# Patient Record
Sex: MALE | Race: WHITE | Employment: OTHER | URBAN - METROPOLITAN AREA
[De-identification: names, ages, dates, MRNs, and addresses within clinical notes are randomized per-mention and may not be internally consistent; named-entity substitution may affect disease eponyms.]

---

## 2018-12-11 ENCOUNTER — HOSPITAL ENCOUNTER (OUTPATIENT)
Age: 83
Setting detail: OBSERVATION
Discharge: HOME | End: 2018-12-12
Attending: EMERGENCY MEDICINE | Admitting: INTERNAL MEDICINE
Payer: MEDICARE

## 2018-12-11 ENCOUNTER — APPOINTMENT (OUTPATIENT)
Dept: CT IMAGING | Age: 83
End: 2018-12-11
Payer: MEDICARE

## 2018-12-11 DIAGNOSIS — S30.0XXA TRAUMATIC HEMATOMA OF BUTTOCK, INITIAL ENCOUNTER: Primary | ICD-10-CM

## 2018-12-11 DIAGNOSIS — Z79.01 ON ANTICOAGULANT THERAPY: ICD-10-CM

## 2018-12-11 PROBLEM — E11.9 DMII (DIABETES MELLITUS, TYPE 2) (HCC): Status: ACTIVE | Noted: 2018-12-11

## 2018-12-11 PROBLEM — E78.5 HYPERLIPIDEMIA: Status: ACTIVE | Noted: 2018-12-11

## 2018-12-11 PROBLEM — T14.8XXA HEMATOMA: Status: ACTIVE | Noted: 2018-12-11

## 2018-12-11 LAB
A/G RATIO: 1.5 (ref 1.1–2.2)
ABO/RH: NORMAL
ALBUMIN SERPL-MCNC: 4 G/DL (ref 3.4–5)
ALP BLD-CCNC: 82 U/L (ref 40–129)
ALT SERPL-CCNC: 12 U/L (ref 10–40)
ANION GAP SERPL CALCULATED.3IONS-SCNC: 17 MMOL/L (ref 3–16)
ANTIBODY SCREEN: NORMAL
APTT: 31.8 SEC (ref 26–36)
AST SERPL-CCNC: 13 U/L (ref 15–37)
BASOPHILS ABSOLUTE: 0.1 K/UL (ref 0–0.2)
BASOPHILS RELATIVE PERCENT: 0.5 %
BILIRUB SERPL-MCNC: 1.6 MG/DL (ref 0–1)
BUN BLDV-MCNC: 13 MG/DL (ref 7–20)
CALCIUM SERPL-MCNC: 9 MG/DL (ref 8.3–10.6)
CHLORIDE BLD-SCNC: 102 MMOL/L (ref 99–110)
CO2: 23 MMOL/L (ref 21–32)
CREAT SERPL-MCNC: 0.7 MG/DL (ref 0.8–1.3)
EOSINOPHILS ABSOLUTE: 0.1 K/UL (ref 0–0.6)
EOSINOPHILS RELATIVE PERCENT: 0.5 %
GFR AFRICAN AMERICAN: >60
GFR NON-AFRICAN AMERICAN: >60
GLOBULIN: 2.6 G/DL
GLUCOSE BLD-MCNC: 156 MG/DL (ref 70–99)
GLUCOSE BLD-MCNC: 174 MG/DL (ref 70–99)
HCT VFR BLD CALC: 40.1 % (ref 40.5–52.5)
HCT VFR BLD CALC: 44.6 % (ref 40.5–52.5)
HEMOGLOBIN: 13.3 G/DL (ref 13.5–17.5)
HEMOGLOBIN: 14.9 G/DL (ref 13.5–17.5)
INR BLD: 1.42 (ref 0.86–1.14)
LYMPHOCYTES ABSOLUTE: 1 K/UL (ref 1–5.1)
LYMPHOCYTES RELATIVE PERCENT: 9 %
MCH RBC QN AUTO: 31.3 PG (ref 26–34)
MCHC RBC AUTO-ENTMCNC: 33.4 G/DL (ref 31–36)
MCV RBC AUTO: 93.7 FL (ref 80–100)
MONOCYTES ABSOLUTE: 1 K/UL (ref 0–1.3)
MONOCYTES RELATIVE PERCENT: 9.5 %
NEUTROPHILS ABSOLUTE: 8.9 K/UL (ref 1.7–7.7)
NEUTROPHILS RELATIVE PERCENT: 80.5 %
PDW BLD-RTO: 13.7 % (ref 12.4–15.4)
PERFORMED ON: ABNORMAL
PLATELET # BLD: 255 K/UL (ref 135–450)
PMV BLD AUTO: 9 FL (ref 5–10.5)
POTASSIUM SERPL-SCNC: 4.6 MMOL/L (ref 3.5–5.1)
PROTHROMBIN TIME: 16.2 SEC (ref 9.8–13)
RBC # BLD: 4.76 M/UL (ref 4.2–5.9)
SODIUM BLD-SCNC: 142 MMOL/L (ref 136–145)
TOTAL PROTEIN: 6.6 G/DL (ref 6.4–8.2)
WBC # BLD: 11 K/UL (ref 4–11)

## 2018-12-11 PROCEDURE — 80053 COMPREHEN METABOLIC PANEL: CPT

## 2018-12-11 PROCEDURE — G0378 HOSPITAL OBSERVATION PER HR: HCPCS

## 2018-12-11 PROCEDURE — 85610 PROTHROMBIN TIME: CPT

## 2018-12-11 PROCEDURE — 36415 COLL VENOUS BLD VENIPUNCTURE: CPT

## 2018-12-11 PROCEDURE — 85730 THROMBOPLASTIN TIME PARTIAL: CPT

## 2018-12-11 PROCEDURE — 51798 US URINE CAPACITY MEASURE: CPT

## 2018-12-11 PROCEDURE — 2580000003 HC RX 258: Performed by: INTERNAL MEDICINE

## 2018-12-11 PROCEDURE — 96360 HYDRATION IV INFUSION INIT: CPT

## 2018-12-11 PROCEDURE — 85018 HEMOGLOBIN: CPT

## 2018-12-11 PROCEDURE — 85025 COMPLETE CBC W/AUTO DIFF WBC: CPT

## 2018-12-11 PROCEDURE — 2580000003 HC RX 258: Performed by: PHYSICIAN ASSISTANT

## 2018-12-11 PROCEDURE — 86850 RBC ANTIBODY SCREEN: CPT

## 2018-12-11 PROCEDURE — 86900 BLOOD TYPING SEROLOGIC ABO: CPT

## 2018-12-11 PROCEDURE — 6370000000 HC RX 637 (ALT 250 FOR IP): Performed by: INTERNAL MEDICINE

## 2018-12-11 PROCEDURE — 96361 HYDRATE IV INFUSION ADD-ON: CPT

## 2018-12-11 PROCEDURE — 6360000004 HC RX CONTRAST MEDICATION: Performed by: EMERGENCY MEDICINE

## 2018-12-11 PROCEDURE — 86901 BLOOD TYPING SEROLOGIC RH(D): CPT

## 2018-12-11 PROCEDURE — 99285 EMERGENCY DEPT VISIT HI MDM: CPT

## 2018-12-11 PROCEDURE — 6370000000 HC RX 637 (ALT 250 FOR IP): Performed by: PHYSICIAN ASSISTANT

## 2018-12-11 PROCEDURE — 72193 CT PELVIS W/DYE: CPT

## 2018-12-11 PROCEDURE — 85014 HEMATOCRIT: CPT

## 2018-12-11 RX ORDER — DUTASTERIDE 0.5 MG/1
0.5 CAPSULE, LIQUID FILLED ORAL DAILY
COMMUNITY
End: 2019-07-24

## 2018-12-11 RX ORDER — MECLIZINE HYDROCHLORIDE 25 MG/1
25 TABLET ORAL 3 TIMES DAILY PRN
Status: CANCELLED | OUTPATIENT
Start: 2018-12-11

## 2018-12-11 RX ORDER — TAMSULOSIN HYDROCHLORIDE 0.4 MG/1
0.4 CAPSULE ORAL DAILY
Status: DISCONTINUED | OUTPATIENT
Start: 2018-12-11 | End: 2018-12-12 | Stop reason: HOSPADM

## 2018-12-11 RX ORDER — LISINOPRIL 10 MG/1
10 TABLET ORAL DAILY
Status: DISCONTINUED | OUTPATIENT
Start: 2018-12-12 | End: 2018-12-12 | Stop reason: HOSPADM

## 2018-12-11 RX ORDER — ACETAMINOPHEN 325 MG/1
650 TABLET ORAL EVERY 4 HOURS PRN
Status: DISCONTINUED | OUTPATIENT
Start: 2018-12-11 | End: 2018-12-12 | Stop reason: HOSPADM

## 2018-12-11 RX ORDER — NICOTINE POLACRILEX 4 MG
15 LOZENGE BUCCAL PRN
Status: DISCONTINUED | OUTPATIENT
Start: 2018-12-11 | End: 2018-12-12 | Stop reason: HOSPADM

## 2018-12-11 RX ORDER — DOCUSATE SODIUM 100 MG/1
100 CAPSULE, LIQUID FILLED ORAL 2 TIMES DAILY PRN
Status: DISCONTINUED | OUTPATIENT
Start: 2018-12-11 | End: 2018-12-12 | Stop reason: HOSPADM

## 2018-12-11 RX ORDER — BISACODYL 10 MG
10 SUPPOSITORY, RECTAL RECTAL DAILY PRN
Status: DISCONTINUED | OUTPATIENT
Start: 2018-12-11 | End: 2018-12-12 | Stop reason: HOSPADM

## 2018-12-11 RX ORDER — HYDROCODONE BITARTRATE AND ACETAMINOPHEN 5; 325 MG/1; MG/1
1 TABLET ORAL EVERY 6 HOURS PRN
Status: DISCONTINUED | OUTPATIENT
Start: 2018-12-11 | End: 2018-12-12

## 2018-12-11 RX ORDER — HYDROCODONE BITARTRATE AND ACETAMINOPHEN 5; 325 MG/1; MG/1
1 TABLET ORAL ONCE
Status: COMPLETED | OUTPATIENT
Start: 2018-12-11 | End: 2018-12-11

## 2018-12-11 RX ORDER — SODIUM CHLORIDE 0.9 % (FLUSH) 0.9 %
10 SYRINGE (ML) INJECTION PRN
Status: DISCONTINUED | OUTPATIENT
Start: 2018-12-11 | End: 2018-12-12 | Stop reason: HOSPADM

## 2018-12-11 RX ORDER — ONDANSETRON 2 MG/ML
4 INJECTION INTRAMUSCULAR; INTRAVENOUS EVERY 4 HOURS PRN
Status: DISCONTINUED | OUTPATIENT
Start: 2018-12-11 | End: 2018-12-12 | Stop reason: HOSPADM

## 2018-12-11 RX ORDER — SODIUM CHLORIDE 9 MG/ML
1000 INJECTION, SOLUTION INTRAVENOUS CONTINUOUS
Status: DISCONTINUED | OUTPATIENT
Start: 2018-12-11 | End: 2018-12-12 | Stop reason: HOSPADM

## 2018-12-11 RX ORDER — DEXTROSE MONOHYDRATE 25 G/50ML
12.5 INJECTION, SOLUTION INTRAVENOUS PRN
Status: DISCONTINUED | OUTPATIENT
Start: 2018-12-11 | End: 2018-12-12 | Stop reason: HOSPADM

## 2018-12-11 RX ORDER — SIMVASTATIN 20 MG
20 TABLET ORAL NIGHTLY
Status: DISCONTINUED | OUTPATIENT
Start: 2018-12-11 | End: 2018-12-12 | Stop reason: HOSPADM

## 2018-12-11 RX ORDER — FAMOTIDINE 20 MG/1
20 TABLET, FILM COATED ORAL 2 TIMES DAILY
Status: DISCONTINUED | OUTPATIENT
Start: 2018-12-11 | End: 2018-12-12 | Stop reason: HOSPADM

## 2018-12-11 RX ORDER — DEXTROSE MONOHYDRATE 50 MG/ML
100 INJECTION, SOLUTION INTRAVENOUS PRN
Status: DISCONTINUED | OUTPATIENT
Start: 2018-12-11 | End: 2018-12-12 | Stop reason: HOSPADM

## 2018-12-11 RX ORDER — SODIUM CHLORIDE 0.9 % (FLUSH) 0.9 %
10 SYRINGE (ML) INJECTION EVERY 12 HOURS SCHEDULED
Status: DISCONTINUED | OUTPATIENT
Start: 2018-12-11 | End: 2018-12-12 | Stop reason: HOSPADM

## 2018-12-11 RX ADMIN — HYDROCODONE BITARTRATE AND ACETAMINOPHEN 1 TABLET: 5; 325 TABLET ORAL at 15:37

## 2018-12-11 RX ADMIN — HYDROCODONE BITARTRATE AND ACETAMINOPHEN 1 TABLET: 5; 325 TABLET ORAL at 20:46

## 2018-12-11 RX ADMIN — IOPAMIDOL 75 ML: 755 INJECTION, SOLUTION INTRAVENOUS at 15:44

## 2018-12-11 RX ADMIN — SODIUM CHLORIDE 1000 ML: 9 INJECTION, SOLUTION INTRAVENOUS at 16:52

## 2018-12-11 RX ADMIN — SIMVASTATIN 20 MG: 20 TABLET, FILM COATED ORAL at 20:38

## 2018-12-11 RX ADMIN — FAMOTIDINE 20 MG: 20 TABLET ORAL at 20:38

## 2018-12-11 RX ADMIN — TAMSULOSIN HYDROCHLORIDE 0.4 MG: 0.4 CAPSULE ORAL at 20:38

## 2018-12-11 RX ADMIN — SODIUM CHLORIDE 1000 ML: 9 INJECTION, SOLUTION INTRAVENOUS at 20:38

## 2018-12-11 RX ADMIN — Medication 10 ML: at 20:44

## 2018-12-11 ASSESSMENT — PAIN DESCRIPTION - ORIENTATION: ORIENTATION: LEFT

## 2018-12-11 ASSESSMENT — PAIN DESCRIPTION - LOCATION
LOCATION: BUTTOCKS
LOCATION: BUTTOCKS;LEG
LOCATION: BUTTOCKS

## 2018-12-11 ASSESSMENT — PAIN SCALES - GENERAL
PAINLEVEL_OUTOF10: 8
PAINLEVEL_OUTOF10: 8
PAINLEVEL_OUTOF10: 6
PAINLEVEL_OUTOF10: 6
PAINLEVEL_OUTOF10: 10
PAINLEVEL_OUTOF10: 8

## 2018-12-11 ASSESSMENT — ENCOUNTER SYMPTOMS
NAUSEA: 0
SHORTNESS OF BREATH: 0
ABDOMINAL PAIN: 0
VOMITING: 0

## 2018-12-11 ASSESSMENT — PAIN DESCRIPTION - PAIN TYPE: TYPE: ACUTE PAIN

## 2018-12-11 NOTE — ED TRIAGE NOTES
Pt to ED with c/o of pain from a fall. Pt states that he fell a week and a half ago but fell again last night around 0300. States that he began having bad pain in his left buttocks following the most resent fall. Pt has a hematoma on his left buttocks down his left leg and into his scrotum. Pt is A&O. States pain is 8/10 but has been 10/10 since the fall.

## 2018-12-11 NOTE — ED NOTES
Pt bladder scanned 411 ml in bladder. Pt states that he does not feel like he needs to urinate at the moment. Pt has urinated 200 ml recently. [facility] notified.       Alon Hickman RN  12/11/18 [id number]

## 2018-12-12 VITALS
DIASTOLIC BLOOD PRESSURE: 83 MMHG | RESPIRATION RATE: 20 BRPM | OXYGEN SATURATION: 96 % | TEMPERATURE: 98.4 F | HEART RATE: 81 BPM | WEIGHT: 176.37 LBS | HEIGHT: 72 IN | BODY MASS INDEX: 23.89 KG/M2 | SYSTOLIC BLOOD PRESSURE: 147 MMHG

## 2018-12-12 LAB
ANION GAP SERPL CALCULATED.3IONS-SCNC: 9 MMOL/L (ref 3–16)
BASOPHILS ABSOLUTE: 0.1 K/UL (ref 0–0.2)
BASOPHILS RELATIVE PERCENT: 0.7 %
BUN BLDV-MCNC: 11 MG/DL (ref 7–20)
CALCIUM SERPL-MCNC: 8.5 MG/DL (ref 8.3–10.6)
CHLORIDE BLD-SCNC: 107 MMOL/L (ref 99–110)
CO2: 27 MMOL/L (ref 21–32)
CREAT SERPL-MCNC: 0.6 MG/DL (ref 0.8–1.3)
EOSINOPHILS ABSOLUTE: 0.1 K/UL (ref 0–0.6)
EOSINOPHILS RELATIVE PERCENT: 1.7 %
GFR AFRICAN AMERICAN: >60
GFR NON-AFRICAN AMERICAN: >60
GLUCOSE BLD-MCNC: 144 MG/DL (ref 70–99)
GLUCOSE BLD-MCNC: 162 MG/DL (ref 70–99)
GLUCOSE BLD-MCNC: 180 MG/DL (ref 70–99)
HCT VFR BLD CALC: 42.1 % (ref 40.5–52.5)
HEMOGLOBIN: 14 G/DL (ref 13.5–17.5)
LYMPHOCYTES ABSOLUTE: 1.3 K/UL (ref 1–5.1)
LYMPHOCYTES RELATIVE PERCENT: 18 %
MCH RBC QN AUTO: 31.6 PG (ref 26–34)
MCHC RBC AUTO-ENTMCNC: 33.4 G/DL (ref 31–36)
MCV RBC AUTO: 94.8 FL (ref 80–100)
MONOCYTES ABSOLUTE: 0.9 K/UL (ref 0–1.3)
MONOCYTES RELATIVE PERCENT: 12 %
NEUTROPHILS ABSOLUTE: 5 K/UL (ref 1.7–7.7)
NEUTROPHILS RELATIVE PERCENT: 67.6 %
PDW BLD-RTO: 13.5 % (ref 12.4–15.4)
PERFORMED ON: ABNORMAL
PERFORMED ON: ABNORMAL
PLATELET # BLD: 231 K/UL (ref 135–450)
PMV BLD AUTO: 9 FL (ref 5–10.5)
POTASSIUM REFLEX MAGNESIUM: 4.2 MMOL/L (ref 3.5–5.1)
RBC # BLD: 4.44 M/UL (ref 4.2–5.9)
SODIUM BLD-SCNC: 143 MMOL/L (ref 136–145)
WBC # BLD: 7.4 K/UL (ref 4–11)

## 2018-12-12 PROCEDURE — 97162 PT EVAL MOD COMPLEX 30 MIN: CPT

## 2018-12-12 PROCEDURE — 97530 THERAPEUTIC ACTIVITIES: CPT

## 2018-12-12 PROCEDURE — 97110 THERAPEUTIC EXERCISES: CPT

## 2018-12-12 PROCEDURE — G8987 SELF CARE CURRENT STATUS: HCPCS

## 2018-12-12 PROCEDURE — 99219 PR INITIAL OBSERVATION CARE/DAY 50 MINUTES: CPT | Performed by: SURGERY

## 2018-12-12 PROCEDURE — 97166 OT EVAL MOD COMPLEX 45 MIN: CPT

## 2018-12-12 PROCEDURE — G8979 MOBILITY GOAL STATUS: HCPCS

## 2018-12-12 PROCEDURE — G8978 MOBILITY CURRENT STATUS: HCPCS

## 2018-12-12 PROCEDURE — 36415 COLL VENOUS BLD VENIPUNCTURE: CPT

## 2018-12-12 PROCEDURE — G0378 HOSPITAL OBSERVATION PER HR: HCPCS

## 2018-12-12 PROCEDURE — 6370000000 HC RX 637 (ALT 250 FOR IP): Performed by: NURSE PRACTITIONER

## 2018-12-12 PROCEDURE — 6370000000 HC RX 637 (ALT 250 FOR IP): Performed by: INTERNAL MEDICINE

## 2018-12-12 PROCEDURE — 85025 COMPLETE CBC W/AUTO DIFF WBC: CPT

## 2018-12-12 PROCEDURE — 97535 SELF CARE MNGMENT TRAINING: CPT

## 2018-12-12 PROCEDURE — 2580000003 HC RX 258: Performed by: INTERNAL MEDICINE

## 2018-12-12 PROCEDURE — 2580000003 HC RX 258: Performed by: PHYSICIAN ASSISTANT

## 2018-12-12 PROCEDURE — G8988 SELF CARE GOAL STATUS: HCPCS

## 2018-12-12 PROCEDURE — 97116 GAIT TRAINING THERAPY: CPT

## 2018-12-12 PROCEDURE — 80048 BASIC METABOLIC PNL TOTAL CA: CPT

## 2018-12-12 RX ORDER — HYDROCODONE BITARTRATE AND ACETAMINOPHEN 5; 325 MG/1; MG/1
2 TABLET ORAL EVERY 4 HOURS PRN
Status: DISCONTINUED | OUTPATIENT
Start: 2018-12-12 | End: 2018-12-12 | Stop reason: HOSPADM

## 2018-12-12 RX ORDER — HYDROCODONE BITARTRATE AND ACETAMINOPHEN 5; 325 MG/1; MG/1
2 TABLET ORAL EVERY 6 HOURS PRN
Qty: 24 TABLET | Refills: 0 | Status: SHIPPED | OUTPATIENT
Start: 2018-12-12 | End: 2018-12-15

## 2018-12-12 RX ORDER — HYDROCODONE BITARTRATE AND ACETAMINOPHEN 5; 325 MG/1; MG/1
1 TABLET ORAL EVERY 6 HOURS PRN
Qty: 12 TABLET | Refills: 0 | Status: SHIPPED | OUTPATIENT
Start: 2018-12-12 | End: 2018-12-12

## 2018-12-12 RX ORDER — HYDROCODONE BITARTRATE AND ACETAMINOPHEN 5; 325 MG/1; MG/1
1 TABLET ORAL EVERY 4 HOURS PRN
Status: DISCONTINUED | OUTPATIENT
Start: 2018-12-12 | End: 2018-12-12 | Stop reason: HOSPADM

## 2018-12-12 RX ADMIN — Medication 10 ML: at 08:30

## 2018-12-12 RX ADMIN — HYDROCODONE BITARTRATE AND ACETAMINOPHEN 2 TABLET: 5; 325 TABLET ORAL at 15:16

## 2018-12-12 RX ADMIN — LISINOPRIL 10 MG: 10 TABLET ORAL at 08:29

## 2018-12-12 RX ADMIN — HYDROCODONE BITARTRATE AND ACETAMINOPHEN 2 TABLET: 5; 325 TABLET ORAL at 10:43

## 2018-12-12 RX ADMIN — TAMSULOSIN HYDROCHLORIDE 0.4 MG: 0.4 CAPSULE ORAL at 08:29

## 2018-12-12 RX ADMIN — FAMOTIDINE 20 MG: 20 TABLET ORAL at 08:29

## 2018-12-12 RX ADMIN — HYDROCODONE BITARTRATE AND ACETAMINOPHEN 1 TABLET: 5; 325 TABLET ORAL at 06:12

## 2018-12-12 RX ADMIN — SODIUM CHLORIDE 1000 ML: 9 INJECTION, SOLUTION INTRAVENOUS at 04:30

## 2018-12-12 ASSESSMENT — PAIN SCALES - GENERAL
PAINLEVEL_OUTOF10: 8
PAINLEVEL_OUTOF10: 6
PAINLEVEL_OUTOF10: 8
PAINLEVEL_OUTOF10: 9
PAINLEVEL_OUTOF10: 7

## 2018-12-12 ASSESSMENT — PAIN DESCRIPTION - LOCATION
LOCATION: BUTTOCKS;LEG
LOCATION: BUTTOCKS;LEG

## 2018-12-12 ASSESSMENT — PAIN DESCRIPTION - PAIN TYPE: TYPE: ACUTE PAIN

## 2018-12-12 ASSESSMENT — PAIN DESCRIPTION - ORIENTATION
ORIENTATION: LEFT
ORIENTATION: LEFT

## 2018-12-12 ASSESSMENT — PAIN DESCRIPTION - DESCRIPTORS: DESCRIPTORS: SORE

## 2018-12-12 NOTE — PROGRESS NOTES
has been getting assistance for toileting, dressing. Typically pt is independent with all.)  Homemaking Assistance: Needs assistance (pt does cooking, laundry. Family does cleaning.)  Ambulation Assistance: Independent (with cane at baseline -- fall in July and then 2 recent falls with hematoma on left hip)  Transfer Assistance: Independent  Active : Yes  Additional Comments: Pt's spouse passed away ~3 monhts ago. Pt has a history of vertigo and a-fib as well as plantar fasciitis in feet       Objective   Vision: Within Functional Limits  Hearing: Within functional limits    Orientation  Overall Orientation Status: Within Functional Limits     Standing Balance  Sit to stand: Stand by assistance (pt used cane and pushing on bed for sit to stand, then rollator to walk)  ADL  Feeding: Independent  Grooming: Setup  UE Bathing:  (NT, expect Mod A needed)  UE Dressing: Minimal assistance  LE Dressing: Maximum assistance  Toileting: Maximum assistance        Bed mobility  Supine to Sit: Supervision  Sit to Supine: Supervision  Transfers  Stand Step Transfers: Stand by assistance (SBA to Supervision with rolling walker, for bed to hallway and back)  Sit to stand: Stand by assistance (pt used cane and pushing on bed for sit to stand, then rollator to walk)  Transfer Comments: Declined up to chair. Air cushion placed in recliner for him to try later. Cognition  Overall Cognitive Status: WFL        Sensation  Overall Sensation Status: Impaired (tingling in left posterior leg after heel-slide exercise in supine)        LUE AROM (degrees)  LUE AROM : WFL  RUE AROM (degrees)  RUE AROM : WFL                 Assessment   Performance deficits / Impairments: Decreased functional mobility ; Decreased ADL status; Decreased high-level IADLs  Prognosis: Good  Decision Making: Medium Complexity  History: hx A-fib, vertigo, diabetes, now with fall and large L buttock and leg hematoma  Exam: self care, mobility  Assistance /

## 2018-12-12 NOTE — DISCHARGE SUMMARY
medications which have CHANGED    Details   rivaroxaban (XARELTO) 20 MG TABS tablet Take 1 tablet by mouth daily (with breakfast) Please hold per cardiology  Qty: 30 tablet, Refills: 0           Current Discharge Medication List      CONTINUE these medications which have NOT CHANGED    Details   dutasteride (AVODART) 0.5 MG capsule Take 0.5 mg by mouth daily      metFORMIN (GLUCOPHAGE) 500 MG tablet Take 1,000 mg by mouth daily (with breakfast)      lisinopril (PRINIVIL;ZESTRIL) 10 MG tablet Take 10 mg by mouth daily. tamsulosin (FLOMAX) 0.4 MG capsule Take 0.4 mg by mouth nightly       lovastatin (MEVACOR) 40 MG tablet Take 40 mg by mouth nightly. Current Discharge Medication List      STOP taking these medications       meclizine (ANTIVERT) 25 MG tablet Comments:   Reason for Stopping:         etodolac (LODINE) 400 MG tablet Comments:   Reason for Stopping:         glipiZIDE (GLUCOTROL) 5 MG tablet Comments:   Reason for Stopping: Follow-up appointments:  one week    Provider Follow-up:    pcp    Condition at Discharge:  Stable    The patient was seen and examined on day of discharge and this discharge summary is in conjunction with any daily progress note from day of discharge. Time Spent on discharge is 45 minutes  in the examination, evaluation, counseling and review of medications and discharge plan. Signed:    Khushboo Mckeon DO   12/12/2018      Thank you Luis Herrera MD for the opportunity to be involved in this patient's care. If you have any questions or concerns please feel free to contact me at [phone].

## 2018-12-12 NOTE — PROGRESS NOTES
includes Appendectomy; hernia repair; Lung surgery; and Testicle surgery.        Restrictions  Restrictions/Precautions: Fall Risk (extensive hematoma in left buttock)    Vision/Hearing  Vision: Within Functional Limits  Hearing: Within functional limits    SUBJECTIVE:  Chart Reviewed: Yes  Patient assessed for rehabilitation services?: Yes  Additional Pertinent Hx: Erika Azevedo is an 80 y.o. M who presented to [address] [facility] ED 12/11/18 with hematoma on right buttocks from fall 1 week ago (pt does take Xarelto). Referring Practitioner: Nikolas Pathak MD  Diagnosis: Traumatic hematoma of buttock     Subjective: Pt supine in bed with head elevated; dtr in room visiting. Pt is verbose and somewhat tangential; able to be redirected. Patient Currently in Pain: Yes  Pain Assessment: 0-10  Pain Level: 8 (6 at rest and 8 with mobility)  Pain Type: Acute pain  Pain Location: Buttocks, Leg  Pain Orientation: Left  Pain Descriptors: Sore  Pain Intervention(s): Medication (see eMar)  Response to Pain Intervention: None (pt states norco not helping)     Orientation  Overall Orientation Status: Within Functional Limits  Orientation Level: Oriented X4  Cognition  WFL     Social/Functional History  Social/Functional History  Lives With: Alone  Type of Home: House  Home Layout: One level  Home Access: Ramped entrance  Bathroom Shower/Tub: Walk-in shower  Bathroom Toilet: Standard  Bathroom Equipment: Shower chair, 3-in-1 commode  Bathroom Accessibility: Walker accessible  Home Equipment: 4 wheeled walker, [employer] Help From: Family (dtr stops by every morning before work since pt fell; granddtr checks to make sure pt gets into bed at night)  ADL Assistance: Needs assistance (Since hematoma has been getting assistance for toileting, dressing. Typically pt is independent with all.)  Homemaking Assistance: Needs assistance (pt does cooking, laundry.  Family does cleaning.)  Ambulation Assistance: Independent (with cane at baseline -- fall in July and then 2 recent falls with hematoma on left hip)  Transfer Assistance: Independent  Active : Yes  Additional Comments: Pt's spouse passed away ~3 monhts ago. Pt has a history of vertigo and a-fib as well as plantar fasciitis in feet       OBJECTIVE:  OBSERVATIONS  Observation: Purple ecchymosis throughout entire left gluteal area and proximal posterior thigh (also extending into scrotum). ROM  RLE PROM: WFL     LLE PROM: Exceptions  LLE General PROM: hip flexion to ~50*, knee flexion to ~90*, full knee ext and ankle DF/PF       STRENGTH  Strength RLE: Exception  Comment: hip 3-/5, knee 4-/5, ankle 5/5     Strength LLE: WFL  Comment: 5/5 hip/knee/ankle       Bed mobility  Supine to Sit: Supervision (HOB flat, no rail)  Sit to Supine: Supervision (HOB flat, pt lifting left leg into bed with hands)  Scooting: Supervision    Transfers  Sit to Stand: Stand by assistance (pt keeping wt off left gluteal with left knee extended, increased effort, use of cane in hand)  Stand to sit: Stand by assistance (pt keeping weight off left gluteal)    Ambulation  Ambulation  Ambulation?: Yes  Ambulation 1  Device: Rollator  Assistance: Stand by assistance, Supervision  Quality of Gait: reduced step length on left, good wt shift into melanie LEs, slow gait speed, head / neck slightly flexed, steady  Distance: 150'  Comments: no c/o dizziness or lightheadedness    Stairs/Curb  Stairs/Curb  Stairs?: No     Balance  Balance  Sitting - Static: Good  Standing - Static: Fair, + (with walker)    Exercises  Exercises  Comments: GS x 5 reps in supine, heel slide x 5 reps on L LE. Instructed pt to perform GS every 30 minutes and heel slides BID. Pt reported posterior thigh numbness/tingling after performing heel slide exercise. Other Activities:  Comment: Foot of bed elevated to facilitate venous return in left LE. Pt educated to elevate leg above heart level and to place wash cloth under scrotum.         Treatment included transfer training, gait training, and patient education.     This note also serves as a D/C Summary in the event that this patient is discharged prior to the next therapy session. Please refer to last PT note for goal status, discharge recommendations and functional status.       ASSESSMENT:   Assessment: Konrad Staley is an 80 y.o. M who presented to [facility] ED 12/11/18 with hematoma on right buttocks from fall 1 week ago (pt does take Xarelto). Prior to admit pt was living at home alone and was requiring increased assistance from his family (family stops over morning and evening). Pt had been independent with all function but is now needing assistance for all BADLs and is ambulating short distances with a rollator walker. Today he transferred in/out of bed with supervision/SBA and ambulated 150' with a rollator walker and SBA/Supervision. He did report transient numbness/tingling in sciatic distribution after hip flexion exercise in supine. Pt appears to be functioning below his baseline but has good support at home. Recommend return home with family assist and level 1 home PT. Treatment Diagnosis: Decreased activity tolerance, left gluteal pain, left leg weakness, abnormal gait  Prognosis: Fair  Decision Making: Medium Complexity  History: Diabetes mellitus ([address] Heart aneurysm; and Vertigo. Exam: Decreased activity tolerance, left gluteal pain, left leg weakness, abnormal gait  Clinical Presentation: Evolving activity tolerance  Patient Education: Role of PT, exercises, importance of elevating left leg. He verb understanding. Barriers to Learning: Distractable  REQUIRES PT FOLLOW UP: Yes  Discharge Recommendations: Home with assist PRN, S Level 1, Patient would benefit from continued therapy after discharge    David Maddox scored a 18/24 on the AM-PAC short mobility form.  Initial research suggests that an AM-PAC score of 18 or greater may be associated with a discharge to patient's home setting, however this

## 2018-12-12 NOTE — PLAN OF CARE
Problem: Pain:  Goal: Control of acute pain  Control of acute pain   Outcome: Ongoing  Pt able to express presence/absence of pain and rate pain appropriately using numerical scale. Pain/discomfort being managed with PRN analgesics per MD orders (see MAR). Pain assessed every shift and after interventions. Problem: Falls - Risk of:  Goal: Will remain free from falls  Will remain free from falls   Outcome: Ongoing  Patient free from falls this shift. Fall precautions in place at all times. Bed in lowest position with two side rails up and wheels locked. Call light within reach. Patient able and agreeable to contact for safety appropriately. Problem: Risk for Impaired Skin Integrity  Goal: Tissue integrity - skin and mucous membranes  Structural intactness and normal physiological function of skin and  mucous membranes. Outcome: Ongoing  Skin assessment performed each shift per protocol. Patient turned and repositioned every two hours and prn with pillow support.  Patient checked for incontence every two hours.     '

## 2018-12-12 NOTE — CARE COORDINATION
INITIAL CASE MANAGEMENT ASSESSMENT    Reviewed chart, met with patient and patients daughter Steven Sandoval to assess possible discharge needs. Explained Case Management role/services.      Living Situation: lives alone (wife passed away 3 months ago)  Has 3 daughters and a granddaughter that take turns being with patient throughout the day  Patients daughters are there during morning and evening    ADLs: independent- gets PRN assist from daughters     DME: prior to admission was using a cane  Has been using a walker since his fall     PT/OT Recs: home care  List given- patient would like [facility]     Active Services: has reached out to [facility] in past - does not currently have any services, but knows how to get a hold of them if he decides he wants services     Transportation: prior to admission patient drove- goal is for him to get back to driving once he is feeling better  Family to transport at d/c     Medications: no barriers    PCP: Himanshu Kramer      HD/PD: n/a    PLAN/COMMENTS:   Patient and family deny any additional needs at this time   SW/CM provided contact information for patient or family to call with any questions   SW/CM will follow and assist as needed     Electronically signed by OPAL Hawthorne on 12/12/2018 at 11:34 AM  [phone]

## 2018-12-13 LAB
GLUCOSE BLD-MCNC: 157 MG/DL (ref 70–99)
PERFORMED ON: ABNORMAL

## 2022-07-27 ENCOUNTER — HOSPITAL ENCOUNTER (EMERGENCY)
Age: 87
Discharge: HOME | End: 2022-07-27
Payer: MEDICARE

## 2022-07-27 VITALS
HEIGHT: 72 IN | BODY MASS INDEX: 22.21 KG/M2 | WEIGHT: 164 LBS | TEMPERATURE: 97 F | HEART RATE: 129 BPM | DIASTOLIC BLOOD PRESSURE: 90 MMHG | OXYGEN SATURATION: 97 % | SYSTOLIC BLOOD PRESSURE: 178 MMHG

## 2022-07-27 DIAGNOSIS — M54.31 RIGHT SCIATIC NERVE PAIN: Primary | ICD-10-CM

## 2022-07-27 PROCEDURE — 99283 EMERGENCY DEPT VISIT LOW MDM: CPT

## 2022-07-27 RX ORDER — TRAMADOL HYDROCHLORIDE 50 MG/1
50 TABLET ORAL ONCE
Status: DISCONTINUED | OUTPATIENT
Start: 2022-07-27 | End: 2022-07-27 | Stop reason: HOSPADM

## 2022-07-27 RX ORDER — TRAMADOL HYDROCHLORIDE 50 MG/1
50 TABLET ORAL EVERY 8 HOURS PRN
Qty: 15 TABLET | Refills: 0 | Status: SHIPPED | OUTPATIENT
Start: 2022-07-27 | End: 2022-08-01

## 2022-07-27 RX ORDER — GABAPENTIN 100 MG/1
100 CAPSULE ORAL 3 TIMES DAILY
Qty: 30 CAPSULE | Refills: 0 | Status: ON HOLD | OUTPATIENT
Start: 2022-07-27 | End: 2022-08-12

## 2022-07-27 RX ORDER — GABAPENTIN 100 MG/1
100 CAPSULE ORAL ONCE
Status: DISCONTINUED | OUTPATIENT
Start: 2022-07-27 | End: 2022-07-27 | Stop reason: HOSPADM

## 2022-07-27 ASSESSMENT — PAIN SCALES - GENERAL: PAINLEVEL_OUTOF10: 4

## 2022-07-27 ASSESSMENT — PAIN - FUNCTIONAL ASSESSMENT: PAIN_FUNCTIONAL_ASSESSMENT: 0-10

## 2022-07-27 NOTE — DISCHARGE INSTRUCTIONS
Home in stable condition to use medication as written, use warm or cold compress to the affected area as well for comfort as tolerated, and follow-up outpatient with your back specialist at next available appointment for further care and treatment. Also follow-up with your cardiologist tomorrow as discussed concerning the decreasing pulse in the right lower extremity. Return to the emergency department for any emergency worsening or concern.

## 2022-07-27 NOTE — ED PROVIDER NOTES
**ADVANCED PRACTICE PROVIDER, I HAVE EVALUATED THIS PATIENT**        [address]      Pt Name: Anjana Castellanos  MCF:[id number]  [place] 9/6/1930  Date of evaluation: 7/27/2022  Provider: Young Rock PA-C      Chief Complaint:    Chief Complaint   Patient presents with    Back Pain     Had recent epidural 7 days ago pt still having severe pain. Pt states he can hardly lift his right leg. Lower back pain radiating down right leg          Nursing Notes, Past Medical Hx, Past Surgical Hx, Social Hx, Allergies, and Family Hx were all reviewed and agreed with or any disagreements were addressed in the HPI.    HPI: (Location, Duration, Timing, Severity, Quality, Assoc Sx, Context, Modifying factors)    Chief Complaint of right low back to hip pain     This is a  80 y.o. male who presents along with his daughter and they give the history together. He does have a history of ongoing issues with right sciatic nerve in the posterior right back to the right leg area. He has had multiple previous epidural injections that usually help. He had one last Thursday and it did not help. Therefore he is in quite a bit of pain from this. He cannot get comfortable nor could he be seen today by his back specialist.  Therefore they decided to come in to be seen.     PastMedical/Surgical History:      Diagnosis Date    Diabetes mellitus ([address]     Heart aneurysm     Vertigo          Procedure Laterality Date    APPENDECTOMY      HERNIA REPAIR      LUNG SURGERY      TESTICLE SURGERY         Medications:  Previous Medications    INSULIN REGULAR (HUMULIN R;NOVOLIN R) 100 UNIT/ML INJECTION    Inject into the skin See Admin Instructions    LISINOPRIL (PRINIVIL;ZESTRIL) 10 MG TABLET    Take 5 mg by mouth daily     MELATONIN 3 MG TABS TABLET    Take 3 mg by mouth nightly as needed    OMEPRAZOLE (PRILOSEC) 10 MG DELAYED RELEASE CAPSULE    Take 10 mg by mouth daily RIVAROXABAN (XARELTO) 20 MG TABS TABLET    Take 1 tablet by mouth daily (with breakfast) Please hold per cardiology    SERTRALINE (ZOLOFT) 100 MG TABLET    Take 100 mg by mouth daily    TAMSULOSIN (FLOMAX) 0.4 MG CAPSULE    Take 0.4 mg by mouth nightly          Review of Systems:  (2-9 systems needed)  Review of Systems  Positive history as above with no acute fall contusion or twisting injury or known overuse injury. No worst of life pain but positive for the pain issue as above. No shortness of breath or chest pain or abdominal pain vomiting or diarrhea. No extremity acute weakness or loss of coordination or sensation or movement. No rash. \"Positives and Pertinent negatives as per HPI\"    Physical Exam:  Physical Exam  Vitals and nursing note reviewed. Constitutional:       Appearance: Normal appearance. He is not diaphoretic. HENT:      Head: Normocephalic and atraumatic. Right Ear: External ear normal.      Left Ear: External ear normal.      Nose: Nose normal.   Eyes:      General:         Right eye: No discharge. Left eye: No discharge. Conjunctiva/sclera: Conjunctivae normal.   Cardiovascular:      Rate and Rhythm: Regular rhythm. Tachycardia present. Pulses: Normal pulses. Heart sounds: Normal heart sounds. No murmur heard. No gallop. Comments: Patient's heart rate is near 100 on exam.  Pulmonary:      Effort: Pulmonary effort is normal. No respiratory distress. Breath sounds: Normal breath sounds. No wheezing, rhonchi or rales. Abdominal:      General: Bowel sounds are normal. There is no distension. Palpations: Abdomen is soft. Tenderness: There is no abdominal tenderness. There is no guarding or rebound. Musculoskeletal:      Cervical back: Normal range of motion and neck supple. No rigidity or tenderness. Comments: Two-point discrimination intact throughout to testing.   Patient able to cross right lower extremity over left as well as traMADol (ULTRAM) tablet 50 mg (has no administration in time range)     Presents for evaluation and treatment is begun here. Electronic medical record reviewed and is also helpful of the patient's care including a record of the visit about a week ago to his pain specialist for his low back with the epidural injection. He is having pain in spite of that. This is a pain that usually responds to that type of treatment and not acute in terms of onset or different in nature or concerning otherwise to patient or family. He has good neurologic function. He has appropriate outpatient follow-up. We will write for some medications to help out with this pain. He does additionally have the findings on exam with the decreased but still present pretibial pulse on the right compared to the left. However capillary refill brisk still. Sensation and movement still within normal of that lower extremity. No suspicion for acute ischemic emergency currently. He additionally is seeing his cardiologist tomorrow and family is agreeable to follow-up with them concerning those findings. This is appropriate care. Patient's heart rate near 100 on exam as documented by myself. Conservative home care now agreed upon with family and they will be discharged as follows. Medications are given for comfort. Home in stable condition to use medication as written, use warm or cold compress to the affected area as well for comfort as tolerated, and follow-up outpatient with your back specialist at next available appointment for further care and treatment. Also follow-up with your cardiologist tomorrow as discussed concerning the decreasing pulse in the right lower extremity. Return to the emergency department for any emergency worsening or concern. The patient tolerated their visit well. I evaluated the patient. The physician was available for consultation as needed.   The patient and / or the family were informed of the results of any tests, a time was given to answer questions, a plan was proposed and they agreed with plan. CLINICAL IMPRESSION:  1. Right sciatic nerve pain        DISPOSITION Decision To Discharge 07/27/2022 06:34:35 PM      PATIENT REFERRED TO:  Danyell Caraballo MD  [address]  [phone]    Call   As needed      DISCHARGE MEDICATIONS:  New Prescriptions    TRAMADOL (ULTRAM) 50 MG TABLET    Take 1 tablet by mouth every 8 hours as needed for Pain for up to 5 days. Caution, causes drowsiness. Take appropriate care.        DISCONTINUED MEDICATIONS:  Discontinued Medications    No medications on file              (Please note the MDM and HPI sections of this note were completed with a voice recognition program.  Efforts were made to edit the dictations but occasionally words are mis-transcribed.)    Electronically signed, Sumit Landers PA-C,          Sumit Landers PA-C  07/27/22 8871

## 2022-08-12 ENCOUNTER — HOSPITAL ENCOUNTER (INPATIENT)
Age: 87
LOS: 6 days | Discharge: ANOTHER HOSPITAL | DRG: 300 | End: 2022-08-18
Attending: INTERNAL MEDICINE | Admitting: INTERNAL MEDICINE
Payer: MEDICARE

## 2022-08-12 ENCOUNTER — APPOINTMENT (OUTPATIENT)
Dept: CT IMAGING | Age: 87
DRG: 300 | End: 2022-08-12
Attending: INTERNAL MEDICINE
Payer: MEDICARE

## 2022-08-12 ENCOUNTER — OFFICE VISIT (OUTPATIENT)
Dept: VASCULAR SURGERY | Age: 87
End: 2022-08-12
Payer: MEDICARE

## 2022-08-12 VITALS — SYSTOLIC BLOOD PRESSURE: 130 MMHG | DIASTOLIC BLOOD PRESSURE: 82 MMHG | WEIGHT: 164 LBS | BODY MASS INDEX: 22.24 KG/M2

## 2022-08-12 DIAGNOSIS — E11.69 TYPE 2 DIABETES MELLITUS WITH OTHER SPECIFIED COMPLICATION, UNSPECIFIED WHETHER LONG TERM INSULIN USE (HCC): Primary | ICD-10-CM

## 2022-08-12 DIAGNOSIS — I68.0 CEREBRAL AMYLOID ANGIOPATHY (CODE): ICD-10-CM

## 2022-08-12 DIAGNOSIS — I99.8 PAIN OF RIGHT LOWER EXTREMITY DUE TO ISCHEMIA: ICD-10-CM

## 2022-08-12 DIAGNOSIS — R60.0 EDEMA OF RIGHT LOWER LEG: ICD-10-CM

## 2022-08-12 DIAGNOSIS — I48.11 LONGSTANDING PERSISTENT ATRIAL FIBRILLATION (HCC): ICD-10-CM

## 2022-08-12 DIAGNOSIS — M79.604 PAIN OF RIGHT LOWER EXTREMITY DUE TO ISCHEMIA: ICD-10-CM

## 2022-08-12 PROBLEM — I48.91 ATRIAL FIBRILLATION (HCC): Status: ACTIVE | Noted: 2022-08-12

## 2022-08-12 LAB
A/G RATIO: 1.3 (ref 1.1–2.2)
ALBUMIN SERPL-MCNC: 3.9 G/DL (ref 3.4–5)
ALP BLD-CCNC: 73 U/L (ref 40–129)
ALT SERPL-CCNC: 24 U/L (ref 10–40)
ANION GAP SERPL CALCULATED.3IONS-SCNC: 12 MMOL/L (ref 3–16)
AST SERPL-CCNC: 34 U/L (ref 15–37)
BASOPHILS ABSOLUTE: 0.1 K/UL (ref 0–0.2)
BASOPHILS RELATIVE PERCENT: 0.7 %
BILIRUB SERPL-MCNC: 0.6 MG/DL (ref 0–1)
BUN BLDV-MCNC: 11 MG/DL (ref 7–20)
CALCIUM SERPL-MCNC: 9.6 MG/DL (ref 8.3–10.6)
CHLORIDE BLD-SCNC: 99 MMOL/L (ref 99–110)
CO2: 27 MMOL/L (ref 21–32)
CREAT SERPL-MCNC: 0.7 MG/DL (ref 0.8–1.3)
D DIMER: >20 UG/ML FEU (ref 0–0.6)
EOSINOPHILS ABSOLUTE: 0.1 K/UL (ref 0–0.6)
EOSINOPHILS RELATIVE PERCENT: 1.1 %
GFR AFRICAN AMERICAN: >60
GFR NON-AFRICAN AMERICAN: >60
GLUCOSE BLD-MCNC: 123 MG/DL (ref 70–99)
GLUCOSE BLD-MCNC: 143 MG/DL (ref 70–99)
HCT VFR BLD CALC: 44.7 % (ref 40.5–52.5)
HEMOGLOBIN: 15.3 G/DL (ref 13.5–17.5)
LACTIC ACID: 1.4 MMOL/L (ref 0.4–2)
LYMPHOCYTES ABSOLUTE: 1.9 K/UL (ref 1–5.1)
LYMPHOCYTES RELATIVE PERCENT: 22.1 %
MCH RBC QN AUTO: 31.5 PG (ref 26–34)
MCHC RBC AUTO-ENTMCNC: 34.2 G/DL (ref 31–36)
MCV RBC AUTO: 92.1 FL (ref 80–100)
MONOCYTES ABSOLUTE: 0.8 K/UL (ref 0–1.3)
MONOCYTES RELATIVE PERCENT: 9.3 %
NEUTROPHILS ABSOLUTE: 5.7 K/UL (ref 1.7–7.7)
NEUTROPHILS RELATIVE PERCENT: 66.8 %
PDW BLD-RTO: 14.3 % (ref 12.4–15.4)
PERFORMED ON: ABNORMAL
PLATELET # BLD: 291 K/UL (ref 135–450)
PMV BLD AUTO: 8 FL (ref 5–10.5)
POTASSIUM SERPL-SCNC: 4.6 MMOL/L (ref 3.5–5.1)
RBC # BLD: 4.86 M/UL (ref 4.2–5.9)
SODIUM BLD-SCNC: 138 MMOL/L (ref 136–145)
TOTAL CK: 400 U/L (ref 39–308)
TOTAL PROTEIN: 6.8 G/DL (ref 6.4–8.2)
TROPONIN: <0.01 NG/ML
TROPONIN: <0.01 NG/ML
WBC # BLD: 8.5 K/UL (ref 4–11)

## 2022-08-12 PROCEDURE — 85025 COMPLETE CBC W/AUTO DIFF WBC: CPT

## 2022-08-12 PROCEDURE — 85379 FIBRIN DEGRADATION QUANT: CPT

## 2022-08-12 PROCEDURE — 99204 OFFICE O/P NEW MOD 45 MIN: CPT | Performed by: SURGERY

## 2022-08-12 PROCEDURE — 6360000004 HC RX CONTRAST MEDICATION: Performed by: SURGERY

## 2022-08-12 PROCEDURE — 36415 COLL VENOUS BLD VENIPUNCTURE: CPT

## 2022-08-12 PROCEDURE — 1123F ACP DISCUSS/DSCN MKR DOCD: CPT | Performed by: SURGERY

## 2022-08-12 PROCEDURE — 82550 ASSAY OF CK (CPK): CPT

## 2022-08-12 PROCEDURE — 6360000002 HC RX W HCPCS: Performed by: INTERNAL MEDICINE

## 2022-08-12 PROCEDURE — 4004F PT TOBACCO SCREEN RCVD TLK: CPT | Performed by: SURGERY

## 2022-08-12 PROCEDURE — G8427 DOCREV CUR MEDS BY ELIG CLIN: HCPCS | Performed by: SURGERY

## 2022-08-12 PROCEDURE — 1200000000 HC SEMI PRIVATE

## 2022-08-12 PROCEDURE — 2580000003 HC RX 258: Performed by: SURGERY

## 2022-08-12 PROCEDURE — 75635 CT ANGIO ABDOMINAL ARTERIES: CPT

## 2022-08-12 PROCEDURE — 83605 ASSAY OF LACTIC ACID: CPT

## 2022-08-12 PROCEDURE — G8420 CALC BMI NORM PARAMETERS: HCPCS | Performed by: SURGERY

## 2022-08-12 PROCEDURE — 84484 ASSAY OF TROPONIN QUANT: CPT

## 2022-08-12 PROCEDURE — 80053 COMPREHEN METABOLIC PANEL: CPT

## 2022-08-12 PROCEDURE — 2580000003 HC RX 258: Performed by: INTERNAL MEDICINE

## 2022-08-12 PROCEDURE — 6370000000 HC RX 637 (ALT 250 FOR IP): Performed by: INTERNAL MEDICINE

## 2022-08-12 RX ORDER — ACETAMINOPHEN 650 MG/1
650 SUPPOSITORY RECTAL EVERY 6 HOURS PRN
Status: DISCONTINUED | OUTPATIENT
Start: 2022-08-12 | End: 2022-08-18 | Stop reason: HOSPADM

## 2022-08-12 RX ORDER — ONDANSETRON 2 MG/ML
4 INJECTION INTRAMUSCULAR; INTRAVENOUS EVERY 6 HOURS PRN
Status: DISCONTINUED | OUTPATIENT
Start: 2022-08-12 | End: 2022-08-18 | Stop reason: HOSPADM

## 2022-08-12 RX ORDER — OXYCODONE HYDROCHLORIDE AND ACETAMINOPHEN 5; 325 MG/1; MG/1
1 TABLET ORAL EVERY 8 HOURS PRN
Status: ON HOLD | COMMUNITY
End: 2022-09-05 | Stop reason: HOSPADM

## 2022-08-12 RX ORDER — LISINOPRIL 5 MG/1
5 TABLET ORAL DAILY
Status: DISCONTINUED | OUTPATIENT
Start: 2022-08-13 | End: 2022-08-16

## 2022-08-12 RX ORDER — ATORVASTATIN CALCIUM 20 MG/1
20 TABLET, FILM COATED ORAL DAILY
Status: ON HOLD | COMMUNITY
End: 2022-08-12

## 2022-08-12 RX ORDER — MIRTAZAPINE 15 MG/1
7.5 TABLET, FILM COATED ORAL NIGHTLY
Status: DISCONTINUED | OUTPATIENT
Start: 2022-08-12 | End: 2022-08-12

## 2022-08-12 RX ORDER — ASPIRIN 81 MG/1
81 TABLET, CHEWABLE ORAL DAILY
Status: DISCONTINUED | OUTPATIENT
Start: 2022-08-12 | End: 2022-08-18 | Stop reason: HOSPADM

## 2022-08-12 RX ORDER — MORPHINE SULFATE 4 MG/ML
4 INJECTION, SOLUTION INTRAMUSCULAR; INTRAVENOUS
Status: DISCONTINUED | OUTPATIENT
Start: 2022-08-12 | End: 2022-08-18 | Stop reason: HOSPADM

## 2022-08-12 RX ORDER — LANOLIN ALCOHOL/MO/W.PET/CERES
3 CREAM (GRAM) TOPICAL NIGHTLY PRN
Status: DISCONTINUED | OUTPATIENT
Start: 2022-08-12 | End: 2022-08-12 | Stop reason: CLARIF

## 2022-08-12 RX ORDER — INSULIN GLARGINE 100 [IU]/ML
30 INJECTION, SOLUTION SUBCUTANEOUS NIGHTLY
COMMUNITY
End: 2022-08-18

## 2022-08-12 RX ORDER — ROSUVASTATIN CALCIUM 10 MG/1
10 TABLET, COATED ORAL EVERY MORNING
Status: DISCONTINUED | OUTPATIENT
Start: 2022-08-13 | End: 2022-08-18 | Stop reason: HOSPADM

## 2022-08-12 RX ORDER — INSULIN LISPRO 100 [IU]/ML
0-4 INJECTION, SOLUTION INTRAVENOUS; SUBCUTANEOUS
Status: DISCONTINUED | OUTPATIENT
Start: 2022-08-12 | End: 2022-08-18 | Stop reason: HOSPADM

## 2022-08-12 RX ORDER — INSULIN GLARGINE 100 [IU]/ML
30 INJECTION, SOLUTION SUBCUTANEOUS NIGHTLY
Status: DISCONTINUED | OUTPATIENT
Start: 2022-08-12 | End: 2022-08-15

## 2022-08-12 RX ORDER — ROSUVASTATIN CALCIUM 10 MG/1
10 TABLET, COATED ORAL DAILY
COMMUNITY

## 2022-08-12 RX ORDER — OXYCODONE HYDROCHLORIDE AND ACETAMINOPHEN 5; 325 MG/1; MG/1
1 TABLET ORAL EVERY 6 HOURS PRN
Status: ON HOLD | COMMUNITY
Start: 2022-08-09 | End: 2022-08-12

## 2022-08-12 RX ORDER — SODIUM CHLORIDE 0.9 % (FLUSH) 0.9 %
5-40 SYRINGE (ML) INJECTION PRN
Status: DISCONTINUED | OUTPATIENT
Start: 2022-08-12 | End: 2022-08-18 | Stop reason: HOSPADM

## 2022-08-12 RX ORDER — HYDROCODONE BITARTRATE AND ACETAMINOPHEN 5; 325 MG/1; MG/1
2 TABLET ORAL EVERY 6 HOURS PRN
Status: DISCONTINUED | OUTPATIENT
Start: 2022-08-12 | End: 2022-08-12 | Stop reason: CLARIF

## 2022-08-12 RX ORDER — MIRTAZAPINE 15 MG/1
15 TABLET, FILM COATED ORAL NIGHTLY
COMMUNITY

## 2022-08-12 RX ORDER — SODIUM CHLORIDE 0.9 % (FLUSH) 0.9 %
5-40 SYRINGE (ML) INJECTION EVERY 12 HOURS SCHEDULED
Status: DISCONTINUED | OUTPATIENT
Start: 2022-08-12 | End: 2022-08-18 | Stop reason: HOSPADM

## 2022-08-12 RX ORDER — ACETAMINOPHEN 325 MG/1
650 TABLET ORAL EVERY 6 HOURS PRN
Status: DISCONTINUED | OUTPATIENT
Start: 2022-08-12 | End: 2022-08-18 | Stop reason: HOSPADM

## 2022-08-12 RX ORDER — PANTOPRAZOLE SODIUM 40 MG/1
40 TABLET, DELAYED RELEASE ORAL
Status: DISCONTINUED | OUTPATIENT
Start: 2022-08-13 | End: 2022-08-18 | Stop reason: HOSPADM

## 2022-08-12 RX ORDER — MORPHINE SULFATE 2 MG/ML
2 INJECTION, SOLUTION INTRAMUSCULAR; INTRAVENOUS
Status: DISCONTINUED | OUTPATIENT
Start: 2022-08-12 | End: 2022-08-18 | Stop reason: HOSPADM

## 2022-08-12 RX ORDER — MIRTAZAPINE 15 MG/1
15 TABLET, FILM COATED ORAL NIGHTLY
Status: DISCONTINUED | OUTPATIENT
Start: 2022-08-12 | End: 2022-08-18 | Stop reason: HOSPADM

## 2022-08-12 RX ORDER — DEXTROSE MONOHYDRATE 100 MG/ML
INJECTION, SOLUTION INTRAVENOUS CONTINUOUS PRN
Status: DISCONTINUED | OUTPATIENT
Start: 2022-08-12 | End: 2022-08-18 | Stop reason: HOSPADM

## 2022-08-12 RX ORDER — SERTRALINE HYDROCHLORIDE 100 MG/1
100 TABLET, FILM COATED ORAL DAILY
Status: DISCONTINUED | OUTPATIENT
Start: 2022-08-12 | End: 2022-08-12 | Stop reason: CLARIF

## 2022-08-12 RX ORDER — OXYCODONE HYDROCHLORIDE AND ACETAMINOPHEN 5; 325 MG/1; MG/1
1 TABLET ORAL EVERY 6 HOURS PRN
Status: DISCONTINUED | OUTPATIENT
Start: 2022-08-12 | End: 2022-08-14

## 2022-08-12 RX ORDER — GABAPENTIN 100 MG/1
100 CAPSULE ORAL 3 TIMES DAILY
Status: DISCONTINUED | OUTPATIENT
Start: 2022-08-12 | End: 2022-08-12 | Stop reason: ALTCHOICE

## 2022-08-12 RX ORDER — TAMSULOSIN HYDROCHLORIDE 0.4 MG/1
0.4 CAPSULE ORAL NIGHTLY
Status: DISCONTINUED | OUTPATIENT
Start: 2022-08-12 | End: 2022-08-15

## 2022-08-12 RX ORDER — INSULIN LISPRO 100 [IU]/ML
0-4 INJECTION, SOLUTION INTRAVENOUS; SUBCUTANEOUS NIGHTLY
Status: DISCONTINUED | OUTPATIENT
Start: 2022-08-12 | End: 2022-08-18 | Stop reason: HOSPADM

## 2022-08-12 RX ORDER — SODIUM CHLORIDE 9 MG/ML
INJECTION, SOLUTION INTRAVENOUS CONTINUOUS
Status: DISCONTINUED | OUTPATIENT
Start: 2022-08-12 | End: 2022-08-17

## 2022-08-12 RX ORDER — ENOXAPARIN SODIUM 100 MG/ML
40 INJECTION SUBCUTANEOUS NIGHTLY
Status: DISCONTINUED | OUTPATIENT
Start: 2022-08-12 | End: 2022-08-18 | Stop reason: HOSPADM

## 2022-08-12 RX ORDER — ATORVASTATIN CALCIUM 20 MG/1
20 TABLET, FILM COATED ORAL DAILY
Status: DISCONTINUED | OUTPATIENT
Start: 2022-08-13 | End: 2022-08-12 | Stop reason: ALTCHOICE

## 2022-08-12 RX ORDER — HYDROCODONE BITARTRATE AND ACETAMINOPHEN 5; 325 MG/1; MG/1
TABLET ORAL
Status: ON HOLD | COMMUNITY
Start: 2022-08-04 | End: 2022-08-12

## 2022-08-12 RX ORDER — ONDANSETRON 4 MG/1
4 TABLET, ORALLY DISINTEGRATING ORAL EVERY 8 HOURS PRN
Status: DISCONTINUED | OUTPATIENT
Start: 2022-08-12 | End: 2022-08-18 | Stop reason: HOSPADM

## 2022-08-12 RX ORDER — OMEPRAZOLE 40 MG/1
40 CAPSULE, DELAYED RELEASE ORAL DAILY
COMMUNITY

## 2022-08-12 RX ORDER — POLYETHYLENE GLYCOL 3350 17 G/17G
17 POWDER, FOR SOLUTION ORAL DAILY PRN
Status: DISCONTINUED | OUTPATIENT
Start: 2022-08-12 | End: 2022-08-18 | Stop reason: HOSPADM

## 2022-08-12 RX ORDER — LISINOPRIL 10 MG/1
10 TABLET ORAL DAILY
Status: ON HOLD | COMMUNITY
End: 2022-09-05 | Stop reason: HOSPADM

## 2022-08-12 RX ORDER — SODIUM CHLORIDE 9 MG/ML
INJECTION, SOLUTION INTRAVENOUS PRN
Status: DISCONTINUED | OUTPATIENT
Start: 2022-08-12 | End: 2022-08-18 | Stop reason: HOSPADM

## 2022-08-12 RX ADMIN — MORPHINE SULFATE 2 MG: 2 INJECTION, SOLUTION INTRAMUSCULAR; INTRAVENOUS at 20:02

## 2022-08-12 RX ADMIN — SODIUM CHLORIDE, PRESERVATIVE FREE 10 ML: 5 INJECTION INTRAVENOUS at 16:40

## 2022-08-12 RX ADMIN — OXYCODONE AND ACETAMINOPHEN 1 TABLET: 5; 325 TABLET ORAL at 18:25

## 2022-08-12 RX ADMIN — IOPAMIDOL 75 ML: 755 INJECTION, SOLUTION INTRAVENOUS at 16:52

## 2022-08-12 RX ADMIN — MORPHINE SULFATE 2 MG: 2 INJECTION, SOLUTION INTRAMUSCULAR; INTRAVENOUS at 16:41

## 2022-08-12 RX ADMIN — MIRTAZAPINE 15 MG: 15 TABLET, FILM COATED ORAL at 20:02

## 2022-08-12 RX ADMIN — TAMSULOSIN HYDROCHLORIDE 0.4 MG: 0.4 CAPSULE ORAL at 20:02

## 2022-08-12 RX ADMIN — SODIUM CHLORIDE: 9 INJECTION, SOLUTION INTRAVENOUS at 16:08

## 2022-08-12 RX ADMIN — ASPIRIN 81 MG CHEWABLE TABLET 81 MG: 81 TABLET CHEWABLE at 17:53

## 2022-08-12 RX ADMIN — ENOXAPARIN SODIUM 40 MG: 100 INJECTION SUBCUTANEOUS at 20:02

## 2022-08-12 ASSESSMENT — ENCOUNTER SYMPTOMS
RESPIRATORY NEGATIVE: 1
GASTROINTESTINAL NEGATIVE: 1
ALLERGIC/IMMUNOLOGIC NEGATIVE: 1
EYES NEGATIVE: 1
BACK PAIN: 1

## 2022-08-12 ASSESSMENT — PAIN - FUNCTIONAL ASSESSMENT
PAIN_FUNCTIONAL_ASSESSMENT: ACTIVITIES ARE NOT PREVENTED

## 2022-08-12 ASSESSMENT — PAIN DESCRIPTION - DESCRIPTORS
DESCRIPTORS: ACHING
DESCRIPTORS: ACHING;BURNING
DESCRIPTORS: ACHING;BURNING

## 2022-08-12 ASSESSMENT — PAIN SCALES - GENERAL
PAINLEVEL_OUTOF10: 9
PAINLEVEL_OUTOF10: 8
PAINLEVEL_OUTOF10: 8
PAINLEVEL_OUTOF10: 9

## 2022-08-12 ASSESSMENT — PAIN DESCRIPTION - ONSET: ONSET: ON-GOING

## 2022-08-12 ASSESSMENT — PAIN DESCRIPTION - FREQUENCY: FREQUENCY: CONTINUOUS

## 2022-08-12 ASSESSMENT — PAIN DESCRIPTION - ORIENTATION
ORIENTATION: RIGHT

## 2022-08-12 ASSESSMENT — PAIN DESCRIPTION - LOCATION
LOCATION: LEG

## 2022-08-12 ASSESSMENT — PAIN DESCRIPTION - PAIN TYPE: TYPE: ACUTE PAIN

## 2022-08-12 NOTE — FLOWSHEET NOTE
08/12/22 1543   Encounter Summary   Encounter Overview/Reason  Spiritual/Emotional Needs   Service Provided For: Patient   Referral/Consult From: Patient   Support System Zoroastrianism/alexi community; Family members; Children;Friends/neighbors   Last Encounter  08/12/22  (visit w/pt, pt feeling upbeat & anxious. requested for sos. PN 8/12)   Complexity of Encounter Moderate   Begin Time 1520   End Time  1540   Total Time Calculated 20 min   Encounter    Type Initial Screen/Assessment   Spiritual/Emotional needs   Type Spiritual Support   Assessment/Intervention/Outcome   Assessment Anxious;Calm; Hopeful   Intervention Active listening;Discussed belief system/Tenriism practices/alexi; Explored Coping Skills/Resources; Explored/Affirmed feelings, thoughts, concerns;Nurtured Hope;Prayer (assurance of)/[place]   Outcome Coping;Encouraged;Engaged in conversation; Acceptance;Expressed Gratitude; Connection/Belonging;Comfort   Plan and Referrals   Plan/Referrals Referred to (Comment)  (Referred to Fr Tariq for SOS. )

## 2022-08-12 NOTE — PROGRESS NOTES
Medication Reconciliation    List of medications patient is currently taking is complete. Source of information: 1.  Conversation with patient's family over the phone                                      2. EPIC records      Allergies  Sulfa antibiotics and Pcn [penicillins]     Tamica Valiente [facility], PharmD, BCPS  8/12/2022 4:39 PM

## 2022-08-12 NOTE — PROGRESS NOTES
Daily Progress Note   Ashleigh Vasquez MD      8/12/2022    Chief Complaint   Patient presents with    New Patient     Ref by Dr. Jacob for decreased pulses. Mostly decreased pulses in right leg, pain all the time, leg cold to the touch, more swollen than when he saw his PCP on 8/9. Has been having these issues for awhile, trouble sleeping at night due to pain. HISTORY OF PRESENT ILLNESS:                The patient is a 80 y.o. male who presents with a referral from Dr. Desiree Jacob for decreased pulses. Mr. Wilmer Ortiz is here with his daughters. One of the daughters says she has been staying with him and he has not really slept because of pain. She says his right leg is considerably more swollen than it was even two days ago. The pain in the leg and foot started about two and a half weeks ago. His right leg is swollen and he denies that he has been sleeping with the leg hanging over the bed but family says he does get up into the chair in a recliner trying to get comfortable but can not. Nothing relieves the pain currently    Mr. Wilmer Ortiz was on Xarelto for a-fib but was removed from it because of a brain bleed that happened in May-June of this year. He was diagnosed with CAA. In June he had a torn achilles tendon on the left and saw Dr. Jacob, orthopedics. He had a cast and a boot placed. He then contracted Covid in June. By July he had so much pain in his legs and especially the right foot that he was unable to get comfortable. His leg has not gotten better, and his foot and leg are cool to the touch. Despite not feeling a pulse, he does appear to have a good capillary refill. Past Medical History:   Diagnosis Date    Diabetes mellitus ([address]     Heart aneurysm     Vertigo        Past Surgical History:   Procedure Laterality Date    APPENDECTOMY      HERNIA REPAIR      LUNG SURGERY      TESTICLE SURGERY         Social History     Socioeconomic History    Marital status:       Spouse name: Not on file    Number of children: Not on file    Years of education: Not on file    Highest education level: Not on file   Occupational History    Not on file   Tobacco Use    Smoking status: Some Days     Types: Cigars    Smokeless tobacco: Current   Substance and Sexual Activity    Alcohol use: No    Drug use: No    Sexual activity: Not on file   Other Topics Concern    Not on file   Social History Narrative    Not on file     Social Determinants of Health     Financial Resource Strain: Not on file   Food Insecurity: Not on file   Transportation Needs: Not on file   Physical Activity: Not on file   Stress: Not on file   Social Connections: Not on file   Intimate Partner Violence: Not on file   Housing Stability: Not on file       No family history on file. No current outpatient medications on file.     Sulfa antibiotics and Pcn [penicillins]    Vitals:    08/12/22 1104   BP: 130/82   Weight: 164 lb (74.4 kg)       Admission on 12/11/2018, Discharged on 12/12/2018   Component Date Value Ref Range Status    WBC 12/11/2018 11.0  4.0 - 11.0 K/uL Final    RBC 12/11/2018 4.76  4.20 - 5.90 M/uL Final    Hemoglobin 12/11/2018 14.9  13.5 - 17.5 g/dL Final    Hematocrit 12/11/2018 44.6  40.5 - 52.5 % Final    MCV 12/11/2018 93.7  80.0 - 100.0 fL Final    MCH 12/11/2018 31.3  26.0 - 34.0 pg Final    MCHC 12/11/2018 33.4  31.0 - 36.0 g/dL Final    RDW 12/11/2018 13.7  12.4 - 15.4 % Final    Platelets 15/34/4366 255  135 - 450 K/uL Final    MPV 12/11/2018 9.0  5.0 - 10.5 fL Final    Neutrophils % 12/11/2018 80.5  % Final    Lymphocytes % 12/11/2018 9.0  % Final    Monocytes % 12/11/2018 9.5  % Final    Eosinophils % 12/11/2018 0.5  % Final    Basophils % 12/11/2018 0.5  % Final    Neutrophils Absolute 12/11/2018 8.9 (A) 1.7 - 7.7 K/uL Final    Lymphocytes Absolute 12/11/2018 1.0  1.0 - 5.1 K/uL Final    Monocytes Absolute 12/11/2018 1.0  0.0 - 1.3 K/uL Final    Eosinophils Absolute 12/11/2018 0.1  0.0 - 0.6 K/uL Final Basophils Absolute 12/11/2018 0.1  0.0 - 0.2 K/uL Final    Sodium 12/11/2018 142  136 - 145 mmol/L Final    Potassium 12/11/2018 4.6  3.5 - 5.1 mmol/L Final    Chloride 12/11/2018 102  99 - 110 mmol/L Final    CO2 12/11/2018 23  21 - 32 mmol/L Final    Anion Gap 12/11/2018 17 (A) 3 - 16 Final    Glucose 12/11/2018 174 (A) 70 - 99 mg/dL Final    BUN 12/11/2018 13  7 - 20 mg/dL Final    Creatinine 12/11/2018 0.7 (A) 0.8 - 1.3 mg/dL Final    GFR Non- 12/11/2018 >60  >60 Final    Comment: >60 mL/min/1.73m2 EGFR, calc. for ages 25 and older using the  MDRD formula (not corrected for weight), is valid for stable  renal function. GFR  12/11/2018 >60  >60 Final    Comment: Chronic Kidney Disease: less than 60 ml/min/1.73 sq.m. Kidney Failure: less than 15 ml/min/1.73 sq.m. Results valid for patients 18 years and older. Calcium 12/11/2018 9.0  8.3 - 10.6 mg/dL Final    Total Protein 12/11/2018 6.6  6.4 - 8.2 g/dL Final    Albumin 12/11/2018 4.0  3.4 - 5.0 g/dL Final    Albumin/Globulin Ratio 12/11/2018 1.5  1.1 - 2.2 Final    Total Bilirubin 12/11/2018 1.6 (A) 0.0 - 1.0 mg/dL Final    Alkaline Phosphatase 12/11/2018 82  40 - 129 U/L Final    ALT 12/11/2018 12  10 - 40 U/L Final    AST 12/11/2018 13 (A) 15 - 37 U/L Final    Globulin 12/11/2018 2.6  g/dL Final    ABO/Rh 12/11/2018 O POS   Final    Antibody Screen 12/11/2018 NEG   Final    Protime 12/11/2018 16.2 (A) 9.8 - 13.0 sec Final    Comment: Effective 5-31-18 09:00am EST  Please note reference ranges have  changed for PT and INR Testing. INR 12/11/2018 1.42 (A) 0.86 - 1.14 Final    Comment: Effective 11/28/18 at 02:30pm EST    Normal: 0.94 - 1.06  Therapeutic: 2.0 - 3.0  Pros. Valve: 2.5 - 3.5  AMI: 2.0 - 3.0      aPTT 12/11/2018 31.8  26.0 - 36.0 sec Final    Comment: Therapeutic range: 54.0 - 90.0 sec    Effective 5-31-18 9:00am EST  Please note reference ranges have  changed for PTT Testing.       POC Glucose 12/11/2018 156 (A) 70 - 99 mg/dl Final    Performed on 12/11/2018 ACCU-CHEK   Final    Sodium 12/12/2018 143  136 - 145 mmol/L Final    Potassium reflex Magnesium 12/12/2018 4.2  3.5 - 5.1 mmol/L Final    Chloride 12/12/2018 107  99 - 110 mmol/L Final    CO2 12/12/2018 27  21 - 32 mmol/L Final    Anion Gap 12/12/2018 9  3 - 16 Final    Glucose 12/12/2018 162 (A) 70 - 99 mg/dL Final    BUN 12/12/2018 11  7 - 20 mg/dL Final    Creatinine 12/12/2018 0.6 (A) 0.8 - 1.3 mg/dL Final    GFR Non- 12/12/2018 >60  >60 Final    Comment: >60 mL/min/1.73m2 EGFR, calc. for ages 25 and older using the  MDRD formula (not corrected for weight), is valid for stable  renal function. GFR  12/12/2018 >60  >60 Final    Comment: Chronic Kidney Disease: less than 60 ml/min/1.73 sq.m. Kidney Failure: less than 15 ml/min/1.73 sq.m. Results valid for patients 18 years and older.       Calcium 12/12/2018 8.5  8.3 - 10.6 mg/dL Final    WBC 12/12/2018 7.4  4.0 - 11.0 K/uL Final    RBC 12/12/2018 4.44  4.20 - 5.90 M/uL Final    Hemoglobin 12/12/2018 14.0  13.5 - 17.5 g/dL Final    Hematocrit 12/12/2018 42.1  40.5 - 52.5 % Final    MCV 12/12/2018 94.8  80.0 - 100.0 fL Final    MCH 12/12/2018 31.6  26.0 - 34.0 pg Final    MCHC 12/12/2018 33.4  31.0 - 36.0 g/dL Final    RDW 12/12/2018 13.5  12.4 - 15.4 % Final    Platelets 76/60/5170 231  135 - 450 K/uL Final    MPV 12/12/2018 9.0  5.0 - 10.5 fL Final    Neutrophils % 12/12/2018 67.6  % Final    Lymphocytes % 12/12/2018 18.0  % Final    Monocytes % 12/12/2018 12.0  % Final    Eosinophils % 12/12/2018 1.7  % Final    Basophils % 12/12/2018 0.7  % Final    Neutrophils Absolute 12/12/2018 5.0  1.7 - 7.7 K/uL Final    Lymphocytes Absolute 12/12/2018 1.3  1.0 - 5.1 K/uL Final    Monocytes Absolute 12/12/2018 0.9  0.0 - 1.3 K/uL Final    Eosinophils Absolute 12/12/2018 0.1  0.0 - 0.6 K/uL Final    Basophils Absolute 12/12/2018 0.1  0.0 - 0.2 K/uL Final Hemoglobin 12/11/2018 13.3 (A) 13.5 - 17.5 g/dL Final    Hematocrit 12/11/2018 40.1 (A) 40.5 - 52.5 % Final    POC Glucose 12/12/2018 144 (A) 70 - 99 mg/dl Final    Performed on 12/12/2018 ACCU-CHEK   Final    POC Glucose 12/12/2018 180 (A) 70 - 99 mg/dl Final    Performed on 12/12/2018 ACCU-CHEK   Final    POC Glucose 12/12/2018 157 (A) 70 - 99 mg/dl Final    Performed on 12/12/2018 ACCU-CHEK   Final       Review of Systems   Constitutional: Negative. HENT: Negative. Eyes: Negative. Respiratory: Negative. Cardiovascular:  Positive for leg swelling. Gastrointestinal: Negative. Endocrine: Negative. Genitourinary: Negative. Musculoskeletal:  Positive for back pain. Skin: Negative. Allergic/Immunologic: Negative. Neurological: Negative. Hematological: Negative. Psychiatric/Behavioral: Negative. All other systems reviewed and are negative. Physical Exam  Vitals and nursing note reviewed. Constitutional:       Appearance: Normal appearance. He is well-developed. Eyes:      Conjunctiva/sclera: Conjunctivae normal.      Pupils: Pupils are equal, round, and reactive to light. Cardiovascular:      Rate and Rhythm: Normal rate and regular rhythm. Pulses:           Carotid pulses are 2+ on the right side and 2+ on the left side. Radial pulses are 2+ on the right side and 2+ on the left side. Femoral pulses are 2+ on the right side and 2+ on the left side. Popliteal pulses are 1+ on the right side. Dorsalis pedis pulses are 0 on the right side and 0 on the left side. Posterior tibial pulses are 0 on the right side and 0 on the left side. Heart sounds: Normal heart sounds. Comments:     Doppler 8/12/2022:  Rt DP: none found  Rt PT: none found  Rt AT:     Lt DP: Lt PT: monophasic  Lt AT: monophasic      Pulmonary:      Effort: Pulmonary effort is normal.      Breath sounds: Normal breath sounds.    Abdominal:      General: Bowel sounds are normal.   Musculoskeletal:         General: Normal range of motion. Cervical back: Normal range of motion. Neurological:      Mental Status: He is alert and oriented to person, place, and time. Psychiatric:         Speech: Speech normal.         Behavior: Behavior normal.         Thought Content: Thought content normal.         Judgment: Judgment normal.     He has a-fib. He had a torn left achilles tendon. He had covid. He has had hernia surgery. ASSESSMENT:    Problem List Items Addressed This Visit          Medium    Pain of right lower extremity due to ischemia    Edema of right lower leg    Cerebral amyloid angiopathy (CODE)    Atrial fibrillation (HCC)       Unprioritized    DMII (diabetes mellitus, type 2) ([facility] [address] - Primary   Patient unfortunately has had symptoms for 2 and half weeks no Dopplers in his foot the toes still have capillary refill could just be because the foot was hanging down in wheelchair there is no gross ulcerations just some scratch marks on the calf. He has marked edema of the same area and marked tenderness to the touch suspect he has ischemic muscle we will check a CK. Creatinine was normal recently but we will check it again. Unfortunately I do not think he be a candidate for any kind of lytics since he has had this bleed into his brain recently and has this amyloid problem in his head. I think the scenario of having to take him off his blood thinners with A. fib having COVID which makes hypercoagulable is a good chance he formed a clot in his heart through it to the right leg causing this ischemia. We discussed why and if he wanted to be a no code he is going to think about it. Looks like the note from neurology yesterday was suggesting palliative care or hospice for pain control we did not discuss possibility of him needing an amputation which is real    PLAN:    Mr. Shaun Osler will be admitted to the hospital for further testing and possible procedure.  His daughters are taking him to registration now. Discussed with the hospitalist plan to do a CT angiogram        I Batool Guzman MA am scribing for and in the presence of Danielle Garg MD on this date of 08/12/22    I Mariaelena Reeves MD personally performed the services described in this documentation as scribed by the Medical Assistant Maria Del Carmen Guzman in my presence and it is both accurate and complete.       Electronically signed by Danielle Garg MD on 8/12/2022 at 1:07 PM

## 2022-08-12 NOTE — FLOWSHEET NOTE
Patient 89 y.o/m . Hinduism. Calm . Two daughters at the bed side providing support. Patient received SOS . Family expressed gratitude    [facility] 8/12/2022 08/12/22 1603   Encounter Summary   Encounter Overview/Reason  Initial Encounter;Rituals, Rites and Sacraments   Service Provided For: Patient;Patient and family together   Referral/Consult From: Other    Support System Children   Last Encounter  08/12/22  (SOS bY  CO 8/12)   Complexity of Encounter Moderate   Begin Time 1540   End Time  1605   Total Time Calculated 25 min   Encounter    Type Initial Screen/Assessment   Spiritual/Emotional needs   Type Spiritual Support   Rituals, Rites and Sacraments   Type Sacrament of Sick; Anointing   Assessment/Intervention/Outcome   Assessment Calm;Coping; Hopeful   Intervention Active listening;Discussed relationship with God;Explored Coping Skills/Resources   Outcome Comfort;Coping;Encouraged;Engaged in conversation;Expressed Gratitude

## 2022-08-12 NOTE — PROGRESS NOTES
Labs reviewed white count surprisingly normal lactic acid normal.    Creatinine normal  Trouble getting his IV analgesia ultrasound placed left arm now successful still waiting stat CTA results. Discussed with hospitalist patient does not need an IR angiogram so this order will be canceled  Discussed with the interventional  radiologist  Explained to family that he is at high risk to need an amputation since we cannot use lytics and even if we could if this is been going on for 2-1/2 weeks reperfusion of dead muscle is dangerous    CAT scan done and reviewed with radiologist.  Findings include a 3.8 cm abdominal aortic aneurysm a lot of native arterial disease in both legs. Complete occlusion of the proximal superficial femoral through the popliteal through all 3 tibial vessels 1 small collaterals seen around the knee. Distortion of the muscle and fatty planes in the calf consistent with ischemia. Discussed with 2 daughters and the patient my recommendation is either to do a right above-knee amputation next week unless he opts to do hospice to keep him comfortable.   To picture on the board and family understands the choices of the they will let Dr. Neha Araiza know this weekend so I can try to get it scheduled

## 2022-08-12 NOTE — H&P
Hospital Medicine History & Physical      PCP: Jacob Noekimber    Date of Admission: 8/12/2022    Date of Service: Pt seen/examined on 8/12/22 and Admitted to Inpatient with expected LOS greater than two midnights due to medical therapy. Chief Complaint:    Painful swollen right leg/right leg ischemia. History Of Present Illness:   Mr. Berenice Bravo is a 70-year-old with a history of atrial fibrillation, cerebral amyloid angiopathy, had aneurysm and type 2 diabetes mellitus. He had a cerebral bleed 2022 necessitating the cessation of Xarelto therapy. In the last 2-1/2 weeks he has developed a painful and swollen right leg-the pain has been progressive. He has trouble weightbearing and ambulating. He was seen in Dr. Mary Tony office today, where an impression of his ischemic right leg was arrived at. He is being admitted for further work-up and symptomatic management. He denies trauma to the leg. No fevers or chills. No nausea or vomiting. No chest pain, shortness of breath. Past Medical History:          Diagnosis Date    Afib ([facility] [address]     Cerebral amyloid angiopathy ([facility] [address]     Diabetes mellitus ([facility] [address]     Heart aneurysm     Vertigo        Past Surgical History:          Procedure Laterality Date    APPENDECTOMY      HERNIA REPAIR      LUNG SURGERY      TESTICLE SURGERY         Medications Prior to Admission:      Prior to Admission medications    Medication Sig Start Date End Date Taking? Authorizing Provider   HYDROcodone-acetaminophen ([address]) 5-325 MG per tablet  8/4/22  Yes Historical Provider, MD   insulin glargine (LANTUS) 100 UNIT/ML injection vial Inject 30 Units into the skin nightly   Yes Historical Provider, MD   metFORMIN (GLUCOPHAGE) 500 MG tablet Take 500 mg by mouth in the morning and 500 mg in the evening. Take with meals.    Yes Historical Provider, MD   mirtazapine (REMERON) 15 MG tablet Take 7.5 mg by mouth nightly 2 tablets   Yes Historical Provider, MD   atorvastatin (LIPITOR) 20 MG tablet Take 20 mg by mouth in the morning. Yes Historical Provider, MD   rivaroxaban (XARELTO) 20 MG TABS tablet Take 1 tablet by mouth daily (with breakfast) Please hold per cardiology 12/12/18  Yes Kerry Barlow,    oxyCODONE-acetaminophen (PERCOCET) 5-325 MG per tablet Take 1 tablet by mouth every 6 hours as needed. 8/9/22 8/16/22  Historical Provider, MD   gabapentin (NEURONTIN) 100 MG capsule Take 1 capsule by mouth in the morning and 1 capsule at noon and 1 capsule before bedtime. Do all this for 10 days. 7/27/22 8/6/22  Fantasma Perry PA-C   omeprazole (PRILOSEC) 10 MG delayed release capsule Take 10 mg by mouth daily    Historical Provider, MD   sertraline (ZOLOFT) 100 MG tablet Take 100 mg by mouth daily  Patient not taking: Reported on 8/12/2022    Historical Provider, MD   melatonin 3 MG TABS tablet Take 3 mg by mouth nightly as needed  Patient not taking: Reported on 8/12/2022    Historical Provider, MD   insulin regular (HUMULIN R;NOVOLIN R) 100 UNIT/ML injection Inject into the skin See Admin Instructions    Historical Provider, MD   lisinopril (PRINIVIL;ZESTRIL) 10 MG tablet Take 5 mg by mouth daily     Historical Provider, MD   tamsulosin (FLOMAX) 0.4 MG capsule Take 0.4 mg by mouth nightly     Historical Provider, MD       Allergies:  Sulfa antibiotics and Pcn [penicillins]    Social History:      The patient currently lives at home with wife    TOBACCO:   reports that he has been smoking cigars. He uses smokeless tobacco.  ETOH:   reports no history of alcohol use. E-cigarette/Vaping       Questions Responses    E-cigarette/Vaping Use     Start Date     Passive Exposure     Quit Date     Counseling Given     Comments               Family History:       Reviewed and negative in regards to presenting illness/complaint. No family history on file. REVIEW OF SYSTEMS COMPLETED:   Pertinent positives as noted in the HPI. All other systems reviewed and negative.     PHYSICAL EXAM PERFORMED:    BP (!) 187/81   Pulse 97   Temp 97.9 °F (36.6 °C) (Oral)   Resp 18   SpO2 92%     General appearance:  No apparent distress, appears stated age and cooperative. HEENT:  Normal cephalic, atraumatic without obvious deformity. Pupils equal, round, and reactive to light. Extra ocular muscles intact. Conjunctivae/corneas clear. Neck: Supple, with full range of motion. No jugular venous distention. Trachea midline. Respiratory:  Normal respiratory effort. Clear to auscultation, bilaterally without Rales/Wheezes/Rhonchi. Cardiovascular: Irregularly irregular rhythm ,normal S1/S2 without murmurs, rubs or gallops. Abdomen: Soft, non-tender, non-distended with normal bowel sounds. Musculoskeletal:  No clubbing, cyanosis. Mild bipedal edema. Tender right leg to palpation. .  Not cyanotic. No digital gangrene. Skin: Skin color, texture, turgor normal.  No rashes or lesions. Neurologic:  Neurovascularly intact without any focal sensory/motor deficits. Cranial nerves: II-XII intact, grossly non-focal.  Psychiatric:  Alert and oriented, thought content appropriate, normal insight  Capillary Refill: Brisk,3 seconds, normal  Peripheral Pulses: +2 palpable, equal bilaterally       Labs:     No results for input(s): WBC, HGB, HCT, PLT in the last 72 hours. No results for input(s): NA, K, CL, CO2, BUN, CREATININE, CALCIUM, PHOS in the last 72 hours. Invalid input(s): MAGNES  No results for input(s): AST, ALT, BILIDIR, BILITOT, ALKPHOS in the last 72 hours. No results for input(s): INR in the last 72 hours. No results for input(s): Kenisha Pizarro in the last 72 hours.     Urinalysis:    No results found for: Bossman Greene, BACTERIA, [address], BLOODU, [address], [facility] [address]    Radiology:     CXR: I have reviewed the CXR with the following interpretation: ordered  EKG:  I have reviewed the EKG with the following interpretation: Afib-rate controlled    CTA ABDOMINAL AORTA W BILAT RUNOFF W CONTRAST    (Results Pending)       Consults:    IP CONSULT TO SPIRITUAL SERVICES  IP CONSULT TO SPIRITUAL SERVICES  IP CONSULT TO SPIRITUAL SERVICES    ASSESSMENT:    Active Hospital Problems    Diagnosis Date Noted    Ischemia of right lower extremity [I99.8] 08/12/2022     Priority: Medium     1. Right leg ischemia with pain  2. Atrial fibrillation off anticoagulation  3. Essential hypertension  4. Diabetes mellitus  5. Cerebral amyloid angiopathy s/p cerebral bleed in May 2022    PLAN:  1. Normal saline for fluid resuscitation  2. Continue home diabetic medications  3. Check HbA1c/lipid panel  4. Morphine analgesia 2 to 4 mg IV every 4 as needed  5. CT angiogram right lower extremity  6. Await further recommendations from general surgery. DVT Prophylaxis: Lovenox  Diet: Diet NPO  Code Status: Prior    PT/OT Eval Status: Ordered    Dispo -pending clinical improvement       Mansoor Ng MD    Thank you Anali Patel for the opportunity to be involved in this patient's care. If you have any questions or concerns please feel free to contact me at [phone].

## 2022-08-13 LAB
GLUCOSE BLD-MCNC: 146 MG/DL (ref 70–99)
GLUCOSE BLD-MCNC: 179 MG/DL (ref 70–99)
HCT VFR BLD CALC: 39.9 % (ref 40.5–52.5)
HEMOGLOBIN: 13.4 G/DL (ref 13.5–17.5)
MCH RBC QN AUTO: 31 PG (ref 26–34)
MCHC RBC AUTO-ENTMCNC: 33.7 G/DL (ref 31–36)
MCV RBC AUTO: 92 FL (ref 80–100)
PDW BLD-RTO: 14.5 % (ref 12.4–15.4)
PERFORMED ON: ABNORMAL
PERFORMED ON: ABNORMAL
PLATELET # BLD: 274 K/UL (ref 135–450)
PMV BLD AUTO: 8.2 FL (ref 5–10.5)
RBC # BLD: 4.34 M/UL (ref 4.2–5.9)
WBC # BLD: 6.5 K/UL (ref 4–11)

## 2022-08-13 PROCEDURE — 2580000003 HC RX 258: Performed by: SURGERY

## 2022-08-13 PROCEDURE — 6370000000 HC RX 637 (ALT 250 FOR IP): Performed by: NURSE PRACTITIONER

## 2022-08-13 PROCEDURE — 6370000000 HC RX 637 (ALT 250 FOR IP): Performed by: INTERNAL MEDICINE

## 2022-08-13 PROCEDURE — 36415 COLL VENOUS BLD VENIPUNCTURE: CPT

## 2022-08-13 PROCEDURE — 1200000000 HC SEMI PRIVATE

## 2022-08-13 PROCEDURE — 2580000003 HC RX 258: Performed by: INTERNAL MEDICINE

## 2022-08-13 PROCEDURE — 85027 COMPLETE CBC AUTOMATED: CPT

## 2022-08-13 PROCEDURE — 99233 SBSQ HOSP IP/OBS HIGH 50: CPT | Performed by: SURGERY

## 2022-08-13 PROCEDURE — 6360000002 HC RX W HCPCS: Performed by: INTERNAL MEDICINE

## 2022-08-13 RX ORDER — SENNA PLUS 8.6 MG/1
2 TABLET ORAL ONCE
Status: COMPLETED | OUTPATIENT
Start: 2022-08-13 | End: 2022-08-13

## 2022-08-13 RX ADMIN — LISINOPRIL 5 MG: 5 TABLET ORAL at 10:05

## 2022-08-13 RX ADMIN — MORPHINE SULFATE 2 MG: 2 INJECTION, SOLUTION INTRAMUSCULAR; INTRAVENOUS at 00:46

## 2022-08-13 RX ADMIN — SODIUM CHLORIDE, PRESERVATIVE FREE 10 ML: 5 INJECTION INTRAVENOUS at 10:05

## 2022-08-13 RX ADMIN — MORPHINE SULFATE 2 MG: 2 INJECTION, SOLUTION INTRAMUSCULAR; INTRAVENOUS at 20:25

## 2022-08-13 RX ADMIN — ENOXAPARIN SODIUM 40 MG: 100 INJECTION SUBCUTANEOUS at 20:24

## 2022-08-13 RX ADMIN — PANTOPRAZOLE SODIUM 40 MG: 40 TABLET, DELAYED RELEASE ORAL at 06:05

## 2022-08-13 RX ADMIN — MORPHINE SULFATE 2 MG: 2 INJECTION, SOLUTION INTRAMUSCULAR; INTRAVENOUS at 06:05

## 2022-08-13 RX ADMIN — MIRTAZAPINE 15 MG: 15 TABLET, FILM COATED ORAL at 20:24

## 2022-08-13 RX ADMIN — TAMSULOSIN HYDROCHLORIDE 0.4 MG: 0.4 CAPSULE ORAL at 20:24

## 2022-08-13 RX ADMIN — POLYETHYLENE GLYCOL 3350 17 G: 17 POWDER, FOR SOLUTION ORAL at 10:05

## 2022-08-13 RX ADMIN — ROSUVASTATIN CALCIUM 10 MG: 10 TABLET, FILM COATED ORAL at 10:05

## 2022-08-13 RX ADMIN — ASPIRIN 81 MG CHEWABLE TABLET 81 MG: 81 TABLET CHEWABLE at 10:05

## 2022-08-13 RX ADMIN — SODIUM CHLORIDE: 9 INJECTION, SOLUTION INTRAVENOUS at 12:56

## 2022-08-13 RX ADMIN — MORPHINE SULFATE 4 MG: 4 INJECTION, SOLUTION INTRAMUSCULAR; INTRAVENOUS at 12:54

## 2022-08-13 RX ADMIN — OXYCODONE AND ACETAMINOPHEN 1 TABLET: 5; 325 TABLET ORAL at 17:04

## 2022-08-13 RX ADMIN — OXYCODONE AND ACETAMINOPHEN 1 TABLET: 5; 325 TABLET ORAL at 10:05

## 2022-08-13 RX ADMIN — INSULIN GLARGINE 30 UNITS: 100 INJECTION, SOLUTION SUBCUTANEOUS at 20:33

## 2022-08-13 RX ADMIN — SENNOSIDES 17.2 MG: 8.6 TABLET, FILM COATED ORAL at 20:24

## 2022-08-13 ASSESSMENT — PAIN DESCRIPTION - DESCRIPTORS
DESCRIPTORS: ACHING
DESCRIPTORS: ACHING;BURNING
DESCRIPTORS: ACHING;BURNING
DESCRIPTORS: ACHING
DESCRIPTORS: ACHING;CRAMPING

## 2022-08-13 ASSESSMENT — PAIN SCALES - GENERAL
PAINLEVEL_OUTOF10: 8
PAINLEVEL_OUTOF10: 8
PAINLEVEL_OUTOF10: 0
PAINLEVEL_OUTOF10: 6
PAINLEVEL_OUTOF10: 0
PAINLEVEL_OUTOF10: 0
PAINLEVEL_OUTOF10: 8
PAINLEVEL_OUTOF10: 4
PAINLEVEL_OUTOF10: 0
PAINLEVEL_OUTOF10: 5
PAINLEVEL_OUTOF10: 7

## 2022-08-13 ASSESSMENT — PAIN DESCRIPTION - PAIN TYPE
TYPE: ACUTE PAIN

## 2022-08-13 ASSESSMENT — PAIN DESCRIPTION - FREQUENCY
FREQUENCY: CONTINUOUS

## 2022-08-13 ASSESSMENT — PAIN DESCRIPTION - LOCATION
LOCATION: LEG

## 2022-08-13 ASSESSMENT — PAIN DESCRIPTION - ORIENTATION
ORIENTATION: RIGHT

## 2022-08-13 ASSESSMENT — PAIN - FUNCTIONAL ASSESSMENT
PAIN_FUNCTIONAL_ASSESSMENT: PREVENTS OR INTERFERES SOME ACTIVE ACTIVITIES AND ADLS
PAIN_FUNCTIONAL_ASSESSMENT: PREVENTS OR INTERFERES SOME ACTIVE ACTIVITIES AND ADLS
PAIN_FUNCTIONAL_ASSESSMENT: PREVENTS OR INTERFERES WITH MANY ACTIVE NOT PASSIVE ACTIVITIES
PAIN_FUNCTIONAL_ASSESSMENT: PREVENTS OR INTERFERES SOME ACTIVE ACTIVITIES AND ADLS
PAIN_FUNCTIONAL_ASSESSMENT: PREVENTS OR INTERFERES SOME ACTIVE ACTIVITIES AND ADLS

## 2022-08-13 ASSESSMENT — PAIN DESCRIPTION - ONSET
ONSET: ON-GOING

## 2022-08-13 NOTE — PLAN OF CARE
Problem: Discharge Planning  Goal: Discharge to home or other facility with appropriate resources  Outcome: Progressing  Flowsheets (Taken 8/13/2022 0917)  Discharge to home or other facility with appropriate resources:   Identify barriers to discharge with patient and caregiver   Arrange for needed discharge resources and transportation as appropriate   No discharge plan in place at this time, pt R femoral vein occluded and will either require a bypass or total AKA. Nuha Weeks Pt and family aware. Possible need for SNF at discharge, SW following for discharge needs. Will monitor. Electronically signed by John Velásquez RN on 8/13/2022 at 4:15 PM    Problem: Pain  Goal: Verbalizes/displays adequate comfort level or baseline comfort level  Outcome: Progressing   Pt assessed for pain. Pt in pain and assessed with 0-10 pain rating scale. Pt given prescribed analgesic for pain. (See eMar) Pt satisfied with pain relief thus far. Will reassess and continue to monitor. Electronically signed by John Velásquez RN on 8/13/2022 at 4:15 PM    Problem: Safety - Adult  Goal: Free from fall injury  Outcome: Progressing   Fall risk assessment completed. Fall precautions in place. Call light within reach. Pt educated on calling for assistance before getting up. Walkway free of clutter. Will continue to monitor. Electronically signed by John Velásquez RN on 8/13/2022 at 4:15 PM    Problem: ABCDS Injury Assessment  Goal: Absence of physical injury  Outcome: Progressing  Flowsheets (Taken 8/13/2022 1611)  Absence of Physical Injury: Implement safety measures based on patient assessment   Pt is free of injury. No injury noted. Fall precautions in place. Call light within reach. Will monitor. Electronically signed by John Velásquez RN on 8/13/2022 at 4:15 PM    Problem: Skin/Tissue Integrity  Goal: Absence of new skin breakdown  Description: 1. Monitor for areas of redness and/or skin breakdown  2.   Assess vascular access sites hourly  3. Every 4-6 hours minimum:  Change oxygen saturation probe site  4. Every 4-6 hours:  If on nasal continuous positive airway pressure, respiratory therapy assess nares and determine need for appliance change or resting period. Outcome: Progressing   Skin assessment completed. No skin breakdown noted. Pt reminded to turn and reposition frequently. Will continue to monitor and reassess.   Electronically signed by Sarbjit Quinn RN on 8/13/2022 at 4:16 PM

## 2022-08-13 NOTE — PROGRESS NOTES
Vascular Surgery Progress Note      Pt admitted yesterday for severe right leg rest pain x 2+ weeks. Admitted from Dr. Alan Gary office and CTA performed. Pt noted to have significant occlusive disease throughout the right leg and advised that will likely require AKA. There was concern regarding prior h/o \"brain bleed\" in March. However, in reviewing MRI study from March, sounds as though this was part of differential dx, but he was never definitively found to have cerebral hemorrhage. Daughter is at bedside and all information obtained from both her and pt. He has had limited ambulation for last few months due to recovery from left Achilles tendon tear, but otherwise, lives at home and was doing fairly well prior to that. On exam, Right foot will pallor and coolness up to mid-distal calf. His is able to wiggle toes. Notes sensory about 3/5 below ankle. No mottling or cyanosis. No skin or tissue necrosis. Thigh is warm. R femoral pulse is 2+ palpable. R popliteal non-palp. R DP absent. R PT is faint but present/monphasic. ASSESSMENT/PLAN :  Critical limb threatening ischemia right lower extremity--however, in my opinion, limb appears salvageable and cannot completely rule out options for arterial intervention and preservation of limb. I personally reviewed images of the CTA runoff. He appears to have partially occlusive disease of the R CFA and severe stenosis of PFA origin. SFA is occluded and contrast is difficult to see beyond that level. However, the PT IS visible in the calf to the ankle. I think operative intervention cannot be based on CTA results as contrast timing is probably suboptimal--supported by the fact that contrast is not well seen in the distal left leg either, despite no ischemia to the left leg. Over an hour spent with pt and family, as well as in reviewing studies and discussing options with them.   Certainly, if there is any option of salvaging the leg, they would be most willing to consider. Again, I think CTA is suboptimal in identifying options for management. I would recommend a diagnostic arteriogram early this coming week. I think disease is more of an acute on chronic situation, not a thromboembolic one; thus, I think he will likely have option for revascularization if he and family chooses. Will likely need R femoral endarterectomy/profundaplasty and Fem-tibial bypass. All questions answered, and I will discuss further with them tomorrow.

## 2022-08-13 NOTE — PROGRESS NOTES
Hospitalist Progress Note      PCP: Rafael Mazariegos    Date of Admission: 8/12/2022    Chief Complaint:   Right leg pain/ ischemia      Hospital Course:    Mr. Neri Ornelas is a 55-year-old with a history of atrial fibrillation, cerebral amyloid angiopathy, had aneurysm and type 2 diabetes mellitus. He had a cerebral bleed 2022 necessitating the cessation of Xarelto therapy. In the last 2-1/2 weeks he has developed a painful and swollen right leg-the pain has been progressive. He has trouble weightbearing and ambulating. He was seen in Dr. Haskins Idanha office today, where an impression of his ischemic right leg was arrived at. He is being admitted for further work-up and symptomatic management. He denies trauma to the leg. No fevers or chills. No nausea or vomiting. No chest pain, shortness of breath.       Subjective:   R leg pain       Medications:  Reviewed    Infusion Medications    sodium chloride 150 mL/hr at 08/13/22 1256    sodium chloride      dextrose       Scheduled Medications    lisinopril  5 mg Oral Daily    tamsulosin  0.4 mg Oral Nightly    insulin glargine  30 Units SubCUTAneous Nightly    pantoprazole  40 mg Oral QAM AC    sodium chloride flush  5-40 mL IntraVENous 2 times per day    enoxaparin  40 mg SubCUTAneous Nightly    aspirin  81 mg Oral Daily    insulin lispro  0-4 Units SubCUTAneous TID WC    insulin lispro  0-4 Units SubCUTAneous Nightly    mirtazapine  15 mg Oral Nightly    rosuvastatin  10 mg Oral QAM     PRN Meds: sodium chloride flush, sodium chloride, ondansetron **OR** ondansetron, acetaminophen **OR** acetaminophen, polyethylene glycol, morphine **OR** morphine, glucose, dextrose bolus **OR** dextrose bolus, glucagon (rDNA), dextrose, oxyCODONE-acetaminophen      Intake/Output Summary (Last 24 hours) at 8/13/2022 1559  Last data filed at 8/13/2022 1256  Gross per 24 hour   Intake 480 ml   Output --   Net 480 ml       Physical Exam Performed:    BP (!) 152/85   Pulse 72 Temp 98.1 °F (36.7 °C) (Oral)   Resp 16   SpO2 93%     General appearance: No apparent distress, appears stated age and cooperative. HEENT: Pupils equal, round, and reactive to light. Conjunctivae/corneas clear. Neck: Supple, with full range of motion. No jugular venous distention. Trachea midline. Respiratory:  Normal respiratory effort. Clear to auscultation, bilaterally without Rales/Wheezes/Rhonchi. Cardiovascular: Regular rate and rhythm with normal S1/S2 without murmurs, rubs or gallops. Abdomen: Soft, non-tender, non-distended with normal bowel sounds. Musculoskeletal: No clubbing, cyanosis or edema bilaterally. Full range of motion without deformity. Skin: Skin color, texture, turgor normal.  No rashes or lesions. Neurologic:  Neurovascularly intact without any focal sensory/motor deficits. Cranial nerves: II-XII intact, grossly non-focal.  Psychiatric: Alert and oriented, thought content appropriate, normal insight  Capillary Refill: Brisk,3 seconds, normal   Peripheral Pulses: +2 palpable, equal bilaterally       Labs:   Recent Labs     08/12/22  1434 08/13/22  0637   WBC 8.5 6.5   HGB 15.3 13.4*   HCT 44.7 39.9*    274     Recent Labs     08/12/22  1434      K 4.6   CL 99   CO2 27   BUN 11   CREATININE 0.7*   CALCIUM 9.6     Recent Labs     08/12/22  1434   AST 34   ALT 24   BILITOT 0.6   ALKPHOS 73     No results for input(s): INR in the last 72 hours. Recent Labs     08/12/22  1434 08/12/22  1443 08/12/22  1550   CKTOTAL 400*  --   --    TROPONINI  --  <0.01 <0.01       Urinalysis:    No results found for: Layvonne Lease, BACTERIA, RBCUA, BLOODU, SPECGRAV, GLUCOSEU    Radiology:  CTA ABDOMINAL AORTA W BILAT RUNOFF W CONTRAST   Final Result   Complete occlusion of the right superficial femoral artery just after the   takeoff of the profunda femoris. Occlusion at the origin of the profunda femoris on the right. More distal   aspects of that vessel are enhancing. Multifocal calcified plaque is seen within the left superficial femoral   artery leading to at least moderate if not high-grade central canal stenosis. Unfortunately, contrast bolus is suboptimal limiting fine detail. Note that the bilateral popliteal arteries and the bilateral 3 vessel runoffs   cannot be reliably evaluated secondary to suboptimal contrast timing. Abdominal aortic aneurysm measuring 4.0 cm maximally. See recommendations   below. Circumferential subcutaneous edema within the right calf extending into the   right foot. No soft tissue gas or radiopaque foreign body. No focal fluid   collections are identified to suggest abscess. Moderate amount of stool within the colon. Please correlate with clinical   evidence of constipation. RECOMMENDATIONS:   4 cm infrarenal abdominal aortic aneurysm. Recommend follow-up every 12   months and vascular consultation. Reference: J Am Otto Radiol [id number]. ASSESSMENT:        1. Right leg ischemia with pain-partially occlusive disease of the R CFA and severe stenosis of PFA origin. SFA is occluded  2. Atrial fibrillation off anticoagulation  3. Essential hypertension  4. Diabetes mellitus  5. Cerebral amyloid angiopathy s/p cerebral bleed in May 2022     PLAN:  1. Normal saline for fluid resuscitation  2. Continue home diabetic medications  3. Check HbA1c/lipid panel  4. Morphine analgesia 2 to 4 mg IV every 4 as needed  5. CT angiogram right lower extremity noted  6.   Await further vasc surgery recs          DVT Prophylaxis: Lovenox  Diet: Diet NPO  Code Status: Prior     PT/OT Eval Status: Ordered     Dispo -pending clinical improvement    Camelia Dickey MD

## 2022-08-13 NOTE — PROGRESS NOTES
Pt arrived to room 3126 from Dr Isauro Smiley office. Vital signs taken and were WNL. Admission and assessment completed. Pt oriented to room, bed, phone, and call light. Fall precautions in place. Call light, bedside table and phone within easy reach. Pt instructed to use call light to call for assistance.

## 2022-08-13 NOTE — PROGRESS NOTES
Pt rounded on this morning Q2h, whiteboard updated, pt given ice water and needs assessed. Pt sitting up in bed, ambulating with daughter to bathroom and tolerating ADL's independently. Pt c/o pain in the RLE, given PRN analgesic (see eMAR). Pt has no further needs or concerns at this time. Call light within reach, pt verbalized understanding to call prior to ambulating. Will continue to monitor and reassess.    Electronically signed by Deana Manzano RN on 8/13/2022 at 6:44 PM

## 2022-08-14 LAB
GLUCOSE BLD-MCNC: 108 MG/DL (ref 70–99)
GLUCOSE BLD-MCNC: 134 MG/DL (ref 70–99)
GLUCOSE BLD-MCNC: 68 MG/DL (ref 70–99)
GLUCOSE BLD-MCNC: 75 MG/DL (ref 70–99)
GLUCOSE BLD-MCNC: 84 MG/DL (ref 70–99)
PERFORMED ON: ABNORMAL
PERFORMED ON: NORMAL
PERFORMED ON: NORMAL

## 2022-08-14 PROCEDURE — 6360000002 HC RX W HCPCS: Performed by: INTERNAL MEDICINE

## 2022-08-14 PROCEDURE — 6360000002 HC RX W HCPCS: Performed by: NURSE PRACTITIONER

## 2022-08-14 PROCEDURE — 51798 US URINE CAPACITY MEASURE: CPT

## 2022-08-14 PROCEDURE — 94760 N-INVAS EAR/PLS OXIMETRY 1: CPT

## 2022-08-14 PROCEDURE — 2580000003 HC RX 258: Performed by: INTERNAL MEDICINE

## 2022-08-14 PROCEDURE — 99233 SBSQ HOSP IP/OBS HIGH 50: CPT | Performed by: SURGERY

## 2022-08-14 PROCEDURE — 2580000003 HC RX 258: Performed by: SURGERY

## 2022-08-14 PROCEDURE — 6370000000 HC RX 637 (ALT 250 FOR IP): Performed by: INTERNAL MEDICINE

## 2022-08-14 PROCEDURE — 1200000000 HC SEMI PRIVATE

## 2022-08-14 RX ORDER — LACTULOSE 10 G/15ML
20 SOLUTION ORAL 3 TIMES DAILY
Status: DISCONTINUED | OUTPATIENT
Start: 2022-08-14 | End: 2022-08-18 | Stop reason: HOSPADM

## 2022-08-14 RX ORDER — HYDRALAZINE HYDROCHLORIDE 20 MG/ML
10 INJECTION INTRAMUSCULAR; INTRAVENOUS EVERY 6 HOURS PRN
Status: DISCONTINUED | OUTPATIENT
Start: 2022-08-14 | End: 2022-08-18 | Stop reason: HOSPADM

## 2022-08-14 RX ORDER — OXYCODONE HYDROCHLORIDE AND ACETAMINOPHEN 5; 325 MG/1; MG/1
2 TABLET ORAL EVERY 6 HOURS PRN
Status: DISCONTINUED | OUTPATIENT
Start: 2022-08-14 | End: 2022-08-18 | Stop reason: HOSPADM

## 2022-08-14 RX ADMIN — HYDRALAZINE HYDROCHLORIDE 10 MG: 20 INJECTION INTRAMUSCULAR; INTRAVENOUS at 22:07

## 2022-08-14 RX ADMIN — OXYCODONE AND ACETAMINOPHEN 1 TABLET: 5; 325 TABLET ORAL at 03:03

## 2022-08-14 RX ADMIN — MORPHINE SULFATE 2 MG: 2 INJECTION, SOLUTION INTRAMUSCULAR; INTRAVENOUS at 13:33

## 2022-08-14 RX ADMIN — OXYCODONE AND ACETAMINOPHEN 2 TABLET: 5; 325 TABLET ORAL at 08:47

## 2022-08-14 RX ADMIN — ENOXAPARIN SODIUM 40 MG: 100 INJECTION SUBCUTANEOUS at 22:14

## 2022-08-14 RX ADMIN — LISINOPRIL 5 MG: 5 TABLET ORAL at 08:47

## 2022-08-14 RX ADMIN — INSULIN GLARGINE 30 UNITS: 100 INJECTION, SOLUTION SUBCUTANEOUS at 22:08

## 2022-08-14 RX ADMIN — OXYCODONE AND ACETAMINOPHEN 2 TABLET: 5; 325 TABLET ORAL at 16:22

## 2022-08-14 RX ADMIN — MORPHINE SULFATE 4 MG: 4 INJECTION, SOLUTION INTRAMUSCULAR; INTRAVENOUS at 05:23

## 2022-08-14 RX ADMIN — ROSUVASTATIN CALCIUM 10 MG: 10 TABLET, FILM COATED ORAL at 08:48

## 2022-08-14 RX ADMIN — LACTULOSE 20 G: 20 SOLUTION ORAL at 13:32

## 2022-08-14 RX ADMIN — MIRTAZAPINE 15 MG: 15 TABLET, FILM COATED ORAL at 22:06

## 2022-08-14 RX ADMIN — SODIUM CHLORIDE: 9 INJECTION, SOLUTION INTRAVENOUS at 08:13

## 2022-08-14 RX ADMIN — LACTULOSE 20 G: 20 SOLUTION ORAL at 22:00

## 2022-08-14 RX ADMIN — SODIUM CHLORIDE, PRESERVATIVE FREE 10 ML: 5 INJECTION INTRAVENOUS at 08:48

## 2022-08-14 RX ADMIN — PANTOPRAZOLE SODIUM 40 MG: 40 TABLET, DELAYED RELEASE ORAL at 05:23

## 2022-08-14 RX ADMIN — OXYCODONE AND ACETAMINOPHEN 2 TABLET: 5; 325 TABLET ORAL at 22:56

## 2022-08-14 RX ADMIN — TAMSULOSIN HYDROCHLORIDE 0.4 MG: 0.4 CAPSULE ORAL at 22:00

## 2022-08-14 RX ADMIN — MORPHINE SULFATE 2 MG: 2 INJECTION, SOLUTION INTRAMUSCULAR; INTRAVENOUS at 01:44

## 2022-08-14 RX ADMIN — ASPIRIN 81 MG CHEWABLE TABLET 81 MG: 81 TABLET CHEWABLE at 08:47

## 2022-08-14 ASSESSMENT — PAIN DESCRIPTION - LOCATION
LOCATION: LEG
LOCATION: FOOT;LEG
LOCATION: LEG

## 2022-08-14 ASSESSMENT — PAIN DESCRIPTION - PAIN TYPE
TYPE: ACUTE PAIN

## 2022-08-14 ASSESSMENT — PAIN DESCRIPTION - ONSET
ONSET: ON-GOING

## 2022-08-14 ASSESSMENT — PAIN SCALES - GENERAL
PAINLEVEL_OUTOF10: 5
PAINLEVEL_OUTOF10: 0
PAINLEVEL_OUTOF10: 5
PAINLEVEL_OUTOF10: 9
PAINLEVEL_OUTOF10: 0
PAINLEVEL_OUTOF10: 0
PAINLEVEL_OUTOF10: 6
PAINLEVEL_OUTOF10: 10
PAINLEVEL_OUTOF10: 9
PAINLEVEL_OUTOF10: 8
PAINLEVEL_OUTOF10: 7
PAINLEVEL_OUTOF10: 3
PAINLEVEL_OUTOF10: 0
PAINLEVEL_OUTOF10: 10

## 2022-08-14 ASSESSMENT — PAIN SCALES - WONG BAKER: WONGBAKER_NUMERICALRESPONSE: 0

## 2022-08-14 ASSESSMENT — PAIN DESCRIPTION - ORIENTATION
ORIENTATION: LEFT
ORIENTATION: LEFT
ORIENTATION: RIGHT

## 2022-08-14 ASSESSMENT — PAIN DESCRIPTION - FREQUENCY
FREQUENCY: CONTINUOUS

## 2022-08-14 ASSESSMENT — PAIN DESCRIPTION - DESCRIPTORS
DESCRIPTORS: ACHING

## 2022-08-14 NOTE — PLAN OF CARE
Problem: Discharge Planning  Goal: Discharge to home or other facility with appropriate resources  Outcome: Progressing  Flowsheets (Taken 8/13/2022 0917 by Rosa Elena Steele RN)  Discharge to home or other facility with appropriate resources:   Identify barriers to discharge with patient and caregiver   Arrange for needed discharge resources and transportation as appropriate     Problem: Pain  Goal: Verbalizes/displays adequate comfort level or baseline comfort level  Outcome: Progressing  Flowsheets (Taken 8/14/2022 0702)  Verbalizes/displays adequate comfort level or baseline comfort level:   Encourage patient to monitor pain and request assistance   Assess pain using appropriate pain scale   Administer analgesics based on type and severity of pain and evaluate response   Implement non-pharmacological measures as appropriate and evaluate response     Problem: Safety - Adult  Goal: Free from fall injury  Outcome: Progressing  Flowsheets (Taken 8/14/2022 0702)  Free From Fall Injury:   Instruct family/caregiver on patient safety   Based on caregiver fall risk screen, instruct family/caregiver to ask for assistance with transferring infant if caregiver noted to have fall risk factors     Problem: ABCDS Injury Assessment  Goal: Absence of physical injury  Outcome: Progressing  Flowsheets (Taken 8/14/2022 0702)  Absence of Physical Injury: Implement safety measures based on patient assessment     Problem: Skin/Tissue Integrity  Goal: Absence of new skin breakdown  Description: 1. Monitor for areas of redness and/or skin breakdown  2. Assess vascular access sites hourly  3. Every 4-6 hours minimum:  Change oxygen saturation probe site  4. Every 4-6 hours:  If on nasal continuous positive airway pressure, respiratory therapy assess nares and determine need for appliance change or resting period. Outcome: Progressing  Note: Michele score assessed. Patient able to ambulate and turn self.  Repositioned patient Q2H and assessed skin. Educated patient on importance of repositioning to prevent skin issues.

## 2022-08-14 NOTE — PLAN OF CARE
Problem: Discharge Planning  Goal: Discharge to home or other facility with appropriate resources  Outcome: Progressing  Flowsheets (Taken 8/13/2022 0917)  Discharge to home or other facility with appropriate resources:   Identify barriers to discharge with patient and caregiver   Arrange for needed discharge resources and transportation as appropriate   No discharge plan in place at this time, pt R femoral vein occluded and will either require a bypass or total AKA. Reena Zuñiga Pt and family aware. Possible need for SNF at discharge, SW following for discharge needs. Will monitor. Electronically signed by Javid Velasco RN on 8/14/2022 at 10:18 AM    Problem: Pain  Goal: Verbalizes/displays adequate comfort level or baseline comfort level  Outcome: Progressing   Pt assessed for pain. Pt in pain and assessed with 0-10 pain rating scale. Pt given prescribed analgesic for pain. (See eMar) Pt satisfied with pain relief thus far. Will reassess and continue to monitor. Electronically signed by Javid Velasco RN on 8/14/2022 at 10:18 AM    Problem: Safety - Adult  Goal: Free from fall injury  Outcome: Progressing   Fall risk assessment completed. Fall precautions in place. Call light within reach. Pt educated on calling for assistance before getting up. Walkway free of clutter. Will continue to monitor. Electronically signed by Javid Velasco RN on 8/14/2022 at 10:18 AM    Problem: ABCDS Injury Assessment  Goal: Absence of physical injury  Outcome: Progressing  Flowsheets (Taken 8/13/2022 1611)  Absence of Physical Injury: Implement safety measures based on patient assessment   Pt is free of injury. No injury noted. Fall precautions in place. Call light within reach. Will monitor. Electronically signed by Javid Velasco RN on 8/14/2022 at 10:18 AM    Problem: Skin/Tissue Integrity  Goal: Absence of new skin breakdown  Description: 1. Monitor for areas of redness and/or skin breakdown  2.   Assess vascular access sites hourly  3. Every 4-6 hours minimum:  Change oxygen saturation probe site  4. Every 4-6 hours:  If on nasal continuous positive airway pressure, respiratory therapy assess nares and determine need for appliance change or resting period. Outcome: Progressing   Skin assessment completed. No skin breakdown noted. Pt reminded to turn and reposition frequently. Will continue to monitor and reassess.   Electronically signed by Contreras Pickard RN on 8/14/2022 at 10:18 AM

## 2022-08-14 NOTE — PROGRESS NOTES
PT AAO x4 per this shift. VSS. Pt tolerating PO fluids per this shift. IVF infusing. Pt c/o of constipation and requesting medication to help. Pt daughter also questionable about any anticoagulants per this shift d/t recent brain bleed. Queen Oswald Victor notified and Pt education given and effective. Pt pain increased per this shift. Managed with rest, elevation, and Prn medication per MD orders. No further needs voiced at this time. Family remains at bedside for support. Fall precautions in place. Bed alarm on. Call light within reach. Will continue to round.  Electronically signed by Yecenia Perez RN on 8/14/2022 at 8:28 AM

## 2022-08-14 NOTE — CARE COORDINATION
INITIAL CASE MANAGEMENT ASSESSMENT    Reviewed chart, met with patient to assess possible discharge needs. Explained Case Management role/services. Living Situation: own home -  2 daughters take turns staying 24/7 since his achilles tendon injury and subsequent RLE ischemia     x 4 years. ADLs: daughters set up bed bath and do cooking and cleaning now. Patient independent and driving prior to cast for achilles tendon injury. DME: has ramped entrance / walk in shower  hand held shower - grab bars - wheelchair - shower chair - 3 wheeled walker    PT/OT Recs: not ordered yet - surgery pending      Active Services: None     Transportation: family      Medications: [address]     PCP: Ifeanyi Sharma      HD/PD: NA    PLAN/COMMENTS: Plan is pending the type of vascular surgery he has and therapy recommendations post-op. Has large supportive family. ARU may be good option. SW/CM provided contact information for patient or family to call with any questions. SW/CM will follow and assist as needed.

## 2022-08-14 NOTE — PROGRESS NOTES
Vascular Surgery Progress Note      No new complaints. Still having rest pain, improved with oral meds. Up in room with assist of dgtrs and nurse. Once again, discussed situation with pt and 2 dgtrs. On exam, he has a relatively warm calf, foot is slightly cooler compared to the left. He is able to wiggle toes. Toes are pinker today and he demonstrates only slightly delayed cap refill. Sensory mildly diminished to touch. Again, all questions answered and options reviewed. Both dgtrs are reticent to proceed with amputation. States that prior to L Achilles injury, pt was living independently and ambulating normally. More recently, he continues to live at home with dgtr. I do believe pt has option for R leg salvage. Certainly, leg is not unsalvageable at this time. It appears that there were many questions on how to initially proceed based upon  h/o possible cerebral hemorrhage back in March. I reviewed his notes from his  Neurologist Dr. Jazlyn Melgar who saw him on 8/11, and advised Hospice for pain mgmt. Again, dgtrs state this seems a drastic measure as father was fairly \"sharp\" and independent for age. MRI of brain from [facility] was also source of concern for any anticoagulation. Impression is as follows:  MRI-HEAD W/WO CONTRAST 3/17/22    IMPRESSION:     1. Peripheral distribution of chronic microhemorrhages are suspicious for amyloid angiopathy   2. Moderate generalized brain atrophy without evidence for lobar predominance   3. Bilateral dependent distribution of sulcal FLAIR hyperintensity is nonspecific, with differential considerations including vascular/CSF flow artifact or less likely infectious or inflammatory meningitis. Leptomeningeal metastases considered unlikely in the absence of T1-weighted postcontrast enhancement abnormality in these regions. Subarachnoid hemorrhage is also a consideration, particularly in the setting of suspected amyloid angiopathy.  However, there is no susceptibility to specifically   suggest subarachnoid hemorrhage and the relatively symmetric distribution would also be atypical for acute subarachnoid hemorrhage from a vascular lesion or trauma. Furthermore, there is no evidence for subarachnoid hemorrhage on recent CT examinations dating back to 3/6/2022. 4. Consider neurology consultation. Signed By: Aston Robins MD, Yahir Peter    All other subsequent brain imaging studies appear to be negative for any acute findings, including MRI brain 7/8/22 and CT Head 7/8/22 from [facility].    In summary, I have offered pt option of further evaluation to identify target for revascularization. I think there is a high likelihood that this is feasible. D/w my partner Dr. [place], who will plan to take pt Tues for diagnostic arteriogram.  All questions answered.       Total encounter time:  45 min with > 50% spent with face-to-face counseling and coordination of care, including: Discussion, counseling, coordination of care

## 2022-08-14 NOTE — ACP (ADVANCE CARE PLANNING)
Advance Care Planning     Advance Care Planning Activator (Inpatient)  Conversation Note      Date of ACP Conversation: 8/14/2022     Conversation Conducted with: Patient with Decision Making Capacity   Healthcare Decision Maker: Named in Advance Directive or Healthcare Power of  (name) Daughter Xiomara Smart Activator: Marilou De Guzman:     Current Designated Health Care Decision Maker:     Primary Decision Maker: Xavi Gamboa - Child - [phone]    Secondary Decision Maker: Will Medina - Child - [phone]  Click here to complete Healthcare Decision Makers including section of the Healthcare Decision Maker Relationship (ie \"Primary\")      Care Preferences    Ventilation: \"If you were in your present state of health and suddenly became very ill and were unable to breathe on your own, what would your preference be about the use of a ventilator (breathing machine) if it were available to you? \"      Would the patient desire the use of ventilator (breathing machine)?: yes    \"If your health worsens and it becomes clear that your chance of recovery is unlikely, what would your preference be about the use of a ventilator (breathing machine) if it were available to you? \"     Would the patient desire the use of ventilator (breathing machine)?: No      Resuscitation  \"CPR works best to restart the heart when there is a sudden event, like a heart attack, in someone who is otherwise healthy. Unfortunately, CPR does not typically restart the heart for people who have serious health conditions or who are very sick. \"    \"In the event your heart stopped as a result of an underlying serious health condition, would you want attempts to be made to restart your heart (answer \"yes\" for attempt to resuscitate) or would you prefer a natural death (answer \"no\" for do not attempt to resuscitate)? \" yes       [] Yes   [x] No   Educated Patient / Mery Stone regarding differences between Advance Directives and portable DNR orders.     Length of ACP Conversation in minutes:      Conversation Outcomes:  [] ACP discussion completed  [] Existing advance directive reviewed with patient; no changes to patient's previously recorded wishes  [] New Advance Directive completed  [] Portable Do Not Rescitate prepared for Provider review and signature  [] POLST/POST/MOLST/MOST prepared for Provider review and signature      Follow-up plan:    [] Schedule follow-up conversation to continue planning  [] Referred individual to Provider for additional questions/concerns   [] Advised patient/agent/surrogate to review completed ACP document and update if needed with changes in condition, patient preferences or care setting    [x] This note routed to one or more involved healthcare providers        {

## 2022-08-15 ENCOUNTER — APPOINTMENT (OUTPATIENT)
Dept: GENERAL RADIOLOGY | Age: 87
DRG: 300 | End: 2022-08-15
Attending: INTERNAL MEDICINE
Payer: MEDICARE

## 2022-08-15 PROBLEM — I70.229 ATHEROSCLEROSIS OF ARTERY OF EXTREMITY WITH REST PAIN (HCC): Status: ACTIVE | Noted: 2022-08-15

## 2022-08-15 LAB
ANION GAP SERPL CALCULATED.3IONS-SCNC: 7 MMOL/L (ref 3–16)
BILIRUBIN URINE: NEGATIVE
BLOOD, URINE: NEGATIVE
BUN BLDV-MCNC: 5 MG/DL (ref 7–20)
CALCIUM SERPL-MCNC: 8.6 MG/DL (ref 8.3–10.6)
CHLORIDE BLD-SCNC: 109 MMOL/L (ref 99–110)
CLARITY: CLEAR
CO2: 25 MMOL/L (ref 21–32)
COLOR: YELLOW
CREAT SERPL-MCNC: 0.6 MG/DL (ref 0.8–1.3)
EKG ATRIAL RATE: 70 BPM
EKG DIAGNOSIS: NORMAL
EKG Q-T INTERVAL: 408 MS
EKG QRS DURATION: 94 MS
EKG QTC CALCULATION (BAZETT): 473 MS
EKG R AXIS: -23 DEGREES
EKG T AXIS: 36 DEGREES
EKG VENTRICULAR RATE: 81 BPM
GFR AFRICAN AMERICAN: >60
GFR NON-AFRICAN AMERICAN: >60
GLUCOSE BLD-MCNC: 118 MG/DL (ref 70–99)
GLUCOSE BLD-MCNC: 159 MG/DL (ref 70–99)
GLUCOSE BLD-MCNC: 196 MG/DL (ref 70–99)
GLUCOSE BLD-MCNC: 72 MG/DL (ref 70–99)
GLUCOSE BLD-MCNC: 78 MG/DL (ref 70–99)
GLUCOSE URINE: NEGATIVE MG/DL
HCT VFR BLD CALC: 40.1 % (ref 40.5–52.5)
HEMOGLOBIN: 13.5 G/DL (ref 13.5–17.5)
KETONES, URINE: NEGATIVE MG/DL
LACTIC ACID: 1.2 MMOL/L (ref 0.4–2)
LEUKOCYTE ESTERASE, URINE: NEGATIVE
MCH RBC QN AUTO: 31.5 PG (ref 26–34)
MCHC RBC AUTO-ENTMCNC: 33.8 G/DL (ref 31–36)
MCV RBC AUTO: 93.1 FL (ref 80–100)
MICROSCOPIC EXAMINATION: NORMAL
NITRITE, URINE: NEGATIVE
PDW BLD-RTO: 14.5 % (ref 12.4–15.4)
PERFORMED ON: ABNORMAL
PERFORMED ON: ABNORMAL
PERFORMED ON: NORMAL
PERFORMED ON: NORMAL
PH UA: 5 (ref 5–8)
PLATELET # BLD: 292 K/UL (ref 135–450)
PMV BLD AUTO: 7.9 FL (ref 5–10.5)
POTASSIUM SERPL-SCNC: 4 MMOL/L (ref 3.5–5.1)
PROTEIN UA: NEGATIVE MG/DL
RBC # BLD: 4.3 M/UL (ref 4.2–5.9)
SODIUM BLD-SCNC: 141 MMOL/L (ref 136–145)
SPECIFIC GRAVITY UA: 1.01 (ref 1–1.03)
TOTAL CK: 249 U/L (ref 39–308)
URINE REFLEX TO CULTURE: NORMAL
URINE TYPE: NORMAL
UROBILINOGEN, URINE: 0.2 E.U./DL
WBC # BLD: 8.1 K/UL (ref 4–11)

## 2022-08-15 PROCEDURE — 36415 COLL VENOUS BLD VENIPUNCTURE: CPT

## 2022-08-15 PROCEDURE — 6370000000 HC RX 637 (ALT 250 FOR IP): Performed by: INTERNAL MEDICINE

## 2022-08-15 PROCEDURE — 6370000000 HC RX 637 (ALT 250 FOR IP): Performed by: STUDENT IN AN ORGANIZED HEALTH CARE EDUCATION/TRAINING PROGRAM

## 2022-08-15 PROCEDURE — 81003 URINALYSIS AUTO W/O SCOPE: CPT

## 2022-08-15 PROCEDURE — 71046 X-RAY EXAM CHEST 2 VIEWS: CPT

## 2022-08-15 PROCEDURE — 2580000003 HC RX 258: Performed by: INTERNAL MEDICINE

## 2022-08-15 PROCEDURE — 6360000002 HC RX W HCPCS: Performed by: NURSE PRACTITIONER

## 2022-08-15 PROCEDURE — 80048 BASIC METABOLIC PNL TOTAL CA: CPT

## 2022-08-15 PROCEDURE — 1200000000 HC SEMI PRIVATE

## 2022-08-15 PROCEDURE — 99232 SBSQ HOSP IP/OBS MODERATE 35: CPT | Performed by: SURGERY

## 2022-08-15 PROCEDURE — 2580000003 HC RX 258: Performed by: SURGERY

## 2022-08-15 PROCEDURE — 93971 EXTREMITY STUDY: CPT

## 2022-08-15 PROCEDURE — 6360000002 HC RX W HCPCS: Performed by: INTERNAL MEDICINE

## 2022-08-15 PROCEDURE — 82550 ASSAY OF CK (CPK): CPT

## 2022-08-15 PROCEDURE — 93010 ELECTROCARDIOGRAM REPORT: CPT | Performed by: INTERNAL MEDICINE

## 2022-08-15 PROCEDURE — 83605 ASSAY OF LACTIC ACID: CPT

## 2022-08-15 PROCEDURE — 6370000000 HC RX 637 (ALT 250 FOR IP): Performed by: NURSE PRACTITIONER

## 2022-08-15 PROCEDURE — 94760 N-INVAS EAR/PLS OXIMETRY 1: CPT

## 2022-08-15 PROCEDURE — 85027 COMPLETE CBC AUTOMATED: CPT

## 2022-08-15 PROCEDURE — 93005 ELECTROCARDIOGRAM TRACING: CPT | Performed by: SURGERY

## 2022-08-15 RX ORDER — INSULIN GLARGINE 100 [IU]/ML
20 INJECTION, SOLUTION SUBCUTANEOUS NIGHTLY
Status: DISCONTINUED | OUTPATIENT
Start: 2022-08-15 | End: 2022-08-18 | Stop reason: HOSPADM

## 2022-08-15 RX ORDER — TAMSULOSIN HYDROCHLORIDE 0.4 MG/1
0.8 CAPSULE ORAL NIGHTLY
Status: DISCONTINUED | OUTPATIENT
Start: 2022-08-15 | End: 2022-08-18 | Stop reason: HOSPADM

## 2022-08-15 RX ADMIN — TAMSULOSIN HYDROCHLORIDE 0.8 MG: 0.4 CAPSULE ORAL at 20:29

## 2022-08-15 RX ADMIN — ASPIRIN 81 MG CHEWABLE TABLET 81 MG: 81 TABLET CHEWABLE at 09:12

## 2022-08-15 RX ADMIN — OXYCODONE AND ACETAMINOPHEN 2 TABLET: 5; 325 TABLET ORAL at 16:17

## 2022-08-15 RX ADMIN — LACTULOSE 20 G: 20 SOLUTION ORAL at 16:17

## 2022-08-15 RX ADMIN — OXYCODONE AND ACETAMINOPHEN 2 TABLET: 5; 325 TABLET ORAL at 05:15

## 2022-08-15 RX ADMIN — SODIUM CHLORIDE: 9 INJECTION, SOLUTION INTRAVENOUS at 05:17

## 2022-08-15 RX ADMIN — ENOXAPARIN SODIUM 40 MG: 100 INJECTION SUBCUTANEOUS at 20:30

## 2022-08-15 RX ADMIN — LACTULOSE 20 G: 20 SOLUTION ORAL at 20:30

## 2022-08-15 RX ADMIN — MORPHINE SULFATE 4 MG: 4 INJECTION, SOLUTION INTRAMUSCULAR; INTRAVENOUS at 20:30

## 2022-08-15 RX ADMIN — ROSUVASTATIN CALCIUM 10 MG: 10 TABLET, FILM COATED ORAL at 09:13

## 2022-08-15 RX ADMIN — PANTOPRAZOLE SODIUM 40 MG: 40 TABLET, DELAYED RELEASE ORAL at 05:15

## 2022-08-15 RX ADMIN — LACTULOSE 20 G: 20 SOLUTION ORAL at 09:11

## 2022-08-15 RX ADMIN — HYDRALAZINE HYDROCHLORIDE 10 MG: 20 INJECTION INTRAMUSCULAR; INTRAVENOUS at 20:30

## 2022-08-15 RX ADMIN — SODIUM CHLORIDE, PRESERVATIVE FREE 10 ML: 5 INJECTION INTRAVENOUS at 09:14

## 2022-08-15 RX ADMIN — INSULIN GLARGINE 20 UNITS: 100 INJECTION, SOLUTION SUBCUTANEOUS at 20:46

## 2022-08-15 RX ADMIN — MORPHINE SULFATE 2 MG: 2 INJECTION, SOLUTION INTRAMUSCULAR; INTRAVENOUS at 09:12

## 2022-08-15 RX ADMIN — MORPHINE SULFATE 4 MG: 4 INJECTION, SOLUTION INTRAMUSCULAR; INTRAVENOUS at 11:19

## 2022-08-15 RX ADMIN — MIRTAZAPINE 15 MG: 15 TABLET, FILM COATED ORAL at 20:31

## 2022-08-15 RX ADMIN — LISINOPRIL 5 MG: 5 TABLET ORAL at 09:13

## 2022-08-15 ASSESSMENT — PAIN SCALES - GENERAL
PAINLEVEL_OUTOF10: 8
PAINLEVEL_OUTOF10: 9
PAINLEVEL_OUTOF10: 9
PAINLEVEL_OUTOF10: 0
PAINLEVEL_OUTOF10: 0
PAINLEVEL_OUTOF10: 8
PAINLEVEL_OUTOF10: 0

## 2022-08-15 ASSESSMENT — PAIN DESCRIPTION - ORIENTATION
ORIENTATION: RIGHT

## 2022-08-15 ASSESSMENT — PAIN DESCRIPTION - LOCATION
LOCATION: LEG

## 2022-08-15 ASSESSMENT — PAIN DESCRIPTION - FREQUENCY: FREQUENCY: CONTINUOUS

## 2022-08-15 ASSESSMENT — PAIN DESCRIPTION - DESCRIPTORS
DESCRIPTORS: ACHING

## 2022-08-15 ASSESSMENT — PAIN SCALES - WONG BAKER: WONGBAKER_NUMERICALRESPONSE: 0

## 2022-08-15 ASSESSMENT — PAIN - FUNCTIONAL ASSESSMENT
PAIN_FUNCTIONAL_ASSESSMENT: PREVENTS OR INTERFERES SOME ACTIVE ACTIVITIES AND ADLS

## 2022-08-15 ASSESSMENT — PAIN DESCRIPTION - PAIN TYPE: TYPE: ACUTE PAIN

## 2022-08-15 ASSESSMENT — PAIN DESCRIPTION - ONSET: ONSET: ON-GOING

## 2022-08-15 NOTE — PROGRESS NOTES
control and fall prevention to prevent ICH from CAA. - RTC 5-6 months    Thu Whipple MD    [facility]      Electronically signed by Jean Pierre Swanson MD at 06/23/2022 10:58 AM EDT       Assessment: Barndo Dejesus is a 80y.o. year old male who presents for a follow up of probable CAA now with potential arterial occlusion in right LE. I discussed again with his daughters that the risk of AP and AC is extremely high in terms of development an ICH and that could potentially be devastating and even life threatening.      Plan:  - D/w ortho about ANDRADE results  - [facility] to d/w other consultants re risk of AC/AP  - Recommended hospice especially for pain mangement  - RTC in 3months    Thu Whipple MD    [facility]      Electronically signed by Jean Pierre Swanson MD at 08/11/2022 11:26 AM EDT

## 2022-08-15 NOTE — PROGRESS NOTES
Hospitalist Progress Note      PCP: Graeme Armando    Date of Admission: 8/12/2022    Chief Complaint: Right leg pain     Hospital Course:   80-year-old with a history of atrial fibrillation, cerebral amyloid angiopathy, had aneurysm and type 2 diabetes mellitus. He had a cerebral bleed 2022 necessitating the cessation of Xarelto therapy. In the last 2-1/2 weeks he has developed a painful and swollen right leg-the pain has been progressive. He has trouble weightbearing and ambulating. Patient was admitted with right lower limb ischemia    Subjective:   Patient was likely hypoglycemic yesterday.    Continues to report right lower limb pain      Medications:  Reviewed    Infusion Medications    sodium chloride 50 mL/hr at 08/15/22 2473    sodium chloride      dextrose       Scheduled Medications    tamsulosin  0.8 mg Oral Nightly    insulin glargine  20 Units SubCUTAneous Nightly    lactulose  20 g Oral TID    magnesium citrate  296 mL Oral Once    lisinopril  5 mg Oral Daily    pantoprazole  40 mg Oral QAM AC    sodium chloride flush  5-40 mL IntraVENous 2 times per day    enoxaparin  40 mg SubCUTAneous Nightly    aspirin  81 mg Oral Daily    insulin lispro  0-4 Units SubCUTAneous TID WC    insulin lispro  0-4 Units SubCUTAneous Nightly    mirtazapine  15 mg Oral Nightly    rosuvastatin  10 mg Oral QAM     PRN Meds: oxyCODONE-acetaminophen, hydrALAZINE, sodium chloride flush, sodium chloride, ondansetron **OR** ondansetron, acetaminophen **OR** acetaminophen, polyethylene glycol, morphine **OR** morphine, glucose, dextrose bolus **OR** dextrose bolus, glucagon (rDNA), dextrose      Intake/Output Summary (Last 24 hours) at 8/15/2022 1340  Last data filed at 8/15/2022 0903  Gross per 24 hour   Intake 720 ml   Output 150 ml   Net 570 ml       Physical Exam Performed:    BP (!) 180/112   Pulse 91   Temp 97.5 °F (36.4 °C) (Oral)   Resp 18   SpO2 96%     General appearance: No apparent distress, appears stated age and cooperative. HEENT: Pupils equal, round, and reactive to light. Conjunctivae/corneas clear. Neck: Supple, with full range of motion. No jugular venous distention. Trachea midline. Respiratory:  Normal respiratory effort. Clear to auscultation, bilaterally without Rales/Wheezes/Rhonchi. Cardiovascular: Regular rate and rhythm with normal S1/S2 without murmurs, rubs or gallops. Abdomen: Soft, non-tender, non-distended with normal bowel sounds. Musculoskeletal: Right lower lumbar pain colder than tender on minimal palpation compared to left. Skin: Skin color, texture, turgor normal.  No rashes or lesions. Neurologic:  Neurovascularly intact without any focal sensory/motor deficits. Cranial nerves: II-XII intact, grossly non-focal.  Psychiatric: Alert and oriented, thought content appropriate, normal insight  Capillary Refill: Brisk,3 seconds, normal   Peripheral Pulses: +2 palpable, equal bilaterally       Labs:   Recent Labs     08/12/22  1434 08/13/22  0637 08/15/22  0831   WBC 8.5 6.5 8.1   HGB 15.3 13.4* 13.5   HCT 44.7 39.9* 40.1*    274 292     Recent Labs     08/12/22  1434 08/15/22  0831    141   K 4.6 4.0   CL 99 109   CO2 27 25   BUN 11 5*   CREATININE 0.7* 0.6*   CALCIUM 9.6 8.6     Recent Labs     08/12/22  1434   AST 34   ALT 24   BILITOT 0.6   ALKPHOS 73     No results for input(s): INR in the last 72 hours. Recent Labs     08/12/22  1434 08/12/22  1443 08/12/22  1550 08/15/22  0831   CKTOTAL 400*  --   --  249   TROPONINI  --  <0.01 <0.01  --        Urinalysis:    No results found for: NITRU, WBCUA, BACTERIA, RBCUA, BLOODU, SPECGRAV, GLUCOSEU    Radiology:  XR CHEST (2 VW)   Final Result   Mild bibasilar airspace opacities. This could represent atelectasis or   pneumonia in the appropriate clinical setting.          VL PRE OP VEIN MAPPING   Final Result      CTA ABDOMINAL AORTA W BILAT RUNOFF W CONTRAST   Final Result   Complete occlusion of the right superficial femoral artery just after the   takeoff of the profunda femoris. Occlusion at the origin of the profunda femoris on the right. More distal   aspects of that vessel are enhancing. Multifocal calcified plaque is seen within the left superficial femoral   artery leading to at least moderate if not high-grade central canal stenosis. Unfortunately, contrast bolus is suboptimal limiting fine detail. Note that the bilateral popliteal arteries and the bilateral 3 vessel runoffs   cannot be reliably evaluated secondary to suboptimal contrast timing. Abdominal aortic aneurysm measuring 4.0 cm maximally. See recommendations   below. Circumferential subcutaneous edema within the right calf extending into the   right foot. No soft tissue gas or radiopaque foreign body. No focal fluid   collections are identified to suggest abscess. Moderate amount of stool within the colon. Please correlate with clinical   evidence of constipation. RECOMMENDATIONS:   4 cm infrarenal abdominal aortic aneurysm. Recommend follow-up every 12   months and vascular consultation. Reference: J Am Otto Radiol 6699;02:[id number]. 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 Patient presented to emergency with right lower limb pain at rest, right lower limb was cold and tender on exam.  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 Plan  -Appreciate vascular surgery input, patient underwent mapping today plan for angiogram tomorrow. Atrial fibrillation. Not on anticoagulation, will consider watchman as outpatient    Diabetes mellitus  Patient was noted to be hypoglycemic,  - reduce glargine to 20 units ( from 30).    -Continue on sliding scale    Cerebral amyloid angiopathy  We will continue to monitor    Hyperlipidemia  Resume statin    BPH  Continue  tamsulosin    Hypertension  Continue lisinopril    DVT Prophylaxis: lovenox  Diet: ADULT DIET;  Regular; 3 carb choices (45 gm/meal)  Diet NPO Exceptions are: Sips of Water with Meds  Code Status: Full Code    PT/OT Eval Status: following     Dispo - pending clinical improvement      Shazia Palacio MD

## 2022-08-15 NOTE — PROGRESS NOTES
PT AAO x4 per this shift. VSS with B/P being increased. NP Yina Victor notified. See eMAR with NO. Pt pain more manageable per this shift with PO pain medications per MD orders. Pt having one small BM per this shift. Pt c/o of not being able to void but 150 mg out after multiple tries. Pt bladder scanned for 630 ml. No Val Putoff notified. See eMAR with NO. One time straight cath order placed. Daughter refusing straight cath at this time stating Pt was able to void independently. No further needs voiced a this time. .Fall precautions in place. Bed alarm on. Call light within reach. Will continue to round.  Electronically signed by Angelina Vasquez RN on 8/15/2022 at 8:54 AM

## 2022-08-15 NOTE — PLAN OF CARE
Problem: Discharge Planning  Goal: Discharge to home or other facility with appropriate resources  Outcome: Progressing  Flowsheets (Taken 8/15/2022 0241)  Discharge to home or other facility with appropriate resources:   Identify barriers to discharge with patient and caregiver   Arrange for needed discharge resources and transportation as appropriate   Refer to discharge planning if patient needs post-hospital services based on physician order or complex needs related to functional status, cognitive ability or social support system     Problem: Pain  Goal: Verbalizes/displays adequate comfort level or baseline comfort level  Outcome: Progressing  Flowsheets (Taken 8/14/2022 0702)  Verbalizes/displays adequate comfort level or baseline comfort level:   Encourage patient to monitor pain and request assistance   Assess pain using appropriate pain scale   Administer analgesics based on type and severity of pain and evaluate response   Implement non-pharmacological measures as appropriate and evaluate response     Problem: Safety - Adult  Goal: Free from fall injury  Outcome: Progressing  Flowsheets  Taken 8/15/2022 0241  Free From Fall Injury:   Based on caregiver fall risk screen, instruct family/caregiver to ask for assistance with transferring infant if caregiver noted to have fall risk factors   Instruct family/caregiver on patient safety  Taken 8/15/2022 0237  Free From Fall Injury:   Based on caregiver fall risk screen, instruct family/caregiver to ask for assistance with transferring infant if caregiver noted to have fall risk factors   Instruct family/caregiver on patient safety     Problem: ABCDS Injury Assessment  Goal: Absence of physical injury  Outcome: Progressing  Flowsheets  Taken 8/15/2022 0241  Absence of Physical Injury: Implement safety measures based on patient assessment  Taken 8/15/2022 0237  Absence of Physical Injury: Implement safety measures based on patient assessment     Problem: Skin/Tissue Integrity  Goal: Absence of new skin breakdown  Description: 1. Monitor for areas of redness and/or skin breakdown  2. Assess vascular access sites hourly  3. Every 4-6 hours minimum:  Change oxygen saturation probe site  4. Every 4-6 hours:  If on nasal continuous positive airway pressure, respiratory therapy assess nares and determine need for appliance change or resting period. Outcome: Progressing  Note: Michele score assessed. Patient able to ambulate and turn self. Repositioned patient Q2H and assessed skin. Educated patient on importance of repositioning to prevent skin issues.

## 2022-08-15 NOTE — PROGRESS NOTES
Hospitalist Progress Note      PCP: Shaye Busch    Date of Admission: 8/12/2022    Chief Complaint:   Right leg pain/ ischemia      Hospital Course:    Mr. Christel Swanson is a 80-year-old with a history of atrial fibrillation, cerebral amyloid angiopathy, had aneurysm and type 2 diabetes mellitus. He had a cerebral bleed 2022 necessitating the cessation of Xarelto therapy. In the last 2-1/2 weeks he has developed a painful and swollen right leg-the pain has been progressive. He has trouble weightbearing and ambulating. He was seen in Dr. Amaris Gomez office today, where an impression of his ischemic right leg was arrived at. He is being admitted for further work-up and symptomatic management. He denies trauma to the leg. No fevers or chills. No nausea or vomiting. No chest pain, shortness of breath.       Subjective:   R leg pain       Medications:  Reviewed    Infusion Medications    sodium chloride 150 mL/hr at 08/14/22 0813    sodium chloride      dextrose       Scheduled Medications    lactulose  20 g Oral TID    magnesium citrate  296 mL Oral Once    lisinopril  5 mg Oral Daily    tamsulosin  0.4 mg Oral Nightly    insulin glargine  30 Units SubCUTAneous Nightly    pantoprazole  40 mg Oral QAM AC    sodium chloride flush  5-40 mL IntraVENous 2 times per day    enoxaparin  40 mg SubCUTAneous Nightly    aspirin  81 mg Oral Daily    insulin lispro  0-4 Units SubCUTAneous TID WC    insulin lispro  0-4 Units SubCUTAneous Nightly    mirtazapine  15 mg Oral Nightly    rosuvastatin  10 mg Oral QAM     PRN Meds: oxyCODONE-acetaminophen, sodium chloride flush, sodium chloride, ondansetron **OR** ondansetron, acetaminophen **OR** acetaminophen, polyethylene glycol, morphine **OR** morphine, glucose, dextrose bolus **OR** dextrose bolus, glucagon (rDNA), dextrose      Intake/Output Summary (Last 24 hours) at 8/14/2022 2039  Last data filed at 8/14/2022 1805  Gross per 24 hour   Intake 720 ml   Output --   Net 720 ml         Physical Exam Performed:    BP (!) 184/100   Pulse 75   Temp 97.8 °F (36.6 °C) (Oral)   Resp 22   SpO2 97%     General appearance: No apparent distress, appears stated age and cooperative. HEENT: Pupils equal, round, and reactive to light. Conjunctivae/corneas clear. Neck: Supple, with full range of motion. No jugular venous distention. Trachea midline. Respiratory:  Normal respiratory effort. Clear to auscultation, bilaterally without Rales/Wheezes/Rhonchi. Cardiovascular: Regular rate and rhythm with normal S1/S2 without murmurs, rubs or gallops. Abdomen: Soft, non-tender, non-distended with normal bowel sounds. Musculoskeletal: No clubbing, cyanosis or edema bilaterally. Full range of motion without deformity. Skin: Skin color, texture, turgor normal.  No rashes or lesions. Neurologic:  Neurovascularly intact without any focal sensory/motor deficits. Cranial nerves: II-XII intact, grossly non-focal.  Psychiatric: Alert and oriented, thought content appropriate, normal insight  Capillary Refill: Brisk,3 seconds, normal   Peripheral Pulses: +2 palpable, equal bilaterally       Labs:   Recent Labs     08/12/22  1434 08/13/22  0637   WBC 8.5 6.5   HGB 15.3 13.4*   HCT 44.7 39.9*    274       Recent Labs     08/12/22  1434      K 4.6   CL 99   CO2 27   BUN 11   CREATININE 0.7*   CALCIUM 9.6       Recent Labs     08/12/22  1434   AST 34   ALT 24   BILITOT 0.6   ALKPHOS 73       No results for input(s): INR in the last 72 hours. Recent Labs     08/12/22  1434 08/12/22  1443 08/12/22  1550   CKTOTAL 400*  --   --    TROPONINI  --  <0.01 <0.01         Urinalysis:    No results found for: Derek Pat, BACTERIA, RBCUA, BLOODU, SPECGRAV, GLUCOSEU    Radiology:  CTA ABDOMINAL AORTA W BILAT RUNOFF W CONTRAST   Final Result   Complete occlusion of the right superficial femoral artery just after the   takeoff of the profunda femoris.       Occlusion at the origin of the profunda femoris on the right. More distal   aspects of that vessel are enhancing. Multifocal calcified plaque is seen within the left superficial femoral   artery leading to at least moderate if not high-grade central canal stenosis. Unfortunately, contrast bolus is suboptimal limiting fine detail. Note that the bilateral popliteal arteries and the bilateral 3 vessel runoffs   cannot be reliably evaluated secondary to suboptimal contrast timing. Abdominal aortic aneurysm measuring 4.0 cm maximally. See recommendations   below. Circumferential subcutaneous edema within the right calf extending into the   right foot. No soft tissue gas or radiopaque foreign body. No focal fluid   collections are identified to suggest abscess. Moderate amount of stool within the colon. Please correlate with clinical   evidence of constipation. RECOMMENDATIONS:   4 cm infrarenal abdominal aortic aneurysm. Recommend follow-up every 12   months and vascular consultation. Reference: J Am Otto Radiol 3322;[id number]. ASSESSMENT:        1. Right leg ischemia with pain-partially occlusive disease of the R CFA and severe stenosis of PFA origin. SFA is occluded  2. Atrial fibrillation off anticoagulation  3. Essential hypertension  4. Diabetes mellitus  5. Cerebral amyloid angiopathy s/p cerebral bleed in May 2022     PLAN:  1. Normal saline for fluid resuscitation  2. Continue home diabetic medications  3. Check HbA1c/lipid panel  4. Morphine analgesia 2 to 4 mg IV every 4 as needed  5. Consult neurology in am  6.   Await further vasc surgery recs          DVT Prophylaxis: Lovenox  Diet: Diet NPO  Code Status: Prior     PT/OT Eval Status: Ordered     Dispo -pending clinical improvement    Maye Guerra MD

## 2022-08-15 NOTE — PROGRESS NOTES
[facility]  Progress Note    8/15/2022 12:01 PM    Chief Complaint: Right Leg Pain     Reason for Consult: PAD    Subjective: Pt seen in Vascular Lab, reports his pain is being controlled with pain medications, plans for Angiogram tomorrow with Dr. Jesus Colon Signs:  Vitals:    08/15/22 0724 08/15/22 0743 08/15/22 0911 08/15/22 1119   BP: 135/75      Pulse: 92      Resp: 16 16 14 18   Temp: 98.5 °F (36.9 °C)      TempSrc: Oral      SpO2: 95% 96%         I/O:    Intake/Output Summary (Last 24 hours) at 8/15/2022 1201  Last data filed at 8/15/2022 0903  Gross per 24 hour   Intake 720 ml   Output 150 ml   Net 570 ml       Physical Exam:   General: no apparent distress, alert, afebrile  Chest/Lungs: non labored breathing no tachypnea, retractions or cyanosis  Extremities:   -RLE - calf mildly tender to touch, foot reasonably warm, capillary refill slightly delayed, skin pink  Pulses:  -R Femoral: +2/4 palpable    Labs:   Lab Results   Component Value Date/Time     08/15/2022 08:31 AM    K 4.0 08/15/2022 08:31 AM    K 4.2 12/12/2018 06:10 AM     08/15/2022 08:31 AM    CO2 25 08/15/2022 08:31 AM    BUN 5 08/15/2022 08:31 AM    CREATININE 0.6 08/15/2022 08:31 AM    GFRAA >60 08/15/2022 08:31 AM    LABGLOM >60 08/15/2022 08:31 AM    GLUCOSE 118 08/15/2022 08:31 AM    CALCIUM 8.6 08/15/2022 08:31 AM     Lab Results   Component Value Date/Time    WBC 8.1 08/15/2022 08:31 AM    RBC 4.30 08/15/2022 08:31 AM    HGB 13.5 08/15/2022 08:31 AM    HCT 40.1 08/15/2022 08:31 AM    MCV 93.1 08/15/2022 08:31 AM    RDW 14.5 08/15/2022 08:31 AM     08/15/2022 08:31 AM     Lab Results   Component Value Date    INR 1.42 (H) 12/11/2018    PROTIME 16.2 (H) 12/11/2018        Scheduled Meds:    tamsulosin  0.8 mg Oral Nightly    lactulose  20 g Oral TID    magnesium citrate  296 mL Oral Once    lisinopril  5 mg Oral Daily    insulin glargine  30 Units SubCUTAneous Nightly    pantoprazole  40 mg Oral QAM AC    sodium chloride flush  5-40 mL IntraVENous 2 times per day    enoxaparin  40 mg SubCUTAneous Nightly    aspirin  81 mg Oral Daily    insulin lispro  0-4 Units SubCUTAneous TID WC    insulin lispro  0-4 Units SubCUTAneous Nightly    mirtazapine  15 mg Oral Nightly    rosuvastatin  10 mg Oral QAM     Continuous Infusions:    sodium chloride 50 mL/hr at 08/15/22 7565    sodium chloride      dextrose         Imaging:  CTA Abdominal Aorta with Bilateral Runoff - 8/12/2022  Impression  Complete occlusion of the right superficial femoral artery just after the  takeoff of the profunda femoris. Occlusion at the origin of the profunda femoris on the right. More distal  aspects of that vessel are enhancing. Multifocal calcified plaque is seen within the left superficial femoral  artery leading to at least moderate if not high-grade central canal stenosis. Unfortunately, contrast bolus is suboptimal limiting fine detail. Note that the bilateral popliteal arteries and the bilateral 3 vessel runoffs  cannot be reliably evaluated secondary to suboptimal contrast timing. Abdominal aortic aneurysm measuring 4.0 cm maximally. See recommendations below. Circumferential subcutaneous edema within the right calf extending into the right foot. No soft tissue gas or radiopaque foreign body. No focal fluid collections are identified to suggest abscess. Moderate amount of stool within the colon. Please correlate with clinical  evidence of constipation. Assessment:   -Right Foot Ischemia with Rest pain  -Multilevel arterial disease     Plan:  -Vein Mapping  -Angiogram tomorrow with Dr. Aurelia Haddad  -NPO after midnight  -IVF    All pertinent information and plan of care discussed with Dr. Zack Nolan. All questions and concerns were addressed with the patient. I have discussed the above stated plan with the patient and the nurse.  The patient verbalized understanding and agreed with the plan.    Thank you for allowing to us to participate in the care of Krysten Yanes      Electronically signed by MANDEEP Morris - CNP on 8/15/2022 at 12:01 PM   Pt seen and examined. for DIAGNOSIS OF right leg ischemia agree with MANDEEP Morris's note. Labs reviewed. Vitals   Vitals:    08/15/22 0724 08/15/22 0743 08/15/22 0911 08/15/22 1119   BP: 135/75      Pulse: 92      Resp: 16 16 14 18   Temp: 98.5 °F (36.9 °C)      TempSrc: Oral      SpO2: 95% 96%         Patient seen in vascular lab apparently he does have a greater saphenous vein on the right that would be usable after his posterior tibial was deemed a outflow for a bypass, he would also need an endarterectomy of the common femoral and deep breath patch. Got pain medicine during the exam still very tender calf and concerned about ischemic muscle in the calf. If he gets to the point where he has a bypass he may well need a fasciotomy  . Discussed with daughter agree with plan for angiogram explained the high risk of any procedure on him.   Again went through previous charts at [facility] and his family doctor documenting acute mental status changes at least 2 clear episodes the neurologist opinion that this is CAA and therefore he is at high risk for bleeding

## 2022-08-15 NOTE — PLAN OF CARE
Problem: Discharge Planning  Goal: Discharge to home or other facility with appropriate resources  Outcome: Progressing  Flowsheets (Taken 8/13/2022 0917)  Discharge to home or other facility with appropriate resources:   Identify barriers to discharge with patient and caregiver   Arrange for needed discharge resources and transportation as appropriate   No discharge plan in place at this time, Pt to Vascular lab this morning for pre-op vein mapping. Pt requiring either RLE amputation or bypass. Pt and family aware. Possible need for SNF at discharge, SW following for discharge needs. Will monitor. Electronically signed by Shellie Dueñas RN on 8/15/2022 at 12:41 PM    Problem: Pain  Goal: Verbalizes/displays adequate comfort level or baseline comfort level  Outcome: Progressing   Pt assessed for pain. Pt in pain and assessed with 0-10 pain rating scale. Pt given prescribed analgesic for pain. (See eMar) Pt satisfied with pain relief thus far. Will reassess and continue to monitor. Electronically signed by Shellie Dueñas RN on 8/15/2022 at 12:41 PM    Problem: Safety - Adult  Goal: Free from fall injury  Outcome: Progressing   Fall risk assessment completed. Fall precautions in place. Call light within reach. Pt educated on calling for assistance before getting up. Walkway free of clutter. Will continue to monitor. Electronically signed by Shellie Dueñas RN on 8/15/2022 at 12:41 PM    Problem: ABCDS Injury Assessment  Goal: Absence of physical injury  Outcome: Progressing  Flowsheets (Taken 8/13/2022 1611)  Absence of Physical Injury: Implement safety measures based on patient assessment   Pt is free of injury. No injury noted. Fall precautions in place. Call light within reach. Will monitor. Electronically signed by Shellie Dueñas RN on 8/15/2022 at 12:41 PM    Problem: Skin/Tissue Integrity  Goal: Absence of new skin breakdown  Description: 1.   Monitor for areas of redness and/or skin breakdown  2. Assess vascular access sites hourly  3. Every 4-6 hours minimum:  Change oxygen saturation probe site  4. Every 4-6 hours:  If on nasal continuous positive airway pressure, respiratory therapy assess nares and determine need for appliance change or resting period. Outcome: Progressing   Skin assessment completed. No skin breakdown noted. Pt reminded to turn and reposition frequently. Will continue to monitor and reassess.   Electronically signed by Cristal Alanis RN on 8/15/2022 at 12:41 PM

## 2022-08-15 NOTE — PROGRESS NOTES
[facility]  Hypoglycemia Event and Prevention Plan      NAME: Tereso Blount RECORD NUMBER:  [id number]  AGE: 80 y.o. GENDER: male  : 1930  EPISODE DATE:  8/15/2022     Data     Recent Labs     22  1212 22  0730 22  1124 22  1608 22  2105 08/15/22  0620   POCGLU 68* 75 108* 84 134* 72       Medications  Scheduled Medications:   tamsulosin  0.8 mg Oral Nightly    lactulose  20 g Oral TID    magnesium citrate  296 mL Oral Once    lisinopril  5 mg Oral Daily    insulin glargine  30 Units SubCUTAneous Nightly    pantoprazole  40 mg Oral QAM AC    sodium chloride flush  5-40 mL IntraVENous 2 times per day    enoxaparin  40 mg SubCUTAneous Nightly    aspirin  81 mg Oral Daily    insulin lispro  0-4 Units SubCUTAneous TID WC    insulin lispro  0-4 Units SubCUTAneous Nightly    mirtazapine  15 mg Oral Nightly    rosuvastatin  10 mg Oral QAM       Diet  Current diet/supplement order: ADULT DIET; Regular; 3 carb choices (45 gm/meal)  Diet NPO Exceptions are: Sips of Water with Meds     Recorded PO: PO Meals Eaten (%): 76 - 100% last meal in flowsheets      Action      Physician Notified of event: Yes   Edie Santiago MD    Recommend BG targets 140 -180. Recommend reducing Lantus dose.       Responce     Achieved POCT Blood Glucose greater than 70 mg/dl: Yes      Medication plan updated: Yes      Electronically signed by Bernardino Francis RN on 8/15/2022 at 8:40 AM

## 2022-08-15 NOTE — PROGRESS NOTES
Consent signed and placed in chart. All questions answered at time time.  Electronically signed by Radha Bowser RN on 8/15/2022 at 9:02 AM

## 2022-08-15 NOTE — PROGRESS NOTES
Pt returned from vascular lab for vein mapping, pt resting in bed this afternoon, denies any pain at this time. Pt scheduled for angiogram tomorrow w/ Dr. Adrian Cannon, consent signed and in chart, pt NPO at midnight, daughter at bedside assisting with needs. Call light within reach, will monitor.    Electronically signed by Marilyn Chen RN on 8/15/2022 at 8:00 PM

## 2022-08-15 NOTE — PROGRESS NOTES
VASCULAR    Seen regarding R foot foot rest pain. Continued pain but managed well with medications. No progression since admission Friday. Daughter at bedside as well. Past functional history reviewed - relatively functional at home. VSS Afeb  Good UO  Lungs clear  R foot cool but pink with slowed cap refill - no cyanosis or mottling. 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 Foot is salvageable at this point and pt seems to be a reasonable candidate for what would be a surgical procedure. Would recommend angiogram first - Tuesday afternoon followed in the near future with revascularization as possible. I see no absolute contraindication to proceeding in this manner and the pt with his daughter agree. Will arrange for angio. NPO after MN with IVFs.      Mark Sullivan

## 2022-08-16 LAB
ANION GAP SERPL CALCULATED.3IONS-SCNC: 10 MMOL/L (ref 3–16)
BUN BLDV-MCNC: 4 MG/DL (ref 7–20)
CALCIUM SERPL-MCNC: 8.6 MG/DL (ref 8.3–10.6)
CHLORIDE BLD-SCNC: 108 MMOL/L (ref 99–110)
CO2: 25 MMOL/L (ref 21–32)
CREAT SERPL-MCNC: 0.6 MG/DL (ref 0.8–1.3)
GFR AFRICAN AMERICAN: >60
GFR NON-AFRICAN AMERICAN: >60
GLUCOSE BLD-MCNC: 71 MG/DL (ref 70–99)
GLUCOSE BLD-MCNC: 75 MG/DL (ref 70–99)
GLUCOSE BLD-MCNC: 80 MG/DL (ref 70–99)
GLUCOSE BLD-MCNC: 82 MG/DL (ref 70–99)
GLUCOSE BLD-MCNC: 86 MG/DL (ref 70–99)
PERFORMED ON: NORMAL
POTASSIUM SERPL-SCNC: 3.8 MMOL/L (ref 3.5–5.1)
SODIUM BLD-SCNC: 143 MMOL/L (ref 136–145)

## 2022-08-16 PROCEDURE — 99152 MOD SED SAME PHYS/QHP 5/>YRS: CPT

## 2022-08-16 PROCEDURE — 94760 N-INVAS EAR/PLS OXIMETRY 1: CPT

## 2022-08-16 PROCEDURE — 36200 PLACE CATHETER IN AORTA: CPT

## 2022-08-16 PROCEDURE — 76937 US GUIDE VASCULAR ACCESS: CPT | Performed by: SURGERY

## 2022-08-16 PROCEDURE — C1769 GUIDE WIRE: HCPCS

## 2022-08-16 PROCEDURE — 75625 CONTRAST EXAM ABDOMINL AORTA: CPT | Performed by: SURGERY

## 2022-08-16 PROCEDURE — 99152 MOD SED SAME PHYS/QHP 5/>YRS: CPT | Performed by: SURGERY

## 2022-08-16 PROCEDURE — 04HY32Z INSERTION OF MONITORING DEVICE INTO LOWER ARTERY, PERCUTANEOUS APPROACH: ICD-10-PCS | Performed by: STUDENT IN AN ORGANIZED HEALTH CARE EDUCATION/TRAINING PROGRAM

## 2022-08-16 PROCEDURE — 36415 COLL VENOUS BLD VENIPUNCTURE: CPT

## 2022-08-16 PROCEDURE — 6360000002 HC RX W HCPCS: Performed by: NURSE PRACTITIONER

## 2022-08-16 PROCEDURE — 75625 CONTRAST EXAM ABDOMINL AORTA: CPT

## 2022-08-16 PROCEDURE — 6360000002 HC RX W HCPCS: Performed by: INTERNAL MEDICINE

## 2022-08-16 PROCEDURE — C1894 INTRO/SHEATH, NON-LASER: HCPCS

## 2022-08-16 PROCEDURE — 6370000000 HC RX 637 (ALT 250 FOR IP): Performed by: INTERNAL MEDICINE

## 2022-08-16 PROCEDURE — 75716 ARTERY X-RAYS ARMS/LEGS: CPT

## 2022-08-16 PROCEDURE — 6370000000 HC RX 637 (ALT 250 FOR IP): Performed by: NURSE PRACTITIONER

## 2022-08-16 PROCEDURE — 2580000003 HC RX 258: Performed by: INTERNAL MEDICINE

## 2022-08-16 PROCEDURE — 80048 BASIC METABOLIC PNL TOTAL CA: CPT

## 2022-08-16 PROCEDURE — 6360000002 HC RX W HCPCS

## 2022-08-16 PROCEDURE — 75716 ARTERY X-RAYS ARMS/LEGS: CPT | Performed by: SURGERY

## 2022-08-16 PROCEDURE — 99153 MOD SED SAME PHYS/QHP EA: CPT

## 2022-08-16 PROCEDURE — 6360000004 HC RX CONTRAST MEDICATION: Performed by: STUDENT IN AN ORGANIZED HEALTH CARE EDUCATION/TRAINING PROGRAM

## 2022-08-16 PROCEDURE — B40G1ZZ PLAIN RADIOGRAPHY OF LEFT LOWER EXTREMITY ARTERIES USING LOW OSMOLAR CONTRAST: ICD-10-PCS | Performed by: STUDENT IN AN ORGANIZED HEALTH CARE EDUCATION/TRAINING PROGRAM

## 2022-08-16 PROCEDURE — 2709999900 HC NON-CHARGEABLE SUPPLY

## 2022-08-16 PROCEDURE — 1200000000 HC SEMI PRIVATE

## 2022-08-16 PROCEDURE — 36200 PLACE CATHETER IN AORTA: CPT | Performed by: SURGERY

## 2022-08-16 PROCEDURE — APPNB30 APP NON BILLABLE TIME 0-30 MINS: Performed by: NURSE PRACTITIONER

## 2022-08-16 PROCEDURE — B41J1ZZ FLUOROSCOPY OF OTHER LOWER ARTERIES USING LOW OSMOLAR CONTRAST: ICD-10-PCS | Performed by: STUDENT IN AN ORGANIZED HEALTH CARE EDUCATION/TRAINING PROGRAM

## 2022-08-16 RX ORDER — LISINOPRIL 10 MG/1
10 TABLET ORAL DAILY
Status: DISCONTINUED | OUTPATIENT
Start: 2022-08-16 | End: 2022-08-18 | Stop reason: HOSPADM

## 2022-08-16 RX ADMIN — OXYCODONE AND ACETAMINOPHEN 2 TABLET: 5; 325 TABLET ORAL at 21:20

## 2022-08-16 RX ADMIN — LISINOPRIL 10 MG: 10 TABLET ORAL at 08:10

## 2022-08-16 RX ADMIN — HYDRALAZINE HYDROCHLORIDE 10 MG: 20 INJECTION INTRAMUSCULAR; INTRAVENOUS at 03:30

## 2022-08-16 RX ADMIN — MIRTAZAPINE 15 MG: 15 TABLET, FILM COATED ORAL at 21:20

## 2022-08-16 RX ADMIN — IOPAMIDOL 150 ML: 755 INJECTION, SOLUTION INTRAVENOUS at 17:02

## 2022-08-16 RX ADMIN — OXYCODONE AND ACETAMINOPHEN 2 TABLET: 5; 325 TABLET ORAL at 13:00

## 2022-08-16 RX ADMIN — SODIUM CHLORIDE, PRESERVATIVE FREE 10 ML: 5 INJECTION INTRAVENOUS at 08:10

## 2022-08-16 RX ADMIN — ASPIRIN 81 MG CHEWABLE TABLET 81 MG: 81 TABLET CHEWABLE at 08:10

## 2022-08-16 RX ADMIN — LACTULOSE 20 G: 20 SOLUTION ORAL at 08:10

## 2022-08-16 RX ADMIN — ENOXAPARIN SODIUM 40 MG: 100 INJECTION SUBCUTANEOUS at 21:19

## 2022-08-16 RX ADMIN — ROSUVASTATIN CALCIUM 10 MG: 10 TABLET, FILM COATED ORAL at 08:09

## 2022-08-16 RX ADMIN — SODIUM CHLORIDE, PRESERVATIVE FREE 10 ML: 5 INJECTION INTRAVENOUS at 21:21

## 2022-08-16 RX ADMIN — OXYCODONE AND ACETAMINOPHEN 2 TABLET: 5; 325 TABLET ORAL at 03:34

## 2022-08-16 RX ADMIN — TAMSULOSIN HYDROCHLORIDE 0.8 MG: 0.4 CAPSULE ORAL at 21:20

## 2022-08-16 ASSESSMENT — PAIN DESCRIPTION - PAIN TYPE: TYPE: ACUTE PAIN

## 2022-08-16 ASSESSMENT — PAIN SCALES - GENERAL
PAINLEVEL_OUTOF10: 7
PAINLEVEL_OUTOF10: 9
PAINLEVEL_OUTOF10: 0
PAINLEVEL_OUTOF10: 0
PAINLEVEL_OUTOF10: 5

## 2022-08-16 ASSESSMENT — PAIN DESCRIPTION - ONSET: ONSET: ON-GOING

## 2022-08-16 ASSESSMENT — PAIN DESCRIPTION - DESCRIPTORS: DESCRIPTORS: ACHING

## 2022-08-16 ASSESSMENT — PAIN DESCRIPTION - ORIENTATION: ORIENTATION: RIGHT

## 2022-08-16 ASSESSMENT — PAIN DESCRIPTION - LOCATION: LOCATION: LEG

## 2022-08-16 ASSESSMENT — PAIN SCALES - WONG BAKER: WONGBAKER_NUMERICALRESPONSE: 0

## 2022-08-16 ASSESSMENT — PAIN DESCRIPTION - FREQUENCY: FREQUENCY: CONTINUOUS

## 2022-08-16 ASSESSMENT — PAIN - FUNCTIONAL ASSESSMENT: PAIN_FUNCTIONAL_ASSESSMENT: PREVENTS OR INTERFERES WITH MANY ACTIVE NOT PASSIVE ACTIVITIES

## 2022-08-16 NOTE — PLAN OF CARE
Problem: Discharge Planning  Goal: Discharge to home or other facility with appropriate resources  8/16/2022 1512 by Monik Bean RN  Outcome: Progressing  Flowsheets (Taken 8/16/2022 1512)  Discharge to home or other facility with appropriate resources:   Identify barriers to discharge with patient and caregiver   Identify discharge learning needs (meds, wound care, etc)   Refer to discharge planning if patient needs post-hospital services based on physician order or complex needs related to functional status, cognitive ability or social support system   Arrange for needed discharge resources and transportation as appropriate  8/16/2022 0244 by Jann Patel RN  Outcome: Progressing  Flowsheets  Taken 8/16/2022 0244  Discharge to home or other facility with appropriate resources:   Identify barriers to discharge with patient and caregiver   Identify discharge learning needs (meds, wound care, etc)   Arrange for needed discharge resources and transportation as appropriate  Taken 8/15/2022 2015  Discharge to home or other facility with appropriate resources: Identify barriers to discharge with patient and caregiver     Problem: Pain  Goal: Verbalizes/displays adequate comfort level or baseline comfort level  8/16/2022 1512 by Monik Bean RN  Outcome: Progressing  Flowsheets (Taken 8/16/2022 1512)  Verbalizes/displays adequate comfort level or baseline comfort level:   Encourage patient to monitor pain and request assistance   Administer analgesics based on type and severity of pain and evaluate response   Consider cultural and social influences on pain and pain management   Assess pain using appropriate pain scale   Implement non-pharmacological measures as appropriate and evaluate response  8/16/2022 0244 by Jann Patel RN  Outcome: Progressing  Flowsheets (Taken 8/14/2022 0702)  Verbalizes/displays adequate comfort level or baseline comfort level:   Encourage patient to monitor pain and request assistance   Assess pain using appropriate pain scale   Administer analgesics based on type and severity of pain and evaluate response   Implement non-pharmacological measures as appropriate and evaluate response     Problem: Safety - Adult  Goal: Free from fall injury  8/16/2022 1512 by Kamran Sanches RN  Outcome: Progressing  Flowsheets (Taken 8/16/2022 1512)  Free From Fall Injury: Instruct family/caregiver on patient safety  8/16/2022 0244 by Barbara May RN  Outcome: Progressing  Flowsheets (Taken 8/16/2022 0242)  Free From Fall Injury:   Instruct family/caregiver on patient safety   Based on caregiver fall risk screen, instruct family/caregiver to ask for assistance with transferring infant if caregiver noted to have fall risk factors     Problem: ABCDS Injury Assessment  Goal: Absence of physical injury  8/16/2022 1512 by Kamran Sanches RN  Outcome: Progressing  Flowsheets (Taken 8/16/2022 1512)  Absence of Physical Injury: Implement safety measures based on patient assessment  8/16/2022 0244 by Barbara May RN  Outcome: Progressing  Flowsheets  Taken 8/16/2022 0244  Absence of Physical Injury: Implement safety measures based on patient assessment  Taken 8/16/2022 0242  Absence of Physical Injury: Implement safety measures based on patient assessment     Problem: Skin/Tissue Integrity  Goal: Absence of new skin breakdown  Description: 1. Monitor for areas of redness and/or skin breakdown  2. Assess vascular access sites hourly  3. Every 4-6 hours minimum:  Change oxygen saturation probe site  4. Every 4-6 hours:  If on nasal continuous positive airway pressure, respiratory therapy assess nares and determine need for appliance change or resting period. 8/16/2022 1512 by Kamran Sanches RN  Outcome: Progressing  Note: Patient skin condition and mucus membrane integrity remain unchanged during this shift. Skin breakdown prevention interventions are in place. Will continue to monitor and assess. 8/16/2022 0244 by Howard Lozoya RN  Outcome: Progressing  Note: Michele score assessed. Patient able to ambulate and turn self. Repositioned patient Q2H and assessed skin. Educated patient on importance of repositioning to prevent skin issues.

## 2022-08-16 NOTE — PRE SEDATION
Sedation Pre-Procedure Note    Patient Name: Lamar Bruner   YOB: 1930  Room/Bed: Y7F-8064/3126-01  Medical Record Number: [id number]  Date: 8/16/2022   Time: 4:22 PM       Indication:  R foot rest pain    Consent: I have discussed with the patient and/or the patient representative the indication, alternatives, and the possible risks and/or complications of the planned procedure and the anesthesia methods. The patient and/or patient representative appear to understand and agree to proceed. Vital Signs:   Vitals:    08/16/22 1300   BP: (!) 157/74   Pulse:    Resp:    Temp:    SpO2:        Past Medical History:   has a past medical history of Afib ([facility] [address] Cerebral amyloid angiopathy ([facility] [address] Diabetes mellitus ([facility] [address] Heart aneurysm, and Vertigo. Past Surgical History:   has a past surgical history that includes Appendectomy; hernia repair; Lung surgery; and Testicle surgery.     Medications:   Scheduled Meds:    lisinopril  10 mg Oral Daily    tamsulosin  0.8 mg Oral Nightly    [Held by provider] insulin glargine  20 Units SubCUTAneous Nightly    lactulose  20 g Oral TID    magnesium citrate  296 mL Oral Once    pantoprazole  40 mg Oral QAM AC    sodium chloride flush  5-40 mL IntraVENous 2 times per day    enoxaparin  40 mg SubCUTAneous Nightly    aspirin  81 mg Oral Daily    insulin lispro  0-4 Units SubCUTAneous TID WC    insulin lispro  0-4 Units SubCUTAneous Nightly    mirtazapine  15 mg Oral Nightly    rosuvastatin  10 mg Oral QAM     Continuous Infusions:    sodium chloride 50 mL/hr at 08/15/22 5035    sodium chloride      dextrose       PRN Meds: oxyCODONE-acetaminophen, hydrALAZINE, sodium chloride flush, sodium chloride, ondansetron **OR** ondansetron, acetaminophen **OR** acetaminophen, polyethylene glycol, morphine **OR** morphine, glucose, dextrose bolus **OR** dextrose bolus, glucagon (rDNA), dextrose  Home Meds:   Prior to Admission medications    Medication Sig Start Date End Date Taking? Authorizing Provider   insulin glargine (LANTUS) 100 UNIT/ML injection vial Inject 30 Units into the skin nightly   Yes Historical Provider, MD   metFORMIN (GLUCOPHAGE) 500 MG tablet Take 1,000 mg by mouth in the morning and 1,000 mg in the evening. Take with meals. Yes Historical Provider, MD   mirtazapine (REMERON) 15 MG tablet Take 15 mg by mouth nightly 2 tablets   Yes Historical Provider, MD   rosuvastatin (CRESTOR) 10 MG tablet Take 10 mg by mouth in the morning. Yes Historical Provider, MD   omeprazole (PRILOSEC) 40 MG delayed release capsule Take 40 mg by mouth in the morning. Yes Historical Provider, MD   lisinopril (PRINIVIL;ZESTRIL) 10 MG tablet Take 10 mg by mouth in the morning. Yes Historical Provider, MD   oxyCODONE-acetaminophen (PERCOCET) 5-325 MG per tablet Take 1 tablet by mouth every 8 hours as needed for Pain. Yes Historical Provider, MD   tamsulosin (FLOMAX) 0.4 MG capsule Take 0.4 mg by mouth nightly     Historical Provider, MD     Coumadin Use Last 7 Days:  no  Antiplatelet drug therapy use last 7 days: no  Other anticoagulant use last 7 days: no  Additional Medication Information:  see above      Pre-Sedation Documentation and Exam:   I have personally completed a history, physical exam & review of systems for this patient (see notes).     Mallampati Airway Assessment:  Mallampati Class III - (soft palate & base of uvula are visible)    Prior History of Anesthesia Complications:   none    ASA Classification:  Class 3 - A patient with severe systemic disease that limits activity but is not incapacitating    Sedation/ Anesthesia Plan:   intravenous sedation    Medications Planned:   midazolam (Versed) intravenously and fentanyl intravenously    Patient is an appropriate candidate for plan of sedation: yes    Electronically signed by Arpita Gonzalez MD on 8/16/2022 at 4:22 PM

## 2022-08-16 NOTE — PROGRESS NOTES
Hospitalist Progress Note      PCP: Letty Gomez    Date of Admission: 8/12/2022    Chief Complaint: Right leg pain     Hospital Course:   70-year-old with a history of atrial fibrillation, cerebral amyloid angiopathy, had aneurysm and type 2 diabetes mellitus. He had a cerebral bleed 2022 necessitating the cessation of Xarelto therapy. In the last 2-1/2 weeks he has developed a painful and swollen right leg-the pain has been progressive. He has trouble weightbearing and ambulating. Patient was admitted with right lower limb ischemia    Subjective:   Patient continues to be hypoglycemic. Denies any new complaints, continues to report right lower limb pain.      Medications:  Reviewed    Infusion Medications    sodium chloride 50 mL/hr at 08/15/22 9601    sodium chloride      dextrose       Scheduled Medications    lisinopril  10 mg Oral Daily    tamsulosin  0.8 mg Oral Nightly    [Held by provider] insulin glargine  20 Units SubCUTAneous Nightly    lactulose  20 g Oral TID    magnesium citrate  296 mL Oral Once    pantoprazole  40 mg Oral QAM AC    sodium chloride flush  5-40 mL IntraVENous 2 times per day    enoxaparin  40 mg SubCUTAneous Nightly    aspirin  81 mg Oral Daily    insulin lispro  0-4 Units SubCUTAneous TID WC    insulin lispro  0-4 Units SubCUTAneous Nightly    mirtazapine  15 mg Oral Nightly    rosuvastatin  10 mg Oral QAM     PRN Meds: oxyCODONE-acetaminophen, hydrALAZINE, sodium chloride flush, sodium chloride, ondansetron **OR** ondansetron, acetaminophen **OR** acetaminophen, polyethylene glycol, morphine **OR** morphine, glucose, dextrose bolus **OR** dextrose bolus, glucagon (rDNA), dextrose      Intake/Output Summary (Last 24 hours) at 8/16/2022 1258  Last data filed at 8/16/2022 1229  Gross per 24 hour   Intake 250 ml   Output --   Net 250 ml         Physical Exam Performed:    BP (!) 151/86   Pulse 85   Temp 97.6 °F (36.4 °C) (Oral)   Resp 18   Wt 169 lb 8.5 oz (76.9 kg)   SpO2 94%   BMI 22.99 kg/m²     General appearance: No apparent distress, appears stated age and cooperative. HEENT: Pupils equal, round, and reactive to light. Conjunctivae/corneas clear. Neck: Supple, with full range of motion. No jugular venous distention. Trachea midline. Respiratory:  Normal respiratory effort. Clear to auscultation, bilaterally without Rales/Wheezes/Rhonchi. Cardiovascular: Regular rate and rhythm with normal S1/S2 without murmurs, rubs or gallops. Abdomen: Soft, non-tender, non-distended with normal bowel sounds. Musculoskeletal: Right lower limb  pain , right leg colder and more  tender on minimal palpation compared to left. Skin: Skin color, texture, turgor normal.  No rashes or lesions. Neurologic:  Neurovascularly intact without any focal sensory/motor deficits. Cranial nerves: II-XII intact, grossly non-focal.  Psychiatric: Alert and oriented, thought content appropriate, normal insight  Capillary Refill: Brisk,3 seconds, normal   Peripheral Pulses: +2 palpable, equal bilaterally       Labs:   Recent Labs     08/15/22  0831   WBC 8.1   HGB 13.5   HCT 40.1*          Recent Labs     08/15/22  0831 08/16/22  0807    143   K 4.0 3.8    108   CO2 25 25   BUN 5* 4*   CREATININE 0.6* 0.6*   CALCIUM 8.6 8.6       No results for input(s): AST, ALT, BILIDIR, BILITOT, ALKPHOS in the last 72 hours. No results for input(s): INR in the last 72 hours. Recent Labs     08/15/22  0831   CKTOTAL 249         Urinalysis:      Lab Results   Component Value Date/Time    NITRU Negative 08/15/2022 02:30 PM    BLOODU Negative 08/15/2022 02:30 PM    SPECGRAV 1.010 08/15/2022 02:30 PM    GLUCOSEU Negative 08/15/2022 02:30 PM       Radiology:  XR CHEST (2 VW)   Final Result   Mild bibasilar airspace opacities. This could represent atelectasis or   pneumonia in the appropriate clinical setting.          VL PRE OP VEIN MAPPING   Final Result      CTA ABDOMINAL AORTA W BILAT RUNOFF W CONTRAST   Final Result   Complete occlusion of the right superficial femoral artery just after the   takeoff of the profunda femoris. Occlusion at the origin of the profunda femoris on the right. More distal   aspects of that vessel are enhancing. Multifocal calcified plaque is seen within the left superficial femoral   artery leading to at least moderate if not high-grade central canal stenosis. Unfortunately, contrast bolus is suboptimal limiting fine detail. Note that the bilateral popliteal arteries and the bilateral 3 vessel runoffs   cannot be reliably evaluated secondary to suboptimal contrast timing. Abdominal aortic aneurysm measuring 4.0 cm maximally. See recommendations   below. Circumferential subcutaneous edema within the right calf extending into the   right foot. No soft tissue gas or radiopaque foreign body. No focal fluid   collections are identified to suggest abscess. Moderate amount of stool within the colon. Please correlate with clinical   evidence of constipation. RECOMMENDATIONS:   4 cm infrarenal abdominal aortic aneurysm. Recommend follow-up every 12   months and vascular consultation. Reference: J Am Otto Radiol 0400;28:485-589. 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 Patient presented to emergency with right lower limb pain at rest, right lower limb was cold and tender on exam.  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 Plan  -Appreciate vascular surgery input, patient underwent mapping yesterday.  -Plan for angiogram today    Atrial fibrillation.   Not on anticoagulation, will consider watchman as outpatient    Diabetes mellitus  Patient was noted to be hypoglycemic,  Hold glargine for now  -Continue on sliding scale    Cerebral amyloid angiopathy  We will continue to monitor    Hyperlipidemia  Resume statin    BPH  Continue  tamsulosin    Hypertension  Continue lisinopril    DVT Prophylaxis: lovenox  Diet: Diet NPO Exceptions are: Sips of Water with Meds  Code Status: Full Code    PT/OT Eval Status: following     Dispo - pending clinical improvement      Phillip Hu MD

## 2022-08-16 NOTE — PROGRESS NOTES
Patient in bed, A&O X4. VSS. IV infusing without issue, flushes easily, dressing CDI. Patient denies pain at this time. AM medications given without complaint and small sips of water. Patient remains NPO for procedure this afternoon. Consent is in the chart. Patient verbalizes no additional questions or concerns regarding the angiogram. Daughter is at bedside. Bed in low and locked position and call light within reach. Will monitor.  Electronically signed by Kathryn Goodson RN on 8/16/2022 at 8:32 AM

## 2022-08-16 NOTE — PLAN OF CARE
Problem: Discharge Planning  Goal: Discharge to home or other facility with appropriate resources  Outcome: Progressing  Flowsheets (Taken 8/16/2022 0244)  Discharge to home or other facility with appropriate resources:   Identify barriers to discharge with patient and caregiver   Identify discharge learning needs (meds, wound care, etc)   Arrange for needed discharge resources and transportation as appropriate     Problem: Pain  Goal: Verbalizes/displays adequate comfort level or baseline comfort level  Outcome: Progressing  Flowsheets (Taken 8/14/2022 0702)  Verbalizes/displays adequate comfort level or baseline comfort level:   Encourage patient to monitor pain and request assistance   Assess pain using appropriate pain scale   Administer analgesics based on type and severity of pain and evaluate response   Implement non-pharmacological measures as appropriate and evaluate response     Problem: Safety - Adult  Goal: Free from fall injury  Outcome: Progressing  Flowsheets (Taken 8/16/2022 0242)  Free From Fall Injury:   Instruct family/caregiver on patient safety   Based on caregiver fall risk screen, instruct family/caregiver to ask for assistance with transferring infant if caregiver noted to have fall risk factors     Problem: ABCDS Injury Assessment  Goal: Absence of physical injury  Outcome: Progressing  Flowsheets  Taken 8/16/2022 0244  Absence of Physical Injury: Implement safety measures based on patient assessment  Taken 8/16/2022 0242  Absence of Physical Injury: Implement safety measures based on patient assessment     Problem: Skin/Tissue Integrity  Goal: Absence of new skin breakdown  Description: 1. Monitor for areas of redness and/or skin breakdown  2. Assess vascular access sites hourly  3. Every 4-6 hours minimum:  Change oxygen saturation probe site  4.   Every 4-6 hours:  If on nasal continuous positive airway pressure, respiratory therapy assess nares and determine need for appliance change or resting period. Outcome: Progressing  Note: Michele score assessed. Patient able to ambulate and turn self. Repositioned patient Q2H and assessed skin. Educated patient on importance of repositioning to prevent skin issues.

## 2022-08-17 LAB
ANION GAP SERPL CALCULATED.3IONS-SCNC: 11 MMOL/L (ref 3–16)
BUN BLDV-MCNC: 5 MG/DL (ref 7–20)
CALCIUM SERPL-MCNC: 8.4 MG/DL (ref 8.3–10.6)
CHLORIDE BLD-SCNC: 106 MMOL/L (ref 99–110)
CO2: 22 MMOL/L (ref 21–32)
CREAT SERPL-MCNC: 0.6 MG/DL (ref 0.8–1.3)
GFR AFRICAN AMERICAN: >60
GFR NON-AFRICAN AMERICAN: >60
GLUCOSE BLD-MCNC: 148 MG/DL (ref 70–99)
GLUCOSE BLD-MCNC: 148 MG/DL (ref 70–99)
GLUCOSE BLD-MCNC: 159 MG/DL (ref 70–99)
GLUCOSE BLD-MCNC: 75 MG/DL (ref 70–99)
GLUCOSE BLD-MCNC: 80 MG/DL (ref 70–99)
INR BLD: 1.27 (ref 0.87–1.14)
PERFORMED ON: ABNORMAL
PERFORMED ON: NORMAL
POTASSIUM SERPL-SCNC: 4.1 MMOL/L (ref 3.5–5.1)
PROTHROMBIN TIME: 15.9 SEC (ref 11.7–14.5)
SODIUM BLD-SCNC: 139 MMOL/L (ref 136–145)

## 2022-08-17 PROCEDURE — 99231 SBSQ HOSP IP/OBS SF/LOW 25: CPT | Performed by: SURGERY

## 2022-08-17 PROCEDURE — 85610 PROTHROMBIN TIME: CPT

## 2022-08-17 PROCEDURE — 6360000002 HC RX W HCPCS: Performed by: INTERNAL MEDICINE

## 2022-08-17 PROCEDURE — 6370000000 HC RX 637 (ALT 250 FOR IP): Performed by: INTERNAL MEDICINE

## 2022-08-17 PROCEDURE — 6370000000 HC RX 637 (ALT 250 FOR IP): Performed by: NURSE PRACTITIONER

## 2022-08-17 PROCEDURE — 6360000002 HC RX W HCPCS: Performed by: NURSE PRACTITIONER

## 2022-08-17 PROCEDURE — 36415 COLL VENOUS BLD VENIPUNCTURE: CPT

## 2022-08-17 PROCEDURE — 80048 BASIC METABOLIC PNL TOTAL CA: CPT

## 2022-08-17 PROCEDURE — 1200000000 HC SEMI PRIVATE

## 2022-08-17 PROCEDURE — 2580000003 HC RX 258: Performed by: INTERNAL MEDICINE

## 2022-08-17 PROCEDURE — 94760 N-INVAS EAR/PLS OXIMETRY 1: CPT

## 2022-08-17 RX ADMIN — SODIUM CHLORIDE: 9 INJECTION, SOLUTION INTRAVENOUS at 08:50

## 2022-08-17 RX ADMIN — ENOXAPARIN SODIUM 40 MG: 100 INJECTION SUBCUTANEOUS at 20:31

## 2022-08-17 RX ADMIN — HYDRALAZINE HYDROCHLORIDE 10 MG: 20 INJECTION INTRAMUSCULAR; INTRAVENOUS at 20:31

## 2022-08-17 RX ADMIN — OXYCODONE AND ACETAMINOPHEN 2 TABLET: 5; 325 TABLET ORAL at 15:36

## 2022-08-17 RX ADMIN — MIRTAZAPINE 15 MG: 15 TABLET, FILM COATED ORAL at 20:31

## 2022-08-17 RX ADMIN — PANTOPRAZOLE SODIUM 40 MG: 40 TABLET, DELAYED RELEASE ORAL at 05:06

## 2022-08-17 RX ADMIN — OXYCODONE AND ACETAMINOPHEN 2 TABLET: 5; 325 TABLET ORAL at 05:06

## 2022-08-17 RX ADMIN — LISINOPRIL 10 MG: 10 TABLET ORAL at 08:44

## 2022-08-17 RX ADMIN — ROSUVASTATIN CALCIUM 10 MG: 10 TABLET, FILM COATED ORAL at 08:44

## 2022-08-17 RX ADMIN — SODIUM CHLORIDE, PRESERVATIVE FREE 10 ML: 5 INJECTION INTRAVENOUS at 08:44

## 2022-08-17 RX ADMIN — ASPIRIN 81 MG CHEWABLE TABLET 81 MG: 81 TABLET CHEWABLE at 08:43

## 2022-08-17 RX ADMIN — SODIUM CHLORIDE, PRESERVATIVE FREE 10 ML: 5 INJECTION INTRAVENOUS at 20:30

## 2022-08-17 RX ADMIN — MORPHINE SULFATE 2 MG: 2 INJECTION, SOLUTION INTRAMUSCULAR; INTRAVENOUS at 08:44

## 2022-08-17 RX ADMIN — MORPHINE SULFATE 2 MG: 2 INJECTION, SOLUTION INTRAMUSCULAR; INTRAVENOUS at 18:34

## 2022-08-17 RX ADMIN — TAMSULOSIN HYDROCHLORIDE 0.8 MG: 0.4 CAPSULE ORAL at 20:31

## 2022-08-17 ASSESSMENT — PAIN - FUNCTIONAL ASSESSMENT
PAIN_FUNCTIONAL_ASSESSMENT: PREVENTS OR INTERFERES SOME ACTIVE ACTIVITIES AND ADLS
PAIN_FUNCTIONAL_ASSESSMENT: PREVENTS OR INTERFERES SOME ACTIVE ACTIVITIES AND ADLS

## 2022-08-17 ASSESSMENT — PAIN DESCRIPTION - PAIN TYPE
TYPE: ACUTE PAIN
TYPE: ACUTE PAIN

## 2022-08-17 ASSESSMENT — PAIN SCALES - GENERAL
PAINLEVEL_OUTOF10: 6
PAINLEVEL_OUTOF10: 2
PAINLEVEL_OUTOF10: 9

## 2022-08-17 ASSESSMENT — PAIN DESCRIPTION - ORIENTATION
ORIENTATION: RIGHT
ORIENTATION: RIGHT

## 2022-08-17 ASSESSMENT — PAIN DESCRIPTION - FREQUENCY
FREQUENCY: CONTINUOUS
FREQUENCY: CONTINUOUS

## 2022-08-17 ASSESSMENT — PAIN DESCRIPTION - ONSET
ONSET: ON-GOING
ONSET: ON-GOING

## 2022-08-17 ASSESSMENT — PAIN DESCRIPTION - DESCRIPTORS
DESCRIPTORS: ACHING
DESCRIPTORS: ACHING

## 2022-08-17 ASSESSMENT — PAIN DESCRIPTION - LOCATION
LOCATION: LEG;FOOT
LOCATION: LEG

## 2022-08-17 ASSESSMENT — PAIN SCALES - WONG BAKER: WONGBAKER_NUMERICALRESPONSE: 0

## 2022-08-17 NOTE — PROGRESS NOTES
VASCULAR    No groin pain. R foot without new issues. VSS Afeb  Good UO  L groin soft without hematomas  R foot cool but pink with slow cap refill. No new neuro deficits. Labs pending    A/P: Ischemic rest pain R foot with multilevel arterial occlusive disease. Despite age pt is a candidate for limb salvage. Recommended R femoral replacement graft + R fem-PT bypass. Pt and family agree. Explained in detail. All questions answered and all agree to proceed. Will plan surgery on Friday afternoon. DC IVFs and up in chair/ambulate as possible. Will follow.      General Sparrow

## 2022-08-17 NOTE — PLAN OF CARE
Problem: Discharge Planning  Goal: Discharge to home or other facility with appropriate resources  Outcome: Progressing  Flowsheets (Taken 8/17/2022 1237)  Discharge to home or other facility with appropriate resources:   Identify barriers to discharge with patient and caregiver   Arrange for needed discharge resources and transportation as appropriate   Identify discharge learning needs (meds, wound care, etc)   Refer to discharge planning if patient needs post-hospital services based on physician order or complex needs related to functional status, cognitive ability or social support system     Problem: Pain  Goal: Verbalizes/displays adequate comfort level or baseline comfort level  Outcome: Progressing  Flowsheets (Taken 8/17/2022 1237)  Verbalizes/displays adequate comfort level or baseline comfort level:   Encourage patient to monitor pain and request assistance   Assess pain using appropriate pain scale   Administer analgesics based on type and severity of pain and evaluate response   Implement non-pharmacological measures as appropriate and evaluate response   Consider cultural and social influences on pain and pain management     Problem: Safety - Adult  Goal: Free from fall injury  Outcome: Progressing  Flowsheets (Taken 8/17/2022 1237)  Free From Fall Injury: Instruct family/caregiver on patient safety     Problem: ABCDS Injury Assessment  Goal: Absence of physical injury  Outcome: Progressing  Flowsheets (Taken 8/17/2022 1237)  Absence of Physical Injury: Implement safety measures based on patient assessment     Problem: Skin/Tissue Integrity  Goal: Absence of new skin breakdown  Description: 1. Monitor for areas of redness and/or skin breakdown  2. Assess vascular access sites hourly  3. Every 4-6 hours minimum:  Change oxygen saturation probe site  4.   Every 4-6 hours:  If on nasal continuous positive airway pressure, respiratory therapy assess nares and determine need for appliance change or resting period. Outcome: Progressing  Note: Patient skin condition and mucus membrane integrity remain unchanged during this shift. Skin breakdown prevention interventions are in place. Will continue to monitor and assess.

## 2022-08-17 NOTE — PROGRESS NOTES
Patient in bed, A&O X4. VSS. IV infusing without issue, flushes easily, dressing CDI. Patient reports a pain of 9/10 in his right leg. Pain medications administered as ordered. See MAR. AM medications given without complaint. Tolerating PO intake. Patient refuses lactulose this morning as he had a BM early today and claims his constipation is resolved. Dressing to left groin remains CDI from angiogram 8/16/22. Pulses remain audible with Doppler only on the right foot. Weak pulses palpated on left foot. Daughter is at bedside. Patient verbalizes no other needs at this time. Bed in low and locked position and call light within reach. Will monitor.  Electronically signed by Loretta Stringer RN on 8/17/2022 at 0850 AM

## 2022-08-17 NOTE — PROGRESS NOTES
Hospitalist Progress Note      PCP: Charm Gitelman    Date of Admission: 8/12/2022    Chief Complaint: Right leg pain     Hospital Course:   51-year-old with a history of atrial fibrillation, cerebral amyloid angiopathy, had aneurysm and type 2 diabetes mellitus. He had a cerebral bleed 2022 necessitating the cessation of Xarelto therapy. In the last 2-1/2 weeks he has developed a painful and swollen right leg-the pain has been progressive. He has trouble weightbearing and ambulating. Patient was admitted with right lower limb ischemia    Subjective:   No new complaints, continues to report right foot pain.      Medications:  Reviewed    Infusion Medications    sodium chloride 25 mL/hr at 08/17/22 0850    dextrose       Scheduled Medications    lisinopril  10 mg Oral Daily    tamsulosin  0.8 mg Oral Nightly    [Held by provider] insulin glargine  20 Units SubCUTAneous Nightly    lactulose  20 g Oral TID    magnesium citrate  296 mL Oral Once    pantoprazole  40 mg Oral QAM AC    sodium chloride flush  5-40 mL IntraVENous 2 times per day    enoxaparin  40 mg SubCUTAneous Nightly    aspirin  81 mg Oral Daily    insulin lispro  0-4 Units SubCUTAneous TID WC    insulin lispro  0-4 Units SubCUTAneous Nightly    mirtazapine  15 mg Oral Nightly    rosuvastatin  10 mg Oral QAM     PRN Meds: oxyCODONE-acetaminophen, hydrALAZINE, sodium chloride flush, sodium chloride, ondansetron **OR** ondansetron, acetaminophen **OR** acetaminophen, polyethylene glycol, morphine **OR** morphine, glucose, dextrose bolus **OR** dextrose bolus, glucagon (rDNA), dextrose      Intake/Output Summary (Last 24 hours) at 8/17/2022 1302  Last data filed at 8/17/2022 0850  Gross per 24 hour   Intake 130 ml   Output --   Net 130 ml         Physical Exam Performed:    /67   Pulse 90   Temp 98.2 °F (36.8 °C) (Oral)   Resp 18   Wt 169 lb 8.5 oz (76.9 kg)   SpO2 96%   BMI 22.99 kg/m²     General appearance: No apparent distress, appears stated age and cooperative. HEENT: Pupils equal, round, and reactive to light. Conjunctivae/corneas clear. Neck: Supple, with full range of motion. No jugular venous distention. Trachea midline. Respiratory:  Normal respiratory effort. Clear to auscultation, bilaterally without Rales/Wheezes/Rhonchi. Cardiovascular: Regular rate and rhythm with normal S1/S2 without murmurs, rubs or gallops. Abdomen: Soft, non-tender, non-distended with normal bowel sounds. Musculoskeletal: Right lower limb  pain , right leg colder and more  tender on minimal palpation compared to left. Skin: Skin color, texture, turgor normal.  No rashes or lesions. Neurologic:  Neurovascularly intact without any focal sensory/motor deficits. Cranial nerves: II-XII intact, grossly non-focal.  Psychiatric: Alert and oriented, thought content appropriate, normal insight  Capillary Refill: Brisk,3 seconds, normal   Peripheral Pulses: +2 palpable, equal bilaterally       Labs:   Recent Labs     08/15/22  0831   WBC 8.1   HGB 13.5   HCT 40.1*          Recent Labs     08/15/22  0831 08/16/22  0807 08/17/22  0633    143 139   K 4.0 3.8 4.1    108 106   CO2 25 25 22   BUN 5* 4* 5*   CREATININE 0.6* 0.6* 0.6*   CALCIUM 8.6 8.6 8.4       No results for input(s): AST, ALT, BILIDIR, BILITOT, ALKPHOS in the last 72 hours. Recent Labs     08/17/22  0633   INR 1.27*     Recent Labs     08/15/22  0831   CKTOTAL 249         Urinalysis:      Lab Results   Component Value Date/Time    NITRU Negative 08/15/2022 02:30 PM    BLOODU Negative 08/15/2022 02:30 PM    SPECGRAV 1.010 08/15/2022 02:30 PM    GLUCOSEU Negative 08/15/2022 02:30 PM       Radiology:  XR CHEST (2 VW)   Final Result   Mild bibasilar airspace opacities. This could represent atelectasis or   pneumonia in the appropriate clinical setting.          VL PRE OP VEIN MAPPING   Final Result      CTA ABDOMINAL AORTA W BILAT RUNOFF W CONTRAST   Final Result   Complete occlusion of the right superficial femoral artery just after the   takeoff of the profunda femoris. Occlusion at the origin of the profunda femoris on the right. More distal   aspects of that vessel are enhancing. Multifocal calcified plaque is seen within the left superficial femoral   artery leading to at least moderate if not high-grade central canal stenosis. Unfortunately, contrast bolus is suboptimal limiting fine detail. Note that the bilateral popliteal arteries and the bilateral 3 vessel runoffs   cannot be reliably evaluated secondary to suboptimal contrast timing. Abdominal aortic aneurysm measuring 4.0 cm maximally. See recommendations   below. Circumferential subcutaneous edema within the right calf extending into the   right foot. No soft tissue gas or radiopaque foreign body. No focal fluid   collections are identified to suggest abscess. Moderate amount of stool within the colon. Please correlate with clinical   evidence of constipation. RECOMMENDATIONS:   4 cm infrarenal abdominal aortic aneurysm. Recommend follow-up every 12   months and vascular consultation. Reference: J Am Otto Radiol 9185;70:362-606. 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 Patient presented to emergency with right lower limb pain at rest, right lower limb was cold and tender on exam.  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 Underwent coronary angiogram that showed multilevel arterial occlusion 8/16. Patient is planned for right femoral bypass Friday  Plan  -Appreciate vascular surgery input, patient underwent mapping yesterday.  -Patient planned for femoral bypass Friday    Atrial fibrillation.   Not on anticoagulation, will consider watchman as outpatient    Diabetes mellitus  Patient was noted to be hypoglycemic,  Hold glargine for now  -Continue on sliding scale    Cerebral amyloid angiopathy  We will continue to monitor    Hyperlipidemia  Resume statin    BPH  Continue  tamsulosin    Hypertension  Continue lisinopril    DVT Prophylaxis: lovenox  Diet: ADULT DIET;  Regular; 5 carb choices (75 gm/meal)  Code Status: Full Code    PT/OT Eval Status: following     Dispo - pending clinical improvement      Viridiana Andrade MD

## 2022-08-17 NOTE — OP NOTE
[address] [facility]                                OPERATIVE REPORT    PATIENT NAME: Yajaira Bueno                     :        1930  MED REC NO:   [id number]                          ROOM:       3126  ACCOUNT NO:   [de-identified]                           ADMIT DATE: 2022  PROVIDER:     Fabrice Seth MD      ANGIOGRAM    DATE OF PROCEDURE:  2022    PREOPERATIVE DIAGNOSIS:  Ischemic rest pain, right foot. POSTOPERATIVE DIAGNOSIS:  Ischemic rest pain, right foot. OPERATION PERFORMED:  1. Ultrasound-guided left femoral catheterization. 2.  Aortogram via suprarenal catheter placement. 3.  Iliac angiogram via catheter positioned above the aortic  bifurcation. 4.  Bilateral infrarenal runoff series via catheter positioned above the  aortic bifurcation. SURGEON:  Fabrice Seth MD    ANESTHESIA:  Lidocaine 1% with conscious sedation (Versed 2 mg and  fentanyl 50 mcg) lasting 30 minutes with continuous oxygen saturation,  EKG and blood pressure monitoring and independent nurse administration  and monitoring. ESTIMATED BLOOD LOSS:  Less than 50 mL. INDICATIONS:  The patient is a very functional 75-year-old gentleman who  presented to the hospital complaining of several-week history of right  foot rest pain. He was evaluated first with a CT angiogram, which  demonstrated occlusion of his right superficial femoral, popliteal and  tibial vessels, however, there appeared to be some filling in the  posterior tibial artery and his foot remained viable. It was  recommended that he consider angiography to see if there any options for  limb salvage. He and his family agreed understanding the risks,  benefits and other options. OPERATIVE PROCEDURE:  The patient was brought to the Angio Department  and placed on the table in supine position.   He was attached to  continuous oxygen saturation, EKG, and blood pressure monitoring. Conscious sedation was begun under the supervision of this physician  with continuous nursing observation. The bilateral groins were then  prepped and draped in a sterile fashion. Ultrasound was sterilely  passed onto the field and the left common femoral artery and its  bifurcation were identified and the artery was confirmed to be patent by  duplex scanning. Lidocaine was infiltrated over the femoral artery and  using ultrasound guidance, the femoral artery was punctured above its  bifurcation without difficulty. Images were obtained for the permanent  record. The wire was passed proximally in the aorta followed by a 5-Spanish  sheath. Over the wire was placed a pigtail catheter, which was  positioned in the suprarenal location. A flush aortogram was performed. Catheter was then withdrawn to adjust above the aortic bifurcation where  iliac angiogram was performed. Leaving the catheter in that position, a  bilateral infrainguinal run off series was performed. The images were  reviewed and found to be of adequate quality and the catheter was  straightened and removed over a wire. Sheath was removed and pressure  applied manually. The patient tolerated the procedure well. FINDINGS:  1. Right renal artery stenosis measuring greater than 60% with no  significant left renal artery stenosis. 2.  Infrarenal abdominal aortic aneurysm, which appears to measure  approximately 4 cm in size and is associated with widely patent  bilateral iliac systems. 3.  Diseased right common femoral artery with a short segment occlusion  of the right deep femoral artery at its origin. The right superficial  femoral artery and popliteal arteries are occluded throughout the course  with reconstitution of the right posterior tibial artery at its origin. The anterior tibial artery is occluded and the peroneal artery severely  diseased.   Throughout the course of the right posterior tibial artery,  there were multiple severe stenoses with an undiseased posterior tibial  artery reconstituting just above the ankle with good runoff into the  foot. 4.  Patent left superficial femoral artery and popliteal artery without  stenoses associated with a left posterior tibial and peroneal runoff  into the foot.         Belem Watts MD    D: 08/16/2022 18:09:23       T: 08/16/2022 22:36:41     [id number]  Job#: [id number]     Doc#: [id number]    CC:

## 2022-08-17 NOTE — PROGRESS NOTES
PM Assessment and Mediations completed. BP (!) 160/86   Pulse 94   Temp 98.2 °F (36.8 °C) (Oral)   Resp 18   Wt 169 lb 8.5 oz (76.9 kg)   SpO2 96%   BMI 22.99 kg/m²     Alert and oriented. Right leg cool, discolored, -dopple pulse  Left leg warm +1pedal pulse  Angiogram site cdi no hematoma  Needs to lay flat until 9pm      Calls appropriately. Plan of care and safety measures reviewed with the patient. Needed items including call light in reach and exit alarm in place.        Electronically signed by Tatyana Maddox RN on 8/16/2022 at 8:11 PM

## 2022-08-17 NOTE — PROGRESS NOTES
Patient in bed, status unchanged from previous assessment. Patient reports a pain of 8/10 in his right foot/leg. Will administer pain medications as ordered. See MAR. Appetite and PO intake remain adequate. No other needs verbalized at this time. Standard safety precautions in place. Will monitor.  Electronically signed by Sameer Adams RN on 8/17/2022 at 6:31 PM

## 2022-08-18 ENCOUNTER — DIRECT ADMIT ORDERS (OUTPATIENT)
Dept: VASCULAR SURGERY | Age: 87
End: 2022-08-18

## 2022-08-18 ENCOUNTER — HOSPITAL ENCOUNTER (INPATIENT)
Age: 87
LOS: 4 days | Discharge: NURSING, REHAB OR LONG-TERM CARE | DRG: 253 | End: 2022-08-22
Attending: INTERNAL MEDICINE | Admitting: INTERNAL MEDICINE
Payer: MEDICARE

## 2022-08-18 VITALS
HEART RATE: 94 BPM | TEMPERATURE: 98 F | SYSTOLIC BLOOD PRESSURE: 116 MMHG | DIASTOLIC BLOOD PRESSURE: 56 MMHG | OXYGEN SATURATION: 93 % | BODY MASS INDEX: 22.63 KG/M2 | WEIGHT: 166.89 LBS | RESPIRATION RATE: 16 BRPM

## 2022-08-18 DIAGNOSIS — I73.9 CLAUDICATION OF LOWER EXTREMITY (HCC): ICD-10-CM

## 2022-08-18 PROBLEM — I99.8 LIMB ISCHEMIA: Status: ACTIVE | Noted: 2022-08-18

## 2022-08-18 PROBLEM — M79.606 ISCHEMIC LEG PAIN: Status: ACTIVE | Noted: 2022-08-18

## 2022-08-18 PROBLEM — I99.8 ISCHEMIC LEG PAIN: Status: ACTIVE | Noted: 2022-08-18

## 2022-08-18 LAB
ABO/RH: NORMAL
ABO/RH: NORMAL
ANION GAP SERPL CALCULATED.3IONS-SCNC: 11 MMOL/L (ref 3–16)
ANTIBODY SCREEN: NORMAL
ANTIBODY SCREEN: NORMAL
BUN BLDV-MCNC: 7 MG/DL (ref 7–20)
CALCIUM SERPL-MCNC: 8.8 MG/DL (ref 8.3–10.6)
CHLORIDE BLD-SCNC: 105 MMOL/L (ref 99–110)
CO2: 23 MMOL/L (ref 21–32)
CREAT SERPL-MCNC: 0.7 MG/DL (ref 0.8–1.3)
GFR AFRICAN AMERICAN: >60
GFR NON-AFRICAN AMERICAN: >60
GLUCOSE BLD-MCNC: 116 MG/DL (ref 70–99)
GLUCOSE BLD-MCNC: 124 MG/DL (ref 70–99)
GLUCOSE BLD-MCNC: 154 MG/DL (ref 70–99)
GLUCOSE BLD-MCNC: 162 MG/DL (ref 70–99)
GLUCOSE BLD-MCNC: 165 MG/DL (ref 70–99)
HCT VFR BLD CALC: 42.1 % (ref 40.5–52.5)
HEMOGLOBIN: 14 G/DL (ref 13.5–17.5)
INR BLD: 1.02 (ref 0.87–1.14)
LV EF: 58 %
LVEF MODALITY: NORMAL
MCH RBC QN AUTO: 30.4 PG (ref 26–34)
MCHC RBC AUTO-ENTMCNC: 33.2 G/DL (ref 31–36)
MCV RBC AUTO: 91.7 FL (ref 80–100)
PDW BLD-RTO: 14.9 % (ref 12.4–15.4)
PERFORMED ON: ABNORMAL
PLATELET # BLD: 324 K/UL (ref 135–450)
PMV BLD AUTO: 8.3 FL (ref 5–10.5)
POTASSIUM SERPL-SCNC: 3.7 MMOL/L (ref 3.5–5.1)
PROTHROMBIN TIME: 13.3 SEC (ref 11.7–14.5)
RBC # BLD: 4.59 M/UL (ref 4.2–5.9)
SODIUM BLD-SCNC: 139 MMOL/L (ref 136–145)
WBC # BLD: 8.3 K/UL (ref 4–11)

## 2022-08-18 PROCEDURE — 86900 BLOOD TYPING SEROLOGIC ABO: CPT

## 2022-08-18 PROCEDURE — 86901 BLOOD TYPING SEROLOGIC RH(D): CPT

## 2022-08-18 PROCEDURE — 6370000000 HC RX 637 (ALT 250 FOR IP): Performed by: INTERNAL MEDICINE

## 2022-08-18 PROCEDURE — 94760 N-INVAS EAR/PLS OXIMETRY 1: CPT

## 2022-08-18 PROCEDURE — 2580000003 HC RX 258: Performed by: INTERNAL MEDICINE

## 2022-08-18 PROCEDURE — 6360000002 HC RX W HCPCS: Performed by: INTERNAL MEDICINE

## 2022-08-18 PROCEDURE — 86850 RBC ANTIBODY SCREEN: CPT

## 2022-08-18 PROCEDURE — 2580000003 HC RX 258: Performed by: NURSE PRACTITIONER

## 2022-08-18 PROCEDURE — 93306 TTE W/DOPPLER COMPLETE: CPT

## 2022-08-18 PROCEDURE — 36415 COLL VENOUS BLD VENIPUNCTURE: CPT

## 2022-08-18 PROCEDURE — 80048 BASIC METABOLIC PNL TOTAL CA: CPT

## 2022-08-18 PROCEDURE — 85610 PROTHROMBIN TIME: CPT

## 2022-08-18 PROCEDURE — 6360000002 HC RX W HCPCS: Performed by: NURSE PRACTITIONER

## 2022-08-18 PROCEDURE — 85027 COMPLETE CBC AUTOMATED: CPT

## 2022-08-18 PROCEDURE — 2100000000 HC CCU R&B

## 2022-08-18 PROCEDURE — 6370000000 HC RX 637 (ALT 250 FOR IP): Performed by: NURSE PRACTITIONER

## 2022-08-18 RX ORDER — DEXTROSE MONOHYDRATE 100 MG/ML
INJECTION, SOLUTION INTRAVENOUS CONTINUOUS PRN
Status: DISCONTINUED | OUTPATIENT
Start: 2022-08-18 | End: 2022-08-22 | Stop reason: HOSPADM

## 2022-08-18 RX ORDER — ROSUVASTATIN CALCIUM 10 MG/1
10 TABLET, COATED ORAL DAILY
Status: DISCONTINUED | OUTPATIENT
Start: 2022-08-19 | End: 2022-08-22 | Stop reason: HOSPADM

## 2022-08-18 RX ORDER — SODIUM CHLORIDE 9 MG/ML
INJECTION, SOLUTION INTRAVENOUS CONTINUOUS
Status: DISCONTINUED | OUTPATIENT
Start: 2022-08-19 | End: 2022-08-18 | Stop reason: HOSPADM

## 2022-08-18 RX ORDER — LACTULOSE 10 G/15ML
20 SOLUTION ORAL 3 TIMES DAILY
Status: DISCONTINUED | OUTPATIENT
Start: 2022-08-18 | End: 2022-08-18

## 2022-08-18 RX ORDER — ENOXAPARIN SODIUM 100 MG/ML
40 INJECTION SUBCUTANEOUS NIGHTLY
Status: COMPLETED | OUTPATIENT
Start: 2022-08-18 | End: 2022-08-18

## 2022-08-18 RX ORDER — SODIUM CHLORIDE 0.9 % (FLUSH) 0.9 %
5-40 SYRINGE (ML) INJECTION PRN
Status: DISCONTINUED | OUTPATIENT
Start: 2022-08-18 | End: 2022-08-22 | Stop reason: HOSPADM

## 2022-08-18 RX ORDER — ACETAMINOPHEN 325 MG/1
650 TABLET ORAL EVERY 6 HOURS PRN
Status: DISCONTINUED | OUTPATIENT
Start: 2022-08-18 | End: 2022-08-22 | Stop reason: HOSPADM

## 2022-08-18 RX ORDER — ACETAMINOPHEN 325 MG/1
650 TABLET ORAL EVERY 4 HOURS PRN
Status: DISCONTINUED | OUTPATIENT
Start: 2022-08-18 | End: 2022-08-22 | Stop reason: HOSPADM

## 2022-08-18 RX ORDER — INSULIN LISPRO 100 [IU]/ML
0-4 INJECTION, SOLUTION INTRAVENOUS; SUBCUTANEOUS NIGHTLY
Status: DISCONTINUED | OUTPATIENT
Start: 2022-08-18 | End: 2022-08-22 | Stop reason: HOSPADM

## 2022-08-18 RX ORDER — INSULIN LISPRO 100 [IU]/ML
0-8 INJECTION, SOLUTION INTRAVENOUS; SUBCUTANEOUS
Status: DISCONTINUED | OUTPATIENT
Start: 2022-08-19 | End: 2022-08-22 | Stop reason: HOSPADM

## 2022-08-18 RX ORDER — MIRTAZAPINE 15 MG/1
30 TABLET, FILM COATED ORAL NIGHTLY
Status: DISCONTINUED | OUTPATIENT
Start: 2022-08-18 | End: 2022-08-22 | Stop reason: HOSPADM

## 2022-08-18 RX ORDER — SODIUM CHLORIDE 9 MG/ML
INJECTION, SOLUTION INTRAVENOUS PRN
Status: DISCONTINUED | OUTPATIENT
Start: 2022-08-18 | End: 2022-08-22 | Stop reason: HOSPADM

## 2022-08-18 RX ORDER — SODIUM CHLORIDE 9 MG/ML
INJECTION, SOLUTION INTRAVENOUS CONTINUOUS
Status: CANCELLED | OUTPATIENT
Start: 2022-08-19

## 2022-08-18 RX ORDER — INSULIN GLARGINE 100 [IU]/ML
20 INJECTION, SOLUTION SUBCUTANEOUS NIGHTLY
Status: DISCONTINUED | OUTPATIENT
Start: 2022-08-18 | End: 2022-08-22 | Stop reason: HOSPADM

## 2022-08-18 RX ORDER — LACTULOSE 10 G/15ML
20 SOLUTION ORAL 3 TIMES DAILY
Status: DISCONTINUED | OUTPATIENT
Start: 2022-08-19 | End: 2022-08-19

## 2022-08-18 RX ORDER — OXYCODONE HYDROCHLORIDE AND ACETAMINOPHEN 5; 325 MG/1; MG/1
1 TABLET ORAL EVERY 8 HOURS PRN
Status: DISCONTINUED | OUTPATIENT
Start: 2022-08-18 | End: 2022-08-19

## 2022-08-18 RX ORDER — ENOXAPARIN SODIUM 100 MG/ML
40 INJECTION SUBCUTANEOUS DAILY
Status: DISCONTINUED | OUTPATIENT
Start: 2022-08-19 | End: 2022-08-18

## 2022-08-18 RX ORDER — ASPIRIN 81 MG/1
81 TABLET, CHEWABLE ORAL DAILY
Status: DISCONTINUED | OUTPATIENT
Start: 2022-08-19 | End: 2022-08-22

## 2022-08-18 RX ORDER — ONDANSETRON 2 MG/ML
4 INJECTION INTRAMUSCULAR; INTRAVENOUS EVERY 6 HOURS PRN
Status: DISCONTINUED | OUTPATIENT
Start: 2022-08-18 | End: 2022-08-22 | Stop reason: HOSPADM

## 2022-08-18 RX ORDER — SODIUM CHLORIDE 0.9 % (FLUSH) 0.9 %
5-40 SYRINGE (ML) INJECTION EVERY 12 HOURS SCHEDULED
Status: DISCONTINUED | OUTPATIENT
Start: 2022-08-18 | End: 2022-08-22 | Stop reason: HOSPADM

## 2022-08-18 RX ORDER — SODIUM CHLORIDE 9 MG/ML
INJECTION, SOLUTION INTRAVENOUS CONTINUOUS
Status: DISCONTINUED | OUTPATIENT
Start: 2022-08-19 | End: 2022-08-19

## 2022-08-18 RX ORDER — ACETAMINOPHEN 650 MG/1
650 SUPPOSITORY RECTAL EVERY 6 HOURS PRN
Status: DISCONTINUED | OUTPATIENT
Start: 2022-08-18 | End: 2022-08-22 | Stop reason: HOSPADM

## 2022-08-18 RX ORDER — POLYETHYLENE GLYCOL 3350 17 G/17G
17 POWDER, FOR SOLUTION ORAL DAILY PRN
Status: DISCONTINUED | OUTPATIENT
Start: 2022-08-18 | End: 2022-08-19

## 2022-08-18 RX ORDER — LISINOPRIL 10 MG/1
10 TABLET ORAL DAILY
Status: DISCONTINUED | OUTPATIENT
Start: 2022-08-18 | End: 2022-08-21

## 2022-08-18 RX ORDER — TAMSULOSIN HYDROCHLORIDE 0.4 MG/1
0.4 CAPSULE ORAL NIGHTLY
Status: DISCONTINUED | OUTPATIENT
Start: 2022-08-18 | End: 2022-08-22 | Stop reason: HOSPADM

## 2022-08-18 RX ORDER — SODIUM CHLORIDE 9 MG/ML
INJECTION, SOLUTION INTRAVENOUS CONTINUOUS
Status: DISCONTINUED | OUTPATIENT
Start: 2022-08-19 | End: 2022-08-18

## 2022-08-18 RX ORDER — PANTOPRAZOLE SODIUM 40 MG/1
40 TABLET, DELAYED RELEASE ORAL
Status: DISCONTINUED | OUTPATIENT
Start: 2022-08-19 | End: 2022-08-22 | Stop reason: HOSPADM

## 2022-08-18 RX ORDER — ONDANSETRON 4 MG/1
4 TABLET, ORALLY DISINTEGRATING ORAL EVERY 8 HOURS PRN
Status: DISCONTINUED | OUTPATIENT
Start: 2022-08-18 | End: 2022-08-22 | Stop reason: HOSPADM

## 2022-08-18 RX ADMIN — OXYCODONE AND ACETAMINOPHEN 2 TABLET: 5; 325 TABLET ORAL at 14:02

## 2022-08-18 RX ADMIN — TAMSULOSIN HYDROCHLORIDE 0.4 MG: 0.4 CAPSULE ORAL at 23:22

## 2022-08-18 RX ADMIN — Medication 10 ML: at 23:23

## 2022-08-18 RX ADMIN — ASPIRIN 81 MG CHEWABLE TABLET 81 MG: 81 TABLET CHEWABLE at 08:53

## 2022-08-18 RX ADMIN — SODIUM CHLORIDE: 9 INJECTION, SOLUTION INTRAVENOUS at 23:26

## 2022-08-18 RX ADMIN — MIRTAZAPINE 30 MG: 15 TABLET, FILM COATED ORAL at 23:22

## 2022-08-18 RX ADMIN — HYDRALAZINE HYDROCHLORIDE 10 MG: 20 INJECTION INTRAMUSCULAR; INTRAVENOUS at 12:15

## 2022-08-18 RX ADMIN — ENOXAPARIN SODIUM 40 MG: 100 INJECTION SUBCUTANEOUS at 23:21

## 2022-08-18 RX ADMIN — LISINOPRIL 10 MG: 10 TABLET ORAL at 23:22

## 2022-08-18 RX ADMIN — ROSUVASTATIN CALCIUM 10 MG: 10 TABLET, FILM COATED ORAL at 08:53

## 2022-08-18 RX ADMIN — ACETAMINOPHEN 650 MG: 325 TABLET ORAL at 22:07

## 2022-08-18 RX ADMIN — OXYCODONE AND ACETAMINOPHEN 2 TABLET: 5; 325 TABLET ORAL at 06:39

## 2022-08-18 RX ADMIN — MORPHINE SULFATE 2 MG: 2 INJECTION, SOLUTION INTRAMUSCULAR; INTRAVENOUS at 19:55

## 2022-08-18 RX ADMIN — SODIUM CHLORIDE, PRESERVATIVE FREE 10 ML: 5 INJECTION INTRAVENOUS at 08:53

## 2022-08-18 RX ADMIN — LISINOPRIL 10 MG: 10 TABLET ORAL at 08:53

## 2022-08-18 RX ADMIN — MORPHINE SULFATE 4 MG: 4 INJECTION, SOLUTION INTRAMUSCULAR; INTRAVENOUS at 08:53

## 2022-08-18 RX ADMIN — PANTOPRAZOLE SODIUM 40 MG: 40 TABLET, DELAYED RELEASE ORAL at 06:41

## 2022-08-18 ASSESSMENT — PAIN DESCRIPTION - ORIENTATION
ORIENTATION: RIGHT

## 2022-08-18 ASSESSMENT — PAIN SCALES - GENERAL
PAINLEVEL_OUTOF10: 5
PAINLEVEL_OUTOF10: 6
PAINLEVEL_OUTOF10: 7
PAINLEVEL_OUTOF10: 4
PAINLEVEL_OUTOF10: 8
PAINLEVEL_OUTOF10: 5
PAINLEVEL_OUTOF10: 0
PAINLEVEL_OUTOF10: 8
PAINLEVEL_OUTOF10: 7

## 2022-08-18 ASSESSMENT — PAIN DESCRIPTION - FREQUENCY
FREQUENCY: CONTINUOUS

## 2022-08-18 ASSESSMENT — PAIN DESCRIPTION - DESCRIPTORS
DESCRIPTORS: ACHING
DESCRIPTORS: ACHING;DISCOMFORT;THROBBING
DESCRIPTORS: ACHING

## 2022-08-18 ASSESSMENT — PAIN DESCRIPTION - LOCATION
LOCATION: LEG
LOCATION: FOOT;LEG
LOCATION: FOOT;LEG
LOCATION: LEG
LOCATION: FOOT;LEG
LOCATION: LEG
LOCATION: FOOT;LEG
LOCATION: LEG

## 2022-08-18 ASSESSMENT — PAIN - FUNCTIONAL ASSESSMENT
PAIN_FUNCTIONAL_ASSESSMENT: PREVENTS OR INTERFERES SOME ACTIVE ACTIVITIES AND ADLS

## 2022-08-18 ASSESSMENT — PAIN DESCRIPTION - ONSET
ONSET: ON-GOING

## 2022-08-18 ASSESSMENT — PAIN DESCRIPTION - PAIN TYPE
TYPE: ACUTE PAIN

## 2022-08-18 NOTE — PROGRESS NOTES
Consent for procedure review and signed by pt. Consent placed in chart. Pt aware of NPO status at MN.

## 2022-08-18 NOTE — PROGRESS NOTES
VASCULAR     No groin pain. R foot without new issues. C/O previous straight cath. VSS     Afeb                 Good UO  L groin soft without hematomas  R foot cool but pink with slow cap refill. No new neuro deficits. Labs noted     A/P:     Ischemic rest pain R foot with multilevel arterial occlusive disease. Despite age pt is a candidate for limb salvage. Recommended R femoral replacement graft + R fem-PT bypass. Pt and family agree. Again explained in detail. All questions answered and all agree to proceed. Surgery tomorrow afternoon. IVF and NPO after MN. Orders written.      Dusty Arroyo

## 2022-08-18 NOTE — CARE COORDINATION
Patient's daughter Mateo Quigley present in room. Discussed possible discharge plans. They prefer 1. acute rehab at [facility]   2. [facility] if snf . Explained that patient will have PT/OT eval after surgery which will help assist with discharge plan. Provided daughter with IMM letter so she would have it since she is present today in room. Referral made to [facility].      Electronically signed by OPAL Man, ADELAIDA, Case Management on 8/18/2022 at 2:05 PM  [employer] [phone]

## 2022-08-18 NOTE — PROGRESS NOTES
Pt resting in bed a beginning of shift, daughter at bedside. He c/o ongoing pain and tingling in RLE, pain rated 7/10. Extremity edematous and cool to touch. He has weak flexion in R foot. Pt CHARLOTTE Centeno on telemetry. Will continue to monitor and assess.

## 2022-08-18 NOTE — PROGRESS NOTES
Patient is resting in bed. Alert and oriented X 4. Daughter at bedside. Complaining of pain, PRN pain medication given per order (see MAR). IV capped and flushed. Assessment complete. All patient needs are met at this time. Fall precautions are in place. Call light is in reach.

## 2022-08-18 NOTE — PLAN OF CARE
Problem: Discharge Planning  Goal: Discharge to home or other facility with appropriate resources  Outcome: Progressing  Flowsheets (Taken 8/17/2022 1237 by Anupam Rowe, RN)  Discharge to home or other facility with appropriate resources:   Identify barriers to discharge with patient and caregiver   Arrange for needed discharge resources and transportation as appropriate   Identify discharge learning needs (meds, wound care, etc)   Refer to discharge planning if patient needs post-hospital services based on physician order or complex needs related to functional status, cognitive ability or social support system     Problem: Pain  Goal: Verbalizes/displays adequate comfort level or baseline comfort level  Outcome: Progressing  Flowsheets (Taken 8/17/2022 1237 by Anupam Rowe, RN)  Verbalizes/displays adequate comfort level or baseline comfort level:   Encourage patient to monitor pain and request assistance   Assess pain using appropriate pain scale   Administer analgesics based on type and severity of pain and evaluate response   Implement non-pharmacological measures as appropriate and evaluate response   Consider cultural and social influences on pain and pain management     Problem: Safety - Adult  Goal: Free from fall injury  Outcome: Progressing  Flowsheets (Taken 8/18/2022 0204 by Kina Hammond)  Free From Fall Injury: Instruct family/caregiver on patient safety     Problem: ABCDS Injury Assessment  Goal: Absence of physical injury  Outcome: Progressing  Flowsheets (Taken 8/18/2022 0204 by Kina Hammond)  Absence of Physical Injury: Implement safety measures based on patient assessment     Problem: Skin/Tissue Integrity  Goal: Absence of new skin breakdown  Description: 1. Monitor for areas of redness and/or skin breakdown  2. Assess vascular access sites hourly  3. Every 4-6 hours minimum:  Change oxygen saturation probe site  4.   Every 4-6 hours:  If on nasal continuous positive airway pressure, respiratory therapy assess nares and determine need for appliance change or resting period. Outcome: Progressing  Note: No new areas of skin breakdown noted. Will monitor for changes.

## 2022-08-18 NOTE — PROGRESS NOTES
Pt will be transported to [facility] [place] this evening by [employer]. Report called to Talon Yarbrough RN at [facility]. Pt will be transferred to room 2901. Notified pt of plan.

## 2022-08-18 NOTE — PROGRESS NOTES
Hospitalist Progress Note      PCP: Ileana Jose    Date of Admission: 8/12/2022    Chief Complaint: Right leg pain     Hospital Course:   80-year-old with a history of atrial fibrillation, cerebral amyloid angiopathy, had aneurysm and type 2 diabetes mellitus. He had a cerebral bleed 2022 necessitating the cessation of Xarelto therapy. In the last 2-1/2 weeks he has developed a painful and swollen right leg-the pain has been progressive. He has trouble weightbearing and ambulating. Patient was admitted with right lower limb ischemia    Subjective:   No new complaints.        Medications:  Reviewed    Infusion Medications    [START ON 8/19/2022] sodium chloride      sodium chloride 25 mL/hr at 08/17/22 0850    dextrose       Scheduled Medications    [START ON 8/19/2022] ceFAZolin  2,000 mg IntraVENous On Call to OR    lisinopril  10 mg Oral Daily    tamsulosin  0.8 mg Oral Nightly    [Held by provider] insulin glargine  20 Units SubCUTAneous Nightly    lactulose  20 g Oral TID    magnesium citrate  296 mL Oral Once    pantoprazole  40 mg Oral QAM AC    sodium chloride flush  5-40 mL IntraVENous 2 times per day    enoxaparin  40 mg SubCUTAneous Nightly    aspirin  81 mg Oral Daily    insulin lispro  0-4 Units SubCUTAneous TID WC    insulin lispro  0-4 Units SubCUTAneous Nightly    mirtazapine  15 mg Oral Nightly    rosuvastatin  10 mg Oral QAM     PRN Meds: oxyCODONE-acetaminophen, hydrALAZINE, sodium chloride flush, sodium chloride, ondansetron **OR** ondansetron, acetaminophen **OR** acetaminophen, polyethylene glycol, morphine **OR** morphine, glucose, dextrose bolus **OR** dextrose bolus, glucagon (rDNA), dextrose      Intake/Output Summary (Last 24 hours) at 8/18/2022 1254  Last data filed at 8/18/2022 0802  Gross per 24 hour   Intake 840 ml   Output 1 ml   Net 839 ml         Physical Exam Performed:    BP (!) 140/64   Pulse (!) 109   Temp 97.8 °F (36.6 °C) (Oral)   Resp 16   Wt 166 lb 14.2 oz (75.7 kg)   SpO2 95%   BMI 22.63 kg/m²     General appearance: No apparent distress, appears stated age and cooperative. HEENT: Pupils equal, round, and reactive to light. Conjunctivae/corneas clear. Neck: Supple, with full range of motion. No jugular venous distention. Trachea midline. Respiratory:  Normal respiratory effort. Clear to auscultation, bilaterally without Rales/Wheezes/Rhonchi. Cardiovascular: Regular rate and rhythm with normal S1/S2 without murmurs, rubs or gallops. Abdomen: Soft, non-tender, non-distended with normal bowel sounds. Musculoskeletal: Right lower limb  pain , right leg colder and more  tender on minimal palpation compared to left. Skin: Skin color, texture, turgor normal.  No rashes or lesions. Neurologic:  Neurovascularly intact without any focal sensory/motor deficits. Cranial nerves: II-XII intact, grossly non-focal.  Psychiatric: Alert and oriented, thought content appropriate, normal insight  Capillary Refill: Brisk,3 seconds, normal   Peripheral Pulses: +2 palpable, equal bilaterally       Labs:   No results for input(s): WBC, HGB, HCT, PLT in the last 72 hours. Recent Labs     08/16/22  0807 08/17/22  0633    139   K 3.8 4.1    106   CO2 25 22   BUN 4* 5*   CREATININE 0.6* 0.6*   CALCIUM 8.6 8.4       No results for input(s): AST, ALT, BILIDIR, BILITOT, ALKPHOS in the last 72 hours. Recent Labs     08/17/22  0633   INR 1.27*       No results for input(s): Assunta Carbajal in the last 72 hours. Urinalysis:      Lab Results   Component Value Date/Time    NITRU Negative 08/15/2022 02:30 PM    BLOODU Negative 08/15/2022 02:30 PM    SPECGRAV 1.010 08/15/2022 02:30 PM    GLUCOSEU Negative 08/15/2022 02:30 PM       Radiology:  XR CHEST (2 VW)   Final Result   Mild bibasilar airspace opacities. This could represent atelectasis or   pneumonia in the appropriate clinical setting.          VL PRE OP VEIN MAPPING   Final Result      CTA ABDOMINAL AORTA W BILAT RUNOFF W CONTRAST   Final Result   Complete occlusion of the right superficial femoral artery just after the   takeoff of the profunda femoris. Occlusion at the origin of the profunda femoris on the right. More distal   aspects of that vessel are enhancing. Multifocal calcified plaque is seen within the left superficial femoral   artery leading to at least moderate if not high-grade central canal stenosis. Unfortunately, contrast bolus is suboptimal limiting fine detail. Note that the bilateral popliteal arteries and the bilateral 3 vessel runoffs   cannot be reliably evaluated secondary to suboptimal contrast timing. Abdominal aortic aneurysm measuring 4.0 cm maximally. See recommendations   below. Circumferential subcutaneous edema within the right calf extending into the   right foot. No soft tissue gas or radiopaque foreign body. No focal fluid   collections are identified to suggest abscess. Moderate amount of stool within the colon. Please correlate with clinical   evidence of constipation. RECOMMENDATIONS:   4 cm infrarenal abdominal aortic aneurysm. Recommend follow-up every 12   months and vascular consultation. Reference: J Am Otto Radiol 2940;11:708-317. 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 Patient presented to emergency with right lower limb pain at rest, right lower limb was cold and tender on exam.  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 Underwent coronary angiogram that showed multilevel arterial occlusion 8/16. Patient is planned for right femoral bypass Friday  Plan  -Appreciate vascular surgery input, patient underwent mapping yesterday.   - echo pending   -Patient planned for femoral bypass Friday    Atrial fibrillation. Not on anticoagulation, will consider watchman as outpatient    Diabetes mellitus  Patient was noted to be hypoglycemic,  Hold glargine for now  -Continue on sliding scale    Cerebral amyloid angiopathy  We will continue to monitor    Hyperlipidemia  Resume statin    BPH  Continue  tamsulosin    Hypertension  Continue lisinopril    DVT Prophylaxis: lovenox  Diet: ADULT DIET;  Regular; 5 carb choices (75 gm/meal)  Diet NPO Exceptions are: Sips of Water with Meds  Code Status: Full Code    PT/OT Eval Status: following     Dispo - pending clinical improvement      Bridgett Mendoza MD

## 2022-08-18 NOTE — PROGRESS NOTES
I was contacted by vascular surgeon as OR is down due to mechanical issues. Patient has a slot for surgery at 200 tomorrow at [facility], family agreed to transfer. Transfer initiated, hospitalist at [facility] accepted patient.

## 2022-08-19 ENCOUNTER — ANESTHESIA EVENT (OUTPATIENT)
Dept: OPERATING ROOM | Age: 87
DRG: 253 | End: 2022-08-19
Payer: MEDICARE

## 2022-08-19 ENCOUNTER — ANESTHESIA (OUTPATIENT)
Dept: OPERATING ROOM | Age: 87
DRG: 253 | End: 2022-08-19
Payer: MEDICARE

## 2022-08-19 ENCOUNTER — APPOINTMENT (OUTPATIENT)
Dept: GENERAL RADIOLOGY | Age: 87
DRG: 253 | End: 2022-08-19
Attending: INTERNAL MEDICINE
Payer: MEDICARE

## 2022-08-19 PROBLEM — I72.4 FEMORAL ARTERY ANEURYSM, RIGHT (HCC): Status: ACTIVE | Noted: 2022-08-19

## 2022-08-19 LAB
ANION GAP SERPL CALCULATED.3IONS-SCNC: 11 MMOL/L (ref 3–16)
ANION GAP SERPL CALCULATED.3IONS-SCNC: 13 MMOL/L (ref 3–16)
BUN BLDV-MCNC: 5 MG/DL (ref 7–20)
BUN BLDV-MCNC: 5 MG/DL (ref 7–20)
CALCIUM SERPL-MCNC: 8.2 MG/DL (ref 8.3–10.6)
CALCIUM SERPL-MCNC: 9.1 MG/DL (ref 8.3–10.6)
CHLORIDE BLD-SCNC: 107 MMOL/L (ref 99–110)
CHLORIDE BLD-SCNC: 110 MMOL/L (ref 99–110)
CO2: 20 MMOL/L (ref 21–32)
CO2: 25 MMOL/L (ref 21–32)
CREAT SERPL-MCNC: 0.6 MG/DL (ref 0.8–1.3)
CREAT SERPL-MCNC: 0.6 MG/DL (ref 0.8–1.3)
GFR AFRICAN AMERICAN: >60
GFR AFRICAN AMERICAN: >60
GFR NON-AFRICAN AMERICAN: >60
GFR NON-AFRICAN AMERICAN: >60
GLUCOSE BLD-MCNC: 102 MG/DL (ref 70–99)
GLUCOSE BLD-MCNC: 136 MG/DL (ref 70–99)
GLUCOSE BLD-MCNC: 185 MG/DL (ref 70–99)
GLUCOSE BLD-MCNC: 199 MG/DL (ref 70–99)
GLUCOSE BLD-MCNC: 210 MG/DL (ref 70–99)
HCT VFR BLD CALC: 39.1 % (ref 40.5–52.5)
HCT VFR BLD CALC: 43.8 % (ref 40.5–52.5)
HEMOGLOBIN: 12.6 G/DL (ref 13.5–17.5)
HEMOGLOBIN: 14.5 G/DL (ref 13.5–17.5)
MCH RBC QN AUTO: 30.5 PG (ref 26–34)
MCH RBC QN AUTO: 30.6 PG (ref 26–34)
MCHC RBC AUTO-ENTMCNC: 32.3 G/DL (ref 31–36)
MCHC RBC AUTO-ENTMCNC: 33 G/DL (ref 31–36)
MCV RBC AUTO: 92.6 FL (ref 80–100)
MCV RBC AUTO: 94.7 FL (ref 80–100)
PDW BLD-RTO: 14.6 % (ref 12.4–15.4)
PDW BLD-RTO: 15.4 % (ref 12.4–15.4)
PERFORMED ON: ABNORMAL
PLATELET # BLD: 240 K/UL (ref 135–450)
PLATELET # BLD: 291 K/UL (ref 135–450)
PMV BLD AUTO: 8 FL (ref 5–10.5)
PMV BLD AUTO: 8.5 FL (ref 5–10.5)
POTASSIUM SERPL-SCNC: 3.7 MMOL/L (ref 3.5–5.1)
POTASSIUM SERPL-SCNC: 4.3 MMOL/L (ref 3.5–5.1)
RBC # BLD: 4.13 M/UL (ref 4.2–5.9)
RBC # BLD: 4.74 M/UL (ref 4.2–5.9)
SODIUM BLD-SCNC: 143 MMOL/L (ref 136–145)
SODIUM BLD-SCNC: 143 MMOL/L (ref 136–145)
WBC # BLD: 11.9 K/UL (ref 4–11)
WBC # BLD: 7.3 K/UL (ref 4–11)

## 2022-08-19 PROCEDURE — 6360000002 HC RX W HCPCS: Performed by: ANESTHESIOLOGY

## 2022-08-19 PROCEDURE — 6360000002 HC RX W HCPCS: Performed by: REGISTERED NURSE

## 2022-08-19 PROCEDURE — 3600000014 HC SURGERY LEVEL 4 ADDTL 15MIN: Performed by: SURGERY

## 2022-08-19 PROCEDURE — 35141 REPAIR DEFECT OF ARTERY: CPT | Performed by: SURGERY

## 2022-08-19 PROCEDURE — 7100000000 HC PACU RECOVERY - FIRST 15 MIN: Performed by: SURGERY

## 2022-08-19 PROCEDURE — 6360000002 HC RX W HCPCS: Performed by: SURGERY

## 2022-08-19 PROCEDURE — 36415 COLL VENOUS BLD VENIPUNCTURE: CPT

## 2022-08-19 PROCEDURE — C1768 GRAFT, VASCULAR: HCPCS | Performed by: SURGERY

## 2022-08-19 PROCEDURE — 6370000000 HC RX 637 (ALT 250 FOR IP): Performed by: SURGERY

## 2022-08-19 PROCEDURE — 6360000002 HC RX W HCPCS: Performed by: NURSE ANESTHETIST, CERTIFIED REGISTERED

## 2022-08-19 PROCEDURE — 80048 BASIC METABOLIC PNL TOTAL CA: CPT

## 2022-08-19 PROCEDURE — 3700000000 HC ANESTHESIA ATTENDED CARE: Performed by: SURGERY

## 2022-08-19 PROCEDURE — 35585 VEIN BYP FEM-TIBIAL PERONEAL: CPT | Performed by: SURGERY

## 2022-08-19 PROCEDURE — 3700000001 HC ADD 15 MINUTES (ANESTHESIA): Performed by: SURGERY

## 2022-08-19 PROCEDURE — 6360000004 HC RX CONTRAST MEDICATION: Performed by: SURGERY

## 2022-08-19 PROCEDURE — 04QK0ZZ REPAIR RIGHT FEMORAL ARTERY, OPEN APPROACH: ICD-10-PCS | Performed by: SURGERY

## 2022-08-19 PROCEDURE — 2580000003 HC RX 258: Performed by: NURSE PRACTITIONER

## 2022-08-19 PROCEDURE — 6370000000 HC RX 637 (ALT 250 FOR IP): Performed by: INTERNAL MEDICINE

## 2022-08-19 PROCEDURE — 041K0JN BYPASS RIGHT FEMORAL ARTERY TO POSTERIOR TIBIAL ARTERY WITH SYNTHETIC SUBSTITUTE, OPEN APPROACH: ICD-10-PCS | Performed by: SURGERY

## 2022-08-19 PROCEDURE — 3600000004 HC SURGERY LEVEL 4 BASE: Performed by: SURGERY

## 2022-08-19 PROCEDURE — 2500000003 HC RX 250 WO HCPCS: Performed by: NURSE ANESTHETIST, CERTIFIED REGISTERED

## 2022-08-19 PROCEDURE — 88304 TISSUE EXAM BY PATHOLOGIST: CPT

## 2022-08-19 PROCEDURE — 6360000002 HC RX W HCPCS: Performed by: FAMILY MEDICINE

## 2022-08-19 PROCEDURE — 7100000001 HC PACU RECOVERY - ADDTL 15 MIN: Performed by: SURGERY

## 2022-08-19 PROCEDURE — 2580000003 HC RX 258: Performed by: NURSE ANESTHETIST, CERTIFIED REGISTERED

## 2022-08-19 PROCEDURE — 6360000002 HC RX W HCPCS

## 2022-08-19 PROCEDURE — 85027 COMPLETE CBC AUTOMATED: CPT

## 2022-08-19 PROCEDURE — 2000000000 HC ICU R&B

## 2022-08-19 PROCEDURE — 2580000003 HC RX 258: Performed by: SURGERY

## 2022-08-19 PROCEDURE — A4217 STERILE WATER/SALINE, 500 ML: HCPCS | Performed by: SURGERY

## 2022-08-19 PROCEDURE — B51B1ZA FLUOROSCOPY OF RIGHT LOWER EXTREMITY VEINS USING LOW OSMOLAR CONTRAST, GUIDANCE: ICD-10-PCS | Performed by: SURGERY

## 2022-08-19 PROCEDURE — 2709999900 HC NON-CHARGEABLE SUPPLY: Performed by: SURGERY

## 2022-08-19 PROCEDURE — 6360000002 HC RX W HCPCS: Performed by: NURSE PRACTITIONER

## 2022-08-19 PROCEDURE — 73552 X-RAY EXAM OF FEMUR 2/>: CPT

## 2022-08-19 PROCEDURE — P9045 ALBUMIN (HUMAN), 5%, 250 ML: HCPCS | Performed by: REGISTERED NURSE

## 2022-08-19 DEVICE — GELSOFT GELATIN IMPREGNATED KNITTED VASCULAR PROSTHESIS STRAIGHT
Type: IMPLANTABLE DEVICE | Site: ARTERIAL | Status: FUNCTIONAL
Brand: GELSOFT™

## 2022-08-19 RX ORDER — SODIUM CHLORIDE 9 MG/ML
INJECTION, SOLUTION INTRAVENOUS CONTINUOUS PRN
Status: DISCONTINUED | OUTPATIENT
Start: 2022-08-19 | End: 2022-08-19 | Stop reason: SDUPTHER

## 2022-08-19 RX ORDER — DEXAMETHASONE SODIUM PHOSPHATE 4 MG/ML
INJECTION, SOLUTION INTRA-ARTICULAR; INTRALESIONAL; INTRAMUSCULAR; INTRAVENOUS; SOFT TISSUE PRN
Status: DISCONTINUED | OUTPATIENT
Start: 2022-08-19 | End: 2022-08-19 | Stop reason: SDUPTHER

## 2022-08-19 RX ORDER — HALOPERIDOL 5 MG/ML
10 INJECTION INTRAMUSCULAR ONCE
Status: COMPLETED | OUTPATIENT
Start: 2022-08-19 | End: 2022-08-19

## 2022-08-19 RX ORDER — HALOPERIDOL 5 MG/ML
INJECTION INTRAMUSCULAR
Status: COMPLETED
Start: 2022-08-19 | End: 2022-08-19

## 2022-08-19 RX ORDER — ONDANSETRON 2 MG/ML
INJECTION INTRAMUSCULAR; INTRAVENOUS PRN
Status: DISCONTINUED | OUTPATIENT
Start: 2022-08-19 | End: 2022-08-19 | Stop reason: SDUPTHER

## 2022-08-19 RX ORDER — SODIUM CHLORIDE 9 MG/ML
INJECTION, SOLUTION INTRAVENOUS PRN
Status: DISCONTINUED | OUTPATIENT
Start: 2022-08-19 | End: 2022-08-22 | Stop reason: HOSPADM

## 2022-08-19 RX ORDER — ONDANSETRON 2 MG/ML
4 INJECTION INTRAMUSCULAR; INTRAVENOUS EVERY 6 HOURS PRN
Status: DISCONTINUED | OUTPATIENT
Start: 2022-08-19 | End: 2022-08-22 | Stop reason: HOSPADM

## 2022-08-19 RX ORDER — VECURONIUM BROMIDE 1 MG/ML
INJECTION, POWDER, LYOPHILIZED, FOR SOLUTION INTRAVENOUS PRN
Status: DISCONTINUED | OUTPATIENT
Start: 2022-08-19 | End: 2022-08-19 | Stop reason: SDUPTHER

## 2022-08-19 RX ORDER — SUCCINYLCHOLINE CHLORIDE 20 MG/ML
INJECTION INTRAMUSCULAR; INTRAVENOUS PRN
Status: DISCONTINUED | OUTPATIENT
Start: 2022-08-19 | End: 2022-08-19 | Stop reason: SDUPTHER

## 2022-08-19 RX ORDER — OXYCODONE HYDROCHLORIDE 5 MG/1
10 TABLET ORAL PRN
Status: DISCONTINUED | OUTPATIENT
Start: 2022-08-19 | End: 2022-08-19 | Stop reason: HOSPADM

## 2022-08-19 RX ORDER — FAMOTIDINE 10 MG/ML
INJECTION, SOLUTION INTRAVENOUS PRN
Status: DISCONTINUED | OUTPATIENT
Start: 2022-08-19 | End: 2022-08-19 | Stop reason: SDUPTHER

## 2022-08-19 RX ORDER — HYDROMORPHONE HCL 110MG/55ML
0.5 PATIENT CONTROLLED ANALGESIA SYRINGE INTRAVENOUS EVERY 5 MIN PRN
Status: DISCONTINUED | OUTPATIENT
Start: 2022-08-19 | End: 2022-08-19 | Stop reason: HOSPADM

## 2022-08-19 RX ORDER — HEPARIN SODIUM 1000 [USP'U]/ML
INJECTION, SOLUTION INTRAVENOUS; SUBCUTANEOUS PRN
Status: DISCONTINUED | OUTPATIENT
Start: 2022-08-19 | End: 2022-08-19 | Stop reason: SDUPTHER

## 2022-08-19 RX ORDER — SODIUM CHLORIDE 0.9 % (FLUSH) 0.9 %
5-40 SYRINGE (ML) INJECTION PRN
Status: DISCONTINUED | OUTPATIENT
Start: 2022-08-19 | End: 2022-08-22 | Stop reason: HOSPADM

## 2022-08-19 RX ORDER — NEOSTIGMINE METHYLSULFATE 1 MG/ML
INJECTION, SOLUTION INTRAVENOUS PRN
Status: DISCONTINUED | OUTPATIENT
Start: 2022-08-19 | End: 2022-08-19 | Stop reason: SDUPTHER

## 2022-08-19 RX ORDER — FENTANYL CITRATE 50 UG/ML
25 INJECTION, SOLUTION INTRAMUSCULAR; INTRAVENOUS EVERY 5 MIN PRN
Status: DISCONTINUED | OUTPATIENT
Start: 2022-08-19 | End: 2022-08-19 | Stop reason: HOSPADM

## 2022-08-19 RX ORDER — SODIUM CHLORIDE 0.9 % (FLUSH) 0.9 %
5-40 SYRINGE (ML) INJECTION EVERY 12 HOURS SCHEDULED
Status: DISCONTINUED | OUTPATIENT
Start: 2022-08-19 | End: 2022-08-22 | Stop reason: HOSPADM

## 2022-08-19 RX ORDER — CLONIDINE HYDROCHLORIDE 0.1 MG/1
0.1 TABLET ORAL EVERY 4 HOURS PRN
Status: DISCONTINUED | OUTPATIENT
Start: 2022-08-19 | End: 2022-08-22 | Stop reason: HOSPADM

## 2022-08-19 RX ORDER — HALOPERIDOL 5 MG/ML
10 INJECTION INTRAMUSCULAR ONCE
Status: DISCONTINUED | OUTPATIENT
Start: 2022-08-19 | End: 2022-08-19

## 2022-08-19 RX ORDER — PROCHLORPERAZINE EDISYLATE 5 MG/ML
5 INJECTION INTRAMUSCULAR; INTRAVENOUS
Status: DISCONTINUED | OUTPATIENT
Start: 2022-08-19 | End: 2022-08-19 | Stop reason: HOSPADM

## 2022-08-19 RX ORDER — DIPHENHYDRAMINE HYDROCHLORIDE 50 MG/ML
12.5 INJECTION INTRAMUSCULAR; INTRAVENOUS
Status: DISCONTINUED | OUTPATIENT
Start: 2022-08-19 | End: 2022-08-19 | Stop reason: HOSPADM

## 2022-08-19 RX ORDER — SODIUM CHLORIDE 9 MG/ML
INJECTION, SOLUTION INTRAVENOUS CONTINUOUS
Status: DISCONTINUED | OUTPATIENT
Start: 2022-08-19 | End: 2022-08-19

## 2022-08-19 RX ORDER — KETAMINE HYDROCHLORIDE 10 MG/ML
INJECTION, SOLUTION INTRAMUSCULAR; INTRAVENOUS PRN
Status: DISCONTINUED | OUTPATIENT
Start: 2022-08-19 | End: 2022-08-19 | Stop reason: SDUPTHER

## 2022-08-19 RX ORDER — ONDANSETRON 2 MG/ML
4 INJECTION INTRAMUSCULAR; INTRAVENOUS
Status: DISCONTINUED | OUTPATIENT
Start: 2022-08-19 | End: 2022-08-19 | Stop reason: HOSPADM

## 2022-08-19 RX ORDER — SODIUM CHLORIDE 9 MG/ML
25 INJECTION, SOLUTION INTRAVENOUS PRN
Status: DISCONTINUED | OUTPATIENT
Start: 2022-08-19 | End: 2022-08-19 | Stop reason: HOSPADM

## 2022-08-19 RX ORDER — ALBUMIN, HUMAN INJ 5% 5 %
SOLUTION INTRAVENOUS PRN
Status: DISCONTINUED | OUTPATIENT
Start: 2022-08-19 | End: 2022-08-19 | Stop reason: SDUPTHER

## 2022-08-19 RX ORDER — OXYCODONE HYDROCHLORIDE AND ACETAMINOPHEN 5; 325 MG/1; MG/1
2 TABLET ORAL EVERY 4 HOURS PRN
Status: DISCONTINUED | OUTPATIENT
Start: 2022-08-19 | End: 2022-08-22 | Stop reason: HOSPADM

## 2022-08-19 RX ORDER — LABETALOL HYDROCHLORIDE 5 MG/ML
10 INJECTION, SOLUTION INTRAVENOUS
Status: DISCONTINUED | OUTPATIENT
Start: 2022-08-19 | End: 2022-08-19 | Stop reason: HOSPADM

## 2022-08-19 RX ORDER — PROPOFOL 10 MG/ML
INJECTION, EMULSION INTRAVENOUS PRN
Status: DISCONTINUED | OUTPATIENT
Start: 2022-08-19 | End: 2022-08-19 | Stop reason: SDUPTHER

## 2022-08-19 RX ORDER — ASPIRIN 81 MG/1
81 TABLET ORAL DAILY
Status: DISCONTINUED | OUTPATIENT
Start: 2022-08-20 | End: 2022-08-22 | Stop reason: HOSPADM

## 2022-08-19 RX ORDER — FENTANYL CITRATE 50 UG/ML
INJECTION, SOLUTION INTRAMUSCULAR; INTRAVENOUS PRN
Status: DISCONTINUED | OUTPATIENT
Start: 2022-08-19 | End: 2022-08-19 | Stop reason: SDUPTHER

## 2022-08-19 RX ORDER — OXYCODONE HYDROCHLORIDE AND ACETAMINOPHEN 5; 325 MG/1; MG/1
1 TABLET ORAL EVERY 4 HOURS PRN
Status: DISCONTINUED | OUTPATIENT
Start: 2022-08-19 | End: 2022-08-22 | Stop reason: HOSPADM

## 2022-08-19 RX ORDER — MORPHINE SULFATE 2 MG/ML
2 INJECTION, SOLUTION INTRAMUSCULAR; INTRAVENOUS
Status: DISCONTINUED | OUTPATIENT
Start: 2022-08-19 | End: 2022-08-22 | Stop reason: HOSPADM

## 2022-08-19 RX ORDER — CEFAZOLIN SODIUM 1 G/3ML
INJECTION, POWDER, FOR SOLUTION INTRAMUSCULAR; INTRAVENOUS PRN
Status: DISCONTINUED | OUTPATIENT
Start: 2022-08-19 | End: 2022-08-19 | Stop reason: SDUPTHER

## 2022-08-19 RX ORDER — SODIUM CHLORIDE 0.9 % (FLUSH) 0.9 %
5-40 SYRINGE (ML) INJECTION EVERY 12 HOURS SCHEDULED
Status: DISCONTINUED | OUTPATIENT
Start: 2022-08-19 | End: 2022-08-19 | Stop reason: HOSPADM

## 2022-08-19 RX ORDER — MEPERIDINE HYDROCHLORIDE 25 MG/ML
12.5 INJECTION INTRAMUSCULAR; INTRAVENOUS; SUBCUTANEOUS EVERY 5 MIN PRN
Status: DISCONTINUED | OUTPATIENT
Start: 2022-08-19 | End: 2022-08-19 | Stop reason: HOSPADM

## 2022-08-19 RX ORDER — ONDANSETRON 4 MG/1
4 TABLET, ORALLY DISINTEGRATING ORAL EVERY 8 HOURS PRN
Status: DISCONTINUED | OUTPATIENT
Start: 2022-08-19 | End: 2022-08-22 | Stop reason: HOSPADM

## 2022-08-19 RX ORDER — SODIUM CHLORIDE 9 MG/ML
INJECTION, SOLUTION INTRAVENOUS CONTINUOUS
Status: DISCONTINUED | OUTPATIENT
Start: 2022-08-19 | End: 2022-08-21

## 2022-08-19 RX ORDER — GLYCOPYRROLATE 0.2 MG/ML
INJECTION INTRAMUSCULAR; INTRAVENOUS PRN
Status: DISCONTINUED | OUTPATIENT
Start: 2022-08-19 | End: 2022-08-19 | Stop reason: SDUPTHER

## 2022-08-19 RX ORDER — SODIUM CHLORIDE 0.9 % (FLUSH) 0.9 %
5-40 SYRINGE (ML) INJECTION PRN
Status: DISCONTINUED | OUTPATIENT
Start: 2022-08-19 | End: 2022-08-19 | Stop reason: HOSPADM

## 2022-08-19 RX ORDER — MORPHINE SULFATE 2 MG/ML
4 INJECTION, SOLUTION INTRAMUSCULAR; INTRAVENOUS
Status: DISCONTINUED | OUTPATIENT
Start: 2022-08-19 | End: 2022-08-22 | Stop reason: HOSPADM

## 2022-08-19 RX ORDER — OXYCODONE HYDROCHLORIDE 5 MG/1
5 TABLET ORAL PRN
Status: DISCONTINUED | OUTPATIENT
Start: 2022-08-19 | End: 2022-08-19 | Stop reason: HOSPADM

## 2022-08-19 RX ORDER — MAGNESIUM SULFATE HEPTAHYDRATE 500 MG/ML
INJECTION, SOLUTION INTRAMUSCULAR; INTRAVENOUS PRN
Status: DISCONTINUED | OUTPATIENT
Start: 2022-08-19 | End: 2022-08-19 | Stop reason: SDUPTHER

## 2022-08-19 RX ORDER — LORAZEPAM 2 MG/ML
0.5 INJECTION INTRAMUSCULAR
Status: DISCONTINUED | OUTPATIENT
Start: 2022-08-19 | End: 2022-08-19 | Stop reason: HOSPADM

## 2022-08-19 RX ORDER — LIDOCAINE HYDROCHLORIDE 20 MG/ML
INJECTION, SOLUTION INFILTRATION; PERINEURAL PRN
Status: DISCONTINUED | OUTPATIENT
Start: 2022-08-19 | End: 2022-08-19 | Stop reason: SDUPTHER

## 2022-08-19 RX ADMIN — PROPOFOL 40 MG: 10 INJECTION, EMULSION INTRAVENOUS at 14:37

## 2022-08-19 RX ADMIN — Medication 2 MG: at 21:16

## 2022-08-19 RX ADMIN — PHENYLEPHRINE HYDROCHLORIDE 50 MCG: 10 INJECTION INTRAVENOUS at 14:50

## 2022-08-19 RX ADMIN — HEPARIN SODIUM 1000 UNITS: 1000 INJECTION INTRAVENOUS; SUBCUTANEOUS at 19:18

## 2022-08-19 RX ADMIN — HALOPERIDOL 10 MG: 5 INJECTION INTRAMUSCULAR at 22:28

## 2022-08-19 RX ADMIN — KETAMINE HYDROCHLORIDE 10 MG: 10 INJECTION, SOLUTION INTRAMUSCULAR; INTRAVENOUS at 16:50

## 2022-08-19 RX ADMIN — VECURONIUM BROMIDE 9 MG: 1 INJECTION, POWDER, LYOPHILIZED, FOR SOLUTION INTRAVENOUS at 14:48

## 2022-08-19 RX ADMIN — DEXAMETHASONE SODIUM PHOSPHATE 4 MG: 4 INJECTION, SOLUTION INTRAMUSCULAR; INTRAVENOUS at 14:43

## 2022-08-19 RX ADMIN — CEFAZOLIN 2000 MG: 2 INJECTION, POWDER, FOR SOLUTION INTRAMUSCULAR; INTRAVENOUS at 14:28

## 2022-08-19 RX ADMIN — KETAMINE HYDROCHLORIDE 10 MG: 10 INJECTION, SOLUTION INTRAMUSCULAR; INTRAVENOUS at 15:09

## 2022-08-19 RX ADMIN — GLYCOPYRROLATE 0.4 MG: 0.2 INJECTION, SOLUTION INTRAMUSCULAR; INTRAVENOUS at 21:14

## 2022-08-19 RX ADMIN — VECURONIUM BROMIDE 2 MG: 1 INJECTION, POWDER, LYOPHILIZED, FOR SOLUTION INTRAVENOUS at 17:53

## 2022-08-19 RX ADMIN — SODIUM CHLORIDE, PRESERVATIVE FREE 10 ML: 5 INJECTION INTRAVENOUS at 23:19

## 2022-08-19 RX ADMIN — ALBUMIN (HUMAN) 12.5 G: 12.5 INJECTION, SOLUTION INTRAVENOUS at 15:30

## 2022-08-19 RX ADMIN — SODIUM CHLORIDE: 9 INJECTION, SOLUTION INTRAVENOUS at 23:18

## 2022-08-19 RX ADMIN — PHENYLEPHRINE HYDROCHLORIDE 50 MCG: 10 INJECTION INTRAVENOUS at 15:37

## 2022-08-19 RX ADMIN — FENTANYL CITRATE 50 MCG: 50 INJECTION, SOLUTION INTRAMUSCULAR; INTRAVENOUS at 14:45

## 2022-08-19 RX ADMIN — PANTOPRAZOLE SODIUM 40 MG: 40 TABLET, DELAYED RELEASE ORAL at 08:37

## 2022-08-19 RX ADMIN — OXYCODONE HYDROCHLORIDE AND ACETAMINOPHEN 1 TABLET: 5; 325 TABLET ORAL at 03:56

## 2022-08-19 RX ADMIN — VECURONIUM BROMIDE 1 MG: 1 INJECTION, POWDER, LYOPHILIZED, FOR SOLUTION INTRAVENOUS at 14:36

## 2022-08-19 RX ADMIN — HYDROMORPHONE HYDROCHLORIDE 0.5 MG: 2 INJECTION INTRAMUSCULAR; INTRAVENOUS; SUBCUTANEOUS at 22:10

## 2022-08-19 RX ADMIN — ONDANSETRON 4 MG: 2 INJECTION INTRAMUSCULAR; INTRAVENOUS at 14:46

## 2022-08-19 RX ADMIN — FAMOTIDINE 20 MG: 10 INJECTION INTRAVENOUS at 14:00

## 2022-08-19 RX ADMIN — HEPARIN SODIUM 1000 UNITS: 1000 INJECTION INTRAVENOUS; SUBCUTANEOUS at 19:01

## 2022-08-19 RX ADMIN — VECURONIUM BROMIDE 2 MG: 1 INJECTION, POWDER, LYOPHILIZED, FOR SOLUTION INTRAVENOUS at 17:08

## 2022-08-19 RX ADMIN — ASPIRIN 81 MG: 81 TABLET, CHEWABLE ORAL at 08:37

## 2022-08-19 RX ADMIN — WATER 10 ML: 1 INJECTION INTRAMUSCULAR; INTRAVENOUS; SUBCUTANEOUS at 14:37

## 2022-08-19 RX ADMIN — LIDOCAINE HYDROCHLORIDE 60 MG: 20 INJECTION, SOLUTION INFILTRATION; PERINEURAL at 14:37

## 2022-08-19 RX ADMIN — MAGNESIUM SULFATE HEPTAHYDRATE 1 G: 500 INJECTION, SOLUTION INTRAMUSCULAR; INTRAVENOUS at 14:42

## 2022-08-19 RX ADMIN — HEPARIN SODIUM 3000 UNITS: 1000 INJECTION INTRAVENOUS; SUBCUTANEOUS at 15:48

## 2022-08-19 RX ADMIN — PHENYLEPHRINE HYDROCHLORIDE 50 MCG: 10 INJECTION INTRAVENOUS at 14:34

## 2022-08-19 RX ADMIN — CEFAZOLIN 2000 MG: 1 INJECTION, POWDER, FOR SOLUTION INTRAMUSCULAR; INTRAVENOUS at 18:32

## 2022-08-19 RX ADMIN — KETAMINE HYDROCHLORIDE 10 MG: 10 INJECTION, SOLUTION INTRAMUSCULAR; INTRAVENOUS at 15:48

## 2022-08-19 RX ADMIN — PHENYLEPHRINE HYDROCHLORIDE 100 MCG: 10 INJECTION INTRAVENOUS at 15:15

## 2022-08-19 RX ADMIN — LISINOPRIL 10 MG: 10 TABLET ORAL at 08:37

## 2022-08-19 RX ADMIN — KETAMINE HYDROCHLORIDE 10 MG: 10 INJECTION, SOLUTION INTRAMUSCULAR; INTRAVENOUS at 14:55

## 2022-08-19 RX ADMIN — PHENYLEPHRINE HYDROCHLORIDE 50 MCG/MIN: 10 INJECTION INTRAVENOUS at 14:50

## 2022-08-19 RX ADMIN — HALOPERIDOL LACTATE 10 MG: 5 INJECTION, SOLUTION INTRAMUSCULAR at 22:28

## 2022-08-19 RX ADMIN — ROSUVASTATIN 10 MG: 10 TABLET, FILM COATED ORAL at 08:37

## 2022-08-19 RX ADMIN — HALOPERIDOL 10 MG: 5 INJECTION INTRAMUSCULAR at 21:55

## 2022-08-19 RX ADMIN — VECURONIUM BROMIDE 2 MG: 1 INJECTION, POWDER, LYOPHILIZED, FOR SOLUTION INTRAVENOUS at 18:53

## 2022-08-19 RX ADMIN — VECURONIUM BROMIDE 4 MG: 1 INJECTION, POWDER, LYOPHILIZED, FOR SOLUTION INTRAVENOUS at 15:44

## 2022-08-19 RX ADMIN — HALOPERIDOL LACTATE 10 MG: 5 INJECTION, SOLUTION INTRAMUSCULAR at 21:55

## 2022-08-19 RX ADMIN — HEPARIN SODIUM 2000 UNITS: 1000 INJECTION INTRAVENOUS; SUBCUTANEOUS at 18:01

## 2022-08-19 RX ADMIN — SODIUM CHLORIDE: 9 INJECTION, SOLUTION INTRAVENOUS at 12:00

## 2022-08-19 RX ADMIN — PHENYLEPHRINE HYDROCHLORIDE 100 MCG: 10 INJECTION INTRAVENOUS at 15:52

## 2022-08-19 RX ADMIN — KETAMINE HYDROCHLORIDE 10 MG: 10 INJECTION, SOLUTION INTRAMUSCULAR; INTRAVENOUS at 17:46

## 2022-08-19 RX ADMIN — FENTANYL CITRATE 50 MCG: 50 INJECTION, SOLUTION INTRAMUSCULAR; INTRAVENOUS at 14:36

## 2022-08-19 RX ADMIN — SUCCINYLCHOLINE CHLORIDE 100 MG: 20 INJECTION, SOLUTION INTRAMUSCULAR; INTRAVENOUS at 14:38

## 2022-08-19 RX ADMIN — CLONIDINE HYDROCHLORIDE 0.1 MG: 0.1 TABLET ORAL at 03:26

## 2022-08-19 RX ADMIN — GLYCOPYRROLATE 0.1 MG: 0.2 INJECTION, SOLUTION INTRAMUSCULAR; INTRAVENOUS at 14:33

## 2022-08-19 RX ADMIN — PHENYLEPHRINE HYDROCHLORIDE 50 MCG: 10 INJECTION INTRAVENOUS at 15:41

## 2022-08-19 RX ADMIN — CEFAZOLIN 2000 MG: 2 INJECTION, POWDER, FOR SOLUTION INTRAMUSCULAR; INTRAVENOUS at 23:38

## 2022-08-19 ASSESSMENT — PAIN SCALES - GENERAL
PAINLEVEL_OUTOF10: 7
PAINLEVEL_OUTOF10: 5
PAINLEVEL_OUTOF10: 0
PAINLEVEL_OUTOF10: 4

## 2022-08-19 ASSESSMENT — PAIN DESCRIPTION - ORIENTATION
ORIENTATION: RIGHT

## 2022-08-19 ASSESSMENT — PAIN DESCRIPTION - LOCATION
LOCATION: LEG

## 2022-08-19 ASSESSMENT — PAIN DESCRIPTION - DESCRIPTORS: DESCRIPTORS: ACHING;DISCOMFORT

## 2022-08-19 ASSESSMENT — PAIN - FUNCTIONAL ASSESSMENT: PAIN_FUNCTIONAL_ASSESSMENT: 0-10

## 2022-08-19 ASSESSMENT — ENCOUNTER SYMPTOMS: SHORTNESS OF BREATH: 1

## 2022-08-19 NOTE — PROGRESS NOTES
VASCULAR    Transferred from [facility] due to OR closure. No new complaints. VSS AFBEB  Excellent UO (arellano placed with 700 cc residual)  Lungs clear   Heart IRR  R foot cool but pink with slow cap refil. Neuro unchanged    Labs noted  ECHO - nl EF    A/P: Ischemic rest pain R foot   Planned R fem-PT bypass this afternoon. All questions answered.      Claritza Etienne

## 2022-08-19 NOTE — H&P
(PRILOSEC) 40 MG delayed release capsule Take 40 mg by mouth in the morning. Historical Provider, MD   lisinopril (PRINIVIL;ZESTRIL) 10 MG tablet Take 10 mg by mouth in the morning. Historical Provider, MD   oxyCODONE-acetaminophen (PERCOCET) 5-325 MG per tablet Take 1 tablet by mouth every 8 hours as needed for Pain. Historical Provider, MD   insulin glargine (LANTUS) 100 UNIT/ML injection vial Inject 30 Units into the skin nightly  8/18/22  Historical Provider, MD   tamsulosin (FLOMAX) 0.4 MG capsule Take 0.4 mg by mouth nightly     Historical Provider, MD       Allergies:  Sulfa antibiotics and Pcn [penicillins]    Social History:      The patient currently lives at home    TOBACCO:   reports that he has been smoking cigars. He uses smokeless tobacco.  ETOH:   reports no history of alcohol use. E-cigarette/Vaping       Questions Responses    E-cigarette/Vaping Use Never User    Start Date     Passive Exposure     Quit Date     Counseling Given     Comments               Family History:      Reviewed and negative in regards to presenting illness/complaint. REVIEW OF SYSTEMS COMPLETED:   Pertinent positives as noted in the HPI. All other systems reviewed and negative. PHYSICAL EXAM PERFORMED:    BP (!) 181/80   Pulse (!) 103   SpO2 98%     General appearance:  No apparent distress, appears stated age and cooperative. HEENT:  Normal cephalic, atraumatic without obvious deformity. Pupils equal, round, and reactive to light. Extra ocular muscles intact. Conjunctivae/corneas clear. Neck: Supple, with full range of motion. No jugular venous distention. Trachea midline. Respiratory:  Normal respiratory effort. Clear to auscultation, bilaterally without Rales/Wheezes/Rhonchi. Cardiovascular:  Regular rate and rhythm with normal S1/S2 without murmurs, rubs or gallops. Abdomen: Soft, non-tender, non-distended with normal bowel sounds. Musculoskeletal:  No clubbing, cyanosis or edema bilaterally. Full range of motion without deformity. Skin: Skin color, texture, turgor normal.  No rashes or lesions. Neurologic:  Neurovascularly intact without any focal sensory/motor deficits. Cranial nerves: II-XII intact, grossly non-focal.  Psychiatric:  Alert and oriented, thought content appropriate, normal insight  Capillary Refill: Brisk,3 seconds, normal  Peripheral Pulses: +2 palpable, equal bilaterally       Labs:     Recent Labs     08/18/22 2110   WBC 8.3   HGB 14.0   HCT 42.1        Recent Labs     08/16/22  0807 08/17/22  0633 08/18/22 2110    139 139   K 3.8 4.1 3.7    106 105   CO2 25 22 23   BUN 4* 5* 7   CREATININE 0.6* 0.6* 0.7*   CALCIUM 8.6 8.4 8.8     No results for input(s): AST, ALT, BILIDIR, BILITOT, ALKPHOS in the last 72 hours. Recent Labs     08/17/22  0633 08/18/22 2110   INR 1.27* 1.02     No results for input(s): Marci Pringle in the last 72 hours. Urinalysis:      Lab Results   Component Value Date/Time    NITRU Negative 08/15/2022 02:30 PM    BLOODU Negative 08/15/2022 02:30 PM    SPECGRAV 1.010 08/15/2022 02:30 PM    GLUCOSEU Negative 08/15/2022 02:30 PM             Consults:    None    ASSESSMENT and PLAN:    Critical right lower limb ischemia  Patient presented to emergency with right lower limb pain at rest, right lower limb was cold and tender on exam.  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 Underwent coronary angiogram that showed multilevel arterial occlusion 8/16.    Patient is planned for right femoral replacement graft + R fem-PT bypass Friday afternoon   Patient underwent mapping yesterday per vascular surgery     Atrial fibrillation, rate controlled  Not on anticoagulation, will consider watchman as outpatient    Diabetes mellitus  Patient was noted to be hypoglycemic,  Hold glargine for now  Continue on sliding scale    Cerebral amyloid angiopathy  We will continue to monitor    Hyperlipidemia  Resume statin BPH  Continue  tamsulosin    Hypertension  Continue lisinopril      DVT Prophylaxis: Lovenox  Diet: Diet NPO Exceptions are: Sips of Water with Meds, Sips of Clear Liquids  Code Status: Full Code    PT/OT Eval Status: PT/OT consult is not ordered     Dispo - Admit as inpatient        Mary Ellen Barth MD    Thank you Marcial Bryan for the opportunity to be involved in this patient's care. If you have any questions or concerns please feel free to contact me at [phone].

## 2022-08-19 NOTE — ANESTHESIA PRE PROCEDURE
Department of Anesthesiology  Preprocedure Note       Name:  Gely Watt   Age:  80 y.o.  :  1930                                          MRN:  [id number]         Date:  2022      Surgeon: Akhil Moscoso):  Jeromy Aguila MD    Procedure: Procedure(s):  RIGHT FEMORAL TO POSTERIOR TIBIAL BYPASS GRAFT WITH VEIN GRAFT    Medications prior to admission:   Prior to Admission medications    Medication Sig Start Date End Date Taking? Authorizing Provider   metFORMIN (GLUCOPHAGE) 500 MG tablet Take 1,000 mg by mouth in the morning and 1,000 mg in the evening. Take with meals. Historical Provider, MD   mirtazapine (REMERON) 15 MG tablet Take 15 mg by mouth nightly 2 tablets    Historical Provider, MD   rosuvastatin (CRESTOR) 10 MG tablet Take 10 mg by mouth in the morning. Historical Provider, MD   omeprazole (PRILOSEC) 40 MG delayed release capsule Take 40 mg by mouth in the morning. Historical Provider, MD   lisinopril (PRINIVIL;ZESTRIL) 10 MG tablet Take 10 mg by mouth in the morning. Historical Provider, MD   oxyCODONE-acetaminophen (PERCOCET) 5-325 MG per tablet Take 1 tablet by mouth every 8 hours as needed for Pain.     Historical Provider, MD   insulin glargine (LANTUS) 100 UNIT/ML injection vial Inject 30 Units into the skin nightly  22  Historical Provider, MD   tamsulosin (FLOMAX) 0.4 MG capsule Take 0.4 mg by mouth nightly     Historical Provider, MD       Current medications:    Current Facility-Administered Medications   Medication Dose Route Frequency Provider Last Rate Last Admin    cloNIDine (CATAPRES) tablet 0.1 mg  0.1 mg Oral Q4H PRN Anabel Naik MD   0.1 mg at 22 0326    0.9 % sodium chloride infusion   IntraVENous Continuous Jeromy Aguila MD 50 mL/hr at 22 0832 Rate Change at 22 0832    ceFAZolin (ANCEF) 2,000 mg in dextrose 5 % 50 mL IVPB (mini-bag)  2,000 mg IntraVENous On Call to [address] [place] [address], APRN - [facility]        acetaminophen (TYLENOL) tablet 650 mg  650 mg Oral Q4H PRN Lori Huynh MD   650 mg at 08/18/22 2207    mirtazapine (REMERON) tablet 30 mg  30 mg Oral Nightly Lori Huynh MD   30 mg at 08/18/22 2322    oxyCODONE-acetaminophen (PERCOCET) 5-325 MG per tablet 1 tablet  1 tablet Oral Q8H PRN Lori Huynh MD   1 tablet at 08/19/22 0356    rosuvastatin (CRESTOR) tablet 10 mg  10 mg Oral Daily Lori Huynh MD   10 mg at 08/19/22 0837    lisinopril (PRINIVIL;ZESTRIL) tablet 10 mg  10 mg Oral Daily Lori Huynh MD   10 mg at 08/19/22 0837    tamsulosin (FLOMAX) capsule 0.4 mg  0.4 mg Oral Nightly Lori Huynh MD   0.4 mg at 08/18/22 2322    pantoprazole (PROTONIX) tablet 40 mg  40 mg Oral QAM AC Lori Huynh MD   40 mg at 08/19/22 0837    [Held by provider] insulin glargine (LANTUS) injection vial 20 Units  20 Units SubCUTAneous Nightly Lori Huynh MD        aspirin chewable tablet 81 mg  81 mg Oral Daily Lori Huynh MD   81 mg at 08/19/22 0837    dextrose bolus 10% 125 mL  125 mL IntraVENous PRN Lori Huynh MD        Or    dextrose bolus 10% 250 mL  250 mL IntraVENous PRN Lori Huynh MD        glucagon (rDNA) injection 1 mg  1 mg SubCUTAneous PRN Lori Huynh MD        dextrose 10 % infusion   IntraVENous Continuous PRN Lori Huynh MD        sodium chloride flush 0.9 % injection 5-40 mL  5-40 mL IntraVENous 2 times per day Lori Huynh MD   10 mL at 08/18/22 2323    sodium chloride flush 0.9 % injection 5-40 mL  5-40 mL IntraVENous PRN Lori Huynh MD        0.9 % sodium chloride infusion   IntraVENous PRN Lori Huynh MD        ondansetron (ZOFRAN-ODT) disintegrating tablet 4 mg  4 mg Oral Q8H PRN Lori Huynh MD        Or    ondansetron (ZOFRAN) injection 4 mg  4 mg IntraVENous Q6H PRN Lori Huynh MD        polyethylene glycol (GLYCOLAX) packet 17 g  17 g Oral Daily PRN Lori Huynh MD        acetaminophen (TYLENOL) tablet 650 mg  650 mg Oral Q6H PRN Lori Huynh MD        Or   Radha Moreland acetaminophen (TYLENOL) suppository 650 mg  650 mg Rectal Q6H PRN Semaj Saravia MD        insulin lispro (HUMALOG) injection vial 0-8 Units  0-8 Units SubCUTAneous TID WC Semaj Saravia MD        insulin lispro (HUMALOG) injection vial 0-4 Units  0-4 Units SubCUTAneous Nightly Semaj Saravia MD        lactulose (CHRONULAC) 10 GM/15ML solution 20 g  20 g Oral TID Semaj Saravia MD           Allergies: Allergies   Allergen Reactions    Sulfa Antibiotics Hives    Pcn [Penicillins] Rash     \"Rash and itchy\"       Problem List:    Patient Active Problem List   Diagnosis Code    Pleural plaque J92.9    Shortness of breath R06.02    Benign essential HTN I10    Hematoma T14. 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    Limb ischemia I99.8    Ischemic leg pain M79.606, I99.8       Past Medical History:        Diagnosis Date    [facility]ib [facility] [address]     Cerebral amyloid angiopathy (HCC)     Diabetes mellitus ([facility] [address]     Heart aneurysm     Vertigo        Past Surgical History:        Procedure Laterality Date    APPENDECTOMY      HERNIA REPAIR      LUNG SURGERY      TESTICLE SURGERY         Social History:    Social History     Tobacco Use    Smoking status: Some Days     Types: Cigars    Smokeless tobacco: Current   Substance Use Topics    Alcohol use:  No                                Ready to quit: Not Answered  Counseling given: Not Answered      Vital Signs (Current):   Vitals:    08/19/22 0600 08/19/22 0623 08/19/22 0800 08/19/22 1204   BP: (!) 159/135 (!) 155/98 (!) 154/97 (!) 160/102   Pulse: 92 84 71 94   Resp:   15 20   Temp:   97.4 °F (36.3 °C) 97.6 °F (36.4 °C)   TempSrc: Temporal   SpO2:   98% 97%   Weight:       Height:                                                  BP Readings from Last 3 Encounters:   08/19/22 (!) 160/102   08/18/22 (!) 116/56   08/12/22 130/82       NPO Status: Time of last liquid consumption: 2359                        Time of last solid consumption: 2359                        Date of last liquid consumption: 08/18/22                        Date of last solid food consumption: 08/18/22    BMI:   Wt Readings from Last 3 Encounters:   08/19/22 165 lb 5.5 oz (75 kg)   08/18/22 166 lb 14.2 oz (75.7 kg)   08/12/22 164 lb (74.4 kg)     Body mass index is 22.42 kg/m².     CBC:   Lab Results   Component Value Date/Time    WBC 7.3 08/19/2022 03:50 AM    RBC 4.74 08/19/2022 03:50 AM    HGB 14.5 08/19/2022 03:50 AM    HCT 43.8 08/19/2022 03:50 AM    MCV 92.6 08/19/2022 03:50 AM    RDW 14.6 08/19/2022 03:50 AM     08/19/2022 03:50 AM       CMP:   Lab Results   Component Value Date/Time     08/19/2022 03:50 AM    K 3.7 08/19/2022 03:50 AM    K 4.2 12/12/2018 06:10 AM     08/19/2022 03:50 AM    CO2 25 08/19/2022 03:50 AM    BUN 5 08/19/2022 03:50 AM    CREATININE 0.6 08/19/2022 03:50 AM    GFRAA >60 08/19/2022 03:50 AM    AGRATIO 1.3 08/12/2022 02:34 PM    LABGLOM >60 08/19/2022 03:50 AM    GLUCOSE 136 08/19/2022 03:50 AM    PROT 6.8 08/12/2022 02:34 PM    CALCIUM 9.1 08/19/2022 03:50 AM    BILITOT 0.6 08/12/2022 02:34 PM    ALKPHOS 73 08/12/2022 02:34 PM    AST 34 08/12/2022 02:34 PM    ALT 24 08/12/2022 02:34 PM       POC Tests:   Recent Labs     08/19/22  1128   POCGLU 102*       Coags:   Lab Results   Component Value Date/Time    PROTIME 13.3 08/18/2022 09:10 PM    INR 1.02 08/18/2022 09:10 PM    APTT 31.8 12/11/2018 02:55 PM       HCG (If Applicable): No results found for: PREGTESTUR, PREGSERUM, HCG, HCGQUANT     ABGs: No results found for: PHART, PO2ART, JDY4XQT, KHT6TBG, BEART, Y4KCACOJ     Type & Screen (If Applicable):  No results found for: LABABO, LABRH    Drug/Infectious Status (If Applicable):  No results found for: HIV, HEPCAB    COVID-19 Screening (If Applicable): No results found for: COVID19        Anesthesia Evaluation    Airway: Mallampati: II  TM distance: >3 FB   Neck ROM: full  Mouth opening: > = 3 FB   Dental:          Pulmonary:   (+) shortness of breath:                             Cardiovascular:    (+) hypertension:, dysrhythmias: atrial fibrillation,         Rhythm: regular  Rate: normal  Echocardiogram reviewed                  Neuro/Psych:   (+) CVA:,             GI/Hepatic/Renal:             Endo/Other:    (+) Diabetes, . Abdominal:             Vascular: Other Findings:           Anesthesia Plan      general     ASA 4       Induction: intravenous. Anesthetic plan and risks discussed with patient. Plan discussed with CRNA.                     Reji Payton MD   8/19/2022

## 2022-08-19 NOTE — CARE COORDINATION
CM returned call to [facility] in admissions at [facility] [phone] and discussed that pt is currently in surgery and per NP will not be ready this weekend. Also pt will need to have therapy evaluations when stable to determine if placement would be needed. She will continue to follow along in epic.     Willie Gunn RN, BSN  [phone]

## 2022-08-19 NOTE — CARE COORDINATION
Cm reviewed chart for d/c planning. Pt was transferred from [facility] for surgery. Pt to have a R fem/popliteal bypass later this afternoon. Pt will need therapy evaluations after surgery. Per  note yesterday at [facility] pt's daughter's first choice is ARU at [facility] and second choice is [facility] if snf. Per  note referral was made to [facility]. CM will follow for therapy recommendations and d/c plan.         Wes Perdomo RN, BSN  [phone]

## 2022-08-19 NOTE — PROGRESS NOTES
Pt transported to [facility] via [facility]. Daughter took patient belongings home. Pt stable, no distress. Morphine administered right before departure. IV left in place. Wrapped with Coban. No further questions at this time.

## 2022-08-19 NOTE — ANESTHESIA PRE PROCEDURE
Department of Anesthesiology  Preprocedure Note       Name:  Desiree Ferrari   Age:  80 y.o.  :  1930                                          MRN:  [id number]         Date:  2022      Surgeon: Maday Fermin):  Alivia Torrez MD    Procedure: Procedure(s):  RIGHT FEMORAL TO POSTERIOR TIBIAL BYPASS GRAFT WITH VEIN GRAFT    Medications prior to admission:   Prior to Admission medications    Medication Sig Start Date End Date Taking? Authorizing Provider   metFORMIN (GLUCOPHAGE) 500 MG tablet Take 1,000 mg by mouth in the morning and 1,000 mg in the evening. Take with meals. Historical Provider, MD   mirtazapine (REMERON) 15 MG tablet Take 15 mg by mouth nightly 2 tablets    Historical Provider, MD   rosuvastatin (CRESTOR) 10 MG tablet Take 10 mg by mouth in the morning. Historical Provider, MD   omeprazole (PRILOSEC) 40 MG delayed release capsule Take 40 mg by mouth in the morning. Historical Provider, MD   lisinopril (PRINIVIL;ZESTRIL) 10 MG tablet Take 10 mg by mouth in the morning. Historical Provider, MD   oxyCODONE-acetaminophen (PERCOCET) 5-325 MG per tablet Take 1 tablet by mouth every 8 hours as needed for Pain.     Historical Provider, MD   insulin glargine (LANTUS) 100 UNIT/ML injection vial Inject 30 Units into the skin nightly  22  Historical Provider, MD   tamsulosin (FLOMAX) 0.4 MG capsule Take 0.4 mg by mouth nightly     Historical Provider, MD       Current medications:    Current Facility-Administered Medications   Medication Dose Route Frequency Provider Last Rate Last Admin    cloNIDine (CATAPRES) tablet 0.1 mg  0.1 mg Oral Q4H PRN Semaj Saravia MD   0.1 mg at 22 0326    0.9 % sodium chloride infusion   IntraVENous Continuous Alivia Torrez MD 50 mL/hr at 22 0832 Rate Change at 22 0832    ceFAZolin (ANCEF) 2,000 mg in dextrose 5 % 50 mL IVPB (mini-bag)  2,000 mg IntraVENous On Call to [address] [place] [address], APRN - [facility]        acetaminophen (TYLENOL) tablet 650 mg  650 mg Oral Q4H PRN Mason Hall MD   650 mg at 08/18/22 2207    mirtazapine (REMERON) tablet 30 mg  30 mg Oral Nightly Mason Hall MD   30 mg at 08/18/22 2322    oxyCODONE-acetaminophen (PERCOCET) 5-325 MG per tablet 1 tablet  1 tablet Oral Q8H PRN Mason Hall MD   1 tablet at 08/19/22 0356    rosuvastatin (CRESTOR) tablet 10 mg  10 mg Oral Daily Mason Hall MD   10 mg at 08/19/22 0837    lisinopril (PRINIVIL;ZESTRIL) tablet 10 mg  10 mg Oral Daily Mason Hall MD   10 mg at 08/19/22 0837    tamsulosin (FLOMAX) capsule 0.4 mg  0.4 mg Oral Nightly Mason Hall MD   0.4 mg at 08/18/22 2322    pantoprazole (PROTONIX) tablet 40 mg  40 mg Oral QAM AC Mason Hall MD   40 mg at 08/19/22 0837    [Held by provider] insulin glargine (LANTUS) injection vial 20 Units  20 Units SubCUTAneous Nightly Mason Hall MD        aspirin chewable tablet 81 mg  81 mg Oral Daily Mason Hall MD   81 mg at 08/19/22 0837    dextrose bolus 10% 125 mL  125 mL IntraVENous PRN Mason Hall MD        Or    dextrose bolus 10% 250 mL  250 mL IntraVENous PRN Mason Hall MD        glucagon (rDNA) injection 1 mg  1 mg SubCUTAneous PRN Mason Hall MD        dextrose 10 % infusion   IntraVENous Continuous PRN Mason Hall MD        sodium chloride flush 0.9 % injection 5-40 mL  5-40 mL IntraVENous 2 times per day Mason Hall MD   10 mL at 08/18/22 2323    sodium chloride flush 0.9 % injection 5-40 mL  5-40 mL IntraVENous PRN Mason Hall MD        0.9 % sodium chloride infusion   IntraVENous PRN Mason Hall MD        ondansetron (ZOFRAN-ODT) disintegrating tablet 4 mg  4 mg Oral Q8H PRN Mason Hall MD        Or    ondansetron (ZOFRAN) injection 4 mg  4 mg IntraVENous Q6H PRN Mason Hall MD        polyethylene glycol (GLYCOLAX) packet 17 g  17 g Oral Daily PRN Mason Hall MD        acetaminophen (TYLENOL) tablet 650 mg  650 mg Oral Q6H PRN Mason Hall MD        Or   Kayleigh Norman acetaminophen (TYLENOL) suppository 650 mg  650 mg Rectal Q6H PRN Burleigh Bernheim, MD        insulin lispro (HUMALOG) injection vial 0-8 Units  0-8 Units SubCUTAneous TID WC Burleigh Bernheim, MD        insulin lispro (HUMALOG) injection vial 0-4 Units  0-4 Units SubCUTAneous Nightly Burleigh Bernheim, MD        lactulose (CHRONULAC) 10 GM/15ML solution 20 g  20 g Oral TID Burleigh Bernheim, MD           Allergies: Allergies   Allergen Reactions    Sulfa Antibiotics Hives    Pcn [Penicillins] Rash     \"Rash and itchy\"       Problem List:    Patient Active Problem List   Diagnosis Code    Pleural plaque J92.9    Shortness of breath R06.02    Benign essential HTN I10    Hematoma T14. 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 ([facility]) I70.229    Limb ischemia I99.8    Ischemic leg pain M79.606, I99.8       Past Medical History:        Diagnosis Date    Afib [facility] [address]     Cerebral amyloid angiopathy (HCC)     Diabetes mellitus ([facility] [address]     Heart aneurysm     Vertigo        Past Surgical History:        Procedure Laterality Date    APPENDECTOMY      HERNIA REPAIR      LUNG SURGERY      TESTICLE SURGERY         Social History:    Social History     Tobacco Use    Smoking status: Some Days     Types: Cigars    Smokeless tobacco: Current   Substance Use Topics    Alcohol use:  No                                Ready to quit: Not Answered  Counseling given: Not Answered      Vital Signs (Current):   Vitals:    08/19/22 0600 08/19/22 0623 08/19/22 0800 08/19/22 1204   BP: (!) 159/135 (!) 155/98 (!) 154/97 (!) 160/102   Pulse: 92 84 71 94   Resp:   15 20   Temp:   97.4 °F (36.3 °C) 97.6 °F (36.4 °C)   TempSrc: Temporal   SpO2:   98% 97%   Weight:       Height:                                                  BP Readings from Last 3 Encounters:   08/19/22 (!) 160/102   08/18/22 (!) 116/56   08/12/22 130/82       NPO Status: Time of last liquid consumption: 2359                        Time of last solid consumption: 2359                        Date of last liquid consumption: 08/18/22                        Date of last solid food consumption: 08/18/22    BMI:   Wt Readings from Last 3 Encounters:   08/19/22 165 lb 5.5 oz (75 kg)   08/18/22 166 lb 14.2 oz (75.7 kg)   08/12/22 164 lb (74.4 kg)     Body mass index is 22.42 kg/m².     CBC:   Lab Results   Component Value Date/Time    WBC 7.3 08/19/2022 03:50 AM    RBC 4.74 08/19/2022 03:50 AM    HGB 14.5 08/19/2022 03:50 AM    HCT 43.8 08/19/2022 03:50 AM    MCV 92.6 08/19/2022 03:50 AM    RDW 14.6 08/19/2022 03:50 AM     08/19/2022 03:50 AM       CMP:   Lab Results   Component Value Date/Time     08/19/2022 03:50 AM    K 3.7 08/19/2022 03:50 AM    K 4.2 12/12/2018 06:10 AM     08/19/2022 03:50 AM    CO2 25 08/19/2022 03:50 AM    BUN 5 08/19/2022 03:50 AM    CREATININE 0.6 08/19/2022 03:50 AM    GFRAA >60 08/19/2022 03:50 AM    AGRATIO 1.3 08/12/2022 02:34 PM    LABGLOM >60 08/19/2022 03:50 AM    GLUCOSE 136 08/19/2022 03:50 AM    PROT 6.8 08/12/2022 02:34 PM    CALCIUM 9.1 08/19/2022 03:50 AM    BILITOT 0.6 08/12/2022 02:34 PM    ALKPHOS 73 08/12/2022 02:34 PM    AST 34 08/12/2022 02:34 PM    ALT 24 08/12/2022 02:34 PM       POC Tests:   Recent Labs     08/19/22  1128   POCGLU 102*       Coags:   Lab Results   Component Value Date/Time    PROTIME 13.3 08/18/2022 09:10 PM    INR 1.02 08/18/2022 09:10 PM    APTT 31.8 12/11/2018 02:55 PM       HCG (If Applicable): No results found for: PREGTESTUR, PREGSERUM, HCG, HCGQUANT     ABGs: No results found for: PHART, PO2ART, DPT7MPP, UAL5MET, BEART, F2ASCVUN     Type & Screen (If Applicable):  No results found for: LABABO, LABRH    Drug/Infectious Status (If Applicable):  No results found for: HIV, HEPCAB    COVID-19 Screening (If Applicable): No results found for: COVID19        Anesthesia Evaluation    Airway: Mallampati: II  TM distance: >3 FB   Neck ROM: full  Mouth opening: > = 3 FB   Dental:          Pulmonary:   (+) shortness of breath:                             Cardiovascular:    (+) hypertension:,         Rhythm: regular  Rate: normal                    Neuro/Psych:               GI/Hepatic/Renal:             Endo/Other:    (+) Diabetes, . Abdominal:             Vascular: Other Findings:           Anesthesia Plan      general     ASA 4       Induction: intravenous. Anesthetic plan and risks discussed with patient. Plan discussed with CRNA.                     Reji Payton MD   8/19/2022

## 2022-08-19 NOTE — DISCHARGE SUMMARY
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 Hospital Course:     80-year-old with a history of atrial fibrillation, cerebral amyloid angiopathy, had aneurysm and type 2 diabetes mellitus. He had a cerebral bleed 2022 necessitating the cessation of Xarelto therapy. In the last 2-1/2 weeks he has developed a painful and swollen right leg-the pain has been progressive. He has trouble weightbearing and ambulating. Patient was admitted with right lower limb ischemia    Critical right lower limb ischemia. Patient presented to emergency with right lower limb pain at rest, right lower limb was cold and tender on exam.  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 Underwent coronary angiogram that showed multilevel arterial occlusion 8/16. Patient is planned for right femoral bypass Friday  Plan  -Appreciate vascular surgery input, patient underwent mapping yesterday. - echo pending   -Patient planned for femoral bypass Friday     Atrial fibrillation.   Not on anticoagulation, will consider watchman as outpatient     Diabetes mellitus  Patient was noted to be hypoglycemic,  Hold glargine for now  -Continue on sliding scale     Cerebral amyloid angiopathy  We will continue to monitor     Hyperlipidemia  Resume statin     BPH  Continue  tamsulosin     Hypertension  Continue lisinopril      Physical Exam Performed:     BP (!) 116/56   Pulse 94   Temp 98 °F (36.7 °C) (Oral)   Resp 16   Wt 166 lb 14.2 oz (75.7 kg)   SpO2 93%   BMI 22.63 kg/m²       General appearance:  No apparent distress, appears stated age and cooperative. HEENT:  Normal cephalic, atraumatic without obvious deformity. Pupils equal, round, and reactive to light. Extra ocular muscles intact. Conjunctivae/corneas clear. Neck: Supple, with full range of motion. No jugular venous distention. Trachea midline. Respiratory:  Normal respiratory effort. Clear to auscultation, bilaterally without Rales/Wheezes/Rhonchi. Cardiovascular:  Regular rate and rhythm with normal S1/S2 without murmurs, rubs or gallops. Abdomen: Soft, non-tender, non-distended with normal bowel sounds. Musculoskeletal:  No clubbing, cyanosis or edema bilaterally. Full range of motion without deformity. Skin: Skin color, texture, turgor normal.  No rashes or lesions. Neurologic:  Neurovascularly intact without any focal sensory/motor deficits. Cranial nerves: II-XII intact, grossly non-focal.  Psychiatric:  Alert and oriented, thought content appropriate, normal insight  Capillary Refill: Brisk,< 3 seconds   Peripheral Pulses: +2 palpable, equal bilaterally       Labs:  For convenience and continuity at follow-up the following most recent labs are provided:      CBC:    Lab Results   Component Value Date/Time    WBC 7.3 08/19/2022 03:50 AM    HGB 14.5 08/19/2022 03:50 AM    HCT 43.8 08/19/2022 03:50 AM     08/19/2022 03:50 AM       Renal:    Lab Results   Component Value Date/Time     08/19/2022 03:50 AM    K 3.7 08/19/2022 03:50 AM    K 4.2 12/12/2018 06:10 AM     08/19/2022 03:50 AM    CO2 25 08/19/2022 03:50 AM    BUN 5 08/19/2022 03:50 AM    CREATININE 0.6 08/19/2022 03:50 AM    CALCIUM 9.1 08/19/2022 03:50 AM         Significant Diagnostic Studies    Radiology:   XR CHEST (2 VW)   Final Result   Mild bibasilar airspace opacities. This could represent atelectasis or   pneumonia in the appropriate clinical setting. VL PRE OP VEIN MAPPING   Final Result      CTA ABDOMINAL AORTA W BILAT RUNOFF W CONTRAST   Final Result   Complete occlusion of the right superficial femoral artery just after the   takeoff of the profunda femoris. Occlusion at the origin of the profunda femoris on the right. More distal   aspects of that vessel are enhancing. Multifocal calcified plaque is seen within the left superficial femoral   artery leading to at least moderate if not high-grade central canal stenosis. Unfortunately, contrast bolus is suboptimal limiting fine detail. Note that the bilateral popliteal arteries and the bilateral 3 vessel runoffs   cannot be reliably evaluated secondary to suboptimal contrast timing. Abdominal aortic aneurysm measuring 4.0 cm maximally. See recommendations   below. Circumferential subcutaneous edema within the right calf extending into the   right foot. No soft tissue gas or radiopaque foreign body. No focal fluid   collections are identified to suggest abscess. Moderate amount of stool within the colon. Please correlate with clinical   evidence of constipation. RECOMMENDATIONS:   4 cm infrarenal abdominal aortic aneurysm. Recommend follow-up every 12   months and vascular consultation. Reference: J Am Otto Radiol 3135;[id number].                 Consults:     IP CONSULT TO SPIRITUAL SERVICES  IP CONSULT TO SPIRITUAL SERVICES  IP CONSULT TO SPIRITUAL SERVICES    Disposition:  [facility]      Condition at Discharge: Stable    Discharge Instructions/Follow-up:    Patient was transferred to [facility] for femoral bypass as OR at [facility] had been down due to technical issues    Code Status:  Full Code     Activity: activity as tolerated    Diet: regular diet      Discharge Medications:     Discharge Medication List as of 8/18/2022  8:20 PM             Details metFORMIN (GLUCOPHAGE) 500 MG tablet Take 1,000 mg by mouth in the morning and 1,000 mg in the evening. Take with meals. Historical Med      mirtazapine (REMERON) 15 MG tablet Take 15 mg by mouth nightly 2 tabletsHistorical Med      rosuvastatin (CRESTOR) 10 MG tablet Take 10 mg by mouth in the morning. Historical Med      omeprazole (PRILOSEC) 40 MG delayed release capsule Take 40 mg by mouth in the morning. Historical Med      lisinopril (PRINIVIL;ZESTRIL) 10 MG tablet Take 10 mg by mouth in the morning. Historical Med      oxyCODONE-acetaminophen (PERCOCET) 5-325 MG per tablet Take 1 tablet by mouth every 8 hours as needed for Pain. Historical Med      tamsulosin (FLOMAX) 0.4 MG capsule Take 0.4 mg by mouth nightly Historical Med             Time Spent on discharge is more than 30 minutes in the examination, evaluation, counseling and review of medications and discharge plan. Signed:    Maryanne Andersen MD   8/19/2022      Thank you [facility] for the opportunity to be involved in this patient's care. If you have any questions or concerns, please feel free to contact me at [phone].

## 2022-08-20 LAB
ANION GAP SERPL CALCULATED.3IONS-SCNC: 14 MMOL/L (ref 3–16)
BUN BLDV-MCNC: 7 MG/DL (ref 7–20)
CALCIUM SERPL-MCNC: 8.2 MG/DL (ref 8.3–10.6)
CHLORIDE BLD-SCNC: 110 MMOL/L (ref 99–110)
CO2: 16 MMOL/L (ref 21–32)
CREAT SERPL-MCNC: 0.6 MG/DL (ref 0.8–1.3)
GFR AFRICAN AMERICAN: >60
GFR NON-AFRICAN AMERICAN: >60
GLUCOSE BLD-MCNC: 144 MG/DL (ref 70–99)
GLUCOSE BLD-MCNC: 146 MG/DL (ref 70–99)
GLUCOSE BLD-MCNC: 175 MG/DL (ref 70–99)
GLUCOSE BLD-MCNC: 203 MG/DL (ref 70–99)
HCT VFR BLD CALC: 36.3 % (ref 40.5–52.5)
HEMOGLOBIN: 11.8 G/DL (ref 13.5–17.5)
MCH RBC QN AUTO: 30.4 PG (ref 26–34)
MCHC RBC AUTO-ENTMCNC: 32.4 G/DL (ref 31–36)
MCV RBC AUTO: 94.1 FL (ref 80–100)
PDW BLD-RTO: 15.1 % (ref 12.4–15.4)
PERFORMED ON: ABNORMAL
PLATELET # BLD: 263 K/UL (ref 135–450)
PMV BLD AUTO: 8.3 FL (ref 5–10.5)
POTASSIUM SERPL-SCNC: 3.8 MMOL/L (ref 3.5–5.1)
RBC # BLD: 3.86 M/UL (ref 4.2–5.9)
SODIUM BLD-SCNC: 140 MMOL/L (ref 136–145)
WBC # BLD: 12.5 K/UL (ref 4–11)

## 2022-08-20 PROCEDURE — 80048 BASIC METABOLIC PNL TOTAL CA: CPT

## 2022-08-20 PROCEDURE — 2580000003 HC RX 258: Performed by: SURGERY

## 2022-08-20 PROCEDURE — 6370000000 HC RX 637 (ALT 250 FOR IP): Performed by: SURGERY

## 2022-08-20 PROCEDURE — 6360000002 HC RX W HCPCS: Performed by: SURGERY

## 2022-08-20 PROCEDURE — 36415 COLL VENOUS BLD VENIPUNCTURE: CPT

## 2022-08-20 PROCEDURE — 6360000002 HC RX W HCPCS: Performed by: INTERNAL MEDICINE

## 2022-08-20 PROCEDURE — 99024 POSTOP FOLLOW-UP VISIT: CPT | Performed by: SURGERY

## 2022-08-20 PROCEDURE — 2000000000 HC ICU R&B

## 2022-08-20 PROCEDURE — 85027 COMPLETE CBC AUTOMATED: CPT

## 2022-08-20 RX ORDER — HALOPERIDOL 5 MG/ML
5 INJECTION INTRAMUSCULAR ONCE
Status: COMPLETED | OUTPATIENT
Start: 2022-08-20 | End: 2022-08-20

## 2022-08-20 RX ADMIN — ROSUVASTATIN 10 MG: 10 TABLET, FILM COATED ORAL at 09:00

## 2022-08-20 RX ADMIN — CEFAZOLIN 2000 MG: 2 INJECTION, POWDER, FOR SOLUTION INTRAMUSCULAR; INTRAVENOUS at 15:16

## 2022-08-20 RX ADMIN — CEFAZOLIN 2000 MG: 2 INJECTION, POWDER, FOR SOLUTION INTRAMUSCULAR; INTRAVENOUS at 07:33

## 2022-08-20 RX ADMIN — MIRTAZAPINE 30 MG: 15 TABLET, FILM COATED ORAL at 20:04

## 2022-08-20 RX ADMIN — HALOPERIDOL LACTATE 5 MG: 5 INJECTION, SOLUTION INTRAMUSCULAR at 04:21

## 2022-08-20 RX ADMIN — PANTOPRAZOLE SODIUM 40 MG: 40 TABLET, DELAYED RELEASE ORAL at 07:00

## 2022-08-20 RX ADMIN — ASPIRIN 81 MG: 81 TABLET, CHEWABLE ORAL at 09:00

## 2022-08-20 RX ADMIN — Medication 10 ML: at 21:00

## 2022-08-20 RX ADMIN — LISINOPRIL 10 MG: 10 TABLET ORAL at 09:00

## 2022-08-20 RX ADMIN — SODIUM CHLORIDE, PRESERVATIVE FREE 10 ML: 5 INJECTION INTRAVENOUS at 20:05

## 2022-08-20 RX ADMIN — TAMSULOSIN HYDROCHLORIDE 0.4 MG: 0.4 CAPSULE ORAL at 20:05

## 2022-08-20 RX ADMIN — SODIUM CHLORIDE, PRESERVATIVE FREE 10 ML: 5 INJECTION INTRAVENOUS at 09:00

## 2022-08-20 ASSESSMENT — PAIN SCALES - GENERAL
PAINLEVEL_OUTOF10: 0

## 2022-08-20 NOTE — PROGRESS NOTES
Hospitalist Progress Note      PCP: Namon Pallas    Date of Admission: 8/18/2022    Chief Complaint: foot pain        Hospital Course:    Marianela Salgado is 80 y.o. male who presented with complaint of right leg pain. Symptom onset was acute for a time period of 2.5 weeks. The severity is described as severe. The course of his symptoms over time is worsening. The symptoms improved with rest and worsened with walking. The patient's symptom is associated with none. Marianela Salgado is 80 y.o. male with history of atrial fibrillation, cerebral amyloid angiopathy, had aneurysm and type 2 diabetes mellitus. He had a cerebral bleed 2022 necessitating the cessation of Xarelto therapy. In the last 2-1/2 weeks he has developed a painful and swollen right leg-the pain has been progressive. He has trouble weightbearing and ambulating. Patient admitted with right lower limb ischemia  On interview, pain is constant and not changed since yesterday     Subjective:   Patient feels well  No chest pain no shortness of breath  No lower extremity pain        Medications:  Reviewed        Intake/Output Summary (Last 24 hours) at 8/20/2022 1547  Last data filed at 8/20/2022 0704  Gross per 24 hour   Intake 4064.79 ml   Output 1775 ml   Net 2289.79 ml         Physical Exam Performed: by night hospital doctor. I did not d    BP (!) 138/49   Pulse 94   Temp 97.7 °F (36.5 °C) (Temporal)   Resp 16   Ht 6' (1.829 m)   Wt 152 lb 5.4 oz (69.1 kg)   SpO2 99%   BMI 20.66 kg/m²   General appearance:  No apparent distress, appears stated age and cooperative. HEENT:  Normal cephalic, atraumatic without obvious deformity. Pupils equal, round, and reactive to light. Extra ocular muscles intact. Conjunctivae/corneas clear. Neck: Supple, with full range of motion. No jugular venous distention. Trachea midline. Respiratory:  Normal respiratory effort.  Clear to auscultation, bilaterally without Rales/Wheezes/Rhonchi. Cardiovascular:  Regular rate and rhythm with normal S1/S2 without murmurs, rubs or gallops. Abdomen: Soft, non-tender, non-distended with normal bowel sounds. Musculoskeletal: Right lower extremity has been dressed  Skin: Skin color, texture, turgor normal.  No rashes or lesions. Neurologic:  Neurovascularly intact without any focal sensory/motor deficits. Cranial nerves: II-XII intact, grossly non-focal.            Labs:   Recent Labs     08/19/22  0350 08/19/22  2256 08/20/22  0755   WBC 7.3 11.9* 12.5*   HGB 14.5 12.6* 11.8*   HCT 43.8 39.1* 36.3*    240 263       Recent Labs     08/19/22  0350 08/19/22  2256 08/20/22  0755    143 140   K 3.7 4.3 3.8    110 110   CO2 25 20* 16*   BUN 5* 5* 7   CREATININE 0.6* 0.6* 0.6*   CALCIUM 9.1 8.2* 8.2*       No results for input(s): AST, ALT, BILIDIR, BILITOT, ALKPHOS in the last 72 hours. Recent Labs     08/18/22  2110   INR 1.02       No results for input(s): Claudia Horn in the last 72 hours. 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 Underwent coronary angiogram that showed multilevel arterial occlusion 8/16.   S/p right femoral replacement with right Marks distal PT bypass  Doing well  On IV antibiotics  Continue bedrest for today       Atrial fibrillation, rate controlled  Not on anticoagulation, will consider watchman as outpatient     Diabetes mellitus  Patient was noted to be hypoglycemic,  Hold glargine for now  Continue on sliding scale     Cerebral amyloid angiopathy  We will continue to monitor     Hyperlipidemia  Resume statin     BPH  Continue  tamsulosin     Hypertension  Continue lisinopril    DVT Prophylaxis: SCD  Diet: ADULT DIET; Regular; 5 carb choices (75 gm/meal)  Code Status: Full Code    PT/OT Eval Status:  Padmini Olson MD

## 2022-08-20 NOTE — PLAN OF CARE
Problem: Safety - Adult  Goal: Free from fall injury  8/20/2022 1822 by Jose Dangelo RN  Outcome: Progressing  8/20/2022 0502 by Kathy Malik RN  Outcome: Progressing     Problem: ABCDS Injury Assessment  Goal: Absence of physical injury  8/20/2022 1822 by Jose Dangelo RN  Outcome: Progressing  8/20/2022 0502 by Kathy Malik RN  Outcome: Progressing     Problem: Skin/Tissue Integrity  Goal: Absence of new skin breakdown  Description: 1. Monitor for areas of redness and/or skin breakdown  2. Assess vascular access sites hourly  3. Every 4-6 hours minimum:  Change oxygen saturation probe site  4. Every 4-6 hours:  If on nasal continuous positive airway pressure, respiratory therapy assess nares and determine need for appliance change or resting period.   8/20/2022 1822 by Jose Dangelo RN  Outcome: Progressing  8/20/2022 0502 by Kathy Malik RN  Outcome: Progressing     Problem: Pain  Goal: Verbalizes/displays adequate comfort level or baseline comfort level  8/20/2022 1822 by Jose Dangelo RN  Outcome: Progressing  8/20/2022 0502 by Kathy Malik RN  Outcome: Progressing     Problem: Chronic Conditions and Co-morbidities  Goal: Patient's chronic conditions and co-morbidity symptoms are monitored and maintained or improved  8/20/2022 1822 by Jose Dangelo RN  Outcome: Progressing  Flowsheets (Taken 8/20/2022 0800)  Care Plan - Patient's Chronic Conditions and Co-Morbidity Symptoms are Monitored and Maintained or Improved: Monitor and assess patient's chronic conditions and comorbid symptoms for stability, deterioration, or improvement  8/20/2022 0502 by Kathy Malik RN  Outcome: Progressing     Problem: Discharge Planning  Goal: Discharge to home or other facility with appropriate resources  8/20/2022 1822 by Jose Dangelo RN  Outcome: Progressing  Flowsheets (Taken 8/20/2022 0800)  Discharge to home or other facility with appropriate resources: Identify

## 2022-08-20 NOTE — PLAN OF CARE
Problem: Safety - Adult  Goal: Free from fall injury  Outcome: Progressing     Problem: ABCDS Injury Assessment  Goal: Absence of physical injury  Outcome: Progressing     Problem: Skin/Tissue Integrity  Goal: Absence of new skin breakdown  Description: 1. Monitor for areas of redness and/or skin breakdown  2. Assess vascular access sites hourly  3. Every 4-6 hours minimum:  Change oxygen saturation probe site  4. Every 4-6 hours:  If on nasal continuous positive airway pressure, respiratory therapy assess nares and determine need for appliance change or resting period.   Outcome: Progressing     Problem: Pain  Goal: Verbalizes/displays adequate comfort level or baseline comfort level  Outcome: Progressing     Problem: Chronic Conditions and Co-morbidities  Goal: Patient's chronic conditions and co-morbidity symptoms are monitored and maintained or improved  Outcome: Progressing     Problem: Discharge Planning  Goal: Discharge to home or other facility with appropriate resources  Outcome: Progressing

## 2022-08-20 NOTE — BRIEF OP NOTE
Brief Postoperative Note      Patient: Jaspal Azevedo  YOB: 1930  MRN: [id number]    Date of Procedure: 8/19/2022    Pre-Op Diagnosis: Ischemic rest pain foot    Post-Op Diagnosis: Same       Procedure(s):  RIGHT FEMORAL TO POSTERIOR TIBIAL BYPASS GRAFT WITH VEIN GRAFT AND ARTERIOGRAM    Surgeon(s):  Areli Herndon MD    Assistant:  Surgical Assistant: Sudhir Ramirez RN  First Assistant: Kofi Art RN    Anesthesia: General    Estimated Blood Loss (mL): 165     Complications: None    Specimens:   ID Type Source Tests Collected by Time Destination   A : A-RIGHT femoral anuerysm Tissue Tissue SURGICAL PATHOLOGY Areli Herndon MD 8/19/2022 1558        Implants:  Implant Name Type Inv. Item Serial No.  Lot No. LRB No. Used Action   GRAFT VASC L30CM OD8MM POLY STR NONRINGED KNIT STD WALL - [id number] Vascular grafts GRAFT VASC L30CM OD8MM POLY STR NONRINGED KNIT STD WALL [id number] [facility] CARDIOVASC[facility]- [id number] Right 1 Implanted         Drains:   Urinary Catheter 08/19/22 Parker (Active)   $ Urethral catheter insertion Inserted for procedure 08/19/22 0400   Catheter Indications Perioperative use for selected surgical procedures 08/19/22 0832   Site Assessment Pink 08/19/22 0832   Urine Color Yellow 08/19/22 0832   Urine Appearance Clear 08/19/22 0832   Urine Odor Malodorous 08/19/22 0400   Collection Container Standard 08/19/22 0832   Securement Method Securing device (Describe) 08/19/22 0832   Catheter Care Completed Yes 08/19/22 0400   Catheter Best Practices  Drainage tube clipped to bed; Bag below bladder;Lack of dependent loop in tubing;Drainage bag less than half full; Tamper seal intact; Catheter secured to thigh;Bag not on floor 08/19/22 0832   Status Draining 08/19/22 0832   Output (mL) 350 mL 08/19/22 1355       Findings: as above    Electronically signed by Areli Herndon MD on 8/19/2022 at 9:10 PM

## 2022-08-20 NOTE — PROGRESS NOTES
Roberto: Alert to person     Cardiac: A-fib    Resp: SpO2 98% on room air    GI: Active    : Parker in place, yellow clear urine    Skin: Intact, RT leg surgical incision     Lines: PIV    Gtts: none       See flow sheets for details, VS, MAR for all medication and titration of gtts.   Sandi Mak RN

## 2022-08-20 NOTE — PROGRESS NOTES
Pt again agitated began yelling out and trying to leave his bed. Family at the bedside talking with patient. Tried to redirect pt with music, agitation continued. Pt voiced that he wanted to leave, and daughter began talking with him. Pt began yelling out, more assistance was called. Pt tried to hit charge nurse. Messaged Dr. Angelina Brink and new order received for Haldol 5mg IM given by charge nurse. Patient tolerated IM injection, and haldol was effective. Pt refused vital signs, bath, and any care at this time. Pt would not allow this nurse to fix his heart lead.

## 2022-08-20 NOTE — PROGRESS NOTES
4/15/2021        RE: Maribel Sevilla  [facility]  [address] [facility] [address]        [facility]  Chief Complaint   Patient presents with     shelter Regulatory     [facility] Medical Record Number:  [id number]  Place of Service where encounter took place:  [facility] () [[id number]]    HPI:    Maribel Sevilla  is 89 year old (12/20/1931), who is being seen today for a federally mandated E/M visit.  HPI information obtained from: facility chart records, facility staff and patient report.     Today's concerns are:  CKD (chronic kidney disease) stage 4, GFR 15-29 ml/min (H)  No recent labs  Creatinine   Date Value Ref Range Status   07/24/2019 2.69 (A) 0.55 - 1.02 mg/dL Final     Lewy body dementia without behavioral disturbance (H)  Bipolar affective disorder, remission status unspecified (H)  Severe episode of recurrent major depressive disorder, without psychotic features (H)  Per chart review, Remeron was decreased 3/17/21. This was likely a GDR done by psychiatry, but the note is not available yet. Patient chronically waxes and wanes with her behavior. She will stay in bed for several days and then spontaneously decide to get up. Sometimes she will keep her eyes closed and not respond in any way. Today she says she feels very sick, but does not need anything and appreciates provider stopping by    Gastroesophageal reflux disease without esophagitis  No reports of dyspepsia per staff      ALLERGIES:No known allergies  PAST MEDICAL HISTORY:   has a past medical history of Anemia, Anxiety, Bipolar affective (H), Dementia (H), Depressive disorder, Gastro-oesophageal reflux disease, OA (osteoarthritis) of knee, and Renal insufficiencies, chronic.  PAST SURGICAL HISTORY:   has a past surgical history that includes Salpingo-oophorectomy bilateral; Hysterectomy; and Eye surgery.  FAMILY HISTORY: family history includes C.A.D. in her brother, father, mother,  Patient became combative, but was able to be redirected with the help of the family member. Patient tried to take out IV and was yelling. Another nurse and family were able to help stop patient from squeezing my hand. "and sister; Psychotic Disorder in her son.  SOCIAL HISTORY:  reports that she has never smoked. She has never used smokeless tobacco. She reports that she does not drink alcohol or use drugs.    MEDICATIONS:  Current Outpatient Medications   Medication Sig Dispense Refill     acetaminophen (TYLENOL) 500 MG tablet Take 500 mg by mouth 2 times daily and 1 tab every 4 hours as needed       donepezil (ARICEPT) 5 MG tablet Take 5 mg by mouth 2 times daily  30 tablet 0     ferrous sulfate (IRON) 325 (65 Fe) MG tablet Take 325 mg by mouth daily every other day       lamoTRIgine (LAMICTAL) 200 MG tablet Take 1 tablet (200 mg) by mouth every morning       mineral oil OIL Place 2 drops into both ears daily       mirtazapine (REMERON) 15 MG tablet Take 1.5 tablets (22.5 mg) by mouth At Bedtime       Multiple Vitamins-Minerals (PRESERVISION AREDS 2 PO) Take 1 tablet by mouth daily       ondansetron (ZOFRAN) 4 MG tablet Take 4 mg by mouth 4 times daily        pantoprazole (PROTONIX) 40 MG EC tablet Take 40 mg by mouth daily        QUEtiapine (SEROQUEL) 50 MG tablet Take 150 mg by mouth At Bedtime        senna-docusate (SENNA S) 8.6-50 MG per tablet Take 2 tablets by mouth At Bedtime       sertraline (ZOLOFT) 50 MG tablet Take 200 mg by mouth daily          Case Management:  I have reviewed the care plan and MDS and do agree with the plan. Patient's desire to return to the community is not assessible due to cognitive impairment. Information reviewed:  Medications, vital signs, orders, and nursing notes.    ROS:  Unobtainable secondary to cognitive impairment/lack of participation    Vitals:  /68   Pulse 68   Temp 98  F (36.7  C)   Resp 16   Ht 1.575 m (5' 2\")   Wt 62.6 kg (138 lb)   SpO2 93%   BMI 25.24 kg/m    Body mass index is 25.24 kg/m .  Exam:  GENERAL APPEARANCE:  Alert, in no distress  ENT:  Mouth and posterior oropharynx normal, moist mucous membranes  EYES:  EOM normal, conjunctiva and lids normal  RESP:  " "no respiratory distress  PSYCH:  insight and judgement impaired, memory impaired     Lab/Diagnostic data:   None recent    ASSESSMENT/PLAN  (N18.4) CKD (chronic kidney disease) stage 4, GFR 15-29 ml/min (H)  (primary encounter diagnosis)  Comment: No recent labs due to refusal. CKD4 based on previous labs.   Plan: Avoid nephrotoxic medications and adjust medications per renal function. Monitor renal function prn.     (G31.83,  F02.80) Lewy body dementia without behavioral disturbance (H)  (F31.9) Bipolar affective disorder, remission status unspecified (H)  (F33.2) Severe episode of recurrent major depressive disorder, without psychotic features (H)  Comment: Chronic behavioral issues of staying in bed, feeling \"sick\", and not responding. No acute concerns. Unless she exhibits any significant change, will let psychiatry adjust medications  Plan:  Continue current POC with no changes at this time and adjustments as needed.    (K21.9) Gastroesophageal reflux disease without esophagitis  Comment: No obvious symptoms  Plan: Continue current POC with no changes at this time and adjustments as needed.      Electronically signed by:  ASCENCION Pope [facility]  Phone: [phone]                    "

## 2022-08-20 NOTE — PROGRESS NOTES
Patient transferred from OR to PACU, opens eyes to physical stimuli, VSS, restless, RLE vascular checks WDL, palpable pedal pulse, dressing CDI, will monitor.

## 2022-08-20 NOTE — PROGRESS NOTES
VASCULAR  POD#1    Confused overnight but improved with single dose of Haldol per hospitalist.  Denies ant foot pain. VSS Afeb  Good UO  Lungs clear  R leg wrapped - no hematoma at any surgical sites  R foot and hyperemic  Palpable graft pulse with excellent doppler PT signal distal to ankle incision    Labs pending    A/P: S/P R femoral replacement + R fem-distal PT bypass. Patent. Decrease IVFs. Continue bedrest for today. Continue IV antibx. Haldol prn - agitation and confusion soul clear in near future.      General [facility]

## 2022-08-20 NOTE — PROGRESS NOTES
Patient asleep, snoring, no longer restless, appears comfortable, opens eyes to voice, VSS, right leg dressing CDI, RLE vascular checks WDL, will transfer back to CVU.

## 2022-08-20 NOTE — OP NOTE
[address]                     [facility], [address]                                OPERATIVE REPORT    PATIENT NAME: Reji Roberson                     :        1930  MED REC NO:   [id number]                          ROOM:       2916  ACCOUNT NO:   [de-identified]                           ADMIT DATE: 2022  PROVIDER:     Cristóbal Moses MD    DATE OF PROCEDURE:  2022    PREOPERATIVE DIAGNOSES:  1. Ischemic rest pain right foot with multilevel arterial occlusive  disease. 2.  Partially thrombosed right femoral artery aneurysm. POSTOPERATIVE DIAGNOSES:  1. Ischemic rest pain right foot with multilevel arterial occlusive  disease. 2.  Partially thrombosed right femoral artery aneurysm. OPERATION PERFORMED:  1. Repair of right femoral artery aneurysm with right femoral artery  replacement (8 mm Dacron graft). 2.  Right femoral to distal posterior tibial in situ bypass graft. 3.  Intraoperative angiogram.    SURGEON:  Cristóbal Moses MD    ANESTHESIA:  General endotracheal.    ESTIMATED BLOOD LOSS:  300 mL. HISTORY:  The patient is a very functional 25-year-old gentleman who  presented recently with sudden onset of right foot rest pain which  continued for approximately a week to two weeks before he sought  attention. Upon being evaluated in the hospital, he has significant  rest pain with some numbness and loss of function in his foot. His foot  however remained viable and imaging with CT angiogram followed by  contrast angiography demonstrated occlusion of the entire right  superficial femoral and popliteal arteries and reconstitution of the  diseased posterior tibial artery at its origin. The posterior tibial  artery then normalized in appearance just above the ankle with a  complete arch in the foot.   CT suggested a nearly thrombosed right  femoral artery aneurysm with occlusion of the first centimeter of the  right deep femoral artery as well as the superficial femoral artery. It  was recommended to his family and the patient that he undergo an  attempted revascularization with repair of inflow dealing with the  femoral artery aneurysm and a femoral to distal posterior tibial bypass. He agreed as did his family understanding the risks, benefits and other  options. TECHNIQUE:  The patient was brought to the operating room and placed in  the operating table in the supine position. After adequate induction of  anesthesia, his right groin and leg were prepped and draped in a sterile  fashion. An oblique incision was made in the right groin crease  overlying the diminished femoral pulse. Subcutaneous tissue was divided  down to the common femoral artery which was identified to be aneurysmal.  Dissection was then continued up to the inguinal ligament where the  common femoral artery was found to normalize just below the origin of  both the inferior epigastric and lateral circumflex arteries. It was  circumferentially dissected and controlled at this level. Dissection  was then continued distally under the superficial femoral artery which  was controlled and deep onto the deep femoral artery. The crossing  femoral circumflex vein was divided between clamps and oversewn with 2-0  silk suture ligatures. This facilitated control of the un-diseased main  trunk of the more distal deep femoral artery. At this point, the  patient was given 3000 units of  heparin. After adequate circulation  time, clamps were applied to the distal external iliac artery as well as  the deep femoral artery and proximal branches. The superficial femoral  artery which was occluded approximately 2 cm below its origin was  divided over a 2-0 silk ligature. The common femoral aneurysm was then  opened longitudinally and a large amount of relatively fresh thrombus  was removed. The origin of the deep femoral artery was confirmed to be  occluded.   The femoral artery was then resected sharply just distal to  the origin of the inferior epigastric and lateral circumflex arteries  which had been ligated prior to this. The arteriotomy was continued  into the profundus artery where it reconstituted and appeared normal  approximately a centimeter distal to its origin. Some atherosclerotic  debris was taken away from the proximal deep femoral artery which had been  transected as well. The major arterial collateral branches into the  origin of the deep femoral artery were preserved. The 8 mm Dacron graft was  brought onto the field. It was spatulated distally and a spatulated  end-to-end anastomosis was created using two separate running 5-0  Prolene on the deep femoral artery. After completion of this and after  backbleeding and flushing with heparinized saline, the graft was  stretched to an appropriate length and spatulated slightly as was the  most proximal common femoral artery. An end-to-end running anastomosis  in a spatulated fashion was then created using two separate 5-0  Prolenes. Prior to completion of this, the arteries were forward and  back bled and flushed with heparinized saline. Anastomosis was  completed and flow was restored. There was noted to be good hemostasis. Attention was then directed to the right greater saphenous vein. It was  identified in the groin and dissected up the saphenofemoral junction  where it was skeletonized by dividing all branches with 2-0 silks and  clips. The wound was packed with antibiotic soaked gauze and attention  was then directed target vessel which was the right posterior tibial  artery just above the ankle. A longitudinal incision was made posterior  to the medial malleolus. Subcutaneous tissue was divided using  electrocautery and the flexor retinaculum was incised. The posterior  tibial vascular bundle was identified and a small but un-diseased  posterior tibial artery was dissected for a distance of 1.5 cm. It was  confirmed to be soft. It was left in its bed with the intention to use  tourniquet control. At this point, attention was directed to preparing  the greater saphenous vein for in situ bypass. The greater saphenous  vein was then mobilized distally from the groin underneath the skin flap  dividing several branches between 2-0 silks and clips. A  counterincision was made in the more distal thigh near the knee and  similarly branches were either clipped or divided. Two to three more  incisions were made in the calf to identify and prepare the greater  saphenous vein which was found to be good quality throughout. Finally,  a last incision was made at anterior medial malleolus and the vein was  mobilized in this area. Attention was then directed back to the groin. The saphenofemoral junction was divided over clamps and oversewn with a  2-0 silk suture ligature. The patient was given additional 3000 units  of heparin and clamps were applied to the proximal Dacron graft as well  as the deep femoral arteries. An elliptical incision was made in the  distal Dacron graft and the greater saphenous vein was spatulated. Standard end-to-side anastomosis was then created using a single running  5-0 Prolene. After completion of this, flow was restored and there was  good hemostasis. The distal greater saphenous vein was then divided and  clipped distally. It was mobilized through the ankle incision to  facilitate tracking of the graft in the subcutaneous plane into the  location of the posterior tibial artery. The vein at this point was  distended with heparin papaverine solution throughout its length. A 3  mm Uresil valvulotome was then inserted from the distal transection of  the vein and advanced up to the anastomosis. It was then withdrawn  multiple times under pulsatile flow with lysis of valves and  establishment of a good pulsatile flow. The graft was heparinized and  using Doppler it was assessed. Several fistulas were identified and  these were exposed and clipped. A final pass of the valvulotome was  performed which resulted in lysis of an additional valve and restoration  of excellent pulsatile bleeding. The graft was heparinized and the  patient was given an additional 2000 units of heparin. A tourniquet was  applied to the mid right calf and the leg was exsanguinated using  elevation. Tourniquet was inflated to 300 mmHg and the posterior tibial  artery ankle was exposed. A longitudinal arteriotomy measuring less  than a centimeter in length was made. The artery was probed with a 1.5  mm Rossana Calico dilator which had accepted well distally. The vein graft was  then marked with indelible ink and had been tunneled into the posterior  tibial wound prior to placement of the tourniquet. The graft was then  cut to an appropriate length and a standard end-to-side anastomosis was  created using two separate 7-0 Prolene. Prior to completion of this,  the tourniquet was temporarily deflated to flush the graft and then  re-inflated and then the anastomosis was completed. Flow was then  restored and there was noted to be an excellent pulse in the graft. Doppler evaluation demonstrated excellent flow including diastolic flow. The proximal graft was then punctured with a 21-gauge butterfly and an  on-table angiogram was performed to evaluate for fistulas. There was  noted to be a single fistula in the skin bridge at the knee. This was  then identified and clipped with control of the fistula. Otherwise, the  angiogram appeared to show excellent technical result. The wounds were  then irrigated and made hemostatic. The ankle wound was closed using a  running 3-0 nylon. Other incisions were closed in layers using 3-0  Vicryls and 4-0 Monocryl for the skin. Clean sterile dry dressing was  applied to the entire leg and the patient was extubated and transferred  to recovery room.         Tyshawn Damon MD    D: 08/19/2022 22:07:41       T: 08/20/2022 2:54:50     [id number]  Job#: [id number]     Doc#: [id number]    CC:

## 2022-08-20 NOTE — ANESTHESIA POSTPROCEDURE EVALUATION
Department of Anesthesiology  Postprocedure Note    Patient: Livan Morataya  MRN: [id number]  YOB: 1930  Date of evaluation: 8/19/2022      Procedure Summary     Date: 08/19/22 Room / Location: [address]    Anesthesia Start: 1429 Anesthesia Stop: 2141    Procedure: RIGHT FEMORAL TO POSTERIOR TIBIAL BYPASS GRAFT WITH VEIN GRAFT AND ARTERIOGRAM (Right) Diagnosis:       Claudication of lower extremity ([address]      (Right Lower Extrimity Claudication)    Surgeons: Essence Doty MD Responsible Provider: Jasper Lew MD    Anesthesia Type: general ASA Status: 4          Anesthesia Type: No value filed.     Michael Phase I: Michael Score: 10    Michael Phase II:        Anesthesia Post Evaluation    Patient location during evaluation: PACU  Patient participation: complete - patient participated  Level of consciousness: awake and alert  Pain score: 2  Airway patency: patent  Nausea & Vomiting: no vomiting  Complications: no  Cardiovascular status: blood pressure returned to baseline  Respiratory status: acceptable  Hydration status: euvolemic  Multimodal analgesia pain management approach

## 2022-08-20 NOTE — PROGRESS NOTES
Hospitalist Progress Note      PCP: Jessica Maldonado    Date of Admission: 8/18/2022    Chief Complaint: foot pain        Hospital Course:    Markell Holder is 80 y.o. male who presented with complaint of right leg pain. Symptom onset was acute for a time period of 2.5 weeks. The severity is described as severe. The course of his symptoms over time is worsening. The symptoms improved with rest and worsened with walking. The patient's symptom is associated with none. Markell Holder is 80 y.o. male with history of atrial fibrillation, cerebral amyloid angiopathy, had aneurysm and type 2 diabetes mellitus. He had a cerebral bleed 2022 necessitating the cessation of Xarelto therapy. In the last 2-1/2 weeks he has developed a painful and swollen right leg-the pain has been progressive. He has trouble weightbearing and ambulating. Patient admitted with right lower limb ischemia  On interview, pain is constant and not changed since yesterday     Subjective:   Patient was gone all day and I never was able to see  them.        Medications:  Reviewed    Infusion Medications    sodium chloride      sodium chloride      sodium chloride      sodium chloride      dextrose      sodium chloride       Scheduled Medications    sodium chloride flush  5-40 mL IntraVENous 2 times per day    ceFAZolin  2,000 mg IntraVENous Q8H    sodium chloride flush  5-40 mL IntraVENous 2 times per day    [START ON 8/20/2022] aspirin  81 mg Oral Daily    mirtazapine  30 mg Oral Nightly    rosuvastatin  10 mg Oral Daily    lisinopril  10 mg Oral Daily    tamsulosin  0.4 mg Oral Nightly    pantoprazole  40 mg Oral QAM AC    [Held by provider] insulin glargine  20 Units SubCUTAneous Nightly    aspirin  81 mg Oral Daily    sodium chloride flush  5-40 mL IntraVENous 2 times per day    insulin lispro  0-8 Units SubCUTAneous TID WC    insulin lispro  0-4 Units SubCUTAneous Nightly    lactulose  20 g Oral TID     PRN Meds: cloNIDine, sodium chloride flush, sodium chloride, meperidine, fentanNYL, HYDROmorphone, oxyCODONE **OR** oxyCODONE, ondansetron, prochlorperazine, LORazepam, diphenhydrAMINE, labetalol, sodium chloride flush, sodium chloride, ondansetron **OR** ondansetron, oxyCODONE-acetaminophen **OR** oxyCODONE-acetaminophen, morphine **OR** morphine, acetaminophen, oxyCODONE-acetaminophen, dextrose bolus **OR** dextrose bolus, glucagon (rDNA), dextrose, sodium chloride flush, sodium chloride, ondansetron **OR** ondansetron, polyethylene glycol, acetaminophen **OR** acetaminophen      Intake/Output Summary (Last 24 hours) at 8/19/2022 2134  Last data filed at 8/19/2022 2035  Gross per 24 hour   Intake 3128.88 ml   Output 4200 ml   Net -1071.12 ml       Physical Exam Performed: by night hospital doctor. I did not physically examine this patient has he was in the OR  every time I went to his room. BP (!) 160/102   Pulse 94   Temp 97.6 °F (36.4 °C) (Temporal)   Resp 20   Ht 6' (1.829 m)   Wt 165 lb 5.5 oz (75 kg)   SpO2 97%   BMI 22.42 kg/m²   General appearance:  No apparent distress, appears stated age and cooperative. HEENT:  Normal cephalic, atraumatic without obvious deformity. Pupils equal, round, and reactive to light. Extra ocular muscles intact. Conjunctivae/corneas clear. Neck: Supple, with full range of motion. No jugular venous distention. Trachea midline. Respiratory:  Normal respiratory effort. Clear to auscultation, bilaterally without Rales/Wheezes/Rhonchi. Cardiovascular:  Regular rate and rhythm with normal S1/S2 without murmurs, rubs or gallops. Abdomen: Soft, non-tender, non-distended with normal bowel sounds. Musculoskeletal:  No clubbing, cyanosis or edema bilaterally. Full range of motion without deformity. Skin: Skin color, texture, turgor normal.  No rashes or lesions. Neurologic:  Neurovascularly intact without any focal sensory/motor deficits.  Cranial nerves: II-XII intact, grossly non-focal.  Psychiatric:  Alert and oriented, thought content appropriate, normal insight  Capillary Refill: slow in effected limb. Peripheral Pulses: +2 palpable, equal bilaterally          Labs:   Recent Labs     08/18/22 2110 08/19/22  0350   WBC 8.3 7.3   HGB 14.0 14.5   HCT 42.1 43.8    291     Recent Labs     08/17/22  0633 08/18/22 2110 08/19/22  0350    139 143   K 4.1 3.7 3.7    105 107   CO2 22 23 25   BUN 5* 7 5*   CREATININE 0.6* 0.7* 0.6*   CALCIUM 8.4 8.8 9.1     No results for input(s): AST, ALT, BILIDIR, BILITOT, ALKPHOS in the last 72 hours. Recent Labs     08/17/22 0633 08/18/22 2110   INR 1.27* 1.02     No results for input(s): Sherry Highman in the last 72 hours. 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 Underwent coronary angiogram that showed multilevel arterial occlusion 8/16. Patient is planned for right femoral replacement graft + R fem-PT bypass Friday afternoon  Patient underwent mapping yesterday per vascular surgery    Pt was in the [facility] .      Atrial fibrillation, rate controlled  Not on anticoagulation, will consider watchman as outpatient     Diabetes mellitus  Patient was noted to be hypoglycemic,  Hold glargine for now  Continue on sliding scale     Cerebral amyloid angiopathy  We will continue to monitor     Hyperlipidemia  Resume statin     BPH  Continue  tamsulosin     Hypertension  Continue lisinopril    DVT Prophylaxis: SCD  Diet: Diet NPO Exceptions are: Sips of Water with Meds, Sips of Clear Liquids  Code Status: Full Code    PT/OT Eval Status:  Asia Leo MD

## 2022-08-21 LAB
ANION GAP SERPL CALCULATED.3IONS-SCNC: 8 MMOL/L (ref 3–16)
BASOPHILS ABSOLUTE: 0.1 K/UL (ref 0–0.2)
BASOPHILS RELATIVE PERCENT: 0.9 %
BUN BLDV-MCNC: 7 MG/DL (ref 7–20)
CALCIUM SERPL-MCNC: 8 MG/DL (ref 8.3–10.6)
CHLORIDE BLD-SCNC: 110 MMOL/L (ref 99–110)
CO2: 22 MMOL/L (ref 21–32)
CREAT SERPL-MCNC: <0.5 MG/DL (ref 0.8–1.3)
EOSINOPHILS ABSOLUTE: 0 K/UL (ref 0–0.6)
EOSINOPHILS RELATIVE PERCENT: 0.4 %
GFR AFRICAN AMERICAN: >60
GFR NON-AFRICAN AMERICAN: >60
GLUCOSE BLD-MCNC: 148 MG/DL (ref 70–99)
GLUCOSE BLD-MCNC: 156 MG/DL (ref 70–99)
GLUCOSE BLD-MCNC: 210 MG/DL (ref 70–99)
HCT VFR BLD CALC: 35.9 % (ref 40.5–52.5)
HEMOGLOBIN: 11.5 G/DL (ref 13.5–17.5)
INR BLD: 1.27 (ref 0.87–1.14)
LYMPHOCYTES ABSOLUTE: 1.2 K/UL (ref 1–5.1)
LYMPHOCYTES RELATIVE PERCENT: 10.2 %
MAGNESIUM: 1.8 MG/DL (ref 1.8–2.4)
MCH RBC QN AUTO: 29.9 PG (ref 26–34)
MCHC RBC AUTO-ENTMCNC: 32 G/DL (ref 31–36)
MCV RBC AUTO: 93.5 FL (ref 80–100)
MONOCYTES ABSOLUTE: 1.1 K/UL (ref 0–1.3)
MONOCYTES RELATIVE PERCENT: 9.4 %
NEUTROPHILS ABSOLUTE: 9.4 K/UL (ref 1.7–7.7)
NEUTROPHILS RELATIVE PERCENT: 79.1 %
PDW BLD-RTO: 15 % (ref 12.4–15.4)
PERFORMED ON: ABNORMAL
PERFORMED ON: ABNORMAL
PLATELET # BLD: 233 K/UL (ref 135–450)
PMV BLD AUTO: 8.4 FL (ref 5–10.5)
POTASSIUM SERPL-SCNC: 3.4 MMOL/L (ref 3.5–5.1)
PROTHROMBIN TIME: 15.8 SEC (ref 11.7–14.5)
RBC # BLD: 3.84 M/UL (ref 4.2–5.9)
SODIUM BLD-SCNC: 140 MMOL/L (ref 136–145)
WBC # BLD: 11.9 K/UL (ref 4–11)

## 2022-08-21 PROCEDURE — 83735 ASSAY OF MAGNESIUM: CPT

## 2022-08-21 PROCEDURE — 2000000000 HC ICU R&B

## 2022-08-21 PROCEDURE — 85610 PROTHROMBIN TIME: CPT

## 2022-08-21 PROCEDURE — 2580000003 HC RX 258: Performed by: SURGERY

## 2022-08-21 PROCEDURE — 99024 POSTOP FOLLOW-UP VISIT: CPT | Performed by: SURGERY

## 2022-08-21 PROCEDURE — 6360000002 HC RX W HCPCS: Performed by: INTERNAL MEDICINE

## 2022-08-21 PROCEDURE — 6370000000 HC RX 637 (ALT 250 FOR IP): Performed by: SURGERY

## 2022-08-21 PROCEDURE — 80048 BASIC METABOLIC PNL TOTAL CA: CPT

## 2022-08-21 PROCEDURE — 6370000000 HC RX 637 (ALT 250 FOR IP): Performed by: INTERNAL MEDICINE

## 2022-08-21 PROCEDURE — 85025 COMPLETE CBC W/AUTO DIFF WBC: CPT

## 2022-08-21 RX ORDER — LISINOPRIL 10 MG/1
10 TABLET ORAL ONCE
Status: COMPLETED | OUTPATIENT
Start: 2022-08-21 | End: 2022-08-21

## 2022-08-21 RX ORDER — MAGNESIUM SULFATE IN WATER 40 MG/ML
2000 INJECTION, SOLUTION INTRAVENOUS ONCE
Status: COMPLETED | OUTPATIENT
Start: 2022-08-21 | End: 2022-08-21

## 2022-08-21 RX ORDER — POLYETHYLENE GLYCOL 3350 17 G/17G
17 POWDER, FOR SOLUTION ORAL 2 TIMES DAILY
Status: DISCONTINUED | OUTPATIENT
Start: 2022-08-21 | End: 2022-08-22 | Stop reason: HOSPADM

## 2022-08-21 RX ORDER — POTASSIUM CHLORIDE 20 MEQ/1
20 TABLET, EXTENDED RELEASE ORAL 2 TIMES DAILY WITH MEALS
Status: DISCONTINUED | OUTPATIENT
Start: 2022-08-21 | End: 2022-08-22 | Stop reason: HOSPADM

## 2022-08-21 RX ORDER — LISINOPRIL 20 MG/1
20 TABLET ORAL DAILY
Status: DISCONTINUED | OUTPATIENT
Start: 2022-08-22 | End: 2022-08-22

## 2022-08-21 RX ADMIN — INSULIN LISPRO 2 UNITS: 100 INJECTION, SOLUTION INTRAVENOUS; SUBCUTANEOUS at 16:30

## 2022-08-21 RX ADMIN — MAGNESIUM SULFATE HEPTAHYDRATE 2000 MG: 40 INJECTION, SOLUTION INTRAVENOUS at 14:30

## 2022-08-21 RX ADMIN — ACETAMINOPHEN 650 MG: 325 TABLET ORAL at 03:15

## 2022-08-21 RX ADMIN — TAMSULOSIN HYDROCHLORIDE 0.4 MG: 0.4 CAPSULE ORAL at 21:15

## 2022-08-21 RX ADMIN — ASPIRIN 81 MG: 81 TABLET, CHEWABLE ORAL at 08:51

## 2022-08-21 RX ADMIN — POLYETHYLENE GLYCOL 3350 17 G: 17 POWDER, FOR SOLUTION ORAL at 15:54

## 2022-08-21 RX ADMIN — MIRTAZAPINE 30 MG: 15 TABLET, FILM COATED ORAL at 21:15

## 2022-08-21 RX ADMIN — PANTOPRAZOLE SODIUM 40 MG: 40 TABLET, DELAYED RELEASE ORAL at 08:51

## 2022-08-21 RX ADMIN — POLYETHYLENE GLYCOL 3350 17 G: 17 POWDER, FOR SOLUTION ORAL at 21:16

## 2022-08-21 RX ADMIN — Medication 10 ML: at 08:52

## 2022-08-21 RX ADMIN — Medication 10 ML: at 21:20

## 2022-08-21 RX ADMIN — SODIUM CHLORIDE, PRESERVATIVE FREE 10 ML: 5 INJECTION INTRAVENOUS at 21:15

## 2022-08-21 RX ADMIN — POTASSIUM CHLORIDE 20 MEQ: 1500 TABLET, EXTENDED RELEASE ORAL at 08:51

## 2022-08-21 RX ADMIN — LISINOPRIL 10 MG: 10 TABLET ORAL at 16:10

## 2022-08-21 RX ADMIN — LISINOPRIL 10 MG: 10 TABLET ORAL at 08:51

## 2022-08-21 RX ADMIN — ROSUVASTATIN 10 MG: 10 TABLET, FILM COATED ORAL at 08:51

## 2022-08-21 RX ADMIN — POTASSIUM CHLORIDE 20 MEQ: 1500 TABLET, EXTENDED RELEASE ORAL at 15:54

## 2022-08-21 RX ADMIN — ACETAMINOPHEN 650 MG: 325 TABLET ORAL at 16:01

## 2022-08-21 RX ADMIN — SODIUM CHLORIDE: 9 INJECTION, SOLUTION INTRAVENOUS at 05:34

## 2022-08-21 ASSESSMENT — PAIN DESCRIPTION - DESCRIPTORS
DESCRIPTORS: DISCOMFORT
DESCRIPTORS: ACHING
DESCRIPTORS: ACHING

## 2022-08-21 ASSESSMENT — PAIN DESCRIPTION - LOCATION
LOCATION: BACK

## 2022-08-21 ASSESSMENT — PAIN - FUNCTIONAL ASSESSMENT
PAIN_FUNCTIONAL_ASSESSMENT: PREVENTS OR INTERFERES SOME ACTIVE ACTIVITIES AND ADLS
PAIN_FUNCTIONAL_ASSESSMENT: ACTIVITIES ARE NOT PREVENTED

## 2022-08-21 ASSESSMENT — PAIN SCALES - GENERAL
PAINLEVEL_OUTOF10: 0
PAINLEVEL_OUTOF10: 0
PAINLEVEL_OUTOF10: 7
PAINLEVEL_OUTOF10: 3
PAINLEVEL_OUTOF10: 7

## 2022-08-21 ASSESSMENT — PAIN DESCRIPTION - ORIENTATION
ORIENTATION: LOWER
ORIENTATION: MID
ORIENTATION: LOWER

## 2022-08-21 ASSESSMENT — PAIN DESCRIPTION - PAIN TYPE: TYPE: CHRONIC PAIN

## 2022-08-21 NOTE — PLAN OF CARE
Problem: Safety - Adult  Goal: Free from fall injury  8/20/2022 2009 by Breonna Dalton RN  Outcome: Progressing  8/20/2022 1822 by Cinthia Almeida RN  Outcome: Progressing     Problem: ABCDS Injury Assessment  Goal: Absence of physical injury  8/20/2022 2009 by Breonna Dalton RN  Outcome: Progressing  8/20/2022 1822 by Cinthia Almeida RN  Outcome: Progressing     Problem: Skin/Tissue Integrity  Goal: Absence of new skin breakdown  Description: 1. Monitor for areas of redness and/or skin breakdown  2. Assess vascular access sites hourly  3. Every 4-6 hours minimum:  Change oxygen saturation probe site  4. Every 4-6 hours:  If on nasal continuous positive airway pressure, respiratory therapy assess nares and determine need for appliance change or resting period.   8/20/2022 2009 by Breonna Dalton RN  Outcome: Progressing  8/20/2022 1822 by Cinthia Almeida RN  Outcome: Progressing     Problem: Pain  Goal: Verbalizes/displays adequate comfort level or baseline comfort level  8/20/2022 2009 by Breonna Dalton RN  Outcome: Progressing  Problem: Chronic Conditions and Co-morbidities  Goal: Patient's chronic conditions and co-morbidity symptoms are monitored and maintained or improved  8/20/2022 2009 by Breonna Dalton RN  Outcome: Progressing  8Problem: Discharge Planning  Goal: Discharge to home or other facility with appropriate resources  8/20/2022 2009 by Breonna Dalton RN  Outcome: Progressing

## 2022-08-21 NOTE — PROGRESS NOTES
VASCULAR                 POD#2     Confusion has cleared. Denies any foot pain. Has not taken any narcotics for pain. VSS     Afeb                 Good UO I/O +600  Lungs clear  R leg unwrapped/rewrapped - no hematoma at any surgical sites; slight ankle wound drainage (not active)  R foot and hyperemic  Palpable graft pulse & distal to ankle incision; excellent doppler PT signal distal to ankle incision & in graft     Labs noted. K 3.4     A/P:     S/P R femoral replacement + R fem-distal PT bypass. Patent with excellent reperfusion. DC IVFs. Supplemental po K. Up to chair today - NWB R foot              José Antonio arellano for now - plan DC Monday. Possible  evaluation. Continue Flomax. PT/OT starting tomorrow. ARU evaluation eventually.                  Rafita Marsh

## 2022-08-21 NOTE — PROGRESS NOTES
Hospitalist Progress Note      PCP: Claritza Lange    Date of Admission: 8/18/2022    Chief Complaint: foot pain        Hospital Course:    Malika Villanueva is 80 y.o. male who presented with complaint of right leg pain. Symptom onset was acute for a time period of 2.5 weeks. The severity is described as severe. The course of his symptoms over time is worsening. The symptoms improved with rest and worsened with walking. The patient's symptom is associated with none. Malika Villanueva is 80 y.o. male with history of atrial fibrillation, cerebral amyloid angiopathy, had aneurysm and type 2 diabetes mellitus. He had a cerebral bleed 2022 necessitating the cessation of Xarelto therapy. In the last 2-1/2 weeks he has developed a painful and swollen right leg-the pain has been progressive. He has trouble weightbearing and ambulating. Patient admitted with right lower limb ischemia  On interview, pain is constant and not changed since yesterday     Subjective:   Feels well  No chest pain no shortness of breath  Has not had a bowel movement  No headache        Medications:  Reviewed        Intake/Output Summary (Last 24 hours) at 8/21/2022 1337  Last data filed at 8/21/2022 0540  Gross per 24 hour   Intake 948.64 ml   Output 2075 ml   Net -1126.36 ml         Physical Exam Performed: by night hospital doctor. I did not d    BP (!) 173/79   Pulse 96   Temp 97.3 °F (36.3 °C) (Temporal)   Resp 16   Ht 6' (1.829 m)   Wt 175 lb 0.7 oz (79.4 kg)   SpO2 96%   BMI 23.74 kg/m²   General appearance:  No apparent distress, appears stated age and cooperative. HEENT:  Normal cephalic, atraumatic without obvious deformity. Pupils equal, round, and reactive to light. Extra ocular muscles intact. Conjunctivae/corneas clear. Neck: Supple, with full range of motion. No jugular venous distention. Trachea midline. Respiratory:  Normal respiratory effort.  Clear to auscultation, bilaterally without Rales/Wheezes/Rhonchi. Cardiovascular:  Regular rate and rhythm with normal S1/S2 without murmurs, rubs or gallops. Abdomen: Soft, non-tender, non-distended with normal bowel sounds. Musculoskeletal: Right lower extremity has been dressed  Skin: Skin color, texture, turgor normal.  No rashes or lesions. Neurologic:  Neurovascularly intact without any focal sensory/motor deficits. Cranial nerves: II-XII intact, grossly non-focal.  Physical exam remains unchanged from 8/20          Labs:   Recent Labs     08/19/22 2256 08/20/22 0755 08/21/22  0426   WBC 11.9* 12.5* 11.9*   HGB 12.6* 11.8* 11.5*   HCT 39.1* 36.3* 35.9*    263 233       Recent Labs     08/19/22 2256 08/20/22  0755 08/21/22  0425    140 140   K 4.3 3.8 3.4*    110 110   CO2 20* 16* 22   BUN 5* 7 7   CREATININE 0.6* 0.6* <0.5*   CALCIUM 8.2* 8.2* 8.0*       No results for input(s): AST, ALT, BILIDIR, BILITOT, ALKPHOS in the last 72 hours. Recent Labs     08/18/22 2110 08/21/22  0426   INR 1.02 1.27*       No results for input(s): Ardelle Chalk in the last 72 hours. 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 Underwent coronary angiogram that showed multilevel arterial occlusion 8/16.   S/p right femoral replacement with right Marks distal PT bypass  Doing well  Per vascular surgery no nonweightbearing on right lower extremity cannot sit out in the chair  Will get PT OT tomorrow         Atrial fibrillation, rate controlled  Not on anticoagulation, will consider watchman as outpatient     Diabetes mellitus  Patient was noted to be hypoglycemic,  Hold glargine for now  Continue on sliding scale     Cerebral amyloid angiopathy  We will continue to monitor     Hyperlipidemia  Resume statin     BPH  Continue  tamsulosin     Hypertension  Continue lisinopril    Urology consult and possible trial without catheter we will discuss with vascular surgery  Possible discharge tomorrow  DVT Prophylaxis: SCD  Diet: ADULT DIET; Regular; 5 carb choices (75 gm/meal)  Code Status: Full Code    PT/OT Eval Status:  Mt Doyle MD

## 2022-08-22 ENCOUNTER — HOSPITAL ENCOUNTER (INPATIENT)
Age: 87
LOS: 15 days | Discharge: HOME HEALTH | DRG: 949 | End: 2022-09-06
Attending: PHYSICAL MEDICINE & REHABILITATION | Admitting: PHYSICAL MEDICINE & REHABILITATION
Payer: MEDICARE

## 2022-08-22 VITALS
RESPIRATION RATE: 16 BRPM | BODY MASS INDEX: 23.5 KG/M2 | OXYGEN SATURATION: 95 % | WEIGHT: 173.5 LBS | HEIGHT: 72 IN | TEMPERATURE: 98 F | DIASTOLIC BLOOD PRESSURE: 74 MMHG | HEART RATE: 100 BPM | SYSTOLIC BLOOD PRESSURE: 138 MMHG

## 2022-08-22 DIAGNOSIS — I73.9 PERIPHERAL VASCULAR DISEASE (HCC): Primary | ICD-10-CM

## 2022-08-22 LAB
ANION GAP SERPL CALCULATED.3IONS-SCNC: 9 MMOL/L (ref 3–16)
BASOPHILS ABSOLUTE: 0.1 K/UL (ref 0–0.2)
BASOPHILS RELATIVE PERCENT: 0.8 %
BUN BLDV-MCNC: 5 MG/DL (ref 7–20)
CALCIUM SERPL-MCNC: 8.1 MG/DL (ref 8.3–10.6)
CHLORIDE BLD-SCNC: 106 MMOL/L (ref 99–110)
CO2: 23 MMOL/L (ref 21–32)
CREAT SERPL-MCNC: <0.5 MG/DL (ref 0.8–1.3)
EOSINOPHILS ABSOLUTE: 0.1 K/UL (ref 0–0.6)
EOSINOPHILS RELATIVE PERCENT: 0.5 %
GFR AFRICAN AMERICAN: >60
GFR NON-AFRICAN AMERICAN: >60
GLUCOSE BLD-MCNC: 173 MG/DL (ref 70–99)
GLUCOSE BLD-MCNC: 173 MG/DL (ref 70–99)
GLUCOSE BLD-MCNC: 182 MG/DL (ref 70–99)
GLUCOSE BLD-MCNC: 188 MG/DL (ref 70–99)
GLUCOSE BLD-MCNC: 189 MG/DL (ref 70–99)
HCT VFR BLD CALC: 35.8 % (ref 40.5–52.5)
HEMOGLOBIN: 11.9 G/DL (ref 13.5–17.5)
LYMPHOCYTES ABSOLUTE: 1.8 K/UL (ref 1–5.1)
LYMPHOCYTES RELATIVE PERCENT: 13.4 %
MAGNESIUM: 1.7 MG/DL (ref 1.8–2.4)
MCH RBC QN AUTO: 30.5 PG (ref 26–34)
MCHC RBC AUTO-ENTMCNC: 33.2 G/DL (ref 31–36)
MCV RBC AUTO: 91.8 FL (ref 80–100)
MONOCYTES ABSOLUTE: 1.1 K/UL (ref 0–1.3)
MONOCYTES RELATIVE PERCENT: 8.5 %
NEUTROPHILS ABSOLUTE: 10.1 K/UL (ref 1.7–7.7)
NEUTROPHILS RELATIVE PERCENT: 76.8 %
PDW BLD-RTO: 15 % (ref 12.4–15.4)
PERFORMED ON: ABNORMAL
PLATELET # BLD: 226 K/UL (ref 135–450)
PMV BLD AUTO: 8.5 FL (ref 5–10.5)
POTASSIUM SERPL-SCNC: 3.1 MMOL/L (ref 3.5–5.1)
RBC # BLD: 3.9 M/UL (ref 4.2–5.9)
SODIUM BLD-SCNC: 138 MMOL/L (ref 136–145)
WBC # BLD: 13.1 K/UL (ref 4–11)

## 2022-08-22 PROCEDURE — 2580000003 HC RX 258: Performed by: PHYSICAL MEDICINE & REHABILITATION

## 2022-08-22 PROCEDURE — 97530 THERAPEUTIC ACTIVITIES: CPT

## 2022-08-22 PROCEDURE — 51702 INSERT TEMP BLADDER CATH: CPT

## 2022-08-22 PROCEDURE — 6370000000 HC RX 637 (ALT 250 FOR IP): Performed by: PHYSICAL MEDICINE & REHABILITATION

## 2022-08-22 PROCEDURE — 80048 BASIC METABOLIC PNL TOTAL CA: CPT

## 2022-08-22 PROCEDURE — 97162 PT EVAL MOD COMPLEX 30 MIN: CPT

## 2022-08-22 PROCEDURE — 85025 COMPLETE CBC W/AUTO DIFF WBC: CPT

## 2022-08-22 PROCEDURE — 6370000000 HC RX 637 (ALT 250 FOR IP): Performed by: SURGERY

## 2022-08-22 PROCEDURE — 83735 ASSAY OF MAGNESIUM: CPT

## 2022-08-22 PROCEDURE — 6370000000 HC RX 637 (ALT 250 FOR IP): Performed by: INTERNAL MEDICINE

## 2022-08-22 PROCEDURE — 97167 OT EVAL HIGH COMPLEX 60 MIN: CPT

## 2022-08-22 PROCEDURE — 6370000000 HC RX 637 (ALT 250 FOR IP): Performed by: NURSE PRACTITIONER

## 2022-08-22 PROCEDURE — 51798 US URINE CAPACITY MEASURE: CPT

## 2022-08-22 PROCEDURE — 1280000000 HC REHAB R&B

## 2022-08-22 PROCEDURE — 6360000002 HC RX W HCPCS: Performed by: INTERNAL MEDICINE

## 2022-08-22 PROCEDURE — 2580000003 HC RX 258: Performed by: SURGERY

## 2022-08-22 PROCEDURE — 97535 SELF CARE MNGMENT TRAINING: CPT

## 2022-08-22 PROCEDURE — 99024 POSTOP FOLLOW-UP VISIT: CPT | Performed by: SURGERY

## 2022-08-22 RX ORDER — HYDRALAZINE HYDROCHLORIDE 25 MG/1
50 TABLET, FILM COATED ORAL EVERY 8 HOURS PRN
Status: DISCONTINUED | OUTPATIENT
Start: 2022-08-22 | End: 2022-09-06 | Stop reason: HOSPADM

## 2022-08-22 RX ORDER — CLONIDINE HYDROCHLORIDE 0.1 MG/1
0.1 TABLET ORAL EVERY 4 HOURS PRN
Status: CANCELLED | OUTPATIENT
Start: 2022-08-22

## 2022-08-22 RX ORDER — TAMSULOSIN HYDROCHLORIDE 0.4 MG/1
0.4 CAPSULE ORAL NIGHTLY
Status: CANCELLED | OUTPATIENT
Start: 2022-08-22

## 2022-08-22 RX ORDER — POTASSIUM CHLORIDE 20 MEQ/1
20 TABLET, EXTENDED RELEASE ORAL 2 TIMES DAILY WITH MEALS
Status: CANCELLED | OUTPATIENT
Start: 2022-08-23 | End: 2022-08-25

## 2022-08-22 RX ORDER — OXYCODONE HYDROCHLORIDE AND ACETAMINOPHEN 5; 325 MG/1; MG/1
2 TABLET ORAL EVERY 4 HOURS PRN
Status: CANCELLED | OUTPATIENT
Start: 2022-08-22

## 2022-08-22 RX ORDER — ROSUVASTATIN CALCIUM 10 MG/1
10 TABLET, COATED ORAL DAILY
Status: DISCONTINUED | OUTPATIENT
Start: 2022-08-23 | End: 2022-09-06 | Stop reason: HOSPADM

## 2022-08-22 RX ORDER — LISINOPRIL 20 MG/1
40 TABLET ORAL DAILY
Status: CANCELLED | OUTPATIENT
Start: 2022-08-23

## 2022-08-22 RX ORDER — OXYCODONE HYDROCHLORIDE AND ACETAMINOPHEN 5; 325 MG/1; MG/1
1 TABLET ORAL EVERY 4 HOURS PRN
Status: CANCELLED | OUTPATIENT
Start: 2022-08-22

## 2022-08-22 RX ORDER — OXYCODONE HYDROCHLORIDE AND ACETAMINOPHEN 5; 325 MG/1; MG/1
2 TABLET ORAL EVERY 4 HOURS PRN
Status: DISCONTINUED | OUTPATIENT
Start: 2022-08-22 | End: 2022-09-06 | Stop reason: HOSPADM

## 2022-08-22 RX ORDER — LISINOPRIL 20 MG/1
40 TABLET ORAL DAILY
Status: DISCONTINUED | OUTPATIENT
Start: 2022-08-23 | End: 2022-08-22 | Stop reason: HOSPADM

## 2022-08-22 RX ORDER — PANTOPRAZOLE SODIUM 40 MG/1
40 TABLET, DELAYED RELEASE ORAL
Status: CANCELLED | OUTPATIENT
Start: 2022-08-23

## 2022-08-22 RX ORDER — FINASTERIDE 5 MG/1
5 TABLET, FILM COATED ORAL DAILY
Status: DISCONTINUED | OUTPATIENT
Start: 2022-08-22 | End: 2022-08-22 | Stop reason: HOSPADM

## 2022-08-22 RX ORDER — ONDANSETRON 4 MG/1
4 TABLET, ORALLY DISINTEGRATING ORAL EVERY 8 HOURS PRN
Status: CANCELLED | OUTPATIENT
Start: 2022-08-22

## 2022-08-22 RX ORDER — ACETAMINOPHEN 325 MG/1
650 TABLET ORAL EVERY 4 HOURS PRN
Status: DISCONTINUED | OUTPATIENT
Start: 2022-08-22 | End: 2022-09-06 | Stop reason: HOSPADM

## 2022-08-22 RX ORDER — ROSUVASTATIN CALCIUM 10 MG/1
10 TABLET, COATED ORAL DAILY
Status: CANCELLED | OUTPATIENT
Start: 2022-08-23

## 2022-08-22 RX ORDER — FINASTERIDE 5 MG/1
5 TABLET, FILM COATED ORAL DAILY
Status: CANCELLED | OUTPATIENT
Start: 2022-08-23

## 2022-08-22 RX ORDER — DEXTROSE MONOHYDRATE 100 MG/ML
INJECTION, SOLUTION INTRAVENOUS CONTINUOUS PRN
Status: DISCONTINUED | OUTPATIENT
Start: 2022-08-22 | End: 2022-09-06 | Stop reason: HOSPADM

## 2022-08-22 RX ORDER — ASPIRIN 81 MG/1
81 TABLET ORAL DAILY
Status: DISCONTINUED | OUTPATIENT
Start: 2022-08-23 | End: 2022-09-06 | Stop reason: HOSPADM

## 2022-08-22 RX ORDER — ACETAMINOPHEN 325 MG/1
650 TABLET ORAL EVERY 4 HOURS PRN
Status: CANCELLED | OUTPATIENT
Start: 2022-08-22

## 2022-08-22 RX ORDER — INSULIN LISPRO 100 [IU]/ML
0-4 INJECTION, SOLUTION INTRAVENOUS; SUBCUTANEOUS NIGHTLY
Status: DISCONTINUED | OUTPATIENT
Start: 2022-08-22 | End: 2022-08-29

## 2022-08-22 RX ORDER — TAMSULOSIN HYDROCHLORIDE 0.4 MG/1
0.4 CAPSULE ORAL NIGHTLY
Status: DISCONTINUED | OUTPATIENT
Start: 2022-08-22 | End: 2022-09-06 | Stop reason: HOSPADM

## 2022-08-22 RX ORDER — SODIUM CHLORIDE 0.9 % (FLUSH) 0.9 %
5-40 SYRINGE (ML) INJECTION EVERY 12 HOURS SCHEDULED
Status: CANCELLED | OUTPATIENT
Start: 2022-08-22

## 2022-08-22 RX ORDER — POTASSIUM CHLORIDE 7.45 MG/ML
10 INJECTION INTRAVENOUS
Status: COMPLETED | OUTPATIENT
Start: 2022-08-22 | End: 2022-08-22

## 2022-08-22 RX ORDER — DIPHENHYDRAMINE HCL 25 MG
25 TABLET ORAL EVERY 6 HOURS PRN
Status: CANCELLED | OUTPATIENT
Start: 2022-08-22

## 2022-08-22 RX ORDER — OXYCODONE HYDROCHLORIDE AND ACETAMINOPHEN 5; 325 MG/1; MG/1
1 TABLET ORAL EVERY 4 HOURS PRN
Status: DISCONTINUED | OUTPATIENT
Start: 2022-08-22 | End: 2022-09-06 | Stop reason: HOSPADM

## 2022-08-22 RX ORDER — INSULIN LISPRO 100 [IU]/ML
0-8 INJECTION, SOLUTION INTRAVENOUS; SUBCUTANEOUS
Status: DISCONTINUED | OUTPATIENT
Start: 2022-08-23 | End: 2022-08-29

## 2022-08-22 RX ORDER — LISINOPRIL 20 MG/1
40 TABLET ORAL DAILY
Status: DISCONTINUED | OUTPATIENT
Start: 2022-08-23 | End: 2022-09-06 | Stop reason: HOSPADM

## 2022-08-22 RX ORDER — ASPIRIN 81 MG/1
81 TABLET ORAL DAILY
Status: CANCELLED | OUTPATIENT
Start: 2022-08-23

## 2022-08-22 RX ORDER — HYDRALAZINE HYDROCHLORIDE 25 MG/1
50 TABLET, FILM COATED ORAL EVERY 8 HOURS PRN
Status: CANCELLED | OUTPATIENT
Start: 2022-08-22

## 2022-08-22 RX ORDER — ONDANSETRON 4 MG/1
4 TABLET, ORALLY DISINTEGRATING ORAL EVERY 8 HOURS PRN
Status: DISCONTINUED | OUTPATIENT
Start: 2022-08-22 | End: 2022-09-06 | Stop reason: HOSPADM

## 2022-08-22 RX ORDER — LABETALOL HYDROCHLORIDE 5 MG/ML
10 INJECTION, SOLUTION INTRAVENOUS EVERY 4 HOURS PRN
Status: DISCONTINUED | OUTPATIENT
Start: 2022-08-22 | End: 2022-08-22 | Stop reason: HOSPADM

## 2022-08-22 RX ORDER — DIPHENHYDRAMINE HCL 25 MG
25 TABLET ORAL EVERY 6 HOURS PRN
Status: DISCONTINUED | OUTPATIENT
Start: 2022-08-22 | End: 2022-09-06 | Stop reason: HOSPADM

## 2022-08-22 RX ORDER — CLONIDINE HYDROCHLORIDE 0.1 MG/1
0.1 TABLET ORAL EVERY 4 HOURS PRN
Status: DISCONTINUED | OUTPATIENT
Start: 2022-08-22 | End: 2022-08-23

## 2022-08-22 RX ORDER — SODIUM CHLORIDE 0.9 % (FLUSH) 0.9 %
5-40 SYRINGE (ML) INJECTION EVERY 12 HOURS SCHEDULED
Status: DISCONTINUED | OUTPATIENT
Start: 2022-08-22 | End: 2022-08-25

## 2022-08-22 RX ORDER — POLYETHYLENE GLYCOL 3350 17 G/17G
17 POWDER, FOR SOLUTION ORAL 2 TIMES DAILY
Status: CANCELLED | OUTPATIENT
Start: 2022-08-22

## 2022-08-22 RX ORDER — INSULIN LISPRO 100 [IU]/ML
0-4 INJECTION, SOLUTION INTRAVENOUS; SUBCUTANEOUS NIGHTLY
Status: CANCELLED | OUTPATIENT
Start: 2022-08-22

## 2022-08-22 RX ORDER — DEXTROSE MONOHYDRATE 100 MG/ML
INJECTION, SOLUTION INTRAVENOUS CONTINUOUS PRN
Status: CANCELLED | OUTPATIENT
Start: 2022-08-22

## 2022-08-22 RX ORDER — MIRTAZAPINE 15 MG/1
30 TABLET, FILM COATED ORAL NIGHTLY
Status: CANCELLED | OUTPATIENT
Start: 2022-08-22

## 2022-08-22 RX ORDER — FUROSEMIDE 10 MG/ML
20 INJECTION INTRAMUSCULAR; INTRAVENOUS ONCE
Status: DISCONTINUED | OUTPATIENT
Start: 2022-08-22 | End: 2022-08-22

## 2022-08-22 RX ORDER — INSULIN LISPRO 100 [IU]/ML
0-8 INJECTION, SOLUTION INTRAVENOUS; SUBCUTANEOUS
Status: CANCELLED | OUTPATIENT
Start: 2022-08-23

## 2022-08-22 RX ORDER — FINASTERIDE 5 MG/1
5 TABLET, FILM COATED ORAL DAILY
Status: DISCONTINUED | OUTPATIENT
Start: 2022-08-23 | End: 2022-09-06 | Stop reason: HOSPADM

## 2022-08-22 RX ORDER — POTASSIUM CHLORIDE 20 MEQ/1
20 TABLET, EXTENDED RELEASE ORAL 2 TIMES DAILY WITH MEALS
Status: DISCONTINUED | OUTPATIENT
Start: 2022-08-23 | End: 2022-08-22 | Stop reason: ALTCHOICE

## 2022-08-22 RX ORDER — MIRTAZAPINE 15 MG/1
30 TABLET, FILM COATED ORAL NIGHTLY
Status: DISCONTINUED | OUTPATIENT
Start: 2022-08-22 | End: 2022-09-06 | Stop reason: HOSPADM

## 2022-08-22 RX ORDER — MAGNESIUM SULFATE IN WATER 40 MG/ML
2000 INJECTION, SOLUTION INTRAVENOUS ONCE
Status: COMPLETED | OUTPATIENT
Start: 2022-08-22 | End: 2022-08-22

## 2022-08-22 RX ORDER — PANTOPRAZOLE SODIUM 40 MG/1
40 TABLET, DELAYED RELEASE ORAL
Status: DISCONTINUED | OUTPATIENT
Start: 2022-08-23 | End: 2022-09-06 | Stop reason: HOSPADM

## 2022-08-22 RX ORDER — POLYETHYLENE GLYCOL 3350 17 G/17G
17 POWDER, FOR SOLUTION ORAL 2 TIMES DAILY
Status: DISCONTINUED | OUTPATIENT
Start: 2022-08-22 | End: 2022-08-23

## 2022-08-22 RX ADMIN — ACETAMINOPHEN 650 MG: 325 TABLET ORAL at 20:56

## 2022-08-22 RX ADMIN — TAMSULOSIN HYDROCHLORIDE 0.4 MG: 0.4 CAPSULE ORAL at 20:59

## 2022-08-22 RX ADMIN — ROSUVASTATIN 10 MG: 10 TABLET, FILM COATED ORAL at 08:36

## 2022-08-22 RX ADMIN — SODIUM CHLORIDE, PRESERVATIVE FREE 10 ML: 5 INJECTION INTRAVENOUS at 20:59

## 2022-08-22 RX ADMIN — FINASTERIDE 5 MG: 5 TABLET, FILM COATED ORAL at 16:37

## 2022-08-22 RX ADMIN — Medication 10 MEQ: at 10:51

## 2022-08-22 RX ADMIN — Medication 10 ML: at 08:38

## 2022-08-22 RX ADMIN — POTASSIUM CHLORIDE 20 MEQ: 1500 TABLET, EXTENDED RELEASE ORAL at 16:37

## 2022-08-22 RX ADMIN — POLYETHYLENE GLYCOL 3350 17 G: 17 POWDER, FOR SOLUTION ORAL at 08:37

## 2022-08-22 RX ADMIN — ASPIRIN 81 MG: 81 TABLET, COATED ORAL at 08:36

## 2022-08-22 RX ADMIN — LISINOPRIL 30 MG: 20 TABLET ORAL at 08:36

## 2022-08-22 RX ADMIN — Medication 10 MEQ: at 11:50

## 2022-08-22 RX ADMIN — MAGNESIUM SULFATE HEPTAHYDRATE 2000 MG: 40 INJECTION, SOLUTION INTRAVENOUS at 08:48

## 2022-08-22 RX ADMIN — CLONIDINE HYDROCHLORIDE 0.1 MG: 0.1 TABLET ORAL at 16:41

## 2022-08-22 RX ADMIN — ACETAMINOPHEN 650 MG: 325 TABLET ORAL at 02:36

## 2022-08-22 RX ADMIN — POTASSIUM CHLORIDE 20 MEQ: 1500 TABLET, EXTENDED RELEASE ORAL at 08:36

## 2022-08-22 RX ADMIN — PANTOPRAZOLE SODIUM 40 MG: 40 TABLET, DELAYED RELEASE ORAL at 08:36

## 2022-08-22 RX ADMIN — MIRTAZAPINE 30 MG: 15 TABLET, FILM COATED ORAL at 20:57

## 2022-08-22 RX ADMIN — OXYCODONE HYDROCHLORIDE AND ACETAMINOPHEN 1 TABLET: 5; 325 TABLET ORAL at 14:25

## 2022-08-22 ASSESSMENT — PAIN DESCRIPTION - LOCATION
LOCATION: BACK
LOCATION: LEG
LOCATION: BACK

## 2022-08-22 ASSESSMENT — PAIN SCALES - GENERAL
PAINLEVEL_OUTOF10: 3
PAINLEVEL_OUTOF10: 3
PAINLEVEL_OUTOF10: 0
PAINLEVEL_OUTOF10: 6

## 2022-08-22 ASSESSMENT — PAIN - FUNCTIONAL ASSESSMENT: PAIN_FUNCTIONAL_ASSESSMENT: ACTIVITIES ARE NOT PREVENTED

## 2022-08-22 ASSESSMENT — PAIN DESCRIPTION - ORIENTATION
ORIENTATION: RIGHT
ORIENTATION: MID

## 2022-08-22 ASSESSMENT — PAIN DESCRIPTION - DESCRIPTORS
DESCRIPTORS: DISCOMFORT;ACHING
DESCRIPTORS: ACHING

## 2022-08-22 NOTE — PROGRESS NOTES
daughters who rotate in and out of house. Will need to be cotx for PT and OT initially due to 2-3 people needed for safe transfers due to patient putting full weight into right foot and needing max cues for safety and sequencing of tasks. If patient discharges prior to next session this note will serve as a discharge summary. Please see below for the latest assessment towards goals. Past Medical History:  has a past medical history of Afib ([facility] [address] Cerebral amyloid angiopathy ([facility] [address] Diabetes mellitus ([facility] [address] Heart aneurysm, and Vertigo. Past Surgical History:  has a past surgical history that includes Appendectomy; hernia repair; Lung surgery; Testicle surgery; and Femoral-tibial Bypass Graft (Right, 8/19/2022). Discharge Recommendations: The patient is a 80 y.o. male who presents with a referral from Dr. Emely Jacob for decreased pulses. Mr. Winston Zheng is here with his daughters. One of the daughters says she has been staying with him and he has not really slept because of pain. She says his right leg is considerably more swollen than it was even two days ago. The pain in the leg and foot started about two and a half weeks ago. His right leg is swollen and he denies that he has been sleeping with the leg hanging over the bed but family says he does get up into the chair in a recliner trying to get comfortable but can not. Nothing relieves the pain currently     Mr. Winston Zheng was on Xarelto for a-fib but was removed from it because of a brain bleed that happened in May-June of this year. He was diagnosed with CAA. In June he had a torn achilles tendon on the left and saw Dr. Jacob, orthopedics. He had a cast and a boot placed. He then contracted Covid in June. By July he had so much pain in his legs and especially the right foot that he was unable to get comfortable. His leg has not gotten better, and his foot and leg are cool to the touch.  Despite not feeling a pulse, he does appear to have a good capillary refill. DME Required For Discharge: DME to be determined at next level of care, DME to be determined pending patient progress    Precautions/Restrictions: high fall risk, weight bearing, up as tolerated  Weight Bearing Restrictions: non weight bearing  [] Right Upper Extremity  [] Left Upper Extremity [x] Right Lower Extremity  [] Left Lower Extremity     Required Braces/Orthotics: no braces required   [] Right  [] Left  Positional Restrictions:no positional restrictions    Pre-Admission Information   Lives With: alone, . Comment: daughters take turns weekly 24/7 to stay with him                          Type of Home: house  Home Layout: one level  Home Access: level entry  [facility]: walker accessible, walk in shower  Bathroom Equipment: grab bars in shower, tub transfer bench, hand held shower head, toilet raiser  Toilet Height: standard height  Home Equipment: rolling walker, manual wheelchair, . Comment: typically uses 3 wheel walker   Transfer Assistance: Independent without use of device  Ambulation Assistance:requires assistance  ADL Assistance: requires assistance with bathing, requires assistance with dressing, requires assistance with grooming, requires assistance with toileting  IADL Assistance: requires assistance with all homemaking tasks  Active :        [] Yes                 [] No  Hand Dominance: [] Left                 [] Right  Current Employment: retired.   Occupation:  at [employer]: [address]: 1 fall leading to ruptured achilles     Examination   Vision:   Vision Corrective Device: wears glasses for reading  Hearing:   hard of hearing  Perception:   WFL  Observation:   General Observation:  right leg ace wrapped due to recent surgery  Posture:   Kyphotic, poor sitting posture EOB  Sensation:   denies numbness and tingling  Proprioception:    WFL  Tone:   Normotonic  Coordination Testing:   WFL    ROM:   Limited shoulder AROM due to kyphotic back with flexed shoulders, elbow, wrist and fingers WFL   Strength:   (B) UE strength grossly WFL    Decision Making: high complexity  Clinical Presentation: stable      Subjective  General: Patient supine in bed, pleasant and cooperative. However patient very focused on walking to the bathroom and standing to urinate. Reinforced R LE NWB many times during session. Patient also with recent left achilles tendon rupture but daughters report cast was removed a few week ago and is healed. Pain: 4/10. Location: right leg and 8/10. Location: 4/10 right leg and beginning of session, 8/10 back pain at end of session when sitting in chair  Pain Interventions: RN notified, RN notified of patient request for pain medication, and repositioned         Activities of Daily Living  Basic Activities of Daily Living  Feeding: setup assistance  Grooming: setup assistance  Lower Extremity Dressing: dependent  Toileting: maximum assistance. Toileting Equipment: none  General Comments: patient declined bathing, gown changed and left sock donned. Nurse stated catheter pulled this AM and is retaining urine. Requested to try to get patient to urinate. Patient wanting to stand to urinate however was very unsafe and putting full weight into right foot. Attempted to urinate sitting   Instrumental Activities of Daily Living  No IADL completed on this date. Functional Mobility  Bed Mobility  Supine to Sit: moderate assistance  Scooting: moderate assistance  Comments:  Transfers  Sit to stand transfer:2 person assistance with mod/max assist of 3 people. 1 person at foot keeping right LE up off floor, 2 therapists at trunk/side due to not following commands and not safe   Stand to sit transfer: 2 person assistance with 2 people mod/max   Stand pivot transfer: 2 person assistance with 2 people mod/max    Bed / Chair transfer: 2 person assistance with 2-3 people, 4 [employer] .     Bed / Chair comments: stood x 4 times to attempt to use urinal. Patient unable to urinate into urinal at sitting or standing. Patient putting full weight into right foot. Comments:  Functional Mobility:  Standing Balance: moderate assistance, maximum assistance. Standing Balance Comment: patient putting weight into right LE even when max cued to keep in NWB  Functional Mobility Comment: cotx needed for safety of patient     Other Therapeutic Interventions    Functional Outcomes  AM-PAC Inpatient Daily Activity Raw Score: 12    Cognition  Arousal/Alterness: appropriate responses to stimuli  Following Commands: follows one step commands with repetition  Attention Span: difficulty dividing attention  Memory: decreased recall of recent events  Safety Judgement: decreased awareness of need for assistance, decreased awareness of need for safety  Problem Solving: decreased awareness of errors, assistance required to identify errors made, assistance required to correct errors made  Insights: decreased awareness of deficits  Initiation: requires cues for some  Sequencing: requires cues for all  Comments:   Orientation:    alert and oriented x 4  Command Following:   impaired-- patient very focused on how things should be done-- patient adamantly wanting to transfer into his w/c and go into the bathroom. Attempted to explain multiple times that patient is R LE NWB and that would be unsafe. Patient very talkative and focused on doing things his way.       Education  Barriers To Learning: cognition and hearing  Patient Education: patient educated on goals, OT role and benefits, plan of care, weight-bearing education, family education, discharge recommendations  Learning Assessment:  patient will require reinforcement due to cognitive deficits    Assessment  Activity Tolerance: Patient tolerated therapy but needed cotx of PT and OT, called a 3rd therapist in for standing and transfer due to patient putting weight onto right LE and demo poor standing balance. Impairments Requiring Therapeutic Intervention: decreased functional mobility, decreased ADL status, decreased safety awareness, decreased cognition, decreased endurance, increased pain, decreased posture  Prognosis: good and fair-- unsure how long will be R LE NWB after surgery      Clinical Assessment: Patient is not at baseline level following right LE vascular surgery. Patient also limited due to recent achilles injury on left and COVID in June. Patient's daughters have been caring for patient at home. Patient would benefit from skilled OT treatment. Limited by NWB of right LE and difficulty maintaining NWB.        Safety Interventions: patient left in chair, chair alarm in place, call light within reach, nurse notified, and family/caregiver present    Plan  Frequency: 3-5 x/per week  Current Treatment Recommendations: strengthening, ROM, balance training, functional mobility training, transfer training, and gait training    Goals  Patient Goals: eventually return home with 24/7 care provided by children   Short Term Goals:  Time Frame: d/c  Patient will complete upper body ADL at stand by assistance   Patient will complete lower body ADL at moderate assistance   Patient will complete toileting at moderate assistance   Patient will complete functional transfers at moderate assistance   Patient will increase functional sitting balance to good for improved ADL completion  Patient will increase functional standing balance to stand x 3 minutes with min assist while keeping right LE in NWB for improved ADL completion  Patient will increase [facility] ADL score = to or > than 16/24    Therapy Session Time     Individual Group Co-treatment   Time In    1325   Time Out    1406   Minutes    41        Timed Code Treatment Minutes:   26   Total Treatment Minutes:  41       Electronically Signed By: JORGE Mahajan/NESTOR Calzada

## 2022-08-22 NOTE — DISCHARGE SUMMARY
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 Hospital Course:     Patient is a 80-year-old-year-old male with past medical history of atrial fibrillation, cerebral amyloid angiopathy, aneurysm, type 2 diabetes, cerebral bleed 2022 causing him to be off Xarelto. Presented to the hospital for right leg pain. During hospitalization patient noted to have acute limbic ischemia underwent right femoral to posterior tibial bypass graft. Patient continued to be monitored. During hospitalization patient was also noted to have urinary retention, urology was consulted Parker was removed with empiric use of Flomax. PT OT saw the patient recommended ARU. Patient was discharged in stable condition to ARU, vascular and urology on board        Physical Exam Performed:     BP (!) 168/82   Pulse 94   Temp 98 °F (36.7 °C) (Temporal)   Resp 15   Ht 6' (1.829 m)   Wt 173 lb 8 oz (78.7 kg)   SpO2 95%   BMI 23.53 kg/m²       General appearance:  No apparent distress, appears stated age and cooperative. HEENT:  Normal cephalic, atraumatic without obvious deformity. Pupils equal, round, and reactive to light. Extra ocular muscles intact. Conjunctivae/corneas clear. Neck: Supple, with full range of motion. No jugular venous distention. Trachea midline. Respiratory:  Normal respiratory effort.  Clear to auscultation, bilaterally without Rales/Wheezes/Rhonchi. Cardiovascular:  Regular rate and rhythm with normal S1/S2 without murmurs, rubs or gallops. Abdomen: Soft, non-tender, non-distended with normal bowel sounds. Musculoskeletal:  No clubbing, cyanosis or edema bilaterally. Limited range of motion to the right leg   skin: Skin color, texture, turgor normal.  No rashes or lesions. Neurologic:  Neurovascularly intact without any focal sensory/motor deficits. Cranial nerves: II-XII intact, grossly non-focal.  Psychiatric:  Alert and oriented, thought content appropriate, normal insight  Capillary Refill: Brisk,< 3 seconds   Peripheral Pulses: +2 palpable, equal bilaterally   Right leg wrapped, without cyanosis, pink, warm pulses intact    Labs: For convenience and continuity at follow-up the following most recent labs are provided:      CBC:    Lab Results   Component Value Date/Time    WBC 13.1 08/22/2022 04:15 AM    HGB 11.9 08/22/2022 04:15 AM    HCT 35.8 08/22/2022 04:15 AM     08/22/2022 04:15 AM       Renal:    Lab Results   Component Value Date/Time     08/22/2022 04:15 AM    K 3.1 08/22/2022 04:15 AM    K 4.2 12/12/2018 06:10 AM     08/22/2022 04:15 AM    CO2 23 08/22/2022 04:15 AM    BUN 5 08/22/2022 04:15 AM    CREATININE <0.5 08/22/2022 04:15 AM    CALCIUM 8.1 08/22/2022 04:15 AM         Significant Diagnostic Studies    Radiology:   XR FEMUR RIGHT (MIN 2 VIEWS)   Final Result             Consults:     IP CONSULT TO PHYSICAL MEDICINE REHAB  IP CONSULT TO UROLOGY    Disposition: ARU    Condition at Discharge: Stable    Discharge Instructions/Follow-up: Follow-up with PCP, vascular and urology outpatient    Code Status:  Full Code     Activity: activity as tolerated    Diet: cardiac diet      Discharge Medications:     Current Discharge Medication List             Details   metFORMIN (GLUCOPHAGE) 500 MG tablet Take 1,000 mg by mouth in the morning and 1,000 mg in the evening. Take with meals. mirtazapine (REMERON) 15 MG tablet Take 15 mg by mouth nightly 2 tablets      rosuvastatin (CRESTOR) 10 MG tablet Take 10 mg by mouth in the morning. omeprazole (PRILOSEC) 40 MG delayed release capsule Take 40 mg by mouth in the morning. lisinopril (PRINIVIL;ZESTRIL) 10 MG tablet Take 10 mg by mouth in the morning. oxyCODONE-acetaminophen (PERCOCET) 5-325 MG per tablet Take 1 tablet by mouth every 8 hours as needed for Pain.      tamsulosin (FLOMAX) 0.4 MG capsule Take 0.4 mg by mouth nightly              Time Spent on discharge is more than 1 hour in the examination, evaluation, counseling and review of medications and discharge plan. Signed: Es Sahu DO   8/22/2022      Thank you [facility] for the opportunity to be involved in this patient's care. If you have any questions or concerns please feel free to contact me at [phone].

## 2022-08-22 NOTE — CARE COORDINATION
Therapy notes still pending. Pt and family member states that therapy work with him and \"recommended going upstairs to rehab. \" CM explained waiting for scores from therapy and process. Both verbalized understanding. ARU MD is following pt. Pt medically stable for d/c.     CM returned call to [facility] in admissions at [facility] [phone] and left vm informing pt may be candidate for ARU, waiting on therapy completed notes.       Wes Perdomo RN, BSN  [phone]

## 2022-08-22 NOTE — CONSULTS
Urology Consult Note  [facility]     Patient: Lolly Jaramillo MRN: [id number]  Room/Bed: [facility]61/6871-99   YOB: 1930  Age/Sex: 80 y.o.male  Admission Date: 8/18/2022     Date of Service:  8/22/2022    Consulting Provider: MANDEEP Latham CNP  Admitting/Requesting Physician: Caterina Jones MD  Primary Care Physician: Rafael Mazariegos    Reason for Consult: BPH    ASSESSMENT/PLAN     81 yo male with hx of BPH and very remote history of TUR of prostate, on Flomax daily. He is POD 2 right fem repair and right fem bybass. He had a arellano placed preoperatively. It was removed this am and urology has been consulted for these issues. Recommendations:  Continue Flomax  OOB as tolerated  Obtain PVR after first void  Monitor for succesful voiding. May need to replace arellano if PVR >450cc's- permit residuals up to 400cc's  Would benefit from rehabilitation  Urology will continue to follow, call with any questions    All patient questions were answered. He understands the plan as listed above. HISTORY     Chief Complaint: No chief complaint on file. History of Present Illness: Lolly Jaramillo is a 80 y.o. male with . Onset of symptoms was days ago with improving course since that time. Symptoms are aggravated by ischemia of foot. Symptoms improved with surgery and fem bipas. Associated symptoms include leg cold. Patient also reports painful lower extremity, but currently resting in bed eyes closed. He has tried the following treatments: arellano prior to surgery and Flomax. Past Medical History:  He has a past medical history of Afib ([address] Cerebral amyloid angiopathy ([facility] [address] Diabetes mellitus ([facility] [address] Heart aneurysm, and Vertigo. Hospital Problem List:  Principal Problem:    Limb ischemia  Active Problems:    Ischemic leg pain    Femoral artery aneurysm, right (HCC)  Resolved Problems:    * No resolved hospital problems.  *      Past Surgical History:  He has a past surgical history that includes Appendectomy; hernia repair; Lung surgery; Testicle surgery; and Femoral-tibial Bypass Graft (Right, 8/19/2022). Social History:  He reports that he has been smoking cigars. He uses smokeless tobacco. He reports that he does not drink alcohol and does not use drugs. Family History:  family history is not on file. Allergies: Allergies   Allergen Reactions    Sulfa Antibiotics Hives    Pcn [Penicillins] Rash     \"Rash and itchy\"       Medications:  Scheduled Meds:   [START ON 8/23/2022] lisinopril  40 mg Oral Daily    potassium chloride  20 mEq Oral BID WC    polyethylene glycol  17 g Oral BID    sodium chloride flush  5-40 mL IntraVENous 2 times per day    aspirin  81 mg Oral Daily    mirtazapine  30 mg Oral Nightly    rosuvastatin  10 mg Oral Daily    tamsulosin  0.4 mg Oral Nightly    pantoprazole  40 mg Oral QAM AC    [Held by provider] insulin glargine  20 Units SubCUTAneous Nightly    sodium chloride flush  5-40 mL IntraVENous 2 times per day    insulin lispro  0-8 Units SubCUTAneous TID WC    insulin lispro  0-4 Units SubCUTAneous Nightly     Continuous Infusions:   sodium chloride      dextrose      sodium chloride       PRN Meds:labetalol, cloNIDine, sodium chloride flush, sodium chloride, ondansetron **OR** ondansetron, oxyCODONE-acetaminophen **OR** oxyCODONE-acetaminophen, morphine **OR** morphine, acetaminophen, dextrose bolus **OR** dextrose bolus, glucagon (rDNA), dextrose, sodium chloride flush, sodium chloride, ondansetron **OR** ondansetron, acetaminophen **OR** acetaminophen    Review of Systems:  Pertinent positives/negatives reviewed in HPI. All other systems reviewed and negative, unless noted below.     Constitutional: Negative  Genitourinary: see HPI  HEENT: Negative   Cardiovascular: Negative   Respiratory: Negative   Gastrointestinal: Negative   Musculoskeletal: Negative   Neurological: Negative   Psychiatric: Negative   Integumentary: Negative     PHYSICAL EXAM Vitals:    08/22/22 0820   BP: (!) 168/82   Pulse: (!) 105   Resp: 15   Temp: 98 °F (36.7 °C)   SpO2: 95%     CONSTITUTIONAL: The patient is well nourished/developed, with no distress noted. NEUROLOGICAL/PSYCHIATRIC: resting in bed with eyes closed  CARDIOVASCULAR: Regular rate and rhythm, no evidence of swelling noted. RESPIRATORY: Normal respiratory effort with no wheezing noted. ABDOMEN: Abdomen soft, non-tender, non-distended. No enlarged liver or spleen. No hernias noted. Stool occult blood not indicated. SKIN: Skin appears normal.  LYMPHATICS: No adenopathy noted. CVA: No CVA tenderness bilaterally. GENITOURINARY: The penis is without rash or lesions and meatus with expected size and location. The scrotum appears normal. Bilateral testicles appears to be of normal size and location. No masses or tenderness noted of testicles or epididymis.        Ins/Outs:    Intake/Output Summary (Last 24 hours) at 8/22/2022 1104  Last data filed at 8/22/2022 0829  Gross per 24 hour   Intake 953.16 ml   Output 2575 ml   Net -1621.84 ml       LABS     CBC   Lab Results   Component Value Date/Time    WBC 13.1 08/22/2022 04:15 AM    RBC 3.90 08/22/2022 04:15 AM    HGB 11.9 08/22/2022 04:15 AM    HCT 35.8 08/22/2022 04:15 AM    MCV 91.8 08/22/2022 04:15 AM    MCH 30.5 08/22/2022 04:15 AM    MCHC 33.2 08/22/2022 04:15 AM    RDW 15.0 08/22/2022 04:15 AM     08/22/2022 04:15 AM    MPV 8.5 08/22/2022 04:15 AM     BMP   Lab Results   Component Value Date/Time     08/22/2022 04:15 AM    K 3.1 08/22/2022 04:15 AM    K 4.2 12/12/2018 06:10 AM     08/22/2022 04:15 AM    CO2 23 08/22/2022 04:15 AM    BUN 5 08/22/2022 04:15 AM    CREATININE <0.5 08/22/2022 04:15 AM    GLUCOSE 173 08/22/2022 04:15 AM    CALCIUM 8.1 08/22/2022 04:15 AM     Urinalysis:   Lab Results   Component Value Date/Time    COLORU Yellow 08/15/2022 02:30 PM    GLUCOSEU Negative 08/15/2022 02:30 PM    BLOODU Negative 08/15/2022 02:30 PM NITRU Negative 08/15/2022 02:30 PM    LEUKOCYTESUR Negative 08/15/2022 02:30 PM     Urine culture: No results for input(s): LABURIN in the last 72 hours. PSA: No results found for: PSA      IMAGING     ECHO Complete 2D W Doppler W Color    Result Date: 8/18/2022  Transthoracic Echocardiography Report (TTE)  Demographics   Patient Name       January Watts   Date of Study      08/18/2022         Gender              Male   Patient Number     [id number]         Date of Birth       09/06/1930   Visit Number       [id number]          Age                 80 year(s)   Accession Number   [id number]         Room Number         7430   Corporate ID       [id number]            Sonographer         Jad Hardwick, RT, RDMS, RDCS   Ordering Physician Areli Herndon,  Interpreting        Justice Todd.                     MD                 Physician           Perri Matt MD  Procedure Type of Study   TTE procedure:ECHOCARDIOGRAM COMPLETE 2D W DOPPLER W COLOR. Procedure Date Date: 08/18/2022 Start: 10:17 AM Study Location: [address] [place] [address]. Patient Status: Routine Height: 72 inches Weight: 166 pounds BSA: 1.97 m2 BMI: 22.51 kg/m2 BP: 184/85 mmHg  Conclusions   Summary  Left ventricular cavity size is normal. There is mildly increased left  ventricular wall thickness. Ejection fraction is visually estimated to be 55-60%. No regional wall  motion abnormalities are noted. Indeterminate diastolic function. Normal right ventricular size and function. Tricuspid valve is structurally normal. Moderate tricuspid regurgitation. No  evidence of tricuspid stenosis.    Signature   ------------------------------------------------------------------  Electronically signed by Perri Matt MD (Interpreting  physician) on 08/18/2022 at 02:27 PM  ------------------------------------------------------------------   Findings   Left Ventricle  Left ventricular cavity size is normal. There is mildly increased left  ventricular wall thickness. Ejection fraction is visually estimated to be  55-60%. No regional wall motion abnormalities are noted. Indeterminate  diastolic function. Mitral Valve  Mitral annular calcification is present. Mild thickening of leaflets of  mitral valve. Mild mitral regurgitation. No evidence of mitral stenosis. Left Atrium  The left atrium is mildly dilated. Aortic Valve  The aortic valve is structurally normal. No evidence of aortic stenosis. No  evidence of aortic valve regurgitation. Aorta  The aortic root is normal in size. Right Ventricle  Normal right ventricular size and function. TAPSE 1.95 cm. RV S velocity 12.1 cm/s. Tricuspid Valve  Tricuspid valve is structurally normal. Moderate tricuspid regurgitation. No  evidence of tricuspid stenosis. Right Atrium  The right atrium is dilated. Pulmonic Valve  The pulmonic valve is not well visualized. No evidence of pulmonic valve  regurgitation. No evidence of pulmonic valve stenosis. Pericardial Effusion  No evidence of pericardial or pleural effusion. Miscellaneous  IVC size is dilated (>2.1 cm) and collapses < 50% with respiration  consistent with elevated RA pressure (15 mmHg). Estimated pulmonary artery systolic pressure is elevated at 63 mmHg assuming  a right atrial pressure of 15 mmHg.   M-Mode/2D Measurements (cm)   LV Diastolic Dimension: 1.45 cm LV Systolic Dimension: 6.12 cm  LV Septum Diastolic: 3.83 cm  LV PW Diastolic: 1.46 cm        AO Root Dimension: 3.7 cm                                  AV Cusp Separation: 1.9 cm                                  LA Area: 21.4 cm2                                  LA volume/Index: 54 ml /27 ml/m2  Doppler Measurements   AV Peak Velocity: 136 cm/s  AV Peak Gradient: 7.4 mmHg  AV Mean Gradient: 5 mmHg  LVOT Peak Velocity: 95 cm/s   TR Velocity:350 cm/s  TR Gradient:49 mmHg   PV Peak Velocity: 92.3 cm/s  PV Peak Gradient: 3.41 mmHg   Aortic Valve   Peak Velocity: 136 cm/s Mean Velocity: 104 cm/s  Peak Gradient: 7.4 mmHg Mean Gradient: 5 mmHg  AV VTI: 25.3 cm   Cusp Separation: 1.9 cm  Aorta   Aortic Root: 3.7 cm      XR CHEST (2 VW)    Result Date: 8/15/2022  EXAMINATION: TWO XRAY VIEWS OF THE CHEST 8/15/2022 10:57 am COMPARISON: Chest x-ray dated 20 July 2015 HISTORY: ORDERING SYSTEM PROVIDED HISTORY: preop TECHNOLOGIST PROVIDED HISTORY: Reason for exam:->preop Reason for Exam: preop FINDINGS: Mild bibasilar airspace opacities. No pneumothorax or pleural effusion. Calcified aortic arch. No cardiomegaly. Mild bibasilar airspace opacities. This could represent atelectasis or pneumonia in the appropriate clinical setting. XR FEMUR RIGHT (MIN 2 VIEWS)    Result Date: 8/19/2022  Radiology exam is complete. No Radiologist dictation. Please follow up with ordering provider. CTA ABDOMINAL AORTA W BILAT RUNOFF W CONTRAST    Result Date: 8/12/2022  EXAMINATION: CTA OF THE AORTA WITH LOWER EXTREMITY RUNOFF 8/12/2022 1:30 pm TECHNIQUE: CTA of the pelvis and bilateral lower extremities was performed after the administration of intravenous contrast.   Multiplanar reformatted images are provided for review. MIP images are provided for review. Automated exposure control, iterative reconstruction, and/or weight based adjustment of the mA/kV was utilized to reduce the radiation dose to as low as reasonably achievable. COMPARISON: None. HISTORY: ORDERING SYSTEM PROVIDED HISTORY: Ischemic right leg TECHNOLOGIST PROVIDED HISTORY: Reason for exam:->Ischemic right leg Reason for Exam: Ischemic right leg FINDINGS: Nonvascular Lower Chest: Visualized lungs are clear. Base of the heart unremarkable. Organs: No hypervascular abnormalities are detected within the liver. Gallbladder unremarkable. Normal arterial phase imaging of the spleen. Adrenals unremarkable. Pancreas unremarkable.   No acute or suspicious renal abnormalities. GI/Bowel: Moderate amount of stool is seen within the large bowel. Large bowel is otherwise unremarkable. The appendix is not well visualized. No asymmetric pericecal inflammation is seen to suggest acute appendicitis. Stomach unremarkable. Small bowel unremarkable. Pelvis: Moderate prostatic hypertrophy. Urinary bladder unremarkable. No free pelvic fluid. Peritoneum/Retroperitoneum: No lymphadenopathy. Bones/Soft Tissues: No acute bony abnormality detected within the abdomen and pelvis. The extra-abdominal and extra pelvic soft tissues are unremarkable. Circumferential subcutaneous edema is noted about the right calf. Edema extends into the right foot, especially dorsally. No soft tissue gas is found. No fluid collections are seen within the right lower extremity to suggest abscess formation. No acute or suspicious bony abnormalities are detected within the right lower extremity. Soft tissue and bony structures of the left lower extremity are unremarkable for age. VASCULAR Abdominal aorta: Infrarenal abdominal aortic aneurysm present measuring maximally 4.0 cm. Moderate thrombus is seen, without significant stenosis. Celiac artery: Mild stenosis at the origin of the celiac artery. Branches appear to be patent. Superior mesenteric artery: Mild stenosis at the origin. More distal aspects of the SMA and its branches appear to be widely patent. Renal arteries: Mild stenosis of the origin of the right renal artery: At least moderate stenosis at the origin of the left renal artery. Right iliac system: Moderate calcified plaque within the common iliac artery, but without significant stenosis or aneurysm. Left iliac system: Moderate calcified plaque within the left common iliac artery leading to mild stenosis of 10-20% or less. External iliac artery on the left is patent.  Right femoral system: Heavy fibro atheromatous and calcified plaque is seen within the right common femoral artery leading to stenosis of approximately 60%. High-grade stenosis at the origin of the profunda femoris is noted. More distal aspects of the profunda femoris are patent. There is complete occlusion of the right superficial femoral artery just after the takeoff of the profunda femoris. Left femoral system: Mild atherosclerotic plaque within the left common femoral artery without significant stenosis. Profunda femoris is widely patent. Multifocal calcified plaque in the superficial femoral artery is present leading to at least moderate if not high-grade stenosis. Unfortunately, the contrast bolus is suboptimal, limiting fine detail. Right popliteal artery: No opacification is identified. However, the contrast bolus is very suboptimal. Left popliteal artery: Again very little contrast is seen, which is likely secondary to contrast timing. Right 3 vessel runoff: The right 3 vessel runoff cannot be reliably evaluated secondary to contrast timing. Left 3 vessel runoff: Likewise, the left three-vessel runoff cannot be reliably evaluated due to contrast timing. Complete occlusion of the right superficial femoral artery just after the takeoff of the profunda femoris. Occlusion at the origin of the profunda femoris on the right. More distal aspects of that vessel are enhancing. Multifocal calcified plaque is seen within the left superficial femoral artery leading to at least moderate if not high-grade central canal stenosis. Unfortunately, contrast bolus is suboptimal limiting fine detail. Note that the bilateral popliteal arteries and the bilateral 3 vessel runoffs cannot be reliably evaluated secondary to suboptimal contrast timing. Abdominal aortic aneurysm measuring 4.0 cm maximally. See recommendations below. Circumferential subcutaneous edema within the right calf extending into the right foot. No soft tissue gas or radiopaque foreign body. No focal fluid collections are identified to suggest abscess.  Moderate amount of stool within the colon. Please correlate with clinical evidence of constipation. RECOMMENDATIONS: 4 cm infrarenal abdominal aortic aneurysm. Recommend follow-up every 12 months and vascular consultation. Reference: J Am Otto Radiol 5663;90:478-766. VL PRE OP VEIN MAPPING    Result Date: 8/15/2022  Lower Extremities Vein Mapping  Demographics   Patient Name       Gloriann Chamber   Date of Study      08/15/2022         Gender              Male   Patient Number     [id number]         Date of Birth       09/06/1930   Visit Number       [id number]          Age                 80 year(s)   Accession Number   [id number]         Room Number         0406   Corporate ID       [id number]            [facility]                                                            [facility]   Ordering Physician Marco Neves MD                 Physician           Surg                                                            Christina Sheets MD  Procedure Type of Study:   Veins:Lower Extremity Vein Mapping, VASC [facility]. Vascular Sonographer Report  Indications for Study:Pre-op for vein harvesting. Impressions Right Impression The right greater saphenous vein was visualized between zone 1 and 8 and demonstrates total compressibility. The right greater saphenous vein was visualized between zone 1 and 8 it is sizeable and suitable for bypass conduit. Please see diameter measurements. Right GSV: GSV high thigh 5.1 mm GSV mid thigh 3.9 mm GSV low thigh 3.5 mm GSV knee 3.2 mm GSV high calf 3.6 mm GSV mid calf 3.4 mm GSV low calf 3.1 mm GSV ankle calf 2.5 mm. Left Impression The left greater saphenous vein was visualized between zone 1 and 8 and demonstrates total compressibility. The left greater saphenous vein is sizeable and suitable for bypass conduit between zone 1 and 8.  Please see diameter measurements. Left GSV: GSV high thigh 4.3 mm GSV mid thigh 4.6 mm GSV low thigh 4.2 mm GSV knee 3.9 mm GSV high calf 2.4 mm GSV mid calf 1.9 mm GSV low calf 1.6 mm GSV ankle calf 1.3 mm. Conclusions   Summary   The right greater saphenous vein was visualized between zone 1 and 8 and  demonstrates total compressibility. The right greater saphenous vein was visualized between zone 1 and 8 it is  sizeable and suitable for bypass conduit. Please see diameter measurements. Right GSV:  GSV high thigh 5.1 mm  GSV mid thigh 3.9 mm  GSV low thigh 3.5 mm  GSV knee 3.2 mm  GSV high calf 3.6 mm  GSV mid calf 3.4 mm  GSV low calf 3.1 mm  GSV ankle calf 2.5 mm. The left greater saphenous vein was visualized between zone 1 and 8 and  demonstrates total compressibility. The left greater saphenous vein is sizeable and suitable for bypass conduit  between zone 1 and 8. Please see diameter measurements. Left GSV:  GSV high thigh 4.3 mm  GSV mid thigh 4.6 mm  GSV low thigh 4.2 mm  GSV knee 3.9 mm  GSV high calf 2.4 mm  GSV mid calf 1.9 mm  GSV low calf 1.6 mm  GSV ankle calf 1.3 mm. Signature   ------------------------------------------------------------------  Electronically signed by Marleni Le MD (Interpreting  physician) on 08/15/2022 at 12:37 PM  ------------------------------------------------------------------  Patient Status:Routine. Study [address] V[facility]. Technical Quality:Adequate visualization. Velocities are measured in cm/s ; Diameters are measured in mm LE Vein Mapping +----------------------------------++--------+-----+----+--------+-----+ ! Superficial - Great Saphenous Vein! !Right   ! ! Left!        !     ! +----------------------------------++--------+-----+----+--------+-----+ ! Location                          ! !Diameter! Depth! !Diameter! Depth! +----------------------------------++--------+-----+----+--------+-----+ ! GSV High Thigh                    !!5.1 !     !    !4.3     !     ! +----------------------------------++--------+-----+----+--------+-----+ ! GSV Mid Thigh                     ! !3.9     !     !    !4.6     !     ! +----------------------------------++--------+-----+----+--------+-----+ ! GSV Low Thigh                     ! !3.5     !     !    !4.2     !     ! +----------------------------------++--------+-----+----+--------+-----+ ! GSV Knee                          !!3.2     !     !    !3.9     !     ! +----------------------------------++--------+-----+----+--------+-----+ ! GSV High Calf                     !!3.6     !     !    !2.4     !     ! +----------------------------------++--------+-----+----+--------+-----+ ! GSV Mid Calf                      !!3.4     !     !    !1.9     !     ! +----------------------------------++--------+-----+----+--------+-----+ ! GSV Low Calf                      !!3.1     !     !    !1.6     !     ! +----------------------------------++--------+-----+----+--------+-----+ ! GSV Ankle                         ! !2.5     !     !    !1.3     !     ! +----------------------------------++--------+-----+----+--------+-----+           Electronically signed by: MANDEEP Gruber CNP  8/22/2022   [facility]  Office Contact: [phone]

## 2022-08-22 NOTE — PLAN OF CARE
Problem: Safety - Adult  Goal: Free from fall injury  Outcome: Progressing  Flowsheets (Taken 8/21/2022 8068 by Dasia Corea, RN)  Free From Fall Injury: Instruct family/caregiver on patient safety     Problem: ABCDS Injury Assessment  Goal: Absence of physical injury  Outcome: Progressing  Flowsheets (Taken 8/21/2022 0838 by Dasia Corea, RN)  Absence of Physical Injury: Implement safety measures based on patient assessment     Problem: Skin/Tissue Integrity  Goal: Absence of new skin breakdown  Description: 1. Monitor for areas of redness and/or skin breakdown  2. Assess vascular access sites hourly  3. Every 4-6 hours minimum:  Change oxygen saturation probe site  4. Every 4-6 hours:  If on nasal continuous positive airway pressure, respiratory therapy assess nares and determine need for appliance change or resting period.   Outcome: Progressing     Problem: Pain  Goal: Verbalizes/displays adequate comfort level or baseline comfort level  Outcome: Progressing  Problem: Chronic Conditions and Co-morbidities  Goal: Patient's chronic conditions and co-morbidity symptoms are monitored and maintained or improved  Outcome: Progressing

## 2022-08-22 NOTE — CONSULTS
Patient: Linda Suarez  [id number]  Date: 8/22/2022      Chief Complaint: Right leg pain    History of Present Illness/Hospital Course:  80-year-old male with a history of A. fib previously on Xarelto but stopped secondary to a head bleed, cerebral amyloid angiopathy, diabetes who was admitted on 8/12 with right leg pain. He was transferred from [facility] to this facility on 8/18 for surgery with Dr. Leo Matthew. He underwent a right femoral to posterior tibial bypass on 8/19. His postoperative course has been overall unremarkable. Therapy evaluations are currently pending. He reports his pain as controlled. He states he will not go to a skilled nursing facility. Per family report, patient took care of his wife for nearly 27 years following a significant stroke as he refused to allow her to go to a nursing facility. Prior Level of Function:  Independent    Current Level of Function:  Pending therapy evaluations today     has a past medical history of Afib ([facility] [address] Cerebral amyloid angiopathy ([facility] [address] Diabetes mellitus ([facility] [address] Heart aneurysm, and Vertigo. has a past surgical history that includes Appendectomy; hernia repair; Lung surgery; Testicle surgery; and Femoral-tibial Bypass Graft (Right, 8/19/2022). reports that he has been smoking cigars. He uses smokeless tobacco. He reports that he does not drink alcohol and does not use drugs. family history is not contributory      REVIEW OF SYSTEMS:   CONSTITUTIONAL: negative for fevers, chills, diaphoresis, appetite change, night sweats and unexpected weight change. HEENT: negative for hearing loss, tinnitus, ear drainage, sinus pressure, nasal congestion, epistaxis and snoring. RESPIRATORY: Negative for hemoptysis, cough, sputum production. CARDIOVASCULAR: negative for chest pain, palpitations, exertional chest pressure/discomfort, edema, syncope.    GASTROINTESTINAL: negative for nausea, vomiting, diarrhea, constipation, blood in stool and abdominal pain.  GENITOURINARY: negative for frequency, dysuria, urinary incontinence, decreased urine volume, and hematuria. HEMATOLOGIC/LYMPHATIC: negative for easy bruising, bleeding and lymphadenopathy. ALLERGIC/IMMUNOLOGIC: negative for recurrent infections, angioedema, anaphylaxis and drug reactions. ENDOCRINE: negative for weight changes and diabetic symptoms including polyuria, polydipsia and polyphagia. MUSCULOSKELETAL: negative for joint swelling, decreased range of motion and muscle weakness. NEUROLOGICAL: negative for headaches, slurred speech, unilateral weakness. PSYCHIATRIC/BEHAVIORAL: negative for hallucinations, behavioral problems, confusion and agitation. All pertinent positives are noted in the HPI. Physical Examination:  Vitals: Patient Vitals for the past 24 hrs:   BP Temp Temp src Pulse Resp SpO2 Weight   08/22/22 0820 (!) 168/82 98 °F (36.7 °C) Temporal (!) 105 15 95 % --   08/22/22 0539 -- -- -- -- -- -- 173 lb 8 oz (78.7 kg)   08/22/22 0534 -- -- -- 93 -- -- --   08/22/22 0533 -- -- -- 97 -- -- --   08/22/22 0532 (!) 171/91 98.2 °F (36.8 °C) Temporal 90 -- 93 % --   08/22/22 0051 -- -- -- 99 -- -- --   08/22/22 0050 -- -- -- 99 -- -- --   08/22/22 0049 (!) 171/80 98.2 °F (36.8 °C) Temporal (!) 105 -- 93 % --   08/21/22 2115 -- -- -- 87 -- -- --   08/21/22 2114 -- -- -- 91 -- -- --   08/21/22 2113 (!) 150/76 97.3 °F (36.3 °C) Temporal 88 (!) 8 93 % --   08/21/22 2112 -- -- -- 96 -- -- --   08/21/22 2111 -- -- -- 87 -- -- --   08/21/22 1720 123/70 -- -- 90 -- -- --   08/21/22 1700 (!) 147/74 -- -- 82 -- -- --   08/21/22 1600 (!) 178/99 97.7 °F (36.5 °C) Temporal 95 18 -- --   08/21/22 1555 -- -- -- 97 -- -- --   08/21/22 1554 -- -- -- 96 -- -- --   08/21/22 1553 -- -- -- 97 -- -- --     Psych: Stable mood, normal judgement, normal affect. Const: No distress  Eyes: Conjunctiva noninjected, no icterus noted; pupils equal, round.    HENT: Atraumatic, normocephalic; Oral mucosa moist  Neck: Trachea midline, neck supple. No thyromegaly noted. CV: No audible murmurs  Resp: No increased WOB, no audible wheezing   GI: Nondistended   Neuro: Alert, oriented, appropriate. Skin: No visible abnormalities  MSK: No joint abnormalities noted. Ext: No significant edema appreciated. No varicosities. Lab Results   Component Value Date    WBC 13.1 (H) 08/22/2022    HGB 11.9 (L) 08/22/2022    HCT 35.8 (L) 08/22/2022    MCV 91.8 08/22/2022     08/22/2022     Lab Results   Component Value Date    INR 1.27 (H) 08/21/2022    INR 1.02 08/18/2022    INR 1.27 (H) 08/17/2022    PROTIME 15.8 (H) 08/21/2022    PROTIME 13.3 08/18/2022    PROTIME 15.9 (H) 08/17/2022     Lab Results   Component Value Date    CREATININE <0.5 (L) 08/22/2022    BUN 5 (L) 08/22/2022     08/22/2022    K 3.1 (L) 08/22/2022     08/22/2022    CO2 23 08/22/2022     Lab Results   Component Value Date    ALT 24 08/12/2022    AST 34 08/12/2022    ALKPHOS 73 08/12/2022    BILITOT 0.6 08/12/2022       Most recent imaging studies revealed   EXAMINATION:   TWO XRAY VIEWS OF THE CHEST       8/15/2022 10:57 am       COMPARISON:   Chest x-ray dated 20 July 2015       HISTORY:   ORDERING SYSTEM PROVIDED HISTORY: preop   TECHNOLOGIST PROVIDED HISTORY:   Reason for exam:->preop   Reason for Exam: preop       FINDINGS:   Mild bibasilar airspace opacities. No pneumothorax or pleural effusion. Calcified aortic arch. No cardiomegaly. Impression   Mild bibasilar airspace opacities. This could represent atelectasis or   pneumonia in the appropriate clinical setting       The above laboratory data have been reviewed. The above imaging data have been reviewed. The above medical testing have been reviewed. Body mass index is 23.53 kg/m². Assessment and Plan:  PAD: S/p right femoral to posterior tibial bypass on 8/19 with Dr. Love Mejia. Currently nonweightbearing. PT/OT eval's today  A. fib  HTN  DM  Hypokalemia    Dispo: Patient is pending therapy evaluations. Anticipate he would be an appropriate ARU candidate. We will follow-up his functional level today. If appropriate, he does not require Precert approval.  Would be able to accept when medically approved. We will continue to follow. Thank you for the consultation. Dayna Ahn MD 8/22/2022, 10:57 AM     * This document was created using dictation software. While all precautions were taken to ensure accuracy, errors may have occurred. Please disregard any typographical errors.

## 2022-08-22 NOTE — CARE COORDINATION
Chart reviewed for d/c planning. Pt is non wt bearing R foot per vascular. Therapy evaluations are pending. CM will follow for d/c plan.       Danielle Haque RN, BSN  [phone]

## 2022-08-22 NOTE — PROGRESS NOTES
Pt transferred to ARU with transport. Belongings gathered. Daughter at bedside.     Electronically signed by Faviola Ruelas RN on 8/22/2022 at 6:51 PM

## 2022-08-22 NOTE — PROGRESS NOTES
Bladder scan now 490 mL. Parker placed.     Electronically signed by Enrique Bush RN on 8/22/2022 at 4:22 PM

## 2022-08-22 NOTE — PROGRESS NOTES
Marita Lamont                 POD#3     Denies any foot pain. Has not taken any narcotics for pain. Pain yestreday with activity. VSS     Afeb                 Good UO     I/O - 881   Lungs clear  R leg unwrapped- no hematoma at any surgical sites noted; no ankle wound drainage  R foot and hyperemic  Palpable graft pulse & distal to ankle incision; excellent doppler PT signal distal to ankle incision & in graft     Labs noted. K 3.1     A/P:     S/P R femoral replacement + R fem-distal PT bypass. Patent with excellent reperfusion. Extend duration of supplemental po K. Up to chair today - NWB R foot              FELISHA arellano today. Possible  evaluation. Continue Flomax. PT/OT starting today - nonweight bearing R foot.  ARU evaluation                 Pollo Hernandez

## 2022-08-22 NOTE — PROGRESS NOTES
[facility]   Phone: [phone]    Physical Therapy    [x] Initial Evaluation            [] Daily Treatment Note         [] Discharge Summary      Patient: Julianna Smart   : 1930   MRN: [id number]   Date of Service:  2022  Admitting Diagnosis: Limb ischemia  Current Admission Summary: The patient is a 80 y.o. male who presents with a referral from Dr. Myles Blair Daily for decreased pulses. Mr. Kim Doe is here with his daughters. One of the daughters says she has been staying with him and he has not really slept because of pain. She says his right leg is considerably more swollen than it was even two days ago. The pain in the leg and foot started about two and a half weeks ago. His right leg is swollen and he denies that he has been sleeping with the leg hanging over the bed but family says he does get up into the chair in a recliner trying to get comfortable but can not. Nothing relieves the pain currently. Mr. Kim Doe was on Xarelto for a-fib but was removed from it because of a brain bleed that happened in May- of this year. He was diagnosed with CAA. In  he had a torn achilles tendon on the left and saw Dr. Jacob, orthopedics. He had a cast and a boot placed. He then contracted Covid in . By July he had so much pain in his legs and especially the right foot that he was unable to get comfortable. His leg has not gotten better, and his foot and leg are cool to the touch. Despite not feeling a pulse, he does appear to have a good capillary refill. Past Medical History:  has a past medical history of Afib ([facility] [address] Cerebral amyloid angiopathy ([facility] [address] Diabetes mellitus ([facility] [address] Heart aneurysm, and Vertigo. Past Surgical History:  has a past surgical history that includes Appendectomy; hernia repair; Lung surgery; Testicle surgery; and Femoral-tibial Bypass Graft (Right, 2022).   Discharge Recommendations: Julianna Smart scored a  on the AM-PAC short mobility form. Current research shows that an AM-PAC score of 17 or less is typically not associated with a discharge to the patient's home setting. Based on the patient's AM-PAC score and their current functional mobility deficits, it is recommended that the patient have 5-7 sessions per week of Physical Therapy at d/c to increase the patient's independence. At this time, this patient demonstrates complex nursing, medical, and rehabilitative needs, and would benefit from intensive rehabilitation services upon discharge from the Inpatient setting. This patient demonstrates the ability to participate in and benefit from an intensive therapy program with a coordinated interdisciplinary team approach to foster frequent, structured, and documented communication among disciplines, who will work together to establish, prioritize, and achieve treatment goals. Please see assessment section for further patient specific details. If patient discharges prior to next session this note will serve as a discharge summary. Please see below for the latest assessment towards goals. DME Required For Discharge: patient has all required DME for discharge  Precautions/Restrictions: high fall risk  Weight Bearing Restrictions: non weight bearing  [] Right Upper Extremity  [] Left Upper Extremity [x] Right Lower Extremity  [] Left Lower Extremity     Required Braces/Orthotics: no braces required   [] Right  [] Left  Positional Restrictions:no positional restrictions    Pre-Admission Information   Lives With: alone, . Comment: daughters take turns weekly 24/7 to stay with him     Type of Home: house  Home Layout: one level  Home Access: level entry  [facility] 45: walker accessible, walk in shower  Bathroom Equipment: grab bars in shower, tub transfer bench, hand held shower head, toilet raiser  Toilet Height: standard height  Home Equipment: rolling walker, manual wheelchair, .   Comment: typically uses 3 wheel walker   Transfer Assistance: Independent without use of device  Ambulation Assistance:requires assistance  ADL Assistance: requires assistance with bathing, requires assistance with dressing, requires assistance with grooming, requires assistance with toileting  IADL Assistance: requires assistance with all homemaking tasks  Active :        [] Yes  [x] No  Hand Dominance: [] Left  [x] Right  Current Employment: retired. Occupation:  at [employer]: [address]: 1 fall leading to ruptured achilles     Examination   Vision:   Vision Corrective Device: wears glasses for reading  Hearing:   hard of hearing  Observation:   General Observation:  Pt RLE wrapped following procedure, poor posture and trunk control in unsupported sitting and standing   Posture: Forward flexed posture with posterior lean in seated and forward lean upon standing   Sensation:   WFL      ROM:   (B) LE AROM WFL  Strength:   Formal MMT held secondary to increased pain in RLE when in dependent position and increased back pain and decreased trunk control in seated   Decision Making: medium complexity  Clinical Presentation: evolving      Subjective  General: Pt reports 4/10 pain in RLE upon arrival that increased with dependent positioning, pt needs to urinate upon arrival but not comfortable using bedside urinal but unable to safely ambulate to bathroom. Pt was assisted in attempting to use bedside urinal in standing and seated but unable, pt was left in depends. Back pain increased with pt reporting 8/10 back pain at end of session and nurse was notified. Pain: 8/10. Location: back; 4/10 RLE  Pain Interventions: RN notified of patient request for pain medication       Functional Mobility  Bed Mobility  Supine to Sit: moderate assistance  Scooting: moderate assistance  Comments:  Transfers  Sit to stand transfer: .   Comment max of 3, 2 therapist at trunk and 1 at RLE to prevent WB   Stand to will require reinforcement due to unwillingness to accept feedback and instruction     Assessment  Activity Tolerance: fair; pt posture decreased with fatigue requiring increased assistance for postural support  Impairments Requiring Therapeutic Intervention: decreased functional mobility, decreased ADL status, decreased strength, decreased safety awareness, decreased endurance, decreased balance, increased pain, decreased posture  Prognosis: good  Clinical Assessment: Pt presents below baseline function secondary to vascular procedure that resulted in non-weight bearing in RLE. Pt has just recently been taken out of cast following achilles tendon tear in LLE that occurred in June. Therefore pt has decreased strength and stability in LLE and currently has non-weight bearing status on RLE resulting in pt requiring maxA of 3 for balance and stability during transfers and for safety with adhering to WB precautions. Ambulation was not assessed on this date due to safety concerns. Pt would benefit from skilled therapy services to facilitate return to safe functional mobility.    Safety Interventions: patient left in chair, chair alarm in place, call light within reach, gait belt, and patient at risk for falls    Plan  Frequency: 3-5 x/per week  Current Treatment Recommendations: strengthening, balance training, functional mobility training, transfer training, endurance training, wheelchair mobility training, patient/caregiver education, and ADL/self-care training    Goals  Patient Goals: Regain strength to assist with independence    Short Term Goals:  Time Frame: at time of discharge   Patient will complete bed mobility at stand by assistance   Patient will complete transfers at moderate assistance   Patient will ambulate 30 ft with use of rolling walker at minimal assistance    Therapy Session Time      Individual Group Co-treatment   Time In     1325   Time Out     1406   Minutes     41     Timed Code Treatment Minutes: 26    Total Treatment Minutes:  41       Electronically Signed By: Bessie Amaya, [address], [facility], DPT [id number]

## 2022-08-22 NOTE — CARE COORDINATION
CM left  for ARU liaison [id number] that pt is stable for discharge and therapy recommends ARU. Pt is medicare and does not require a pre cert.     Rojelio Peters RN, BSN  [phone]

## 2022-08-22 NOTE — PROGRESS NOTES
Bladder scan done, hanging on to 390 cc urine after arellano removed at 0830. Call placed to urology office, message left for [facility] NP. Awaiting response.     Electronically signed by Jessie Rivas RN on 8/22/2022 at 1:33 PM

## 2022-08-23 LAB
ANION GAP SERPL CALCULATED.3IONS-SCNC: 7 MMOL/L (ref 3–16)
BUN BLDV-MCNC: 6 MG/DL (ref 7–20)
CALCIUM SERPL-MCNC: 8 MG/DL (ref 8.3–10.6)
CHLORIDE BLD-SCNC: 106 MMOL/L (ref 99–110)
CO2: 24 MMOL/L (ref 21–32)
CREAT SERPL-MCNC: <0.5 MG/DL (ref 0.8–1.3)
GFR AFRICAN AMERICAN: >60
GFR NON-AFRICAN AMERICAN: >60
GLUCOSE BLD-MCNC: 156 MG/DL (ref 70–99)
GLUCOSE BLD-MCNC: 175 MG/DL (ref 70–99)
GLUCOSE BLD-MCNC: 202 MG/DL (ref 70–99)
GLUCOSE BLD-MCNC: 203 MG/DL (ref 70–99)
GLUCOSE BLD-MCNC: 216 MG/DL (ref 70–99)
PERFORMED ON: ABNORMAL
POTASSIUM SERPL-SCNC: 3.7 MMOL/L (ref 3.5–5.1)
SODIUM BLD-SCNC: 137 MMOL/L (ref 136–145)

## 2022-08-23 PROCEDURE — 97161 PT EVAL LOW COMPLEX 20 MIN: CPT

## 2022-08-23 PROCEDURE — 1280000000 HC REHAB R&B

## 2022-08-23 PROCEDURE — 36415 COLL VENOUS BLD VENIPUNCTURE: CPT

## 2022-08-23 PROCEDURE — 97530 THERAPEUTIC ACTIVITIES: CPT

## 2022-08-23 PROCEDURE — 6370000000 HC RX 637 (ALT 250 FOR IP): Performed by: PHYSICAL MEDICINE & REHABILITATION

## 2022-08-23 PROCEDURE — 97166 OT EVAL MOD COMPLEX 45 MIN: CPT

## 2022-08-23 PROCEDURE — 80048 BASIC METABOLIC PNL TOTAL CA: CPT

## 2022-08-23 PROCEDURE — 97535 SELF CARE MNGMENT TRAINING: CPT

## 2022-08-23 PROCEDURE — 2580000003 HC RX 258: Performed by: PHYSICAL MEDICINE & REHABILITATION

## 2022-08-23 PROCEDURE — 99024 POSTOP FOLLOW-UP VISIT: CPT | Performed by: SURGERY

## 2022-08-23 RX ORDER — DOCUSATE SODIUM 100 MG/1
100 CAPSULE, LIQUID FILLED ORAL DAILY
Status: DISCONTINUED | OUTPATIENT
Start: 2022-08-24 | End: 2022-08-23

## 2022-08-23 RX ORDER — POLYETHYLENE GLYCOL 3350 17 G/17G
17 POWDER, FOR SOLUTION ORAL DAILY PRN
Status: DISCONTINUED | OUTPATIENT
Start: 2022-08-23 | End: 2022-08-29 | Stop reason: SDUPTHER

## 2022-08-23 RX ADMIN — TAMSULOSIN HYDROCHLORIDE 0.4 MG: 0.4 CAPSULE ORAL at 22:04

## 2022-08-23 RX ADMIN — LISINOPRIL 40 MG: 20 TABLET ORAL at 09:59

## 2022-08-23 RX ADMIN — OXYCODONE AND ACETAMINOPHEN 2 TABLET: 5; 325 TABLET ORAL at 14:11

## 2022-08-23 RX ADMIN — PANTOPRAZOLE SODIUM 40 MG: 40 TABLET, DELAYED RELEASE ORAL at 07:45

## 2022-08-23 RX ADMIN — MIRTAZAPINE 30 MG: 15 TABLET, FILM COATED ORAL at 22:04

## 2022-08-23 RX ADMIN — OXYCODONE AND ACETAMINOPHEN 2 TABLET: 5; 325 TABLET ORAL at 04:10

## 2022-08-23 RX ADMIN — ASPIRIN 81 MG: 81 TABLET, COATED ORAL at 09:59

## 2022-08-23 RX ADMIN — INSULIN LISPRO 2 UNITS: 100 INJECTION, SOLUTION INTRAVENOUS; SUBCUTANEOUS at 12:28

## 2022-08-23 RX ADMIN — OXYCODONE AND ACETAMINOPHEN 1 TABLET: 5; 325 TABLET ORAL at 08:49

## 2022-08-23 RX ADMIN — ROSUVASTATIN 10 MG: 10 TABLET, FILM COATED ORAL at 09:59

## 2022-08-23 RX ADMIN — OXYCODONE AND ACETAMINOPHEN 2 TABLET: 5; 325 TABLET ORAL at 22:06

## 2022-08-23 RX ADMIN — OXYCODONE AND ACETAMINOPHEN 1 TABLET: 5; 325 TABLET ORAL at 09:58

## 2022-08-23 RX ADMIN — FINASTERIDE 5 MG: 5 TABLET, FILM COATED ORAL at 09:58

## 2022-08-23 RX ADMIN — SODIUM CHLORIDE, PRESERVATIVE FREE 10 ML: 5 INJECTION INTRAVENOUS at 22:07

## 2022-08-23 RX ADMIN — INSULIN LISPRO 2 UNITS: 100 INJECTION, SOLUTION INTRAVENOUS; SUBCUTANEOUS at 17:46

## 2022-08-23 ASSESSMENT — PAIN SCALES - GENERAL
PAINLEVEL_OUTOF10: 8
PAINLEVEL_OUTOF10: 6
PAINLEVEL_OUTOF10: 8
PAINLEVEL_OUTOF10: 8
PAINLEVEL_OUTOF10: 7
PAINLEVEL_OUTOF10: 6

## 2022-08-23 ASSESSMENT — PAIN DESCRIPTION - LOCATION
LOCATION: HIP
LOCATION: LEG

## 2022-08-23 ASSESSMENT — PAIN DESCRIPTION - DESCRIPTORS
DESCRIPTORS: ACHING
DESCRIPTORS: CRAMPING
DESCRIPTORS: SHOOTING

## 2022-08-23 ASSESSMENT — PAIN DESCRIPTION - ORIENTATION
ORIENTATION: RIGHT

## 2022-08-23 ASSESSMENT — PAIN SCALES - WONG BAKER: WONGBAKER_NUMERICALRESPONSE: 0

## 2022-08-23 NOTE — PLAN OF CARE
ARU PATIENT TREATMENT PLAN  [facility]  [address] [facility], [address]  [phone]      Edward Abbasi    : 1930  Acct #: [de-identified]  MRN: [id number]  PHYSICIAN:  Lorena Roberson MD  Primary Problem    Patient Active Problem List   Diagnosis    Pleural plaque    Shortness of breath    Benign essential HTN    Hematoma    Hyperlipidemia    DMII (diabetes mellitus, type 2) ([facility] [address]    Pain of right lower extremity due to ischemia    Edema of right lower leg    Cerebral amyloid angiopathy (CODE)    Atrial fibrillation (HCC)    Ischemia of right lower extremity    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 Cleo Cazares. ASA, crestor. Currently nonweightbearing. PT/OT     A. Fib: AC held 2/2 prior bleed. HTN: lisinopril 40     DM: SSI (home regimen metformin 1000 BID). Was not using lantus in CVU. Hypokalemia: supplemented     HTN: Lisinopril 40     BPH: proscar, flomax. Arellano in place 2/2 retention. VT in near future. ADMIT DATE:2022    Patient Goals: To return home with improved mobility. Admitting Impairments: Debility - 16 - Debility  Activities: Impaired Eating, Hygiene, Toileting, Bathing, Dressing, Bed Mobility, Transfers, Ambulation, Stairs, and Endurance. Participation: Prior to admission patient was living at home alone with family providing 24/7 supv/assist, was ambulating independently with a walker, but needed assistance with transfers and ADLs, was not an active .      CARE PLAN     NURSING:  Edward Abbasi while on this unit will:  [x] Be continent of bowel and bladder     [x] Have an adequate number of bowel movements  [x] Urinate with no urinary retention >300ml in bladder  [x] Complete bladder protocol with arellano removal  [] Maintain O2 SATs at ___%  [x] Have pain managed while on ARU       [x] Be pain free by discharge   [x] Have no skin breakdown while on ARU  [x] Have improved skin integrity via wound measurements  [] Have no signs/symptoms of infection at the wound site  [x] Be free from injury during hospitalization   [x] Complete education with patient/family with understanding demonstrated for:  [x] Adjustment   [] Other:     Nursing Interventions will include:  [x] bowel/bladder training   [x] education for medical assistive devices   [x] medication education   [] O2 saturation management   [x] energy conservation   [] stress management techniques   [x] fall prevention   [x] alarms protocol   [] seating and positioning   [x] skin/wound care   [] pressure relief instruction   [x] dressing changes     [x] infection protection   [x] DVT prophylaxis  [x] assistance with in room safety with transfers to bed, toilet, wheelchair, shower   [x] bathroom activities and hygiene  [] Other:    Patient/Caregiver Education for:  [] Disease/sustained injury/management     [x] Medication Use  [] Surgical intervention  [x] Safety  [] Body mechanics and or joint protection  [x] Health maintenance     [] Other:     PHYSICAL THERAPY:  Goals:        Patient Goals:  Return home with improved mobility   Short Term Goals:  Time Frame: 1.5 wks   Pt able to perform bed mobility modified independence. Pt able to perform sliding board transfers with minimal assistance. Pt able to perform stand pivot transfers using grab bar with moderate assistance. Pt able to navigate a wc 150 ft mod I.    Long Term Goals: Time Frame 3 wks  Pt able to perform sliding board transfers with SBA  Pt able to perform stand pivot transfers using grab bar with SBA  Pt able to perform a car transfer with SBA. Will add additional goals for ambulation if indicated based on WB restriction progression.                 These goals were reviewed with this patient at the time of assessment and Gely Watt is in agreement. Plan of Care: Pt to be seen 5 out of 7 days per week per ARU protocol ( 90 minutes with PT)                     OCCUPATIONAL THERAPY:  Goals:             Patient goals: not stated   Short Term Goals: To be completed in: 2 weeks  Patient will complete upper body ADL at supervision   Patient will complete lower body ADL at minimal assistance   Patient will complete toileting at minimal assistance   Patient will complete grooming at supervision   Patient will complete functional transfers at minimal assistance     Long Term Goals: To be completed in: 3 weeks   Patient will complete upper body ADL at modified independent   Patient will complete lower body ADL at supervision   Patient will complete toileting at stand by assistance   Patient will complete functional transfers at supervision   Patient will complete functional mobility at stand by assistance   Patient to gather and transport IADL items at stand by assistance       These goals were reviewed with this patient at the time of assessment and Gely Watt is in agreement    Plan of Care:  Pt to be seen 5 out of 7 days per week per ARU protocol ( 90 minutes with OT)    Therapy Treatments will include:  [x]  therapeutic exercises    []  gait training     []  neuromuscular re-ed                            [x]  transfer training             [] community reintegration    [x] bed mobility                          [x]  w/c mobility and training  [x]  self care    []home mgmt    []  cognitive training            []  energy conservation        []  dysphagia tx    []  speech/language/communication therapy   []  group therapy    [x]  patient/family education    [] Other:    CASE MANAGEMENT:  Goals:   Assist patient/family with discharge planning, patient/family counseling, and coordination with insurance during ARU stay.        Gely Watt will be seen a minimum of 3 hours of therapy per day, a minimum of 5 out of 7 days per week  (please see above for specific treatment plan per PT/OT/SLP). [] In this rare instance due to the nature of this patient's medical involvement, this patient will be seen 15 hours per week (900 minutes within a 7 day period). In addition, dietician/nutritionist may monitor calorie count as well as intake and collaboratively work with SLP on dietary upgrades. Neuropsychology/Psychology may evaluate and provide necessary support. Medical issues being managed closely and that require 24 hour availability of a physician:  [] Swallowing Precautions  [x] Bowel/Bladder Fx  [x] Weight bearing precautions  [x] Wound Care    [x] Pain Mgmt   [x] Infection Protection  [x] DVT Prophylaxis   [x] Fall Precautions  [x] Fluid/Electrolyte/Nutrition Balance  [] Voice Protection   [x] Respiratory  [] Other:    Medical Prognosis: [] Good  [x] Fair    [] Guarded   Total expected IRF days: 21  Anticipated discharge destination: Home  [] Home Independently   [x] Home with supervision    []SNF     [] Other                                           Physician anticipated functional outcomes:  By discharge, patient will progress to requiring only SBA-Supervision for all mobility and activities. IPOC brief synthesis: 80-year-old male with a history of A. fib previously on Xarelto but stopped secondary to a head bleed, cerebral amyloid angiopathy, diabetes who was admitted on 8/12 with right leg pain. He was transferred from [facility] to this facility on 8/18 for surgery with Dr. Lani Betancur. He underwent a right femoral to posterior tibial bypass on 8/19. His postoperative course has been overall unremarkable. He was admitted with the above goal.        I have reviewed this initial plan of care and agree with its contents:    Title   Name    Date    Time    Physician: Electronically signed by Isaiah Elkins MD on 8/25/2022 at 9:58 AM    Case Mgmt: OPAL Durant, LAUREN, [email or web address].     OT: Cami Rea MOT OTR/L 8/23/22 1547    PT: Mindy Maloney, [address], 8/23/22, 1611    RN: Shanna Sanchez RN  8/23/22  0278    :  Asher Bates PT, DPT [id number]  8/24/22  10:55 AM

## 2022-08-23 NOTE — PROGRESS NOTES
[facility] Department   Phone: [phone]    Physical Therapy    [x] Initial Evaluation            [] Daily Treatment Note         [] Discharge Summary      Patient: Keith Lilly   : 1930   MRN: [id number]   Date of Service:  2022  Admitting Diagnosis: Peripheral artery disease [facility])  Current Admission Summary: 80-year-old male with a history of A. fib previously on Xarelto but stopped secondary to a head bleed, cerebral amyloid angiopathy, diabetes who was admitted on  with right leg pain. He was transferred from [facility] to this facility on  for surgery with Dr. Kwesi Ruiz. He underwent a right femoral to posterior tibial bypass on . His postoperative course has been overall unremarkable. He was evaluated by therapy and suggested to continue in an inpatient setting prior to returning home. He reports his pain is controlled. He is questioning why he is nonweightbearing in the right lower extremity. Past Medical History:  has a past medical history of Afib ([facility] [address] Cerebral amyloid angiopathy ([facility] [address] Diabetes mellitus ([facility] [address] Heart aneurysm, and Vertigo. Past Surgical History:  has a past surgical history that includes Appendectomy; hernia repair; Lung surgery; Testicle surgery; and Femoral-tibial Bypass Graft (Right, 2022). Discharge Recommendations: Home with HHPT   DME Required For Discharge: DME to be determined pending patient progress  Precautions/Restrictions: high fall risk  Weight Bearing Restrictions: non weight bearing until POD #8 (22) per Dr. Kwesi Ruiz note (will progress at indicated)  [] Right Upper Extremity  [] Left Upper Extremity [x] Right Lower Extremity  [] Left Lower Extremity     Per dtg report, pt is WBAT on LLE but hasn't had any therapy since being out of boot.      Required Braces/Orthotics: no braces required   [] Right  [] Left  Positional Restrictions:no positional restrictions    Pre-Admission Information   Lives With: alone, . Comment: daughters take turns weekly 24/7 to stay with him since achilles injury in June                      Type of Home: house  Home Layout: one level  Home Access: level entry with ramp   Bathroom Layout: walker accessible, walk in shower  (not wc accessible)  Bathroom Equipment: grab bars in shower, tub transfer bench, hand held shower head, toilet raiser, BSC  Toilet Height: standard height  Home Equipment: rolling walker, transport chair, bed railing . Comment: typically uses 3 wheel walker   Transfer Assistance: Independent without use of device; Since June performing pivot transfers with CGA using grab bars  Ambulation Assistance:Independent with 3WW, since achilles injury not walking but only transferring via stand pivots with CGA; was unable to propel transport chair himself  ADL Assistance: Typically independent with ADLs prior to achilles injury;  Since June, requires assistance with sponge bathing, requires assistance with dressing, requires assistance with grooming, requires assistance with toileting  IADL Assistance: requires assistance with all homemaking tasks   Active :        [] Yes                 [x] No  Hand Dominance: [] Left                 [x] Right  Current Employment: retired. Occupation:  at [employer]: [address]: 1 fall leading to ruptured achilles in June and was NWB and then in a boot. Pt had not started any PT for his Left achilles prior to current hospitalization. Examination   Vision:   Vision Corrective Device: wears glasses for reading  Hearing:   hard of hearing  Observation:   General Observation:  Left shoulder anterior and elevated, left lateral lean to un-weight right hip   Posture:   Fwd head and rounded shoulders, downward gaze;  thoracic kyphosis  Sensation:   WFL  Proprioception:    diminished proprioception in (L) LE  Tone:   Normotonic    ROM:   LLE functional except ankle DF to neutral only. RLE functional except limited ankle mobility (passively not tested due to pain). Strength:   RLE not tested due to recent surgery and NWB status. LLE hip flexion, quad/HS 4-/5, ankle 3+/5  (observable gluteal atrophy when standing). Decision Making: low complexity  Clinical Presentation: stable      Subjective  General: Supine in bed at arrival. Pleasant and agreeable. Dtg present. Pain: 8/10. Location: right hip/leg  Pain Interventions: RN notified of patient request for pain medication  Nsg arrived and administered medication. Functional Mobility  Bed Mobility  Supine to Sit: contact guard assistance  Sit to Supine: moderate assistance  Rolling Left: stand by assistance  Rolling Right: stand by assistance  Scooting: minimal assistance  Comments:HOB flat and use of railings, cues for NWB on RLE  Transfers  Sit to stand transfer: 2 person assistance with mod + max A using anterior bar   Stand to sit transfer: 2 person assistance with mod + max A   Slide board transfer: 2 person assistance with max + mod A   Bed to chair transfer: 2 person assistance with max + mod A   Comments: Pt stood from wc using anterior bar with assistance to maintain NWB on RLE, cues at glut, and input during transfer to maintain LLE on floor. Pt stood with L knee and trunk flexed with L lateral lean (max A of 2 to maintain). Gluteal atrophy on L. SB transfer bed to wc with min A of 2 (going downhill) but max cues to technique and assist to keep RLE off ground. SB wc to recliner with increased assistance needed and difficulty following sequencing cues for fwd lean, hand placement, and pushing through UEs and LLE. Ambulation  Ambulation not tested on this date. Distance:   Gait Mechanics:   Comments:  Unsafe to attempt due to difficulty with STS and maintaining NWB on RLE. Recent L achilles sx/repair. Stair Mobility  Stair mobility not completed on this date.   Comments:unsafe to attempt   Wheelchair Mobility:  Chair: manual  Surface: level surface  Method: (R) UE and (L) UE  Distance: 270 ft  Assistance: contact guard assistance  Comments: Cues for technique, included turns. Balance  Static Sitting Balance: fair (-): maintains balance at CGA with use of UE support  Dynamic Sitting Balance: fair (-): maintains balance at min A with use of UE support  Static Standing Balance: poor (-): requires max (A) to maintain balance  Dynamic Standing Balance: poor (-): requires max (A) to maintain balance  Comments: Unsafe to attempt item retrieval from floor level    Other Therapeutic Interventions  Seated ADL in  in bathroom with OT assistance. Max A for pants management in standing at anterior grab bar. 2nd session:  Pt in recliner at arrival. 8/10 pain with recent medication. SB transfer recliner to ,  to/from mat table with max + mod A. Max cues and assist for placement of board and technique for sliding. Practices static SB technique of mat table focusing on anterior lean, pushing through UEs and LLE x 3 reps but still needed max + mod A to unwt hips partially for scoot. 2 STS transfers at ballet bar from wc level with max + mod A of 2 (assist to maintain RLE NWB). Stood ~15 seconds each with left lateral lean, flexed L knee and trunk. PT/OT assisted with transfer back to bed using STEDY with PT holding RLE off of platform to maintain NWB status (hip and knee flexion) and OT assisting with stand (max + mod ). Pt was able to maintain NWB with PT assistance. Partial stand within STEDY pt maintained NWB without PT assistance and stood with minimal assistance. Recommending nsg use maximove/modesto for transfers until SB or pivot transfers improved. Sit to supine with mod A.      Functional Outcomes                 Cognition  Overall Cognitive Status: Impaired  Arousal/Alterness: appropriate responses to stimuli  Following Commands: follows one step commands consistently  Attention Span: appears intact  Memory: decreased recall of recent events  Safety Judgement: decreased awareness of need for assistance, decreased awareness of need for safety  Problem Solving: assistance required to generate solutions, assistance required to implement solutions  Insights: decreased awareness of deficits  Initiation: requires cues for some  Sequencing: requires cues for some  Orientation:    alert and oriented x 4  Command Following:   accurately follows one step commands    Education  Barriers To Learning: hearing  Patient Education: patient educated on goals, PT role and benefits, plan of care, weight-bearing education, functional mobility training, transfer training  Learning Assessment:  patient verbalizes understanding, would benefit from continued reinforcement    Assessment  Activity Tolerance: Limited by fatigue and pain   Impairments Requiring Therapeutic Intervention: decreased functional mobility, decreased ADL status, decreased ROM, decreased strength, decreased safety awareness, decreased endurance, decreased balance, increased pain, decreased posture  Prognosis: good  Clinical Assessment: At baseline pt was independent living alone. Pt has been recovering from a recent Left achilles tear/repair in June 2022 but had not started Physical therapy after being cleared for WBAT out of his boot prior to re-hospitalization for current surgery. Pt was NWB on LLE for several weeks per report. Pt is now NWB on RLE with increased pain since surgery and has residual weakness in his LLE. Currently needing 2 people to standing and maintain new restrictions. Initiated sliding board transfers but pt having difficulty sequencing technique to complete safely and efficiently. Continued skilled PT to promote return towards PLOF.     Safety Interventions: patient left in chair, chair alarm in place, and call light within reach    Plan  Frequency: 5 x/week, 90 min/day  Current Treatment Recommendations: strengthening, ROM, balance training, functional mobility training, transfer training, endurance training, and wheelchair mobility training    Goals  Patient Goals:  Return home with improved mobility   Short Term Goals:  Time Frame: 1.5 wks   Pt able to perform bed mobility modified independence. Pt able to perform sliding board transfers with minimal assistance. Pt able to perform stand pivot transfers using grab bar with moderate assistance. Pt able to navigate a wc 150 ft mod I.    Long Term Goals: Time Frame 3 wks  Pt able to perform sliding board transfers with SBA  Pt able to perform stand pivot transfers using grab bar with SBA  Pt able to perform a car transfer with SBA. Will add additional goals for ambulation if indicated based on WB restriction progression.        Therapy Session Time      Individual Group Co-treatment   Time In     0830   Time Out     5839   Minutes     59     Second Session Therapy Time:   Individual Concurrent Group Co-treatment   Time In       5708   Time Out       4853   Minutes       38     Timed Code Treatment Minutes:  87    Total Treatment Minutes:  736    Electronically Signed By: Guido Asencio, [id number]

## 2022-08-23 NOTE — PROGRESS NOTES
Admission Period/Goal QM Codes for Keith Lilly. QM Admit Code Goal Code   Eating 5 6   Oral Hygiene 4 5   Toileting Hygiene 1 4   Shower/Bathing 3 4   UB Dressing 4 5   LB Dressing 1 4   Putting on/off Footwear 1 5   Rolling Left and Right 3 6   Sit To Lying 3 6   Lying to Sitting on Bedside 3 6   Sit to Stand 1 4   Chair/Bed to Chair Transfer 1 4   Toilet Transfers 88 4   Car Transfers 88 4   Walk 10 Feet 88 4   Walk 50 Feet with Two Turns 88 4   Walk 150 Feet 88 4   Walk 10 Feet on Uneven Surfaces 88 4   1 Step (Curb) 88 3   4 Steps 88 3   12 Steps 88 3   Picking up Object from Floor 88 3   Wheel 50 Feet with 2 Turns 4 6   Type Manual Manual   Wheel 150 Feet 4 6   Type Manual Manual       Following discussion at the Quality Measure Huddle, the above codes were determined to be the patient's usual performance at admission.     OT:  Kat Soto, OTR/L #[id number], 8/25/2022, 1515     PT:  Omayra Sung PT, DPT - [id number], 8/26/2022, 725     RN:  Jeronimo Cheng BSN, CRRN 8/25/22 1200     :  Yolanda Hill PT, DPT [id number]  8/26/22  10:37 AM

## 2022-08-23 NOTE — PROGRESS NOTES
Urology Progress Note  [facility]    Provider: Virgia Mcburney, APRN - CNP  Patient ID:  Admission Date: 2022 Name: Lamar Bruner  OR Date: 2022 MRN: [id number]   Patient Location: [id number] : 1930  Attending: Gerhardt Coke, MD Date of Service: 2022  PCP: Silviano Kirkland     Diagnoses:  BPH  Retention    Assessment/Plan:  79 yo male with hx of BPH and very remote history of TUR of prostate, on Flomax daily. He is POD 3 right fem repair and right fem bybass. He failed a voiding trial yesterday post operatively. Recommendations:  Continue Flomax  Start Finasteride  OOB as tolerated  Further rehabilitation prior to VT    The patient had a chance to ask questions which were answered. he understands the above plan. Subjective:   Lamar Bruner is a 80 y.o. male. He was seen and examined this morning. Today we discussed Further rehabilitation and finasteride prior to VT. Objective:   Vitals:  Vitals:    22 0945   BP:    Pulse: (!) 114   Resp: 16   Temp: 98 °F (36.7 °C)   SpO2: 98%       Intake/Output Summary (Last 24 hours) at 2022 1017  Last data filed at 2022 0418  Gross per 24 hour   Intake --   Output 800 ml   Net -800 ml     Physical Exam:  Gen: Alert and oriented x3, no acute distress  CV: Regular rate   Resp: unlabored respirations  Abd: Soft, non-distended, non-tender, no masses  Ext: no peripheral edema noted, moves upper and lower extremities spontaneously  Skin: warmand well perfused, no rashes noted on the face, or arms.      Labs:  Lab Results   Component Value Date    WBC 13.1 (H) 2022    HGB 11.9 (L) 2022    HCT 35.8 (L) 2022    MCV 91.8 2022     2022     Lab Results   Component Value Date    CREATININE <0.5 (L) 2022    BUN 6 (L) 2022     2022    K 3.7 2022     2022    CO2 24 2022       Virgia Mcburney, APRN - CNP   2022

## 2022-08-23 NOTE — H&P
Patient: Emerson Selby  [id number]  Date: 8/23/2022      Chief Complaint: Right leg pain     History of Present Illness/Hospital Course:  61-year-old male with a history of A. fib previously on Xarelto but stopped secondary to a head bleed, cerebral amyloid angiopathy, diabetes who was admitted on 8/12 with right leg pain. He was transferred from [address] [place] [address] to this facility on 8/18 for surgery with Dr. Krupa Logan. He underwent a right femoral to posterior tibial bypass on 8/19. His postoperative course has been overall unremarkable. He was evaluated by therapy and suggested to continue in an inpatient setting prior to returning home. He reports his pain is controlled. He is questioning why he is nonweightbearing in the right lower extremity. Prior Level of Function:  Independent     Current Level of Function:  Total assist     has a past medical history of Afib ([facility] [address] Cerebral amyloid angiopathy ([facility] [address] Diabetes mellitus ([facility] [address] Heart aneurysm, and Vertigo. has a past surgical history that includes Appendectomy; hernia repair; Lung surgery; Testicle surgery; and Femoral-tibial Bypass Graft (Right, 8/19/2022). reports that he has been smoking cigars. He uses smokeless tobacco. He reports that he does not drink alcohol and does not use drugs.     family history is not contributory    Allergies: Sulfa antibiotics and Pcn [penicillins]    Current Facility-Administered Medications   Medication Dose Route Frequency Provider Last Rate Last Admin    polyethylene glycol (GLYCOLAX) packet 17 g  17 g Oral Daily PRN Nader Torre MD        [START ON 8/24/2022] docusate sodium (COLACE) capsule 100 mg  100 mg Oral Daily Nader Torre MD        acetaminophen (TYLENOL) tablet 650 mg  650 mg Oral Q4H PRN Nader Torre MD   650 mg at 08/22/22 2056    mirtazapine (REMERON) tablet 30 mg  30 mg Oral Nightly Nader Torre MD   30 mg at 08/22/22 2057    rosuvastatin (CRESTOR) tablet 10 mg  10 mg Oral Daily Tessa Khan MD Jeimy   10 mg at 08/23/22 0959    tamsulosin (FLOMAX) capsule 0.4 mg  0.4 mg Oral Nightly Kylah Alegria MD   0.4 mg at 08/22/22 2059    pantoprazole (PROTONIX) tablet 40 mg  40 mg Oral QAM AC Kylah Alegria MD   40 mg at 08/23/22 0745    glucagon (rDNA) injection 1 mg  1 mg SubCUTAneous PRN Kylah Alegria MD        dextrose 10 % infusion   IntraVENous Continuous PRN Kylah Alegria MD        sodium chloride flush 0.9 % injection 5-40 mL  5-40 mL IntraVENous 2 times per day Kylah Alegria MD   10 mL at 08/22/22 2059    insulin lispro (HUMALOG) injection vial 0-8 Units  0-8 Units SubCUTAneous TID WC Kylah Alegria MD        insulin lispro (HUMALOG) injection vial 0-4 Units  0-4 Units SubCUTAneous Nightly Kylah Alegria MD        cloNIDine (CATAPRES) tablet 0.1 mg  0.1 mg Oral Q4H PRN Kylah Alegria MD        aspirin EC tablet 81 mg  81 mg Oral Daily Kylah Alegria MD   81 mg at 08/23/22 0959    oxyCODONE-acetaminophen (PERCOCET) 5-325 MG per tablet 1 tablet  1 tablet Oral Q4H PRN Kylah Alegria MD   1 tablet at 08/23/22 7302    Or    oxyCODONE-acetaminophen (PERCOCET) 5-325 MG per tablet 2 tablet  2 tablet Oral Q4H PRN Kylah Alegria MD   2 tablet at 08/23/22 0410    lisinopril (PRINIVIL;ZESTRIL) tablet 40 mg  40 mg Oral Daily Kylah Alegria MD   40 mg at 08/23/22 0959    finasteride (PROSCAR) tablet 5 mg  5 mg Oral Daily Kylah Alegria MD   5 mg at 08/23/22 0659    diphenhydrAMINE (BENADRYL) tablet 25 mg  25 mg Oral Q6H PRN Kylah Alegria MD        hydrALAZINE (APRESOLINE) tablet 50 mg  50 mg Oral Q8H PRN Kylah Alegria MD        ondansetron (ZOFRAN-ODT) disintegrating tablet 4 mg  4 mg Oral Q8H PRN Kylah Alegria MD           REVIEW OF SYSTEMS:   CONSTITUTIONAL: negative for fevers, chills, diaphoresis, appetite change, night sweats and unexpected weight change. EYES: negative for blurred vision, eye discharge, visual disturbance and icterus.    HEENT: negative for hearing loss, tinnitus, ear drainage, sinus pressure, nasal congestion, and epistaxis. RESPIRATORY: Negative for hemoptysis, cough, sputum production. CARDIOVASCULAR: negative for chest pain, palpitations, exertional chest pressure/discomfort, edema, syncope. GASTROINTESTINAL: negative for nausea, vomiting, diarrhea, constipation, blood in stool and abdominal pain. GENITOURINARY: negative for frequency, dysuria, urinary incontinence, decreased urine volume, and hematuria. HEMATOLOGIC/LYMPHATIC: negative for easy bruising, bleeding and lymphadenopathy. ALLERGIC/IMMUNOLOGIC: negative for recurrent infections, angioedema, anaphylaxis and drug reactions. ENDOCRINE: negative for weight changes and diabetic symptoms including polyuria, polydipsia and polyphagia. MUSCULOSKELETAL: positive for pain, decreased range of motion and muscle weakness. NEUROLOGICAL: negative for headaches, slurred speech, unilateral weakness. PSYCHIATRIC/BEHAVIORAL: negative for hallucinations, behavioral problems, confusion and agitation. All pertinent positives are noted in the HPI. Physical Examination:  Vitals: Patient Vitals for the past 24 hrs:   BP Temp Temp src Pulse Resp SpO2 Height Weight   08/23/22 0945 -- 98 °F (36.7 °C) -- (!) 114 16 98 % -- --   08/23/22 0007 (!) 168/81 98 °F (36.7 °C) Oral 96 16 -- -- --   08/22/22 2300 (!) 161/81 -- -- -- -- -- -- --   08/22/22 2257 -- -- -- -- -- -- 6' (1.829 m) 163 lb 9 oz (74.2 kg)     Psych: Stable mood, normal judgement, normal affect   Const: No distress  Eyes: Conjunctiva noninjected, no icterus noted; pupils equal, round. HENT: Atraumatic, normocephalic; Oral mucosa moist  Neck: Trachea midline, neck supple. No thyromegaly noted. CV: Regular rate and rhythm, no murmur rub or gallop noted  Resp: Lungs clear to auscultation bilaterally, no rales wheezes or ronchi, no retractions. Respirations unlabored. GI: Soft, nontender, nondistended. Normal bowel sounds.  No palpable masses. Neuro: Alert, oriented, appropriate. No cranial nerve deficits appreciated. Sensation intact to light touch. Motor examination reveals normal strength in BUE/LLE diffusely. RLE deferred. Reflexes normal and symmetric. Skin: Normal temperature and turgor. RLE in ACE dressing  MSK: No joint abnormalities noted. RLE toes warm, good PT pulse  Ext: 1+ LLE edema appreciated. No varicosities. Lab Results   Component Value Date    WBC 13.1 (H) 08/22/2022    HGB 11.9 (L) 08/22/2022    HCT 35.8 (L) 08/22/2022    MCV 91.8 08/22/2022     08/22/2022     Lab Results   Component Value Date    INR 1.27 (H) 08/21/2022    INR 1.02 08/18/2022    INR 1.27 (H) 08/17/2022    PROTIME 15.8 (H) 08/21/2022    PROTIME 13.3 08/18/2022    PROTIME 15.9 (H) 08/17/2022     Lab Results   Component Value Date    CREATININE <0.5 (L) 08/23/2022    BUN 6 (L) 08/23/2022     08/23/2022    K 3.7 08/23/2022     08/23/2022    CO2 24 08/23/2022     Lab Results   Component Value Date    ALT 24 08/12/2022    AST 34 08/12/2022    ALKPHOS 73 08/12/2022    BILITOT 0.6 08/12/2022       MMost recent imaging studies revealed   EXAMINATION:   TWO XRAY VIEWS OF THE CHEST       8/15/2022 10:57 am       COMPARISON:   Chest x-ray dated 20 July 2015       HISTORY:   ORDERING SYSTEM PROVIDED HISTORY: preop   TECHNOLOGIST PROVIDED HISTORY:   Reason for exam:->preop   Reason for Exam: preop       FINDINGS:   Mild bibasilar airspace opacities. No pneumothorax or pleural effusion. Calcified aortic arch. No cardiomegaly. Impression   Mild bibasilar airspace opacities. This could represent atelectasis or   pneumonia in the appropriate clinical setting         The above laboratory data have been reviewed. The above imaging data have been reviewed. The above medical testing have been reviewed. Body mass index is 22.18 kg/m².     Barriers to Discharge: Weightbearing restrictions, pain, decreased safety, ADLs    Disposition: Home    Prognosis: Good    Rehabilitation goals: To return patient to home setting at their prior level of function. POST ADMISSION PHYSICIAN EVALUATION  The patient has agreed to being admitted to our comprehensive inpatient  rehabilitation facility consisting of at least 180 minutes of therapy a day,  5 out of 7 days a week. The patient/family has a good understanding of our discharge process. The  patient has potential to make improvement and is in need of at least two of  the following multidisciplinary therapies including but not limited to  physical, occupational, respiratory, and speech, nutritional services, wound care, and prosthetics and orthotics. Given the patients complex condition  and risk of further medical complications, rehabilitation services cannot be  safely provided at a lower level of care such as a skilled nursing facility. I have compared the patients medical and functional status at the time of the  preadmission screening and the same on this date, and there are no significant changes except as documented below in the assessment and plan. By signing this document, I acknowledge that I have personally performed a  full physical examination on this patient within 24 hours of admission to  this inpatient rehabilitation facility and have determined the patient to be  able to tolerate the above course of treatment at an intensive level for a  reasonable period of time. I will be completing a detailed individualized  Plan of Care for this patient by day four of the patients stay based upon the  Preadmission Screen, this Post-Admission Evaluation, and the therapy  evaluations. Assessment and Plan:  PAD: S/p right femoral to posterior tibial bypass on 8/19 with Dr. Margarete Bamberger. ASA, crestor. Currently nonweightbearing. PT/OT    A. Fib: AC held 2/2 prior bleed. HTN: lisinopril 40    DM: SSI (home regimen metformin 1000 BID). Was not using lantus in CVU. Hypokalemia: supplemented    HTN: Lisinopril 40    BPH: proscar, flomax. Parker in place 2/2 retention. VT in near future. Bowels: Per protocol  Bladder: Per protocol   Sleep: remeron scheduled  Pain: tylenol, maylin PRN   DVT PPx: SCD  ELOS: 14-17    Kishor Hoyos MD 8/23/2022, 10:07 AM     * This document was created using dictation software. While all precautions were taken to ensure accuracy, errors may have occurred. Please disregard any typographical errors.

## 2022-08-23 NOTE — PROGRESS NOTES
Nutrition Note    RECOMMENDATIONS  PO Diet: CCC  ONS: Glucerna BID     NUTRITION ASSESSMENT   Pt reports has a fairly good appetite but does not always like the hospital food. PO intake 51-75% of meals thus far on a CCC diet. Pt states UBW is 168 lb but believes he lost 10 lb from COVID x 2 months ago. Admission wt = 163.9 lb. Pt is agreeable to receiveing Glucerna BID to help maintain nutrition status & promote wound healing. Will monitor for consistently adequate po & supp intake. Nutrition Related Findings: +1 RLE & trace LLE edema. LBM 8/22  Wounds: Surgical Incision  Nutrition Education:  Education not indicated   Nutrition Goals: PO intake 50% or greater     MALNUTRITION ASSESSMENT   Acute Illness  Malnutrition Status: No malnutrition    NUTRITION DIAGNOSIS   Increased nutrient needs related to increase demand for energy/nutrients as evidenced by other (comment) (increased protein for healing s/p surgery)      CURRENT NUTRITION THERAPIES  ADULT DIET; Regular; 5 carb choices (75 gm/meal)     PO Intake: 51-75%   PO Supplement Intake:None Ordered    ANTHROPOMETRICS  Current Height: 6' (182.9 cm)  Current Weight: 163 lb 9 oz (74.2 kg)    Ideal Body Weight (IBW): 178 lbs  (81 kg)    Usual Bodyweight 168 lb (76.2 kg)       BMI: 22.1      COMPARATIVE STANDARDS  Energy (kcal):  1850- 2220     Protein (g):  89- 111g       Fluid (mL/day):  1 ml/kcal    The patient will be monitored per nutrition standards of care. Consult dietitian if additional nutrition interventions are needed prior to RD reassessment.      Chente Hutson RD, LD    Contact: [phone]

## 2022-08-23 NOTE — PROGRESS NOTES
VASCULAR    Now on ARU. Foot well perfused with palpable graft pulse. PT/OT as tolerated BUT NWB R foot until POD#8 to protect ankle anastomosis. Will follow.     Jeannette Schmitz

## 2022-08-23 NOTE — PROGRESS NOTES
Transferred the pt to room 4902 to use a maxi modesto as needed. The pt and his daughter are informed.

## 2022-08-23 NOTE — DISCHARGE INSTRUCTIONS
We hope your stay on rehab has exceeded your expectations. Once again the entire Acute Rehab Staff at [facility] wish to thank you for allowing us the privilege to care for you. A few days after you are discharged from Rehab, you will receive a survey [employer]) in the mail. This is a nationally distributed survey sent to thousands of rehab patients throughout the nation. It is very important to the staff and Dr. Niyah Winters to receive feedback based on your experience on the Rehab Unit. Thank you, we wish you good health always,         Acute Rehab Team      Hospital Preference:     [facility] [address] Diagnosis/Conditions    _______________________ (free text)    Emergency Contact:    ________________________________________Phone#________________________      Advanced Directives:    Code Status: ?  [x]  Full Code  ? []  DNR  ? []  [facility]  ? []  [facility]    (as of date of discharge:  _________)      Medical POA: ?  []   Yes ______________________________ ?   []   No                                       (Name and phone number)                     Living Will:   ?   []   Yes    ?  []   No        Insurance Information:    _______________________ (free text)      Individual Responsible  for the coordination of the discharge/follow up:    ______________________________________________________    Functional Status:    VISUAL DEFICITS:    Yes []  No  [x]       If yes, assisted device:   Wears Glasses Yes []  No  []  Wears Contacts  Yes []  No  []  Legally Blind Yes []  No  []    HEARING DEFICITS:    Yes [x]  No  []       If yes, assisted device:   Wears Hearing Aids Yes []  No  [x]  Pocket Talker  Yes []  No  []       Physical Therapist & Contact #: Pinky Jade PT, DP - [phone]  [address] #: Anastasia Nye OTR/L [phone]  Speech Therapist & Contact #:       Activities of Daily Living:     ADL's - Adaptive Equipment used: tub transfer bench, grab bars (reacher/shower), reacher, sock aid, bed side commode (placed over toilet)      []  Independent ? []  Modified Independent ? [x]  Supervision                [x]  Minimal Assistance ? []  Moderate Assistance ? []  Maximal Assistance   SUP for upper body ADL tasks; pt will require minimal assist with lower body tasks such as dressing, bathing and for thorough olive hygiene; Continue to encourage patient participation to fullest abilities throughout all ADLs to continue to progress patients independence and functional strength and activity tolerance. Driving Restriction:      [x]  YES   [] NO     Mobility:     Ambulation: Device: 2 Wheel [employer]     []  Independent ? []  Modified Independent ? [x]  Supervision/Contact Guard Assist                []  Minimal Assistance ? []  Moderate Assistance ? []  Maximal Assistance     Stairs:      Number of stairs: 12    Handrails:    [] Right   [] Left      [x] Bilateral   []  Independent ? []  Modified Independent ? [x]  Supervision/Contact Guard Assist                []  Minimal Assistance ? []  Moderate Assistance ? []  Maximal Assistance       Current Diet Consistency:?       [x]  Regular   [] Soft and Bite-Sized  ? [] Minced and Moist     ?[]  Puree     Current Liquid Level:?         [x] Thin Liquids     [] Mildly Thick (Nectar) ?     [] Moderately Thick (Honey)    [] Pudding Thick    [] Joshua Junaid Water Protocol     Dietary Restrictions:?      []  General Diet   []  Tube Feed, w/ Diet   []  Tube Feed, NPO   [x]  Carb Control   []  Cardiac   []  Renal    []  Other:

## 2022-08-23 NOTE — PROGRESS NOTES
[facility] Department   Phone: [phone]    Occupational Therapy    [x] Initial Evaluation            [] Daily Treatment Note         [] Discharge Summary      Patient: Queen Mariana   : 1930   MRN: [id number]   Date of Service:  2022    Admitting Diagnosis:  Peripheral artery disease [facility])  Current Admission Summary:   72-year-old male with a history of A. fib previously on Xarelto but stopped secondary to a head bleed, cerebral amyloid angiopathy, diabetes who was admitted on  with right leg pain. He was transferred from [facility] to this facility on  for surgery with Dr. Hortencia Ortiz. He underwent a right femoral to posterior tibial bypass on . His postoperative course has been overall unremarkable. He was evaluated by therapy and suggested to continue in an inpatient setting prior to returning home. He reports his pain is controlled. He is questioning why he is nonweightbearing in the right lower extremity. Past Medical History:  has a past medical history of Afib ([facility] Cerebral amyloid angiopathy ([facility] Diabetes mellitus ([facility] Heart aneurysm, and Vertigo. Past Surgical History:  has a past surgical history that includes Appendectomy; hernia repair; Lung surgery; Testicle surgery; and Femoral-tibial Bypass Graft (Right, 2022). Discharge Recommendations: Home with 24 hour supervision and assist and HHOT    DME Required For Discharge: DME to be determined pending patient progress wheelchair     Precautions/Restrictions: medium fall risk, weight bearing  Weight Bearing Restrictions: non weight bearing  [] Right Upper Extremity  [] Left Upper Extremity [x] Right Lower Extremity  [] Left Lower Extremity     Required Braces/Orthotics: no braces required   [] Right  [] Left  Positional Restrictions:no positional restrictions    Pre-Admission Information   Lives With: alone, .   Comment: daughters take turns weekly  to stay with him since achilles injury                       Type of Home: house  Home Layout: one level  Home Access: level entry with ramp   Bathroom Layout: walker accessible, walk in shower  (not wc accessible)  Bathroom Equipment: grab bars in shower, tub transfer bench, hand held shower head, toilet raiser, BSC  Toilet Height: standard height  Home Equipment: rolling walker, transport chair, bed railing . Comment: typically uses 3 wheel walker   Transfer Assistance: Independent without use of device  Ambulation Assistance:Independent with 3WW, since achilles injury not walking but only transferring via stand pivots with CGA; was unable to propel transport chair himself  ADL Assistance: Typically independent with ADLs prior to achilles injury;  requires assistance with sponge bathing, requires assistance with dressing, requires assistance with grooming, requires assistance with toileting  IADL Assistance: requires assistance with all homemaking tasks  Active :        [] Yes                 [x] No  Hand Dominance: [] Left                 [x] Right  Current Employment: retired. Occupation:  at [employer]: [address]: 1 fall leading to ruptured achilles in June and was NWB and then in a boot    Examination   Vision:   Vision Corrective Device: wears glasses for reading  Hearing:   hard of hearing  Observation:   General Observation:  RLE ace wrapped, arellano catheter   Posture:   Rounded shoulders and neck forward flexed- L shoulder anterior and elevated   Sensation:   denies numbness and tingling  ROM:   (B) UE AROM WFL  Strength:   (B) UE strength grossly +4    Decision Making: medium complexity  Clinical Presentation: evolving      Subjective  General: Patient in bed upon arrival, agreeable to OT/PT evaluation. Patient's daughter present for session. Pain: 8/10.   Location: R hip progressing down R leg   Pain Interventions: RN notified of patient request for pain medication .  contact guard assistance  Functional Mobility Activity: around rehab unit, 270 ft   Functional Mobility Device Use: wheelchair  Functional Mobility Comment: both legs on footrests    Other Therapeutic Interventions       Second Session:  Patient in recliner upon arrival, agreeable to OT/PT session. Daughter present for session. Patient reported just receiving pain meds but that he is still having 8/10 pain. Patient transferred with slide board from recliner to  with max of 1 and mod of 1. Patient requiring verbal cueing for weight shifting, maintaining NWBing and hand placement. Patient completed transfer training from  to [employer] in order to improve BUE and LLE strength needed for transfers. Patient completed 2 sit to stands from  to ballet with maxA of 1 and modA of 1. Patient only able to tolerate 15 seconds and with lean to left and forward posture. Patient transferred from  to mat table with max of 1 and mod of 1 with slide board. Patient participated in partial stands from [facility] in order to improve ability to use slide board. Patient required max of 1 and mod of 1 for 3 partial stands. Patient transferred from mat table to  with max of 1 and mod of 1 with slide board. Patient transferred from  to standing with Stedy with maxA of 1 and modA of 1. Patient transferred to bed with [facility]. Patient transferred from sitting EOB to supine with modA of 1 and the repositioned higher in bed with max of 2. Patient left in bed with alarm on, call button in reach and all needs met.       Cognition  Overall Cognitive Status: Impaired  Arousal/Alterness: appropriate responses to stimuli  Following Commands: follows one step commands consistently  Attention Span: appears intact  Memory: decreased recall of recent events  Safety Judgement: decreased awareness of need for assistance, decreased awareness of need for safety  Problem Solving: assistance required to generate solutions, assistance required to implement solutions  Insights: decreased awareness of deficits  Initiation: requires cues for some  Sequencing: requires cues for some  Orientation:    alert and oriented x 4  Command Following:   accurately follows one step commands     Education  Barriers To Learning: hearing  Patient Education: patient educated on goals, OT role and benefits, plan of care, ADL adaptive strategies, proper use of assistive device/equipment, family education, transfer training  Learning Assessment:  patient verbalizes understanding, would benefit from continued reinforcement    Assessment  Activity Tolerance: patient tolerated well but limited by pain and NWB of RLE  Impairments Requiring Therapeutic Intervention: decreased functional mobility, decreased ADL status, decreased strength, decreased endurance, decreased balance, decreased coordination, increased pain  Prognosis: good  Clinical Assessment: Patient presents below baseline function s/p femoral tibial bypass graft and will benefit from continued OT services to address the above deficits. Prior to surgery and achilles injury, patient was independent with ADLs and mobility with 3WW. Patient currently requiring assist of 2 (max + mod) for transfers and SBA-total A for ADLs. Patient limited by pain and weight bearing status. Patient will benefit from OT services to maximize patients safety and independence with ADLs, transfers and functional mobility. Goals and POC pending as weight bearing precautions change.    Safety Interventions: patient left in chair, chair alarm in place, call light within reach, patient at risk for falls, nurse notified, and family/caregiver present    Plan  Frequency: 5 x/week, 90 min/day  Current Treatment Recommendations: strengthening, balance training, functional mobility training, transfer training, endurance training, wheelchair mobility training, patient/caregiver education, and ADL/self-care training    Goals  Patient Goals: not stated    Short Term Goals: To be completed in: 2 weeks  Patient will complete upper body ADL at supervision   Patient will complete lower body ADL at minimal assistance   Patient will complete toileting at minimal assistance   Patient will complete grooming at supervision   Patient will complete functional transfers at minimal assistance     Long Term Goals:   To be completed in: 3 weeks   Patient will complete upper body ADL at modified independent   Patient will complete lower body ADL at supervision   Patient will complete toileting at stand by assistance   Patient will complete functional transfers at supervision   Patient will complete functional mobility at stand by assistance   Patient to gather and transport IADL items at stand by assistance       Therapy Session Time  First Session Therapy Time:    Individual Group Co-treatment   Time In    0830   Time Out    0934   Minutes    59      Second Session Therapy Time:   Individual Concurrent Group Co-treatment   Time In       1415   Time Out       1453   Minutes       38       Timed Code Treatment Minutes:  Timed Code Treatment Minutes: 49 Minutes + 38   Total Treatment Minutes:  102 minutes        Electronically Signed By: Farrukh Hess, [address] [place] [address] OTR/L [id number]

## 2022-08-23 NOTE — PROGRESS NOTES
4 Eyes Skin Assessment     NAME:  Rome King  YOB: 1930  MEDICAL RECORD NUMBER:  [id number]    The patient is being assess for  Admission    I agree that 2 RN's have performed a thorough Head to Toe Skin Assessment on the patient. ALL assessment sites listed below have been assessed. Areas assessed by both nurses:    Head, Face, Ears, Shoulders, Back, Chest, Arms, Elbows, Hands, Sacrum. Buttock, Coccyx, Ischium, and Legs. Feet and Heels        Does the Patient have a Wound? Yes wound(s) were present on assessment. LDA wound assessment was Initiated and completed . Incision to right leg. Scab to left knee.        Michele Prevention initiated:  Yes   Wound Care Orders initiated:  No    Pressure Injury (Stage 3,4, Unstageable, DTI, NWPT, and Complex wounds) if present place referral/consult order under [de-identified] No    New and Established Ostomies if present place consult order under : No      Nurse 1 eSignature: Electronically signed by Natali Bowles RN on 8/23/22 at 1:37 AM EDT    **SHARE this note so that the co-signing nurse is able to place an eSignature**    Nurse 2 eSignature: Electronically signed by Matthew Dozier RN on 8/23/22 at 2:00 AM EDT

## 2022-08-23 NOTE — PLAN OF CARE
Problem: Discharge Planning  Goal: Discharge to home or other facility with appropriate resources  Outcome: Progressing     Problem: Skin/Tissue Integrity  Goal: Absence of new skin breakdown  Description: 1. Monitor for areas of redness and/or skin breakdown  2. Assess vascular access sites hourly  3. Every 4-6 hours minimum:  Change oxygen saturation probe site  4. Every 4-6 hours:  If on nasal continuous positive airway pressure, respiratory therapy assess nares and determine need for appliance change or resting period.   Outcome: Progressing     Problem: ABCDS Injury Assessment  Goal: Absence of physical injury  Outcome: Progressing     Problem: Safety - Adult  Goal: Free from fall injury  Outcome: Progressing     Problem: Chronic Conditions and Co-morbidities  Goal: Patient's chronic conditions and co-morbidity symptoms are monitored and maintained or improved  Outcome: Progressing     Problem: Nutrition Deficit:  Goal: Optimize nutritional status  Outcome: Progressing

## 2022-08-23 NOTE — PROGRESS NOTES
[facility] Admission Assessment    Patient was admitted to room 4900 at 1900. Patient was oriented to the Call Light, Phone, TV, Thermostat, Bed Controls, Bathroom and Emergency Cord. Patient verbalized and demonstrated understanding of all. Patient was also given an over view of Unit Routines for Acute Rehab. Patient states that their normal bowel regime is Q daily. Meal times were explained, including how to order food. The white board, (which is posted on the wall by the door is used for communication) has the Therapy Scheduled that is posted each day along with the name of your doctor, nurse, and therapist for your convenience. We recommend any family that will be care givers or any care givers the patient has, take part in therapy. We have no set visiting hours, we suggest non-caregiver friends and family visitors come after therapy (at 4 PM or later) to allow patient to rest in between sessions. In conjunction with the patient and patients family, this nurse worked to establish a tailored Fall TIPS plan to ensure patient safety and compliance:    Falls TIPS Completion    Patient identified as increased risk for harm if fall:  [x] Yes     Fall Risks  History of Falls:    [x] Yes   Medication Side Effects:   [x] Yes   Walking Aid:    [x] Yes   IV Pole or Equipment:   [] Yes   Unsteady Walk:     [x] Yes   May Forget or Choose Not to Call: [x] Yes     Fall Interventions   Communicate Recent Fall and/or Risk of Harm: [x] Yes   Walking Aids:  Crutches: [] Yes   Cane: [x] Yes   Walker: [x] Yes   IV Assistance When Walking: [] Yes   Toileting Schedule: Every 2 Hours  Bedpan:   [] Yes   Assist to Commode: [x] Yes   Assist to Bathroom: [x] Yes   Bed Alarm On: [x] Yes   Assistance Out of Bed:  Bedrest: [] Yes   1 Person: [x] Yes   2 People: [] Yes     Ethnicity  \"Are you of , /a, or Arabic origin? \"  Check all that apply:  [x] A.   No, not of , /a, or [place] Origin  [] B.  Yes, [place], [facility]  [] C.  Yes, [facility]  [] D.  Yes, [place]  [] E.  Yes, another , , or Ukrainian origin  [] X. Patient unable to respond    Race  \"What is your race? \"  Check all that apply:  [x] A. White  [] B. Black or   [] C. American [place] [facility] or [place] Native  [] D.  [place] [facility]  [] E. [place]  [] F. Panamanian  [] G. [place]  [] [facility]  [] I. [place]  [] J.  Other   [] K.   [] L. Swazi or Mar  [] M. Mauritanian  [] N. Other [place]  [] X. Patient unable to respond    Language  A. \"What is your preferred language? \"   English    B. \"Do you need or want an  to communicate with a doctor or health care staff? \"  Check only one:  [x] 0. No  [] 1. Yes  [] 9. Unable to determine    Transportation  \"Has lack of transportation kept you from medical appointments, meetings, work, or from getting things needed for daily living? \"Check all that apply:  [] A.  Yes, it has kept me from medical appointments or from getting my medications  [] B.  Yes, it has kept me from non-medical meetings, appointments, work, or from getting things that I need  [x] C.  No  [] X. Patient unable to respond    Hearing  Ability to hear (with hearing aid or hearing appliances if normally used)  [x]  0. Adequate - no difficulty in normal conversation, social interaction, listening to TV  []  1. Minimal difficulty - difficulty in some environments (e.g. when person speaks softly or setting is noisy)  []  2. Moderate difficulty - speaker has to increase volume and speak distinctly   []  3. Highly impaired - absence of useful hearing    Vision  Ability to see in adequate light (with glasses or other visual appliances)  [x]  0. Adequate - sees fine detail, such as regular print in newspapers/books  []  1. Impaired - sees large print, but nto regular print in newspapers/books  []  2.   Moderately impaired - limited vision; not able to see newspaper headlines but can identify objects  []  3. Highly impaired - object identification in question, but eyes appear to follow objects  []  4. Severely impaired - no vision or sees only light, colors, or shapes; eyes do not appear to follow objects    Health Literacy  \"How often do you need to have someone help you when you read instructions, pamphlets, or other written material from your doctor or pharmacy? \"  []  0. Never  []  1. Rarely  [x]  2. Sometimes  []  3. Often  []  4. Always  []  8. Patient unable to respond    BIMS - **Must be completed in the flowsheet at admission prior to proceeding with Delirium Assessment**  [x] BIMS completed in flowsheet at admission    Signs and Symptoms of Delirium  A. Acute Onset Mental Status Change - Is there evidence of an acute change in mental status from the patient's baseline? [x] 0. No  [] 1. Yes    B. Inattention - Did the patient have difficulty focusing attention, for example being easily distractible or having difficulty keeping track of what was being said? [x]  0. Behavior not present  []  1. Behavior continuously present, does not fluctuate  []  2. Behavior present, fluctuates (comes and goes, changes in severity)    C. Disorganized thinking - Was the patient's thinking disorganized or incoherent (rambling or irrelevant conversation, unclear or illogical flow of ideas, or unpredictable switching from subject to subject)? [x]  0. Behavior not present  []  1. Behavior continuously present, does not fluctuate  []  2. Behavior present, fluctuates (comes and goes, changes in severity)    D. Altered level of consciousness - Did the patient have altered level of consciousness as indicated by any of the following criteria?   Vigilant - startled easily to any sound or touch  Lethargic - repeatedly dozed off while being asked questions, but responded to voice or touch  Stuporous - very difficulty to arouse and keep aroused for the interview  Comatose - could not be aroused  [x]  0. Behavior not present  []  1. Behavior continuously present, does not fluctuate  []  2. Behavior present, fluctuates (comes and goes, changes in severity)    Mood    \"Over the last 2 weeks, have you been bothered by any of the following problems?\" 1. Symptom Presence    0 = No  1 = Yes  9 = No Response 2. Symptom Frequency    0 = Never or 1 day  1 = 2-6 days (several days)  2 = 7-11 days (half or more of the days)  3 = 12-14 days (nearly every day)  **Leave blank if 'No Reponse'**      Enter scores in boxes    Column 1 Column 2   Little interest or pleasure in doing things   0 0   Feeling down, depressed, or hopeless   0 0   **If either A or B in column 2 is coded 2 or 3, CONTINUE asking the questions below. If not, END the interview. **     Trouble falling or staying asleep, or sleeping too much       Feeling tired or having little energy       Poor appetite or overeating       Feeling bad about yourself - or that you are a failure or have let yourself or your family down       Trouble concentrating on things, such as reading the newspaper or watching television       Moving or speaking so slowly that other people could have noticed. Or the opposite- being so fidgety or restless that you have been moving around a lot more than usual.       Thoughts that you would be better off dead, or of hurting yourself in some way. Total Severity: Add scores for all frequency responses in column 2 (possible score 0-27, or enter 99 if unable to complete (if symptom frequency (column 2) is blank for 3 or more items). 0     Social Isolation  \"How often do you feel lonely or isolated from those around you? \"  [x] 0. Never  [] 1. Rarely  [] 2. Sometimes  [] 3. Often  [] 4. Always  [] 8. Patient unable to respond    Pain Effect on Sleep  \"Over the past 5 days, how much of the time has pain made it hard for you to sleep at night? \"  []  0.   Does not apply - I have Procedures, and Programs    Check all of the following treatments, procedures, and programs that apply on admission. On admission (check all that apply)   Cancer Treatments   A1. Chemotherapy []           A2. IV []           A3. Oral []           A10. Other []   B1. Radiation []   Respiratory Therapies   C1. Oxygen Therapy []           C2. Continuous []           C3. Intermittent []           C4. High-concentration []   D1. Suctioning []           D2. Scheduled []           D3. As needed []   E1. Tracheostomy Care []   F1. Invasive Mechanical Ventilator (ventilator or respirator) []   G1. Non-invasive Mechanical Ventilator []           G2. BiPAP []           G3. CPAP []   Other   H1. IV Medications []           H2. Vasoactive medications []           H3. Antibiotics []           H4. Anticoagulation []           H10. Other []   I1. Transfusions []   J1. Dialysis []           J2. Hemodialysis []           J3. Peritoneal dialysis []   O1. IV access []           O2. Peripheral [x]           O3. Midline []           O4.  Central (PICC, tunneled, port) []      None of the above (select if no Cancer, Respiratory, or Other boxes are checked) []

## 2022-08-24 LAB
ANION GAP SERPL CALCULATED.3IONS-SCNC: 7 MMOL/L (ref 3–16)
BUN BLDV-MCNC: 8 MG/DL (ref 7–20)
CALCIUM SERPL-MCNC: 8.1 MG/DL (ref 8.3–10.6)
CHLORIDE BLD-SCNC: 103 MMOL/L (ref 99–110)
CO2: 25 MMOL/L (ref 21–32)
CREAT SERPL-MCNC: <0.5 MG/DL (ref 0.8–1.3)
GFR AFRICAN AMERICAN: >60
GFR NON-AFRICAN AMERICAN: >60
GLUCOSE BLD-MCNC: 151 MG/DL (ref 70–99)
GLUCOSE BLD-MCNC: 151 MG/DL (ref 70–99)
GLUCOSE BLD-MCNC: 158 MG/DL (ref 70–99)
GLUCOSE BLD-MCNC: 194 MG/DL (ref 70–99)
GLUCOSE BLD-MCNC: 218 MG/DL (ref 70–99)
HCT VFR BLD CALC: 37.4 % (ref 40.5–52.5)
HEMOGLOBIN: 12.2 G/DL (ref 13.5–17.5)
MAGNESIUM: 2.3 MG/DL (ref 1.8–2.4)
MCH RBC QN AUTO: 30.6 PG (ref 26–34)
MCHC RBC AUTO-ENTMCNC: 32.5 G/DL (ref 31–36)
MCV RBC AUTO: 94 FL (ref 80–100)
PDW BLD-RTO: 15.1 % (ref 12.4–15.4)
PERFORMED ON: ABNORMAL
PLATELET # BLD: 167 K/UL (ref 135–450)
PMV BLD AUTO: 9.7 FL (ref 5–10.5)
POTASSIUM SERPL-SCNC: 4.7 MMOL/L (ref 3.5–5.1)
RBC # BLD: 3.98 M/UL (ref 4.2–5.9)
SODIUM BLD-SCNC: 135 MMOL/L (ref 136–145)
WBC # BLD: 9 K/UL (ref 4–11)

## 2022-08-24 PROCEDURE — 97530 THERAPEUTIC ACTIVITIES: CPT

## 2022-08-24 PROCEDURE — 85027 COMPLETE CBC AUTOMATED: CPT

## 2022-08-24 PROCEDURE — 80048 BASIC METABOLIC PNL TOTAL CA: CPT

## 2022-08-24 PROCEDURE — 2580000003 HC RX 258: Performed by: PHYSICAL MEDICINE & REHABILITATION

## 2022-08-24 PROCEDURE — 36415 COLL VENOUS BLD VENIPUNCTURE: CPT

## 2022-08-24 PROCEDURE — 1280000000 HC REHAB R&B

## 2022-08-24 PROCEDURE — 97535 SELF CARE MNGMENT TRAINING: CPT

## 2022-08-24 PROCEDURE — 6370000000 HC RX 637 (ALT 250 FOR IP): Performed by: PHYSICAL MEDICINE & REHABILITATION

## 2022-08-24 PROCEDURE — 83735 ASSAY OF MAGNESIUM: CPT

## 2022-08-24 PROCEDURE — 99024 POSTOP FOLLOW-UP VISIT: CPT | Performed by: SURGERY

## 2022-08-24 PROCEDURE — 51702 INSERT TEMP BLADDER CATH: CPT

## 2022-08-24 PROCEDURE — 97116 GAIT TRAINING THERAPY: CPT

## 2022-08-24 RX ADMIN — MIRTAZAPINE 30 MG: 15 TABLET, FILM COATED ORAL at 22:13

## 2022-08-24 RX ADMIN — INSULIN LISPRO 2 UNITS: 100 INJECTION, SOLUTION INTRAVENOUS; SUBCUTANEOUS at 16:22

## 2022-08-24 RX ADMIN — OXYCODONE AND ACETAMINOPHEN 2 TABLET: 5; 325 TABLET ORAL at 17:41

## 2022-08-24 RX ADMIN — ROSUVASTATIN 10 MG: 10 TABLET, FILM COATED ORAL at 09:30

## 2022-08-24 RX ADMIN — TAMSULOSIN HYDROCHLORIDE 0.4 MG: 0.4 CAPSULE ORAL at 22:13

## 2022-08-24 RX ADMIN — FINASTERIDE 5 MG: 5 TABLET, FILM COATED ORAL at 09:30

## 2022-08-24 RX ADMIN — METFORMIN HYDROCHLORIDE 500 MG: 500 TABLET ORAL at 09:30

## 2022-08-24 RX ADMIN — SODIUM CHLORIDE, PRESERVATIVE FREE 10 ML: 5 INJECTION INTRAVENOUS at 09:31

## 2022-08-24 RX ADMIN — ASPIRIN 81 MG: 81 TABLET, COATED ORAL at 09:30

## 2022-08-24 RX ADMIN — OXYCODONE AND ACETAMINOPHEN 2 TABLET: 5; 325 TABLET ORAL at 06:23

## 2022-08-24 RX ADMIN — METFORMIN HYDROCHLORIDE 500 MG: 500 TABLET ORAL at 16:18

## 2022-08-24 RX ADMIN — PANTOPRAZOLE SODIUM 40 MG: 40 TABLET, DELAYED RELEASE ORAL at 06:23

## 2022-08-24 RX ADMIN — SODIUM CHLORIDE, PRESERVATIVE FREE 10 ML: 5 INJECTION INTRAVENOUS at 22:16

## 2022-08-24 RX ADMIN — OXYCODONE AND ACETAMINOPHEN 2 TABLET: 5; 325 TABLET ORAL at 11:50

## 2022-08-24 ASSESSMENT — PAIN SCALES - GENERAL
PAINLEVEL_OUTOF10: 8
PAINLEVEL_OUTOF10: 8
PAINLEVEL_OUTOF10: 3

## 2022-08-24 ASSESSMENT — PAIN DESCRIPTION - LOCATION
LOCATION: LEG
LOCATION: BACK
LOCATION: LEG

## 2022-08-24 ASSESSMENT — PAIN DESCRIPTION - ORIENTATION
ORIENTATION: RIGHT
ORIENTATION: RIGHT
ORIENTATION: MID

## 2022-08-24 ASSESSMENT — PAIN DESCRIPTION - DESCRIPTORS
DESCRIPTORS: CRAMPING
DESCRIPTORS: ACHING

## 2022-08-24 ASSESSMENT — PAIN DESCRIPTION - PAIN TYPE: TYPE: CHRONIC PAIN

## 2022-08-24 ASSESSMENT — PAIN - FUNCTIONAL ASSESSMENT: PAIN_FUNCTIONAL_ASSESSMENT: ACTIVITIES ARE NOT PREVENTED

## 2022-08-24 NOTE — PROGRESS NOTES
Lives With: alone. Comment: daughters take turns weekly 24/7 to stay with him since achilles injury in June                      Type of Home: house  Home Layout: one level  Home Access: level entry with ramp   Bathroom Layout: walker accessible, walk in shower  (not wc accessible)  Bathroom Equipment: grab bars in shower, tub transfer bench, hand held shower head, toilet raiser, BSC  Toilet Height: standard height  Home Equipment: rolling walker, transport chair, bed railing . Comment: typically uses 3 wheel walker   Transfer Assistance: Independent without use of device; Since June performing pivot transfers with CGA using grab bars  Ambulation Assistance:Independent with 3WW, since achilles injury not walking but only transferring via stand pivots with CGA; was unable to propel transport chair himself  ADL Assistance: Typically independent with ADLs prior to achilles injury;  Since June, requires assistance with sponge bathing, requires assistance with dressing, requires assistance with grooming, requires assistance with toileting  IADL Assistance: requires assistance with all homemaking tasks   Active :        [] Yes                 [x] No  Hand Dominance: [] Left                 [x] Right  Current Employment: retired. Occupation:  at [employer]: [address]: 1 fall leading to ruptured achilles in June and was NWB and then in a boot. Pt had not started any PT for his Left achilles prior to current hospitalization. Subjective  General: Supine in bed at arrival. Pleasant and agreeable. Dtg present. Pain: 9/10. Location: (R) hip, low back, and diffuse (L) LE pain  Pain Interventions: pain medication in place prior to arrival and RN notified for additional request for medication once allowed.        Functional Mobility  Bed Mobility  Supine to Sit: stand by assistance  Scooting: stand by assistance  Comments:HOB partially elevated with use of and decreased with standing. Supine => sit transfer completed with HOB elevated and use of HR at SBA. Bed => w/c transfer completed with use of stedy for evaluation of safe completion with nursing staff. Transfer with stedy completed at [facility] within dependent transfer device. Sit <=> stand transfers completed throughout session to RW at min/mod (A) of 1, CGA to fixed bar surfaces. Stand pivot transfer completed at ballet bar progressing from mod (A) of 1 on first completion to min (A) on second completion. Static JW targeted reaching completed at ballet bar including reaching outside JW and across midline with balance maintained at CGA/min (A), VC and use of mirror for equal weight distribution and postural correction. Lateral stepping completed at ballet bar x 3 ft at min (A), VC for UE use to maintain WB restrictions. Pt ambulates 5 ft in // bars x 2 completions at min (A) with step to mechanics, forward flexed posture, and VC for UE to properly maintain WB restrictions. Stand step transfer completed with RW at mod (A) of 1 from w/c => bed. Sit => supine transfer completed at min (A). Pt in bed with bed alarm activated and call light within reach.      Functional Outcomes                 Cognition  Overall Cognitive Status: Impaired  Arousal/Alterness: appropriate responses to stimuli  Following Commands: follows one step commands consistently  Attention Span: appears intact  Memory: decreased recall of recent events  Safety Judgement: decreased awareness of need for assistance, decreased awareness of need for safety  Problem Solving: assistance required to generate solutions, assistance required to implement solutions  Insights: decreased awareness of deficits  Initiation: requires cues for some  Sequencing: requires cues for some  Orientation:    alert and oriented x 4  Command Following:   accurately follows one step commands    Education  Barriers To Learning: hearing  Patient Education: patient educated on goals, PT role and benefits, plan of care, weight-bearing education, general safety, functional mobility training, proper use of assistive device/equipment, transfer training  Learning Assessment:  patient verbalizes understanding, would benefit from continued reinforcement    Assessment  Activity Tolerance: Limited by fatigue and pain   Impairments Requiring Therapeutic Intervention: decreased functional mobility, decreased ADL status, decreased ROM, decreased strength, decreased safety awareness, decreased endurance, decreased balance, increased pain, decreased posture  Prognosis: good  Clinical Assessment: Pt demonstrating improved functional mobility in response to upgraded (R) LE WB status to 50%. Pt demonstrates ability to achieve and maintain static standing with UE support at one caregiver assist.  Pt continues to require assist of 2 for surface to surface transfers with ambulation completed in // bars at this time. Prior to recent surgery and (L) ankle injury in June, patient was independent and living home alone. Continued skilled PT to promote return towards PLOF. Safety Interventions: patient left in chair, chair alarm in place, and call light within reach    Plan  Frequency: 5 x/week, 90 min/day  Current Treatment Recommendations: strengthening, ROM, balance training, functional mobility training, transfer training, endurance training, and wheelchair mobility training    Goals  Patient Goals:  Return home with improved mobility   Short Term Goals:  Time Frame: 1.5 wks   Pt able to perform bed mobility modified independence. Pt able to perform sliding board transfers with minimal assistance - goal discontinued secondary to upgraded WB status  Pt able to perform stand pivot transfers using grab bar with moderate assistance.    Pt able to navigate a wc 150 ft mod I.  Pt able to ambulate 10 ft with LRAD at moderate assistance    Long Term Goals: Time Frame 3 wks  Pt able to perform sliding board transfers with SBA - goal discontinued secondary to upgraded WB status  Pt able to perform stand pivot transfers using grab bar with SBA  Pt able to ambulate 150 ft with LRAD at SBA  Pt able to perform a car transfer with SBA.        Therapy Session Time      Individual Group Co-treatment   Time In      5287   Time Out      7331   Minutes      48     Second Session Therapy Time:   Individual Concurrent Group Co-treatment   Time In        1350   Time Out        1434   Minutes        44     Timed Code Treatment Minutes:  50 + 44    Total Treatment Minutes: 94 Minutes    Electronically Signed By: Corina Cunningham PT

## 2022-08-24 NOTE — PLAN OF CARE
Problem: Discharge Planning  Goal: Discharge to home or other facility with appropriate resources  8/24/2022 1041 by Ana Gold RN  Outcome: Progressing  8/24/2022 0208 by Arnaud Benoit, RN  Outcome: Progressing     Problem: Skin/Tissue Integrity  Goal: Absence of new skin breakdown  Description: 1. Monitor for areas of redness and/or skin breakdown  2. Assess vascular access sites hourly  3. Every 4-6 hours minimum:  Change oxygen saturation probe site  4. Every 4-6 hours:  If on nasal continuous positive airway pressure, respiratory therapy assess nares and determine need for appliance change or resting period.   8/24/2022 1041 by Ana Gold RN  Outcome: Progressing  8/24/2022 0208 by Arnaud Benoit, RN  Outcome: Progressing

## 2022-08-24 NOTE — PROGRESS NOTES
[facility]   Phone: [phone]    Occupational Therapy    [] Initial Evaluation            [x] Daily Treatment Note         [] Discharge Summary      Patient: Marianela Salgado   : 1930   MRN: [id number]   Date of Service:  2022    Admitting Diagnosis:  Peripheral artery disease [facility])  Current Admission Summary:   20-year-old male with a history of A. fib previously on Xarelto but stopped secondary to a head bleed, cerebral amyloid angiopathy, diabetes who was admitted on  with right leg pain. He was transferred from [facility] to this facility on  for surgery with Dr. Bernardo Davila. He underwent a right femoral to posterior tibial bypass on . His postoperative course has been overall unremarkable. He was evaluated by therapy and suggested to continue in an inpatient setting prior to returning home. Past Medical History:  has a past medical history of Afib ([facility] [address] Cerebral amyloid angiopathy ([facility] [address] Diabetes mellitus ([facility] [address] Heart aneurysm, and Vertigo. Past Surgical History:  has a past surgical history that includes Appendectomy; hernia repair; Lung surgery; Testicle surgery; and Femoral-tibial Bypass Graft (Right, 2022). Discharge Recommendations: Home with 24 hour supervision and assist and HHOT    DME Required For Discharge: DME to be determined pending patient progress wheelchair     Precautions/Restrictions: medium fall risk, weight bearing  Weight Bearing Restrictions: partial weight bearing - 50 %  [] Right Upper Extremity  [] Left Upper Extremity [x] Right Lower Extremity  [] Left Lower Extremity    WBAT (L) LE  Required Braces/Orthotics: no braces required   [] Right  [] Left  Positional Restrictions:no positional restrictions    Pre-Admission Information   Lives With: alone, .   Comment: daughters take turns weekly / to stay with him since achilles injury                       Type of Home: house  Home Layout: one level  Home Access: level entry with ramp   Bathroom Layout: walker accessible, walk in shower  (not wc accessible)  Bathroom Equipment: grab bars in shower, tub transfer bench, hand held shower head, toilet raiser, BSC  Toilet Height: standard height  Home Equipment: rolling walker, transport chair, bed railing . Comment: typically uses 3 wheel walker   Transfer Assistance: Independent without use of device  Ambulation Assistance:Independent with 3WW, since achilles injury not walking but only transferring via stand pivots with CGA; was unable to propel transport chair himself  ADL Assistance: Typically independent with ADLs prior to achilles injury;  requires assistance with sponge bathing, requires assistance with dressing, requires assistance with grooming, requires assistance with toileting  IADL Assistance: requires assistance with all homemaking tasks  Active :        [] Yes                 [x] No  Hand Dominance: [] Left                 [x] Right  Current Employment: retired. Occupation:  at [employer]: [address]: 1 fall leading to ruptured achilles in June and was NWB and then in a boot      Subjective  General: Pt supine in bed w/ HOB elevated upon arrival finishing breakfast - pt daughter present - agreeable to OT/PT session. Pain: 8/10. Location: R LE and back (R lumbar region)   Pain Interventions: pain medication in place prior to arrival, RN notified of patient request for pain medication, and repositioned  RN present to re-administer meds at [facility]     Activities of Daily Living  Basic Activities of Daily Living  Dressing Comments: Pt briefs changed and pants donned this date - pt DEP for LB dressing w/ 2 person assist - 1 modA for balance w/ RW and 1 donning/doffing briefs and LB clothing   General Comments: Pt denied all ADLs this date   Instrumental Activities of Daily Living  No IADL completed on this date.     Functional Mobility  Bed Mobility  Supine to Sit: stand by assistance, . Comment bed rail on L and increased time - HOB elevated  Rolling Left: stand by assistance, . Comment bed rail on L and increased time  Scooting: stand by assistance  Comments:   Transfers  Sit to stand transfer:maximum assistance, . Comment completed to/from EOB and w/c this date - increased time to complete - completed w/ and w/o RW; assistance ranging  mod-max x1 this date  Stand to sit transfer: maximum assistance  Stand pivot transfer: 2 person assistance with modA + Darian x2 w/c>EOM    Squat pivot transfer: 2 person assistance with modA + Darian x2 EOB>w/c, EOM > w/c, > recliner   Comments: scale placed under pt R foot during sit>stand prior to squat/stand pivots completed this date to maintain pt 50% WB precautions - pt maintained 35-50- # through R LE throughout - increased weight pushed through R LE w/ fatigue up to 90#; Functional sit><stands completed from elevated EOB x3 - pt maintained stance min/modA x1 w/ posterior lean and forward flexed trunk   Functional Mobility:  Sitting Balance: stand by assistance. Sitting Balance Comment: EOB, EOM, w/c  Standing Balance: moderate assistance. Standing Balance Comment: aw/ and w/o RW during functional sit>stands  No functional mobility completed on this date. Other Therapeutic Interventions  Pt required mod VC's throughout transfers this date for sequencing, initiation, hand placement and tranfers technique/setup. Second Session:  Pt supine in bed upon OT/PT arrival - pleasant and agreeable to therapy but c/o of back pain and fatigue from AM session - pt did not rate pain. Pt's 3 daughters present in room at start and end of session. Pt experienced pain in R LE during session noting increase in pain during sitting or standing ascend/descent but not in static or dynamic standing. Pt completed supine<>sit this date SBA w/ VC's for sequencing - supine>sit w/ HOB elevated, sit>supine w/ head of bed flat.  Pt transferred CGA EOB>w/c w/ stedy. All sit<>stands this date min/mod A x1 to RW; sit><stands to ballet bars CGA x1 this date. Pt completed activity challenging standing balance/endurance at ballet bars this date varying CGA-Darian x1 for balance w/ mod VC's for posture and weight shift to place some weight on R LE with new WB status - pt erased 9 marks on mirror outside COG during stance w/ 100% accuracy - support via one UE used throughout task completion - one seated rest break needed. Stand pivot transfer completed this date in preparation for shower on 8/25 - w/ use of ballet bars- pt transfers w/c><armed chair modA x1 progressing to Darian x1. 3 lateral steps L completed w/ ballet bars Darian w/ VC's for sequencing. Pt amb in parallel bars x2 this date Darian at BLEs + w/c follow w/ VC's for sequencing and posture throughout. Pt left supine in bed w/ HOB slightly elevated - bed alarm, call light, and all other needs within reach.      Cognition  Overall Cognitive Status: Impaired  Arousal/Alterness: appropriate responses to stimuli  Following Commands: follows one step commands consistently  Attention Span: appears intact  Memory: decreased recall of recent events  Safety Judgement: decreased awareness of need for assistance, decreased awareness of need for safety  Problem Solving: assistance required to generate solutions, assistance required to implement solutions  Insights: decreased awareness of deficits  Initiation: requires cues for some  Sequencing: requires cues for some  Orientation:    alert and oriented x 4  Command Following:   accurately follows one step commands     Education  Barriers To Learning: cognition and hearing  Patient Education: patient educated on goals, OT role and benefits, plan of care, ADL adaptive strategies, proper use of assistive device/equipment, family education, transfer training  Learning Assessment:  patient verbalizes understanding, would benefit from continued reinforcement    Assessment  Activity Tolerance: pt significantly limited by back pain this date   Impairments Requiring Therapeutic Intervention: decreased functional mobility, decreased ADL status, decreased strength, decreased endurance, decreased balance, decreased coordination, increased pain  Prognosis: good  Clinical Assessment: Patient presents below baseline function s/p femoral tibial bypass graft and will benefit from continued OT services to address the above deficits. Prior to surgery and achilles injury, patient was independent with ADLs and mobility with 3WW. Patient currently requiring assist of 2 (modA + Darian) for transfers. Patient increased to 50% WB precaution this date allowoing weight to be put through R LE for functional trasnfers this date - pt demo'd good following of precautions via scale. Patient con't to benefit from OT services to maximize patients safety and independence with ADLs, transfers and functional mobility. Con't per POC. Safety Interventions: patient left in chair, chair alarm in place, call light within reach, patient at risk for falls, nurse notified, and family/caregiver present    Plan  Frequency: 5 x/week, 90 min/day  Current Treatment Recommendations: strengthening, balance training, functional mobility training, transfer training, endurance training, wheelchair mobility training, patient/caregiver education, and ADL/self-care training    Goals  Patient Goals: not stated    Short Term Goals: To be completed in: 2 weeks  Patient will complete upper body ADL at supervision   Patient will complete lower body ADL at minimal assistance   Patient will complete toileting at minimal assistance   Patient will complete grooming at supervision   Patient will complete functional transfers at minimal assistance     Long Term Goals:   To be completed in: 3 weeks   Patient will complete lower body ADL at stand by assistance   Patient will complete toileting at stand by assistance Patient will complete functional transfers at stand by assistance   Patient will complete functional mobility at stand by assistance   Patient to gather and transport IADL items at stand by assistance       Therapy Session Time  First Session Therapy Time:    Individual Group Co-treatment   Time In    0356 [id number]   Time Out    0924   Minutes    50      Second Session Therapy Time:   Individual Concurrent Group Co-treatment   Time In       1350   Time Out       1434   Minutes       44       Timed Code Treatment Minutes:  40+26  Total Treatment Minutes:  [facility], S/OT   Directed and supervised by JORGE Trinidad/NESTOR #[id number]

## 2022-08-24 NOTE — PROGRESS NOTES
Waylon Canales  8/24/2022  [id number]    Chief Complaint: Peripheral artery disease ([address]    Subjective:   No overnight events. No current complaints. Pain controlled. Therapy reporting he was being treated for an achilles injury and was in a boot prior to admission. ROS: No CP, SOB, dyspnea    Objective:  Patient Vitals for the past 24 hrs:   BP Temp Temp src Pulse Resp SpO2 Height   08/23/22 2200 (!) 162/85 97.9 °F (36.6 °C) Oral (!) 108 16 96 % --   08/23/22 1223 -- -- -- -- -- -- 6' (1.829 m)   08/23/22 0945 -- 98 °F (36.7 °C) -- (!) 114 16 98 % --     Gen: No distress, pleasant. Resting in bed  HEENT: Normocephalic, atraumatic. CV: Regular rate and rhythm. No MRG   Resp: No respiratory distress. CTAB   Abd: Soft, nontender nondistended  Ext: No edema. RLE in knee high ACE dressing, toes warm, good PT pulse. Neuro: Alert, oriented, appropriately interactive. Laboratory data: Available via EMR. Therapy progress:    PT    Supine to Sit: Supervision or touching assistance  Sit to Supine: Partial/moderate assistance   Sit to Stand: Dependent  Chair/Bed to Chair Transfer: Dependent  Car Transfer:    Ambulation 10 ft:    Ambulation 50 ft:    Ambulation 150 ft:    Stairs - 1 Step:    Stairs - 4 Step:    Stairs - 12 Step:      OT    Eating:    Oral Hygiene: Supervision or touching assistance  Bathing: Partial/moderate assistance  Upper Body Dressing: Supervision or touching assistance  Lower Body Dressing: Dependent  Toilet Transfer: Toilet Hygiene:      Speech Therapy         Body mass index is 22.18 kg/m².     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 Cleo Cazares. ASA, crestor. - now 50% weightbearing. PT/OT     A. Fib: AC held 2/2 prior bleed. HTN: lisinopril 40     DM: SSI (home regimen metformin 1000 BID). Was not using lantus in CVU. - start metformin 500     Hypokalemia: supplemented     HTN: Lisinopril 40     BPH: proscar, flomax. Parker in place 2/2 retention. VT in near future when more ambulatory    R achilles injury: - no information in Epic, will need to confirm need for continued boot with ambulation. If required, will need approval from vascular given ankle anastomosis. Bowels: Per protocol  Bladder: Per protocol   Sleep: remeron scheduled  Pain: tylenol, maylin PRN   DVT PPx: SCD  ROYCE: TBD    Discussed with Dr. Cleo Cazares. Katerine Briones MD 8/24/2022, 8:50 AM    * This document was created using dictation software. While all precautions were taken to ensure accuracy, errors may have occurred. Please disregard any typographical errors.

## 2022-08-24 NOTE — PATIENT CARE CONFERENCE
[facility]  Inpatient Rehabilitation  Weekly Team Conference Note    Patient Name: Mynor Forte        MRN: [id number]    : 1930  (80 y.o.)  Gender: male           The team conference for this patient was held on 2022 at 11:00am and led by:  Slim Murray MD    CASE MANAGEMENT:  Assessment: Patient lives at home and his daughters take turns staying with patient. Patient has support . Patient resides in a ranch style home with a ramp entry. Patient is not active with any community support programs. Patient anticipates discharging to home with needed supports. PHYSICAL THERAPY:    Bed Mobility  Supine to Sit: stand by assistance  Scooting: stand by assistance  Comments:HOB partially elevated with use of railings  Transfers  Sit to stand transfer: maximum assistance, . Comment Ranges from mod to max (A) of 1  Stand to sit transfer: maximum assistance  Stand pivot transfer: 2 person assistance with mod (A) of 1 + min (A) of 1 for completion of w/c => therapy mat. Scale utilized to ensure proper WB. Self maintains WB restrictions until end of transfer once fatigued. Squat pivot transfer: 2 person assistance with mod (A) of 1 + min (A) of 1 for completion of bed => w/c, therapy mat => w/c => recliner. Comments: Significant VC for transfer set up, hand placement, and sequence. Surface to surface transfer training both with and without RW as documented above. Ambulation   Pt ambulates 5 ft in // bars x 2 completions at min (A) with step to mechanics, forward flexed posture, and VC for UE to properly maintain WB restrictions.     QM:  Roll Left and Right  Assistance Needed: Supervision or touching assistance  CARE Score: 4  Discharge Goal: Independent  Sit to Lying  Assistance Needed: Partial/moderate assistance  CARE Score: 3  Discharge Goal: Independent  Lying to Sitting on Side of Bed  Assistance Needed: Partial/moderate assistance  CARE Score: 3  Discharge Goal: independence. Pt able to perform sliding board transfers with minimal assistance - goal discontinued secondary to upgraded WB status  Pt able to perform stand pivot transfers using grab bar with moderate assistance. Pt able to navigate a wc 150 ft mod I.  Pt able to ambulate 10 ft with LRAD at moderate assistance     Long Term Goals: Time Frame 3 wks  Pt able to perform sliding board transfers with SBA - goal discontinued secondary to upgraded WB status  Pt able to perform stand pivot transfers using grab bar with SBA  Pt able to ambulate 150 ft with LRAD at SBA  Pt able to perform a car transfer with SBA. These goals were reviewed with this patient at the time of assessment and Livan Morataya is in agreement. Plan of Care: Pt to be seen 5 out of 7 days per week per ARU protocol (90 minutes with PT)     OCCUPATIONAL THERAPY:    ADL: Pt completes LB dressing/bathing with assistance of 2 in stance and Max/TD while seated (1x for balance + 1x for clothing for stances); UB ADL completion with set-up/SBA and increased time. Toilet Transfers: Variable assistance of 2 - now progressing d/t upgraded WBing status on 8/24     Tub/ShowerTransfers: simulated SPT for bathing w/c<>chair with arm rests- assist of 2 progressing to Min/Mod of 1 with anterior grab bar.      QM:  Eating  Assistance Needed: Setup or clean-up assistance  CARE Score: 5  Discharge Goal: Set-up or clean-up assistance  Oral Hygiene  Assistance Needed: Supervision or touching assistance  CARE Score: 4  Discharge Goal: Set-up or clean-up assistance  [address] needed: Dependent  CARE Score: 1  Discharge Goal: Supervision or touching assistance  Toilet Transfer  Reason if not Attempted: Not attempted due to medical condition or safety concerns  CARE Score: 88  Discharge Goal: Supervision or touching assistance  Shower/Bathe Self  Assistance Needed: Partial/moderate assistance  Comment: sponge bath  CARE Score: 3  Discharge Goal: Supervision or touching assistance  Upper Body Dressing  Assistance Needed: Supervision or touching assistance  CARE Score: 4  Discharge Goal: Set-up or clean-up assistance  Lower Body Dressing  Assistance Needed: Dependent  CARE Score: 1  Discharge Goal: Supervision or touching assistance  Putting On/Taking Off Footwear  Assistance Needed: Dependent  CARE Score: 1  Discharge Goal: Set-up or clean-up assistance    Goals:             Short Term Goals: To be completed in: 2 weeks  Patient will complete upper body ADL at supervision   Patient will complete lower body ADL at minimal assistance   Patient will complete toileting at minimal assistance   Patient will complete grooming at supervision   Patient will complete functional transfers at minimal assistance      Long Term Goals: To be completed in: 3 weeks   Patient will complete lower body ADL at stand by assistance   Patient will complete toileting at stand by assistance   Patient will complete functional transfers at stand by assistance   Patient will complete functional mobility at stand by assistance   Patient to gather and transport IADL items at stand by assistance     : These goals were reviewed with this patient at the time of assessment and [place] is in agreement    Plan of Care:  Pt to be seen 5 out of 7 days per week per ARU protocol ( 90 minutes with OT)     NUTRITION:  Weight: 163 lb 9 oz (74.2 kg) / Body mass index is 22.18 kg/m². Diet Order: CCC    Supplements:NA    PO intake 51-75% of meals thus far on a CCC diet. Pt states UBW is 168 lb but believes he lost 10 lb from COVID x 2 months ago. Admission wt = 163.9 lb. Pt is agreeable to receiveing Glucerna BID to help maintain nutrition status & promote wound healing. Please see nutrition note for details. NURSING:    Risk for Readmission: 19%    Johns Fall Risk Score: 85  Wounds/Incisions/Ulcers:   Incision: Right leg, Right Heel.     Wound: Redness; coccyx  Medication Review: reviewed with patient and family member. Pain: managed per non-pharmacologically and eMAR  Consultations/Labs/X-rays:    Labs: Magnesium, CBC Q MWF    Basic Metabolic panel Q Daily    Patient/Family Education provided by team:    Discharge Plan   Estimated Length of Stay:15 days  Destination: [place] at Discharge: HHPT S3, [facility] OT S3  Equipment at Discharge: TBD pending progress. Goal for mobility with RW. Factors facilitating achievement of predicted outcomes: Home layout, family support  Barriers to the achievement of predicted outcomes: (B) LE involvement, pain, WB status    Patient Goals: To return home with improved mobility. Interdisciplinary Individualized Plan of Care Review of Previous Week:    Medical and functional progress towards goals:  Medically doing great, vascular following, Blood Sugars controlled, progressing to home med regimen, WBing status potentially changing and if so catheter can be removed. Pt progressing to be able to split cotx PT/OT, pt training on adaptive equipment for ADLs, progressing with transfers and ambulation within limitations of WBing status. Barriers towards progress:  WBing status, decreased activity tolerance  Interventions to address Barriers:  Vascular considering changing WBing status, split cotx PT/OT to increase pt's activity tolerance  Goals still appropriate:  Yes  Modifications to goals: None  Continue Current Plan of Care:  Yes  Modifications to Plan of Care: None    Rehab Team Members in attendance for Team Conference:  Augustine Mancia, MSW, LSW    Kat Amezcua, RD, LD    Georgina Henson, JOCELINER/NESTOR Jj, PT, DPT    RYLIE MurrayN, RN, [address] PT, DPT,     I approve the established interdisciplinary plan of care as documented within the medical record of Laurent Amado.     Brooke Acharya MD   Electronically signed by Brooke Acharya MD on 8/25/2022 at 11:14 AM

## 2022-08-24 NOTE — PLAN OF CARE
Problem: Discharge Planning  Goal: Discharge to home or other facility with appropriate resources  8/24/2022 0208 by Karly Saravia RN  Outcome: Progressing     Problem: Skin/Tissue Integrity  Goal: Absence of new skin breakdown  Description: 1. Monitor for areas of redness and/or skin breakdown  2. Assess vascular access sites hourly  3. Every 4-6 hours minimum:  Change oxygen saturation probe site  4. Every 4-6 hours:  If on nasal continuous positive airway pressure, respiratory therapy assess nares and determine need for appliance change or resting period.   8/24/2022 0208 by Karly Saravia RN  Outcome: Progressing

## 2022-08-24 NOTE — CARE COORDINATION
Social Work Admission Assessment    Objective:  Met with pt and his daughter to complete initial assessment and review role of  in rehab process. Pt oriented to unit. Pt states understanding of this. Current Home Situation:  Patient lives at home and his daughters take turns staying with patient. Patient has support 24/7. Patient resides in a ranch style home with a ramp entry. Pt's plans re:  Return to work/school/volunteer:  Patient is retired. Accessibility to community resources/transportation:  None/Daughters will provide transportation. Has pt experienced a recent loss or signigicant life event that would impact their care or ability to participate?  __xNo  __Yes - Explain    Has pt ever been treated for emotional disorders? _x_No  __Yes--How does that affect current situation:    How does pt and family cope with stressful events and this hospitalization? Patient states, \" I just cope with stress, that's all\". Special Problem Areas:  None    Discharge Plan: To home with needed supports. Impression/Plan: Veronica Olivares (patient )is a 80year old male that has been admitted to ARU. Provided patient with this SW's contact information to contact as needed. SW also informed patient and his daughter about the Team Conference to be held on tomorrow @ 11:00 AM. Will continue to follow for support and discharge planning.   Electronically signed by Jimmie Bloch, MSW on 8/24/2022 at 4:51 PM

## 2022-08-24 NOTE — PROGRESS NOTES
Morning assessment completed, bp on the softer side, held all the blood pressure pills, alert and oriented, will give pain pill once is time for the next dose. The care plan and education has been reviewed and mutually agreed upon with the patient. Pt remains free from falls. Fall precautions in place--bed in lowest position, call light within reach, bed alarm in use. Pt aware to call for assistance before getting up.

## 2022-08-25 LAB
ANION GAP SERPL CALCULATED.3IONS-SCNC: 9 MMOL/L (ref 3–16)
BUN BLDV-MCNC: 10 MG/DL (ref 7–20)
CALCIUM SERPL-MCNC: 8.3 MG/DL (ref 8.3–10.6)
CHLORIDE BLD-SCNC: 104 MMOL/L (ref 99–110)
CO2: 26 MMOL/L (ref 21–32)
CREAT SERPL-MCNC: <0.5 MG/DL (ref 0.8–1.3)
GFR AFRICAN AMERICAN: >60
GFR NON-AFRICAN AMERICAN: >60
GLUCOSE BLD-MCNC: 135 MG/DL (ref 70–99)
GLUCOSE BLD-MCNC: 155 MG/DL (ref 70–99)
GLUCOSE BLD-MCNC: 164 MG/DL (ref 70–99)
GLUCOSE BLD-MCNC: 168 MG/DL (ref 70–99)
GLUCOSE BLD-MCNC: 199 MG/DL (ref 70–99)
PERFORMED ON: ABNORMAL
POTASSIUM SERPL-SCNC: 3.6 MMOL/L (ref 3.5–5.1)
SODIUM BLD-SCNC: 139 MMOL/L (ref 136–145)

## 2022-08-25 PROCEDURE — 97530 THERAPEUTIC ACTIVITIES: CPT

## 2022-08-25 PROCEDURE — 1280000000 HC REHAB R&B

## 2022-08-25 PROCEDURE — 80048 BASIC METABOLIC PNL TOTAL CA: CPT

## 2022-08-25 PROCEDURE — 6370000000 HC RX 637 (ALT 250 FOR IP): Performed by: PHYSICAL MEDICINE & REHABILITATION

## 2022-08-25 PROCEDURE — 99024 POSTOP FOLLOW-UP VISIT: CPT | Performed by: SURGERY

## 2022-08-25 PROCEDURE — 36415 COLL VENOUS BLD VENIPUNCTURE: CPT

## 2022-08-25 PROCEDURE — 51702 INSERT TEMP BLADDER CATH: CPT

## 2022-08-25 PROCEDURE — 97116 GAIT TRAINING THERAPY: CPT

## 2022-08-25 PROCEDURE — 97535 SELF CARE MNGMENT TRAINING: CPT

## 2022-08-25 RX ORDER — SODIUM CHLORIDE 0.9 % (FLUSH) 0.9 %
5-40 SYRINGE (ML) INJECTION PRN
Status: DISCONTINUED | OUTPATIENT
Start: 2022-08-25 | End: 2022-09-06 | Stop reason: HOSPADM

## 2022-08-25 RX ADMIN — LISINOPRIL 40 MG: 20 TABLET ORAL at 12:22

## 2022-08-25 RX ADMIN — FINASTERIDE 5 MG: 5 TABLET, FILM COATED ORAL at 08:27

## 2022-08-25 RX ADMIN — ROSUVASTATIN 10 MG: 10 TABLET, FILM COATED ORAL at 08:27

## 2022-08-25 RX ADMIN — MIRTAZAPINE 30 MG: 15 TABLET, FILM COATED ORAL at 20:25

## 2022-08-25 RX ADMIN — ASPIRIN 81 MG: 81 TABLET, COATED ORAL at 08:28

## 2022-08-25 RX ADMIN — TAMSULOSIN HYDROCHLORIDE 0.4 MG: 0.4 CAPSULE ORAL at 20:25

## 2022-08-25 RX ADMIN — METFORMIN HYDROCHLORIDE 500 MG: 500 TABLET ORAL at 08:28

## 2022-08-25 RX ADMIN — OXYCODONE AND ACETAMINOPHEN 1 TABLET: 5; 325 TABLET ORAL at 20:25

## 2022-08-25 RX ADMIN — OXYCODONE AND ACETAMINOPHEN 2 TABLET: 5; 325 TABLET ORAL at 08:27

## 2022-08-25 RX ADMIN — OXYCODONE AND ACETAMINOPHEN 1 TABLET: 5; 325 TABLET ORAL at 12:24

## 2022-08-25 RX ADMIN — METFORMIN HYDROCHLORIDE 500 MG: 500 TABLET ORAL at 17:08

## 2022-08-25 RX ADMIN — PANTOPRAZOLE SODIUM 40 MG: 40 TABLET, DELAYED RELEASE ORAL at 07:09

## 2022-08-25 ASSESSMENT — PAIN SCALES - GENERAL
PAINLEVEL_OUTOF10: 3
PAINLEVEL_OUTOF10: 0
PAINLEVEL_OUTOF10: 2

## 2022-08-25 ASSESSMENT — PAIN DESCRIPTION - LOCATION
LOCATION: HIP
LOCATION: BACK;HIP
LOCATION: BACK

## 2022-08-25 ASSESSMENT — PAIN - FUNCTIONAL ASSESSMENT: PAIN_FUNCTIONAL_ASSESSMENT: ACTIVITIES ARE NOT PREVENTED

## 2022-08-25 ASSESSMENT — PAIN DESCRIPTION - ORIENTATION
ORIENTATION: RIGHT

## 2022-08-25 ASSESSMENT — PAIN DESCRIPTION - DESCRIPTORS
DESCRIPTORS: ACHING

## 2022-08-25 ASSESSMENT — PAIN DESCRIPTION - FREQUENCY: FREQUENCY: CONTINUOUS

## 2022-08-25 ASSESSMENT — PAIN DESCRIPTION - PAIN TYPE: TYPE: CHRONIC PAIN

## 2022-08-25 ASSESSMENT — PAIN DESCRIPTION - ONSET: ONSET: ON-GOING

## 2022-08-25 NOTE — PROGRESS NOTES
[facility] Department   Phone: [phone]    Occupational Therapy    [] Initial Evaluation            [x] Daily Treatment Note         [] Discharge Summary      Patient: Brando Dejesus   : 1930   MRN: [id number]   Date of Service:  2022    Admitting Diagnosis:  Peripheral artery disease [facility])  Current Admission Summary:   42-year-old male with a history of A. fib previously on Xarelto but stopped secondary to a head bleed, cerebral amyloid angiopathy, diabetes who was admitted on  with right leg pain. He was transferred from [facility] to this facility on  for surgery with Dr. Adrian Cannon. He underwent a right femoral to posterior tibial bypass on . His postoperative course has been overall unremarkable. He was evaluated by therapy and suggested to continue in an inpatient setting prior to returning home. Past Medical History:  has a past medical history of Afib ([facility] [address] Cerebral amyloid angiopathy ([facility] [address] Diabetes mellitus ([facility] [address] Heart aneurysm, and Vertigo. Past Surgical History:  has a past surgical history that includes Appendectomy; hernia repair; Lung surgery; Testicle surgery; and Femoral-tibial Bypass Graft (Right, 2022). Discharge Recommendations: Home with 24 hour supervision and assist and HHOT    DME Required For Discharge: DME to be determined pending patient progress wheelchair     Precautions/Restrictions: medium fall risk, weight bearing  Weight Bearing Restrictions: partial weight bearing - 50 %  [] Right Upper Extremity  [] Left Upper Extremity [x] Right Lower Extremity  [] Left Lower Extremity      WBAT (L) LE  Required Braces/Orthotics: no braces required   [] Right  [] Left  Positional Restrictions:no positional restrictions    Pre-Admission Information   Lives With: alone, .   Comment: daughters take turns weekly / to stay with him since achilles injury                       Type of Home: house  Home Layout: one level  Home Access: level entry with ramp   Bathroom Layout: walker accessible, walk in shower  (not wc accessible)  Bathroom Equipment: grab bars in shower, tub transfer bench, hand held shower head, toilet raiser, BSC  Toilet Height: standard height  Home Equipment: rolling walker, transport chair, bed railing . Comment: typically uses 3 wheel walker   Transfer Assistance: Independent without use of device  Ambulation Assistance:Independent with 3WW, since achilles injury not walking but only transferring via stand pivots with CGA; was unable to propel transport chair himself  ADL Assistance: Typically independent with ADLs prior to achilles injury;  requires assistance with sponge bathing, requires assistance with dressing, requires assistance with grooming, requires assistance with toileting  IADL Assistance: requires assistance with all homemaking tasks  Active :        [] Yes                 [x] No  Hand Dominance: [] Left                 [x] Right  Current Employment: retired. Occupation:  at [employer]: [address]: 1 fall leading to ruptured achilles in June and was NWB and then in a boot      Subjective  General: Pt supine in bed w/ HOB elevated upon arrival finishing breakfast - pt daughter present - agreeable to OT/PT session w/ shower. Pt c/o of increased discomfort and fatigue this date   Pain: 9/10. Location: back and R LE  Pain Interventions: pain medication in place prior to arrival and repositioned      Activities of Daily Living  Basic Activities of Daily Living  Grooming: setup assistance supervision  Grooming Comments: oral care and facial grooming (shaving) seated in w/c at sink - pt required increased time to complete  Upper Extremity Bathing: setup assistance supervision requires verbal cueing Increased time to complete task .   Comment: VCs for thoroughness  Lower Extremity Bathing: setup assistance supervision requires verbal cueing Increased time to complete task . Comment: VCs for thoroughness - olive care completed seated; pt R LE bagged and all wounds waterproofed this date by therapists per RN directions   Bathing Equipment: long handled sponge  Bathing Comments: pt seated on [facility] UB/LB bathing w/ use of long handled sponge for back and LLE; pt required increased time throughout task completion  Upper Extremity Dressing: setup assistance supervision Increased time to complete task . Comment: pt marisa/doffed UB clothing seated on [facility]  Lower Extremity Dressing: maximum assistance requires verbal cueing Increased time to complete task  Dressing Equipment: none  Dressing Comments: LB dressing completed in shower this date - pt maxA for all LB dressing including catheter management, briefs/pants over ankles seated - maxA for pants/briefs over hips in stance w/ balance support and pt UE support via RW and grab bar on R  General Comments: Pt could benefit from additional AE for LB dressing to maximize IND in task completion - recommend intro to sock aid and reacher in future sessions   Instrumental Activities of Daily Living  No IADL completed on this date. Functional Mobility  Bed Mobility  Supine to Sit: stand by assistance, . Comment bed rail on L and increased time - HOB elevated  Scooting: stand by assistance  Comments:   Transfers  Sit to stand transfer:minimal assistance, moderate assistance, . Comment sit>stands ranging min/modA this date dependent on UE support and pt fatigue levels   Stand to sit transfer: minimal assistance, moderate assistance, . Comment sit>stands ranging min/modA this date dependent on UE support and pt fatigue levels  Squat pivot transfer: minimal assistance, moderate assistance, .   Comment w/c>recliner  Shower transfer: minimal assistance, moderate assistance  Shower transfer equipment: bedside commode, grab bars, transfers from w/c  Shower transfer comments: pt required modA x1 for shower transfer this date present, and all other needs within reach. Cognition  Overall Cognitive Status: Impaired  Arousal/Alterness: appropriate responses to stimuli  Following Commands: follows one step commands consistently  Attention Span: appears intact  Memory: decreased recall of recent events  Safety Judgement: decreased awareness of need for assistance, decreased awareness of need for safety  Problem Solving: assistance required to generate solutions, assistance required to implement solutions  Insights: decreased awareness of deficits  Initiation: requires cues for some  Sequencing: requires cues for some  Orientation:    alert and oriented x 4  Command Following:   accurately follows one step commands     Education  Barriers To Learning: cognition and hearing  Patient Education: patient educated on goals, OT role and benefits, plan of care, ADL adaptive strategies, proper use of assistive device/equipment, family education, transfer training  Learning Assessment:  patient verbalizes understanding, would benefit from continued reinforcement    Assessment  Activity Tolerance: pt required seated rest break after short stands and amb this date; pt c/o of increased back and R LE pain after sitting on BSC for shower this AM  Impairments Requiring Therapeutic Intervention: decreased functional mobility, decreased ADL status, decreased strength, decreased endurance, decreased balance, decreased coordination, increased pain  Prognosis: good  Clinical Assessment: Patient presents below baseline function s/p femoral tibial bypass graft and will benefit from continued OT services to address the above deficits. Prior to surgery and achilles injury, patient was independent with ADLs and mobility with 3WW. Patient currently min/modA x1 for functional transfers and amb. Pt demo'd good sitting balance during shower completing UB bathing and dressing SUP/SBA - pt will benefit from AE for LB dresing currently requiring maxA for completion.  Pt

## 2022-08-25 NOTE — CARE COORDINATION
Team conference held today. Spoke with patient and his daughter to discuss progress with therapy, barriers to discharge, and plans to return home. Estimated discharge date set for 9/09/2022. Patient anticipates discharging to home with needed supports. Will continue to follow for support and discharge planning.   Electronically signed by OPAL Lewis on 8/25/2022 at 2:20 PM

## 2022-08-25 NOTE — PROGRESS NOTES
VASCULAR    Looks good. Patent graft. No wound issues with PWB. CPM and will re-evaluate for FWB tomorrow.     [place]

## 2022-08-25 NOTE — PROGRESS NOTES
The pt woke up with melanie LE slightly out of bed. The pt was confused and asked many questions. Took few minutes to be oriented again. Pt's daughter at bed side showed this staff thick  and large yellow sputum. The daughter was concerned if the pt has a pneumonia. Encouraged the to drink more water and eat but declined to eat.

## 2022-08-25 NOTE — PLAN OF CARE
Problem: Discharge Planning  Goal: Discharge to home or other facility with appropriate resources  Outcome: Progressing     Problem: Skin/Tissue Integrity  Goal: Absence of new skin breakdown  Description: 1. Monitor for areas of redness and/or skin breakdown  2. Assess vascular access sites hourly  3. Every 4-6 hours minimum:  Change oxygen saturation probe site  4. Every 4-6 hours:  If on nasal continuous positive airway pressure, respiratory therapy assess nares and determine need for appliance change or resting period.   Outcome: Progressing     Problem: ABCDS Injury Assessment  Goal: Absence of physical injury  Outcome: Progressing     Problem: Safety - Adult  Goal: Free from fall injury  Outcome: Progressing     Problem: Chronic Conditions and Co-morbidities  Goal: Patient's chronic conditions and co-morbidity symptoms are monitored and maintained or improved  Outcome: Progressing     Problem: Nutrition Deficit:  Goal: Optimize nutritional status  Outcome: Progressing     Problem: Pain  Goal: Verbalizes/displays adequate comfort level or baseline comfort level  Outcome: Progressing

## 2022-08-25 NOTE — PROGRESS NOTES
Linda Suarez  8/25/2022  [id number]    Chief Complaint: Peripheral artery disease ([address]    Subjective:   No overnight events. No current complaints. Pain controlled. Glucoses appropriate. ROS: No CP, SOB, dyspnea    Objective:  Patient Vitals for the past 24 hrs:   BP Temp Temp src Pulse Resp SpO2   08/24/22 2200 (!) 156/81 98.3 °F (36.8 °C) Oral 100 18 96 %   08/24/22 0927 97/62 97.9 °F (36.6 °C) Oral (!) 110 16 97 %       Gen: No distress, pleasant. Resting in bedside chair  HEENT: Normocephalic, atraumatic  CV: Regular rate and rhythm. No MRG   Resp: No respiratory distress. CTAB   Abd: Soft, nontender nondistended  Ext: No edema. RLE in knee high ACE dressing  Neuro: Alert, oriented, appropriately interactive    Laboratory data: Available via EMR. Therapy progress:    PT    Supine to Sit: Partial/moderate assistance  Sit to Supine: Partial/moderate assistance   Sit to Stand: Substantial/maximal assistance  Chair/Bed to Chair Transfer: Dependent  Car Transfer:    Ambulation 10 ft:    Ambulation 50 ft:    Ambulation 150 ft:    Stairs - 1 Step:    Stairs - 4 Step:    Stairs - 12 Step:      OT    Eating:    Oral Hygiene: Supervision or touching assistance  Bathing: Partial/moderate assistance  Upper Body Dressing: Supervision or touching assistance  Lower Body Dressing: Dependent  Toilet Transfer: Toilet Hygiene:      Speech Therapy         Body mass index is 22.18 kg/m².     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 Cleo aCzares. ASA, crestor. 50% weightbearing. PT/OT     A. Fib: AC held 2/2 prior bleed. HTN: lisinopril 40     DM: SSI (home regimen metformin 1000 BID). Started metformin 500     Hypokalemia: supplemented     HTN: Lisinopril 40     BPH: proscar, flomax. Parker in place 2/2 retention. VT in near future when more ambulatory     Bowels: Per protocol  Bladder: Per protocol   Sleep: remeron scheduled  Pain: tylenol, maylin PRN   DVT PPx: SCD  ROYCE: 9/9    Team conference was held today on the patient and discussed directly with the patient utilizing their entire treatment team. Please see separate team note for details. Total treatment time for today's care >35 min. Katerine Briones MD 8/25/2022, 8:19 AM    * This document was created using dictation software. While all precautions were taken to ensure accuracy, errors may have occurred. Please disregard any typographical errors.

## 2022-08-25 NOTE — PROGRESS NOTES
[facility] Department   Phone: [phone]    Physical Therapy    [] Initial Evaluation            [x] Daily Treatment Note         [] Discharge Summary      Patient: Bebeto Cardona   : 1930   MRN: [id number]   Date of Service:  2022  Admitting Diagnosis: Peripheral artery disease [facility])  Current Admission Summary: 80-year-old male with a history of A. fib previously on Xarelto but stopped secondary to a head bleed, cerebral amyloid angiopathy, diabetes who was admitted on  with right leg pain. He was transferred from [facility] to this facility on  for surgery with Dr. Valerie Serrano. He underwent a right femoral to posterior tibial bypass on . His postoperative course has been overall unremarkable. He was evaluated by therapy and suggested to continue in an inpatient setting prior to returning home. He reports his pain is controlled. He is questioning why he is nonweightbearing in the right lower extremity. Past Medical History:  has a past medical history of Afib ([facility] [address] Cerebral amyloid angiopathy ([facility] [address] Diabetes mellitus ([facility] [address] Heart aneurysm, and Vertigo. Past Surgical History:  has a past surgical history that includes Appendectomy; hernia repair; Lung surgery; Testicle surgery; and Femoral-tibial Bypass Graft (Right, 2022).   Discharge Recommendations: Home with HHPT   DME Required For Discharge: DME to be determined pending patient progress  Precautions/Restrictions: high fall risk  Weight Bearing Restrictions: partial weight bearing - 50 %  [] Right Upper Extremity  [] Left Upper Extremity [x] Right Lower Extremity  [] Left Lower Extremity     ** Per dtg report, pt is WBAT on (L) LE following conservative management of achilles injury but hasn't had any therapy since being out of boot **       Required Braces/Orthotics: no braces required   [] Right  [] Left  Positional Restrictions:no positional restrictions    Pre-Admission Information Lives With: alone. Comment: daughters take turns weekly 24/7 to stay with him since achilles injury in June                      Type of Home: house  Home Layout: one level  Home Access: level entry with ramp   Bathroom Layout: walker accessible, walk in shower  (not wc accessible)  Bathroom Equipment: grab bars in shower, tub transfer bench, hand held shower head, toilet raiser, BSC  Toilet Height: standard height  Home Equipment: rolling walker, transport chair, bed railing . Comment: typically uses 3 wheel walker   Transfer Assistance: Independent without use of device; Since June performing pivot transfers with CGA using grab bars  Ambulation Assistance:Independent with 3WW, since achilles injury not walking but only transferring via stand pivots with CGA; was unable to propel transport chair himself  ADL Assistance: Typically independent with ADLs prior to achilles injury;  Since June, requires assistance with sponge bathing, requires assistance with dressing, requires assistance with grooming, requires assistance with toileting  IADL Assistance: requires assistance with all homemaking tasks   Active :        [] Yes                 [x] No  Hand Dominance: [] Left                 [x] Right  Current Employment: retired. Occupation:  at [employer]: [address]: 1 fall leading to ruptured achilles in June and was NWB and then in a boot. Pt had not started any PT for his Left achilles prior to current hospitalization. Subjective  General: Supine in bed at arrival. Agreeable to therapy session. Daughter present. Pain: 9/10. Location: Low back and diffuse LE pain.   Pain Interventions: pain medication in place prior to arrival and RN notified       Functional Mobility  Bed Mobility  Supine to Sit: stand by assistance  Scooting: stand by assistance  Comments:HOB partially elevated with use of railings and increased time  Transfers  Sit to stand transfer: moderate assistance, . Comment Ranges from min/mod (A) based on fatigue and surface height  Stand to sit transfer: moderate assistance  Stand pivot transfer: 2 person assistance with mod (A) of 1 + CGA of 1 for completion of shower seat surface => w/c. Squat pivot transfer: 2 person assistance with mod (A) of 1 + CGA of 1 for completion of w/c => recliner. Comments: Significant VC for transfer set up, hand placement, and sequence. Increased time for completion of all transfers. Ambulation  Surface:level surface  Assistive Device: rolling walker  Assistance: moderate assistance  Distance: 15 ft  Gait Mechanics: Forward flexed posture with head down. Step to mechanics with wide JW, increased time in double limb support, and reduced elisha. Comments:  Max VC for UE use/sequence to maintain proper WB status  Stair Mobility  Stair mobility not completed on this date. Comments: Remains unsafe to attempt   Wheelchair Mobility:  No w/c mobility completed on this date. Comments:   Balance  Static Sitting Balance: fair (+): maintains balance at SBA/supervision without use of UE support  Dynamic Sitting Balance: fair: maintains balance at CGA without use of UE support  Static Standing Balance: poor: requires mod (A) to maintain balance  Dynamic Standing Balance: poor: requires mod (A) to maintain balance  Comments:     Other Therapeutic Interventions    Session begins with ambulation from bed => shower seat surface as documented above. Shower completed with OT team member to maximize patient performance and maintain patient safety. Pt requires min/mod assist to complete sit <=> stand transfer from shower seat surface, mod (A) of 1 secondary to posterior lean during dependent management of LB clothing/pericare  in standing position. Pt requires SBA assist to maintain static unsupported sitting balance during shower completion in addition to OT assist with ADL task completion.   Pt requires CGA assist to maintain dynamic sitting balance during shower completion in addition to OT assist with ADL task completion. Stand pivot transfer from elevated shower seat to w/c with use of horizontal grab bars at mod (A) of 1 + CGA. 2nd Session:   Pt seated in recliner upon arrival, reporting ongoing LE and low back pain. Medication in place prior to arrival, agreeable to PT session. Sit <=> stand transfers completed throughout session at mod (A) with use of RW for external support, with extensive cueing and education on proper hand placement. Pt requires min/mod (A) to maintain static stance during dependent assist for LB clothing management. Pt ambulates 20 ft within session using RW at mod (A) + w/c follow with same mechanics as first session. Car transfer completed with RW at mod (A) of 1 + CGA with VC for sequence and hand placement. Stand step transfer completed with use of RW at mod (A) of 1 + CGA. Sit => supine transfer completed with HOB flat + HR at SBA, increased time for task completion.       Functional Outcomes                 Cognition  Overall Cognitive Status: Impaired  Arousal/Alterness: appropriate responses to stimuli  Following Commands: follows one step commands consistently  Attention Span: appears intact  Memory: decreased recall of recent events  Safety Judgement: decreased awareness of need for assistance, decreased awareness of need for safety  Problem Solving: assistance required to generate solutions, assistance required to implement solutions  Insights: decreased awareness of deficits  Initiation: requires cues for some  Sequencing: requires cues for some  Orientation:    alert and oriented x 4  Command Following:   accurately follows one step commands    Education  Barriers To Learning: hearing  Patient Education: patient educated on goals, PT role and benefits, plan of care, weight-bearing education, general safety, functional mobility training, proper use of assistive perform stand pivot transfers using grab bar with SBA  Pt able to ambulate 150 ft with LRAD at SBA  Pt able to perform a car transfer with SBA. Therapy Session Time      Individual Group Co-treatment   Time In      0845   Time Out      9692   Minutes      72     Second Session Therapy Time:   Individual Concurrent Group Co-treatment   Time In        1245   Time Out        1315   Minutes        30     Timed Code Treatment Minutes:  65 + 30    Total Treatment Minutes: 95 Minutes     co-tx completed on this date to maximize functional mobility and maintain patient safety, plan to begin dividing disciplines in response to progressing mobility levels.     Electronically Signed By: Jakob Jorgensen PT

## 2022-08-25 NOTE — PROGRESS NOTES
Left foot cool to touch and right foot warm to touch. The pt did not eat lunch and dinner. The pt state he is not hungry and \"feels bad\". The pt fell a sleep right away.

## 2022-08-26 LAB
GLUCOSE BLD-MCNC: 159 MG/DL (ref 70–99)
GLUCOSE BLD-MCNC: 168 MG/DL (ref 70–99)
GLUCOSE BLD-MCNC: 182 MG/DL (ref 70–99)
GLUCOSE BLD-MCNC: 195 MG/DL (ref 70–99)
HCT VFR BLD CALC: 35.4 % (ref 40.5–52.5)
HEMOGLOBIN: 11.8 G/DL (ref 13.5–17.5)
MAGNESIUM: 1.8 MG/DL (ref 1.8–2.4)
MCH RBC QN AUTO: 30.7 PG (ref 26–34)
MCHC RBC AUTO-ENTMCNC: 33.4 G/DL (ref 31–36)
MCV RBC AUTO: 92.1 FL (ref 80–100)
PDW BLD-RTO: 14.7 % (ref 12.4–15.4)
PERFORMED ON: ABNORMAL
PLATELET # BLD: 269 K/UL (ref 135–450)
PMV BLD AUTO: 8.3 FL (ref 5–10.5)
RBC # BLD: 3.84 M/UL (ref 4.2–5.9)
WBC # BLD: 8.3 K/UL (ref 4–11)

## 2022-08-26 PROCEDURE — 85027 COMPLETE CBC AUTOMATED: CPT

## 2022-08-26 PROCEDURE — 6370000000 HC RX 637 (ALT 250 FOR IP): Performed by: SURGERY

## 2022-08-26 PROCEDURE — 83735 ASSAY OF MAGNESIUM: CPT

## 2022-08-26 PROCEDURE — 36415 COLL VENOUS BLD VENIPUNCTURE: CPT

## 2022-08-26 PROCEDURE — 97110 THERAPEUTIC EXERCISES: CPT

## 2022-08-26 PROCEDURE — 97530 THERAPEUTIC ACTIVITIES: CPT

## 2022-08-26 PROCEDURE — 6370000000 HC RX 637 (ALT 250 FOR IP): Performed by: PHYSICAL MEDICINE & REHABILITATION

## 2022-08-26 PROCEDURE — 99024 POSTOP FOLLOW-UP VISIT: CPT | Performed by: SURGERY

## 2022-08-26 PROCEDURE — 94760 N-INVAS EAR/PLS OXIMETRY 1: CPT

## 2022-08-26 PROCEDURE — 1280000000 HC REHAB R&B

## 2022-08-26 PROCEDURE — 97116 GAIT TRAINING THERAPY: CPT

## 2022-08-26 PROCEDURE — 97535 SELF CARE MNGMENT TRAINING: CPT

## 2022-08-26 RX ORDER — LANOLIN ALCOHOL/MO/W.PET/CERES
400 CREAM (GRAM) TOPICAL EVERY 24 HOURS
Status: DISCONTINUED | OUTPATIENT
Start: 2022-08-26 | End: 2022-08-29

## 2022-08-26 RX ORDER — CIPROFLOXACIN 500 MG/1
500 TABLET, FILM COATED ORAL EVERY 12 HOURS SCHEDULED
Status: COMPLETED | OUTPATIENT
Start: 2022-08-26 | End: 2022-09-04

## 2022-08-26 RX ADMIN — LISINOPRIL 40 MG: 20 TABLET ORAL at 08:06

## 2022-08-26 RX ADMIN — PANTOPRAZOLE SODIUM 40 MG: 40 TABLET, DELAYED RELEASE ORAL at 05:15

## 2022-08-26 RX ADMIN — OXYCODONE AND ACETAMINOPHEN 2 TABLET: 5; 325 TABLET ORAL at 12:02

## 2022-08-26 RX ADMIN — ROSUVASTATIN 10 MG: 10 TABLET, FILM COATED ORAL at 08:06

## 2022-08-26 RX ADMIN — OXYCODONE AND ACETAMINOPHEN 1 TABLET: 5; 325 TABLET ORAL at 17:10

## 2022-08-26 RX ADMIN — CIPROFLOXACIN 500 MG: 500 TABLET, FILM COATED ORAL at 08:21

## 2022-08-26 RX ADMIN — MIRTAZAPINE 30 MG: 15 TABLET, FILM COATED ORAL at 20:30

## 2022-08-26 RX ADMIN — METFORMIN HYDROCHLORIDE 500 MG: 500 TABLET ORAL at 17:10

## 2022-08-26 RX ADMIN — CIPROFLOXACIN 500 MG: 500 TABLET, FILM COATED ORAL at 20:30

## 2022-08-26 RX ADMIN — TAMSULOSIN HYDROCHLORIDE 0.4 MG: 0.4 CAPSULE ORAL at 20:30

## 2022-08-26 RX ADMIN — OXYCODONE AND ACETAMINOPHEN 2 TABLET: 5; 325 TABLET ORAL at 07:58

## 2022-08-26 RX ADMIN — METFORMIN HYDROCHLORIDE 500 MG: 500 TABLET ORAL at 08:05

## 2022-08-26 RX ADMIN — ASPIRIN 81 MG: 81 TABLET, COATED ORAL at 08:05

## 2022-08-26 RX ADMIN — FINASTERIDE 5 MG: 5 TABLET, FILM COATED ORAL at 08:05

## 2022-08-26 RX ADMIN — Medication 400 MG: at 17:10

## 2022-08-26 ASSESSMENT — PAIN DESCRIPTION - ORIENTATION
ORIENTATION: LOWER;RIGHT
ORIENTATION: RIGHT

## 2022-08-26 ASSESSMENT — PAIN SCALES - GENERAL
PAINLEVEL_OUTOF10: 6
PAINLEVEL_OUTOF10: 0

## 2022-08-26 ASSESSMENT — PAIN DESCRIPTION - DESCRIPTORS
DESCRIPTORS: ACHING
DESCRIPTORS: SORE
DESCRIPTORS: ACHING

## 2022-08-26 ASSESSMENT — PAIN DESCRIPTION - LOCATION
LOCATION: BACK;HIP
LOCATION: BACK;HIP
LOCATION: HIP

## 2022-08-26 NOTE — PROGRESS NOTES
[facility] Department   Phone: [phone]    Occupational Therapy    [] Initial Evaluation            [x] Daily Treatment Note         [] Discharge Summary      Patient: Ean Richardson   : 1930   MRN: [id number]   Date of Service:  2022    Admitting Diagnosis:  Peripheral artery disease [facility])  Current Admission Summary:   51-year-old male with a history of A. fib previously on Xarelto but stopped secondary to a head bleed, cerebral amyloid angiopathy, diabetes who was admitted on  with right leg pain. He was transferred from [facility] to this facility on  for surgery with Dr. Jayson Foster. He underwent a right femoral to posterior tibial bypass on . His postoperative course has been overall unremarkable. He was evaluated by therapy and suggested to continue in an inpatient setting prior to returning home. Past Medical History:  has a past medical history of Afib ([facility] [address] Cerebral amyloid angiopathy ([facility] [address] Diabetes mellitus ([facility] [address] Heart aneurysm, and Vertigo. Past Surgical History:  has a past surgical history that includes Appendectomy; hernia repair; Lung surgery; Testicle surgery; and Femoral-tibial Bypass Graft (Right, 2022). Discharge Recommendations: Home with 24 hour supervision and assist and HHOT    DME Required For Discharge: DME to be determined pending patient progress     Precautions/Restrictions: high fall risk  Weight Bearing Restrictions: weight bearing as tolerated  [] Right Upper Extremity  [] Left Upper Extremity [x] Right Lower Extremity  [x] Left Lower Extremity  Required Braces/Orthotics: no braces required  Positional Restrictions:no positional restrictions    Pre-Admission Information   Lives With: alone, .   Comment: daughters take turns weekly / to stay with him since achilles injury                       Type of Home: house  Home Layout: one level  Home Access: level entry with ramp   Bathroom Layout: walker accessible, walk in shower  (not wc accessible)  Bathroom Equipment: grab bars in shower, tub transfer bench, hand held shower head, toilet raiser, BSC  Toilet Height: standard height  Home Equipment: rolling walker, transport chair, bed railing . Comment: typically uses 3 wheel walker   Transfer Assistance: Independent without use of device  Ambulation Assistance:Independent with 3WW, since achilles injury not walking but only transferring via stand pivots with CGA; was unable to propel transport chair himself  ADL Assistance: Typically independent with ADLs prior to achilles injury;  requires assistance with sponge bathing, requires assistance with dressing, requires assistance with grooming, requires assistance with toileting  IADL Assistance: requires assistance with all homemaking tasks  Active :        [] Yes                 [x] No  Hand Dominance: [] Left                 [x] Right  Current Employment: retired. Occupation:  at [employer]: [address]: 1 fall leading to ruptured achilles in June and was NWB and then in a boot      Subjective  General: Patient in recliner upon arrival, agreeable to OT session. Patient reported pain in back but did not rate. Reported feeling worn out from PT session. Daughter in room upon arrival and reporting patient already received pain meds. Pain: Patient does not rate upon questioning  Pain Interventions: pain medication in place prior to arrival and repositioned      Activities of Daily Living  Basic Activities of Daily Living  Lower Extremity Dressing: moderate assistance requires verbal cueing Increased time to complete task  Dressing Equipment: sock aide  Dressing Comments: Patient able to doff L sock with SBA seated EOM but required assistance doffing R sock due to back pain. Patient educated on sock aide and was able to marisa socks with sock aide once sock set up on device.  Patient had trouble getting sock onto sock aide and will benefit from continued practice. General Comments: Patient declining other ADLs. Patient reporting he needed to urinate, education provided on having catheter. Per MD, patient to most likely have catheter removed Monday. Instrumental Activities of Daily Living  No IADL completed on this date. Functional Mobility  Bed Mobility  Bed mobility not completed on this date. Comments:   Transfers  Sit to stand transfer:contact guard assistance, minimal assistance  Stand to sit transfer: contact guard assistance, minimal assistance  Comments: to/from recliner, wc, and mat table. Patient educated on hand placement during transfers-requiring verbal cues throughout session. Patient required Darian from lower surface and Darian due to decreased control when sitting. Functional Mobility:  Sitting Balance: stand by assistance, contact guard assistance. Sitting Balance Comment: seated EOM for ADLs  Standing Balance: stand by assistance, contact guard assistance. Standing Balance Comment: for card sorting task   Functional Mobility: .  contact guard assistance, minimal assistance  Functional Mobility Activity: ~15 ft in room, ~20 ft in therapy room   Functional Mobility Device Use: rolling walker  Functional Mobility Comment:  patient completed functional mobility from recliner to wc in room, from mat table to elevated table      Comments:     Other Therapeutic Interventions  Patient completed card sorting task standing at elevated table with CGA. Patient only tolerated 1:18 of standing before needing to sit. Patient able to sort cards by suit with no additional cueing. Patient used RUE for task and LUE for balance. Task completed to improve standing tolerance and balance needed for ADLs. Second Session:  Patient in recliner upon arrival, agreeable to OT/PT session. Patient reporting pain in back but did not rate. Patient completed transfers during session with min-CGA.  Patient occasionally needing min due to posteriorly leaning. Patient completed ~15 ft of functional mobility with CGA-Darian and RW. Patient completed 3 steps with Darian and max verbal cueing. Patient participated in standing activities at the mirror in order to improve patients awareness of left lateral lean. Patient completed functional reaching task of wiping off 10 marks on the top of the mirror. After seated rest break patient was able to wipe off 10 numbers across the whole mirror, requiring patient to step laterally using the ballet bar. Patient required Darian for postural adjustments during standing tasks. Patient requiring CGA-Darian for stand to sit due to poor control. Patient completed balance activity of reaching with BUE to tap the red ball in front of patient. (10 reps). Max cueing throughout for following directions. Patient required min-modA for standing balance due to posteriorly leaning. Patient transferred from wc to bed with RW and Darian. Pt left supine in bed w/ HOB elevated w/ bed alarm, call light, daughter present, and all other needs within reach.     Cognition  Overall Cognitive Status: Impaired  Arousal/Alterness: appropriate responses to stimuli  Following Commands: follows one step commands consistently  Attention Span: appears intact  Memory: decreased recall of recent events  Safety Judgement: decreased awareness of need for assistance, decreased awareness of need for safety  Problem Solving: assistance required to generate solutions, assistance required to implement solutions  Insights: decreased awareness of deficits  Initiation: requires cues for some  Sequencing: requires cues for some  Orientation:    alert and oriented x 4  Command Following:   accurately follows one step commands     Education  Barriers To Learning: cognition and hearing  Patient Education: patient educated on goals, OT role and benefits, plan of care, ADL adaptive strategies, proper use of assistive device/equipment, family education, transfer training  Learning Assessment:  patient verbalizes understanding, would benefit from continued reinforcement    Assessment  Activity Tolerance: patient tolerated well with rest breaks throughout. Limited due to pain   Impairments Requiring Therapeutic Intervention: decreased functional mobility, decreased ADL status, decreased strength, decreased endurance, decreased balance, decreased coordination, increased pain  Prognosis: good  Clinical Assessment: Patient presents below baseline function s/p femoral tibial bypass graft and will benefit from continued OT services to address the above deficits. Prior to surgery and achilles injury, patient was independent with ADLs and mobility with 3WW. Patient progressing to min-CGA for transfers and functional mobility. Pt continues to require cues for posture during standing activities and is limited by back pain. Patient con't to benefit from OT services to maximize patients safety and independence with ADLs, transfers and functional mobility. Con't per POC. Safety Interventions: patient left in chair, chair alarm in place, call light within reach, patient at risk for falls, nurse notified, and family/caregiver present    Plan  Frequency: 5 x/week, 90 min/day  Current Treatment Recommendations: strengthening, balance training, functional mobility training, transfer training, endurance training, wheelchair mobility training, patient/caregiver education, and ADL/self-care training    Goals  Patient Goals: not stated    Short Term Goals: To be completed in: 2 weeks  Patient will complete upper body ADL at supervision   Patient will complete lower body ADL at minimal assistance   Patient will complete toileting at minimal assistance   Patient will complete grooming at supervision   Patient will complete functional transfers at minimal assistance -goal met 8/26     Long Term Goals:   To be completed in: 3 weeks   Patient will complete lower body ADL at stand by assistance   Patient will complete toileting at stand by assistance   Patient will complete functional transfers at stand by assistance   Patient will complete functional mobility at stand by assistance   Patient to gather and transport IADL items at stand by assistance     Goals progressing 8/26       Therapy Session Time  First Session Therapy Time:    Individual Group Co-treatment   Time In 1015     Time Out 1100     Minutes 45        Second Session Therapy Time:   Individual Concurrent Group Co-treatment   Time In      1245   Time Out      1330   Minutes      45       Timed Code Treatment Minutes:  45 + 45  Total Treatment Minutes:  [address] [place], [place] OTR/L [id number]

## 2022-08-26 NOTE — PLAN OF CARE
Problem: Discharge Planning  Goal: Discharge to home or other facility with appropriate resources  Outcome: Progressing     Problem: Skin/Tissue Integrity  Goal: Absence of new skin breakdown  Description: 1. Monitor for areas of redness and/or skin breakdown  2. Assess vascular access sites hourly  3. Every 4-6 hours minimum:  Change oxygen saturation probe site  4. Every 4-6 hours:  If on nasal continuous positive airway pressure, respiratory therapy assess nares and determine need for appliance change or resting period.   Outcome: Progressing     Problem: ABCDS Injury Assessment  Goal: Absence of physical injury  Outcome: Progressing     Problem: Safety - Adult  Goal: Free from fall injury  8/26/2022 0857 by Markos Leigh RN  Outcome: Progressing     Problem: Chronic Conditions and Co-morbidities  Goal: Patient's chronic conditions and co-morbidity symptoms are monitored and maintained or improved  Outcome: Progressing     Problem: Nutrition Deficit:  Goal: Optimize nutritional status  Outcome: Progressing

## 2022-08-26 NOTE — PLAN OF CARE
Problem: Safety - Adult  Goal: Free from fall injury  8/25/2022 2247 by Kirti Davenport RN  Outcome: Progressing

## 2022-08-26 NOTE — PROGRESS NOTES
[facility]   Phone: [phone]    Physical Therapy     [] Initial Evaluation                          [x] Daily Treatment Note                      [] Discharge Summary        Patient: Susanna Cortés                               : 1930                                    MRN: [id number]                                    Date of Service:  2022      Admitting Diagnosis: Peripheral artery disease [facility])  Current Admission Summary: 80-year-old male with a history of A. fib previously on Xarelto but stopped secondary to a head bleed, cerebral amyloid angiopathy, diabetes who was admitted on  with right leg pain. He was transferred from [facility] to this facility on  for surgery with Dr. Phyllis Kay. He underwent a right femoral to posterior tibial bypass on . His postoperative course has been overall unremarkable. He was evaluated by therapy and suggested to continue in an inpatient setting prior to returning home. He reports his pain is controlled. He is questioning why he is nonweightbearing in the right lower extremity. Past Medical History:  has a past medical history of Afib ([facility] [address] Cerebral amyloid angiopathy ([facility] [address] Diabetes mellitus ([facility] [address] Heart aneurysm, and Vertigo. Past Surgical History:  has a past surgical history that includes Appendectomy; hernia repair; Lung surgery; Testicle surgery; and Femoral-tibial Bypass Graft (Right, 2022).   Discharge Recommendations: Home with HHPT   DME Required For Discharge: DME to be determined pending patient progress  Precautions/Restrictions: high fall risk  Weight Bearing Restrictions: weight bearing as tolerated - upgraded to WBAT on 2022  [] Right Upper Extremity        [] Left Upper Extremity         [x] Right Lower Extremity         [] Left Lower Extremity              ** Per dtg report, pt is WBAT on (L) LE following conservative management of achilles injury but hasn't had any therapy since being out of boot **        Required Braces/Orthotics: no braces required                   [] Right            [] Left  Positional Restrictions:no positional restrictions       Pre-Admission Information   Lives With: alone. Comment: daughters take turns weekly 24/7 to stay with him since achilles injury in June                      Type of Home: house  Home Layout: one level  Home Access: level entry with ramp   Bathroom Layout: walker accessible, walk in shower  (not wc accessible)  Bathroom Equipment: grab bars in shower, tub transfer bench, hand held shower head, toilet raiser, BSC  Toilet Height: standard height  Home Equipment: rolling walker, transport chair, bed railing . Comment: typically uses 3 wheel walker   Transfer Assistance: Independent without use of device; Since June performing pivot transfers with CGA using grab bars  Ambulation Assistance:Independent with 3WW, since achilles injury not walking but only transferring via stand pivots with CGA; was unable to propel transport chair himself  ADL Assistance: Typically independent with ADLs prior to achilles injury;  Since June, requires assistance with sponge bathing, requires assistance with dressing, requires assistance with grooming, requires assistance with toileting  IADL Assistance: requires assistance with all homemaking tasks   Active :        [] Yes                 [x] No  Hand Dominance: [] Left                 [x] Right  Current Employment: retired. Occupation:  at [employer]: [address]: 1 fall leading to ruptured achilles in June and was NWB and then in a boot. Pt had not started any PT for his Left achilles prior to current hospitalization. Subjective  General: Seated in recliner upon arrival. Agreeable to therapy session. Daughter present. Pain: 9/10. Location: Low back and diffuse LE pain.   Pain Interventions: pain medication in place prior to arrival and RN notified       Functional Mobility  Bed Mobility  Supine to Sit: stand by assistance  Scooting: stand by assistance  Comments:HOB partially elevated with use of railings and increased time  Transfers  Sit to stand transfer: minimal assistance, . Comment CGA with occasional min (A) for initial standing balance stabilization of posterior lean  Stand to sit transfer: minimal assistance  Stand step transfer: minimal assistance, . Comment RW utilized for turning  Comments: Improved awareness of hand placement but ongoing VC for correct use. RW utilized for all transfers. VC to keep feet inside walker JW while turning. Ambulation  Surface:level surface  Assistive Device: rolling walker  Assistance: minimal assistance  Distance: 60 ft  Gait Mechanics: Forward flexed posture with head down. Mixed use of step to vs reciprocal gait pattern with reduced elisha, wide JW, increased time in double limb support. Comments:    Stair Mobility  Stair mobility not completed on this date. Comments:   Wheelchair Mobility:  No w/c mobility completed on this date. Comments:   Balance  Static Sitting Balance: fair (+): maintains balance at SBA/supervision without use of UE support  Dynamic Sitting Balance: fair (+): maintains balance at SBA/supervision without use of UE support  Static Standing Balance: poor (+): requires min (A) to maintain balance  Dynamic Standing Balance: poor (+): requires min (A) to maintain balance  Comments:      Other Therapeutic Interventions              Seated stepper L1: 3 min + 3 min to improve strength and cardiovascular endurance. Repetitive stand step transfer training x 4 completions with use of RW, requiring min (A) and consistent VC for postural correction and to maintain self inside walker JW. 2nd Session:                Pt seated in recliner upon arrival, reporting ongoing low back and (B) LE pain, agreeable to PT session.  Sit <=> stand transfers completed throughout session at CGA/min (A) with occasional min (A) for stabilization during transition of hands to UE. Pt ambulates 20 ft (max distance not attempted) using RW at min (A) with same mechanics as first session. Pt ascends/descends 3 (6\") steps with (B) HR at min (A) with step to gait mechanics and heavy reliance on UE support. Standing activities completed at ballet bar with mirror use for visual feedback of postural alignment: overhead targeted reaching with postural correct between each item to correct (L) lateral lean and forward flexion, lateral stepping with serial targeted reaching. Pt requires assist of 1 for postural facilitation throughout task in addition to OT lead activity/command following. Static JW balance activity with transition from RW to no UE support (targeted reach to chest level target). Pt consistently requires mod (A) for correction of posterior LOB during unsupported standing position. Stand step transfer w/c => bed with RW at min (A). Sit => supine transfer completed with HR and bed flat positioning at SBA. Pt in bed with bed alarm and call light.                      Cognition  Overall Cognitive Status: Impaired  Arousal/Alterness: appropriate responses to stimuli  Following Commands: follows one step commands consistently, follows multi step commands with repetition  Safety Judgement: decreased awareness of need for safety  Problem Solving: assistance required to generate solutions, assistance required to implement solutions  Sequencing: requires cues for some  Orientation:    alert and oriented x 4  Command Following:   accurately follows one step commands       Education  Barriers To Learning: hearing  Patient Education: patient educated on goals, PT role and benefits, plan of care, weight-bearing education, general safety, functional mobility training, proper use of assistive device/equipment, transfer training  Learning Assessment:  patient verbalizes understanding, would benefit from continued reinforcement       Assessment  Activity Tolerance: Limited by fatigue and pain resulting in increased rest breaks  Impairments Requiring Therapeutic Intervention: decreased functional mobility, decreased ADL status, decreased ROM, decreased strength, decreased safety awareness, decreased endurance, decreased balance, increased pain, decreased posture  Prognosis: good  Clinical Assessment: Pt continues to make excellent progress in response to therapy services, progressing in all goal areas and achieving several short term goals. Patient has been upgraded to [facility] allowing increased functional mobility with use of RW including significant improvement in ambulation distance and introduction of stair mobility. Despite progress, patient currently requires ongoing physical assistance for balance stabilization during routine mobility tasks. Prior to recent surgery and (L) ankle injury in June, patient was independent and living home alone. Continued skilled PT to promote return towards PLOF. Safety Interventions: patient left in chair, chair alarm in place, and call light within reach       Plan  Frequency: 5 x/week, 90 min/day  Current Treatment Recommendations: strengthening, ROM, balance training, functional mobility training, transfer training, endurance training, and wheelchair mobility training       Goals  Patient Goals:  Return home with improved mobility   Short Term Goals:  Time Frame: 1.5 wks   Pt able to perform bed mobility modified independence.    Pt able to perform sliding board transfers with minimal assistance - goal discontinued secondary to upgraded WB status  Pt able to perform stand pivot transfers using grab bar with moderate assistance - goal met 8/26/2022  Pt able to navigate a wc 150 ft mod I - goal discontinued secondary to upgraded WB status  Pt able to ambulate 10 ft with LRAD at moderate assistance - goal met 8/26/2022     Long Term Goals: Time Frame 3 wks  Pt able to perform sliding board transfers with SBA - goal discontinued secondary to upgraded WB status  Pt able to perform stand pivot transfers using grab bar with SBA  Pt able to ambulate 150 ft with LRAD at SBA  Pt able to perform a car transfer with SBA. Therapy Session Time                  Individual Group Co-treatment   Time In 0845     Time Out 2438     Minutes 47        Second Session Therapy Time:    Individual Concurrent Group Co-treatment   Time In     1332   Time Out    1330   Minutes    45      Cotx completed for safety during introduction of stair mobility and dynamic balance without UE support. Plan to separate all therapy sessions tomorrow as a result of current assist levels during mobility.       Timed Code Treatment Minutes:  54 + 45     Total Treatment Minutes: 99 Minutes        Electronically Signed By: Silvia Dumont PT

## 2022-08-26 NOTE — PROGRESS NOTES
VASCULAR  POD#7    Feels well. Anxious to more independent in activities. VSS Afeb  Incisions intact; ankle incision intact without drainage but expanding petechial blanching erythema  Palpable graft pulse with warm foot. A/P: S/P R fem-PT bypass. Patent with excellent perfusion. Mild cellulitis at ankle incision (likely strep by appearance) - will begin Cipro 500 po BID and follow. Full weight bearing R foot. Will follow.      Berenice Fry

## 2022-08-26 NOTE — PROGRESS NOTES
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 Terrence Meade. ASA, crestor. 50% weightbearing. PT/OT. Cipro for cellulitis. A. Fib: AC held 2/2 prior bleed. HTN: lisinopril 40     DM: SSI (home regimen metformin 1000 BID). Started metformin 500     Hypokalemia: supplemented     HTN: Lisinopril 40     BPH: proscar, flomax. Parker in place 2/2 retention. VT in near future when more ambulatory (anticipating Monday)    Hypomagnesemia: - supplement     Bowels: Per protocol  Bladder: Per protocol   Sleep: remeron scheduled  Pain: tylenol, maylin PRN   DVT PPx: SCD  ROYCE: 9/9      John Nielsen MD 8/26/2022, 2:50 PM    * This document was created using dictation software. While all precautions were taken to ensure accuracy, errors may have occurred. Please disregard any typographical errors.

## 2022-08-27 LAB
GLUCOSE BLD-MCNC: 116 MG/DL (ref 70–99)
GLUCOSE BLD-MCNC: 137 MG/DL (ref 70–99)
GLUCOSE BLD-MCNC: 143 MG/DL (ref 70–99)
GLUCOSE BLD-MCNC: 195 MG/DL (ref 70–99)
PERFORMED ON: ABNORMAL

## 2022-08-27 PROCEDURE — 6370000000 HC RX 637 (ALT 250 FOR IP): Performed by: PHYSICAL MEDICINE & REHABILITATION

## 2022-08-27 PROCEDURE — 97530 THERAPEUTIC ACTIVITIES: CPT

## 2022-08-27 PROCEDURE — 97110 THERAPEUTIC EXERCISES: CPT

## 2022-08-27 PROCEDURE — 97116 GAIT TRAINING THERAPY: CPT

## 2022-08-27 PROCEDURE — 1280000000 HC REHAB R&B

## 2022-08-27 PROCEDURE — 6370000000 HC RX 637 (ALT 250 FOR IP): Performed by: SURGERY

## 2022-08-27 PROCEDURE — 97535 SELF CARE MNGMENT TRAINING: CPT

## 2022-08-27 RX ORDER — SENNA AND DOCUSATE SODIUM 50; 8.6 MG/1; MG/1
2 TABLET, FILM COATED ORAL DAILY PRN
Status: DISCONTINUED | OUTPATIENT
Start: 2022-08-27 | End: 2022-08-29

## 2022-08-27 RX ORDER — POLYETHYLENE GLYCOL 3350 17 G/17G
17 POWDER, FOR SOLUTION ORAL DAILY
Status: DISCONTINUED | OUTPATIENT
Start: 2022-08-28 | End: 2022-08-29

## 2022-08-27 RX ADMIN — CIPROFLOXACIN 500 MG: 500 TABLET, FILM COATED ORAL at 08:59

## 2022-08-27 RX ADMIN — FINASTERIDE 5 MG: 5 TABLET, FILM COATED ORAL at 09:00

## 2022-08-27 RX ADMIN — METFORMIN HYDROCHLORIDE 500 MG: 500 TABLET ORAL at 16:32

## 2022-08-27 RX ADMIN — TAMSULOSIN HYDROCHLORIDE 0.4 MG: 0.4 CAPSULE ORAL at 20:33

## 2022-08-27 RX ADMIN — POLYETHYLENE GLYCOL 3350 17 G: 17 POWDER, FOR SOLUTION ORAL at 16:33

## 2022-08-27 RX ADMIN — PANTOPRAZOLE SODIUM 40 MG: 40 TABLET, DELAYED RELEASE ORAL at 05:13

## 2022-08-27 RX ADMIN — METFORMIN HYDROCHLORIDE 500 MG: 500 TABLET ORAL at 09:00

## 2022-08-27 RX ADMIN — OXYCODONE AND ACETAMINOPHEN 2 TABLET: 5; 325 TABLET ORAL at 12:44

## 2022-08-27 RX ADMIN — CIPROFLOXACIN 500 MG: 500 TABLET, FILM COATED ORAL at 20:33

## 2022-08-27 RX ADMIN — Medication 400 MG: at 16:32

## 2022-08-27 RX ADMIN — ROSUVASTATIN 10 MG: 10 TABLET, FILM COATED ORAL at 09:00

## 2022-08-27 RX ADMIN — SENNOSIDES AND DOCUSATE SODIUM 2 TABLET: 50; 8.6 TABLET ORAL at 16:32

## 2022-08-27 RX ADMIN — MIRTAZAPINE 30 MG: 15 TABLET, FILM COATED ORAL at 20:33

## 2022-08-27 RX ADMIN — LISINOPRIL 40 MG: 20 TABLET ORAL at 09:01

## 2022-08-27 RX ADMIN — OXYCODONE AND ACETAMINOPHEN 2 TABLET: 5; 325 TABLET ORAL at 16:32

## 2022-08-27 RX ADMIN — OXYCODONE AND ACETAMINOPHEN 2 TABLET: 5; 325 TABLET ORAL at 08:58

## 2022-08-27 RX ADMIN — ASPIRIN 81 MG: 81 TABLET, COATED ORAL at 09:01

## 2022-08-27 ASSESSMENT — PAIN DESCRIPTION - DESCRIPTORS
DESCRIPTORS: ACHING;DISCOMFORT
DESCRIPTORS: ACHING;DISCOMFORT
DESCRIPTORS: ACHING;DISCOMFORT;SORE
DESCRIPTORS: ACHING;DISCOMFORT;SHARP;SORE

## 2022-08-27 ASSESSMENT — PAIN DESCRIPTION - ORIENTATION
ORIENTATION: LOWER

## 2022-08-27 ASSESSMENT — PAIN DESCRIPTION - LOCATION
LOCATION: BACK

## 2022-08-27 ASSESSMENT — PAIN SCALES - GENERAL
PAINLEVEL_OUTOF10: 9
PAINLEVEL_OUTOF10: 8
PAINLEVEL_OUTOF10: 7
PAINLEVEL_OUTOF10: 8
PAINLEVEL_OUTOF10: 8
PAINLEVEL_OUTOF10: 5
PAINLEVEL_OUTOF10: 8
PAINLEVEL_OUTOF10: 8

## 2022-08-27 ASSESSMENT — PAIN DESCRIPTION - PAIN TYPE
TYPE: CHRONIC PAIN

## 2022-08-27 ASSESSMENT — PAIN DESCRIPTION - FREQUENCY
FREQUENCY: CONTINUOUS

## 2022-08-27 ASSESSMENT — PAIN - FUNCTIONAL ASSESSMENT
PAIN_FUNCTIONAL_ASSESSMENT: ACTIVITIES ARE NOT PREVENTED

## 2022-08-27 ASSESSMENT — PAIN DESCRIPTION - ONSET
ONSET: ON-GOING

## 2022-08-27 NOTE — PROGRESS NOTES
Livan Morataya  8/27/2022  [id number]    Chief Complaint: Peripheral artery disease ([address]    Subjective:   No overnight events. Minimal pain. No complaints. ROS: No CP, SOB, dyspnea    Objective:  Patient Vitals for the past 24 hrs:   BP Temp Temp src Pulse Resp SpO2   08/27/22 0845 (!) 100/49 97.8 °F (36.6 °C) Oral (!) 111 16 94 %   08/26/22 2015 121/71 97.7 °F (36.5 °C) Oral 85 16 96 %       Gen: No distress, pleasant. Resting in WC chair  HEENT: Normocephalic, atraumatic  CV: Regular rate and rhythm. No MRG   Resp: No respiratory distress. CTAB   Abd: Soft, nontender nondistended  Ext: No edema. RLE in knee high ACE dressing  Neuro: Alert, oriented, appropriately interactive    Laboratory data: Available via EMR. Therapy progress:    PT    Supine to Sit: Partial/moderate assistance  Sit to Supine: Partial/moderate assistance   Sit to Stand: Partial/moderate assistance  Chair/Bed to Chair Transfer: Partial/moderate assistance  Car Transfer: Substantial/maximal assistance  Ambulation 10 ft: Partial/moderate assistance  Ambulation 50 ft: Partial/moderate assistance  Ambulation 150 ft:    Stairs - 1 Step:    Stairs - 4 Step:    Stairs - 12 Step:      OT    Eating: Setup or clean-up assistance  Oral Hygiene: Supervision or touching assistance  Bathing: Supervision or touching assistance  Upper Body Dressing: Supervision or touching assistance  Lower Body Dressing: Substantial/maximal assistance  Toilet Transfer: Toilet Hygiene: Dependent    Speech Therapy         Body mass index is 22.18 kg/m².     Assessment:  Patient Active Problem List   Diagnosis    Pleural plaque    Shortness of breath    Benign essential HTN    Hematoma    Hyperlipidemia    DMII (diabetes mellitus, type 2) (HCC)    Pain of right lower extremity due to ischemia    Edema of right lower leg    Cerebral amyloid angiopathy (CODE)    Atrial fibrillation (HCC)    Ischemia of right lower extremity    Atherosclerosis of artery of extremity with rest pain ([facility] [address]    Limb ischemia    Ischemic leg pain    Femoral artery aneurysm, right ([facility] [address]    Peripheral artery disease ([facility] [address]       Plan:   PAD: S//p right femoral to posterior tibial bypass on 8/19 with Dr. Philip Jim. ASA, crestor. 50% weightbearing. PT/OT. Cipro for cellulitis. A. Fib: AC held 2/2 prior bleed. HTN: lisinopril 40     DM: SSI (home regimen metformin 1000 BID). Started metformin 500     Hypokalemia: supplemented     HTN: Lisinopril 40     BPH: proscar, flomax. Parker in place 2/2 retention. VT in near future when more ambulatory (anticipating Monday)    Hypomagnesemia: - supplement     Bowels: Per protocol  Bladder: Per protocol   Sleep: remeron scheduled  Pain: tylenol, maylin PRN   DVT PPx: SCD  ROYCE: 9/9      Lennox Sicilian, MD  8/27/2022, 9:41 AM    * This document was created using dictation software. While all precautions were taken to ensure accuracy, errors may have occurred. Please disregard any typographical errors.

## 2022-08-27 NOTE — PROGRESS NOTES
[facility] Department   Phone: [phone]    Physical Therapy    [] Initial Evaluation            [x] Daily Treatment Note         [] Discharge Summary      Patient: Geovanna Harris   : 1930   MRN: [id number]   Date of Service:  2022  Admitting Diagnosis: Peripheral artery disease [facility])  Current Admission Summary: 80-year-old male with a history of A. fib previously on Xarelto but stopped secondary to a head bleed, cerebral amyloid angiopathy, diabetes who was admitted on  with right leg pain. He was transferred from [facility] to this facility on  for surgery with Dr. Johnathan Jasmine. He underwent a right femoral to posterior tibial bypass on . His postoperative course has been overall unremarkable. He was evaluated by therapy and suggested to continue in an inpatient setting prior to returning home. He reports his pain is controlled. He is questioning why he is nonweightbearing in the right lower extremity. Past Medical History:  has a past medical history of Afib ([facility] [address] Cerebral amyloid angiopathy ([facility] [address] Diabetes mellitus ([facility] [address] Heart aneurysm, and Vertigo. Past Surgical History:  has a past surgical history that includes Appendectomy; hernia repair; Lung surgery; Testicle surgery; and Femoral-tibial Bypass Graft (Right, 2022).   Discharge Recommendations: Home with HHPT   DME Required For Discharge: DME to be determined pending patient progress  Precautions/Restrictions: high fall risk  Weight Bearing Restrictions: weight bearing as tolerated - upgraded to WBAT on 2022  [] Right Upper Extremity  [] Left Upper Extremity [x] Right Lower Extremity  [] Left Lower Extremity     ** Per dtg report, pt is WBAT on (L) LE following conservative management of achilles injury but hasn't had any therapy since being out of boot **       Required Braces/Orthotics: no braces required   [] Right  [] Left  Positional Restrictions:no positional restrictions    Pre-Admission Information   Lives With: alone. Comment: daughters take turns weekly 24/7 to stay with him since achilles injury in June                      Type of Home: house  Home Layout: one level  Home Access: level entry with ramp   Bathroom Layout: walker accessible, walk in shower  (not wc accessible)  Bathroom Equipment: grab bars in shower, tub transfer bench, hand held shower head, toilet raiser, BSC  Toilet Height: standard height  Home Equipment: rolling walker, transport chair, bed railing . Comment: typically uses 3 wheel walker   Transfer Assistance: Independent without use of device; Since June performing pivot transfers with CGA using grab bars  Ambulation Assistance:Independent with 3WW, since achilles injury not walking but only transferring via stand pivots with CGA; was unable to propel transport chair himself  ADL Assistance: Typically independent with ADLs prior to achilles injury;  Since June, requires assistance with sponge bathing, requires assistance with dressing, requires assistance with grooming, requires assistance with toileting  IADL Assistance: requires assistance with all homemaking tasks   Active :        [] Yes                 [x] No  Hand Dominance: [] Left                 [x] Right  Current Employment: retired. Occupation:  at [employer]: [address]: 1 fall leading to ruptured achilles in June and was NWB and then in a boot. Pt had not started any PT for his Left achilles prior to current hospitalization. Subjective  General: Seated in recliner upon arrival. Agreeable to therapy session. Daughter present. Reports having a rough day d/t pain. Pain: 8/10. Location: R LE  Pain Interventions: pain medication in place prior to arrival and RN notified, Burning from R hip to toes.        Functional Mobility  Bed Mobility  Supine to Sit: stand by assistance  Scooting: stand by assistance  Comments:HOB partially elevated with use of railings and increased time Performed in second session  Transfers  Sit to stand transfer: minimal assistance  Stand to sit transfer: minimal assistance  Stand step transfer: moderate assistance  Comments: Difficult processing w/c to recliner transfer  Ambulation  Surface:level surface  Assistive Device: rolling walker  Assistance: minimal assistance  Distance: 10 ft x 1 and 60' x 1 with w/c follow. Gait Mechanics: Forward flexed posture with head down, rotated and SB L, decreased DF on the R therefore initial contact with forefoot. Mixed use of step to vs reciprocal gait pattern with reduced elisha, wide JW, increased time in double limb support, mild ataxia B LE, poor control of walker leaving it too far ahead and decreased control with 90 degree turn. Comments:  Second ambulation, very fatigued and quickly sat with poor control into the w/c. Stair Mobility  Stair mobility not completed on this date. Comments: unsafe this date  Wheelchair Mobility:  No w/c mobility completed on this date. Comments:   Balance  Static Sitting Balance: fair (+): maintains balance at SBA/supervision without use of UE support  Dynamic Sitting Balance: fair (+): maintains balance at SBA/supervision without use of UE support  Static Standing Balance: poor (+): requires min (A) to maintain balance  Dynamic Standing Balance: poor (+): requires min (A) to maintain balance  Comments:     Other Therapeutic Interventions    1st session:  Pt. Stood in the 11 bars and completed stepping forward and backward x 5 reps with PT focusing on improving upright posture and increased WB through the R LE  hip abd/add x 10 each LE. Passive pec stretching from a seated position with Passive overpressure from PT.  REaching across body x 10 reps each direction. 2nd Session:     Pt. Supine in bed, sound asleep, found flexed forward with head down and SB to the L.   C/o 8/10 pain   Completed supine B LE exercises: heel slides x 20 on L and 15 on the R.  Glut and quad sets x 15, hip abd/add x 15, SAQ x 15, AP's with assist for the RLE x 15. Supine to sit with Supervision. Sit to/from stand with Min A.  Amb. X 20' x 2 with FWW with Min to Mod A, allows FWW to get too far ahead with poor control. Daughter reports Pt. Used a [employer] prior to injuring Achilles and PT notes Pt. Trying to use the FWW in a similar manner. Placed Pt. In high back w/c with adjustable arm rests to support posture better. Sit to supine with Supervision and increased time with use of rail. Left supine in bed with call light in reach and bed alarm in place. Three pillows under head to accomodate kyphosis with forward head. Cognition  Overall Cognitive Status: Impaired  Arousal/Alterness: appropriate responses to stimuli  Following Commands: follows one step commands consistently, follows multi step commands with repetition  Safety Judgement: decreased awareness of need for safety  Problem Solving: assistance required to generate solutions, assistance required to implement solutions  Sequencing: requires cues for some  Orientation:    alert and oriented x 4  Command Following:   accurately follows one step commands    Education  Barriers To Learning: hearing  Patient Education: patient educated on goals, PT role and benefits, plan of care, weight-bearing education, general safety, functional mobility training, proper use of assistive device/equipment, transfer training  Learning Assessment:  patient verbalizes understanding, would benefit from continued reinforcement    Assessment  Activity Tolerance: Limited by fatigue and pain resulting in increased rest breaks  Impairments Requiring Therapeutic Intervention: decreased functional mobility, decreased ADL status, decreased ROM, decreased strength, decreased safety awareness, decreased endurance, decreased balance, increased pain, decreased posture  Prognosis: good  Clinical Assessment: Pt.  Is limited by pain and fatigue this date. Was able to amb. 61' but with a quick sit in w/c d/t fatigue. Prior to recent surgery and (L) ankle injury in June, patient was independent and living home alone. Continued skilled PT to promote return towards PLOF. Safety Interventions: patient left in chair, chair alarm in place, and call light within reach    Plan  Frequency: 5 x/week, 90 min/day  Current Treatment Recommendations: strengthening, ROM, balance training, functional mobility training, transfer training, endurance training, and wheelchair mobility training    Goals  Patient Goals:  Return home with improved mobility   Short Term Goals:  Time Frame: 1.5 wks   Pt able to perform bed mobility modified independence. Pt able to perform sliding board transfers with minimal assistance - goal discontinued secondary to upgraded WB status  Pt able to perform stand pivot transfers using grab bar with moderate assistance - goal met 8/26/2022  Pt able to navigate a wc 150 ft mod I - goal discontinued secondary to upgraded WB status  Pt able to ambulate 10 ft with LRAD at moderate assistance - goal met 8/26/2022    Long Term Goals: Time Frame 3 wks  Pt able to perform sliding board transfers with SBA - goal discontinued secondary to upgraded WB status  Pt able to perform stand pivot transfers using grab bar with SBA  Pt able to ambulate 150 ft with LRAD at SBA  Pt able to perform a car transfer with SBA. Therapy Session Time      Individual Group Co-treatment   Time In 0940       Time Out 8589       Minutes 50         Second Session Therapy Time:   Individual Concurrent Group Co-treatment   Time In 1325         Time Out 1410         Minutes 45           Timed Code Treatment Minutes:  04+31      Total Treatment Minutes: 95    Electronically Signed By: Indiana Hayes, PT, [id number]    Treatment delivered by above therapist.  Adjustment made to therapy time to accurately match care delivered.   Omayra Sung PT, DPT - [id number]

## 2022-08-27 NOTE — PLAN OF CARE
Problem: Discharge Planning  Goal: Discharge to home or other facility with appropriate resources  Outcome: Progressing     Problem: Skin/Tissue Integrity  Goal: Absence of new skin breakdown  Outcome: Progressing     Problem: ABCDS Injury Assessment  Goal: Absence of physical injury  Outcome: Progressing     Problem: Safety - Adult  Goal: Free from fall injury  8/27/2022 1255 by Brigette Serra RN  Outcome: Progressing  8/26/2022 2257 by Dahiana Pham RN  Outcome: Progressing     Problem: Chronic Conditions and Co-morbidities  Goal: Patient's chronic conditions and co-morbidity symptoms are monitored and maintained or improved  Outcome: Progressing     Problem: Nutrition Deficit:  Goal: Optimize nutritional status  Outcome: Progressing     Problem: Pain  Goal: Verbalizes/displays adequate comfort level or baseline comfort level  8/27/2022 1255 by Brigette Serra RN  Outcome: Progressing  8/26/2022 2257 by Dahiana Pham RN  Outcome: Progressing

## 2022-08-27 NOTE — PROGRESS NOTES
[facility] Department   Phone: [phone]    Occupational Therapy    [] Initial Evaluation            [x] Daily Treatment Note         [] Discharge Summary      Patient: Veronique Bolton   : 1930   MRN: [id number]   Date of Service:  2022    Admitting Diagnosis:  Peripheral artery disease [facility])  Current Admission Summary:   44-year-old male with a history of A. fib previously on Xarelto but stopped secondary to a head bleed, cerebral amyloid angiopathy, diabetes who was admitted on  with right leg pain. He was transferred from [address] [place] [address] to this facility on  for surgery with Dr. Wilian Gonzaels. He underwent a right femoral to posterior tibial bypass on . His postoperative course has been overall unremarkable. He was evaluated by therapy and suggested to continue in an inpatient setting prior to returning home. Past Medical History:  has a past medical history of Afib ([facility] [address] Cerebral amyloid angiopathy ([facility] [address] Diabetes mellitus ([facility] [address] Heart aneurysm, and Vertigo. Past Surgical History:  has a past surgical history that includes Appendectomy; hernia repair; Lung surgery; Testicle surgery; and Femoral-tibial Bypass Graft (Right, 2022). Discharge Recommendations: Home with 24 hour supervision and assist and HHOT    DME Required For Discharge: DME to be determined pending patient progress     Precautions/Restrictions: high fall risk  Weight Bearing Restrictions: weight bearing as tolerated  [] Right Upper Extremity  [] Left Upper Extremity [x] Right Lower Extremity  [x] Left Lower Extremity  Required Braces/Orthotics: no braces required  Positional Restrictions:no positional restrictions    Pre-Admission Information   Lives With: alone, .   Comment: daughters take turns weekly  to stay with him since achilles injury                       Type of Home: house  Home Layout: one level  Home Access: level entry with ramp   Bathroom Layout: walker accessible, walk in shower  (not wc accessible)  Bathroom Equipment: grab bars in shower, tub transfer bench, hand held shower head, toilet raiser, BSC  Toilet Height: standard height  Home Equipment: rolling walker, transport chair, bed railing . Comment: typically uses 3 wheel walker   Transfer Assistance: Independent without use of device  Ambulation Assistance:Independent with 3WW, since achilles injury not walking but only transferring via stand pivots with CGA; was unable to propel transport chair himself  ADL Assistance: Typically independent with ADLs prior to achilles injury;  requires assistance with sponge bathing, requires assistance with dressing, requires assistance with grooming, requires assistance with toileting  IADL Assistance: requires assistance with all homemaking tasks  Active :        [] Yes                 [x] No  Hand Dominance: [] Left                 [x] Right  Current Employment: retired. Occupation:  at [employer]: [address]: 1 fall leading to ruptured achilles in June and was NWB and then in a boot      Subjective  General: Patient in recliner upon arrival, agreeable to OT session.  Patient reports continuous R hip (posterior) with all position changes, required encouragement for participation, often stating \"I guess I have to, I have no choice in this\"   Pain: Patient does not rate upon questioning  Pain Interventions: RN notified of patient request for pain medication and repositioned      Activities of Daily Living  Basic Activities of Daily Living  Grooming: setup assistance stand by assistance  Grooming Comments: oral care and face washing seated at sink  Upper Extremity Dressing: setup assistance stand by assistance Increased time to complete task  Lower Extremity Dressing: moderate assistance requires verbal cueing Increased time to complete task  Dressing Comments: UB/LB dressing completed in bathroom- UB dressing w/c level, stnace with anterior bar for clothing over hips- pt required assist to thread (B) LE into pants, CGA for clothing over hips in stance w/ unilateral UE support  Toileting Comments: Parker cath in place  General Comments: Increased time for ADL completion, min VC for initiation of tasks    Instrumental Activities of Daily Living  No IADL completed on this date. Functional Mobility  Bed Mobility  Supine to Sit: stand by assistance  Scooting: stand by assistance, contact guard assistance  Comments: HOB slightly elevated, use of HR, increased time  Transfers  Sit to stand transfer:contact guard assistance, minimal assistance  Stand to sit transfer: contact guard assistance, minimal assistance  Stand step transfer: contact guard assistance, minimal assistance  Comments: EOB>w/c and w/c<> RW multiple times. Functional Mobility:  Sitting Balance: stand by assistance. Standing Balance: contact guard assistance, minimal assistance. Functional Mobility: .  minimal assistance  Functional Mobility Activity: 20ft in hallway  Functional Mobility Device Use: rolling walker  Functional Mobility Comment:  patient completed functional mobility from recliner to wc in room, from mat table to elevated table      Comments:     Other Therapeutic Interventions  UB TE with band in room for strength and facilitation of extensor posture: 8 reps x1 of rows and horizontal shoulder abduction- TC and VC for form throughout. Second Session:  Pt seated in recliner at entry, pleasant and agreeable to session, reports continued ongoing back pain but up to date on medication; ADL needs declined. Heat pack placed on R lumbar and hip region throughout ~20min during session to address pain. Pt completed SPT with RW recliner<>w/c at [facility] with increased time and VC for hand placement.  PVC pipe activity completed seated at table to address problem solving/visual spatial skills, UE/grasp strength and dynamic reaching- moderate tall structure completed- Max VC and visual cues throughout- after construction pt asked to deconstruct and sort piece by type/size (100%accuracy with shape sorting)- extensive time to complete task- pt demo'd significantly difficulty with visual spatial reasoning throughout- pt stating task was \"hard but enjoyable\". Pt left in recliner at EOS, pt's drt present in room, chair alarm on, call light and needs in reach. Cognition  Overall Cognitive Status: Impaired  Arousal/Alterness: appropriate responses to stimuli  Following Commands: follows one step commands consistently  Attention Span: appears intact  Memory: decreased recall of recent events  Safety Judgement: decreased awareness of need for assistance, decreased awareness of need for safety  Problem Solving: assistance required to generate solutions, assistance required to implement solutions  Insights: decreased awareness of deficits  Initiation: requires cues for some  Sequencing: requires cues for some  Orientation:    alert and oriented x 4  Command Following:   accurately follows one step commands     Education  Barriers To Learning: cognition and hearing  Patient Education: patient educated on goals, OT role and benefits, plan of care, ADL adaptive strategies, proper use of assistive device/equipment, family education, transfer training  Learning Assessment:  patient verbalizes understanding, would benefit from continued reinforcement    Assessment  Activity Tolerance: limited by pain, pt require encouragement and rest breaks throughout. Impairments Requiring Therapeutic Intervention: decreased functional mobility, decreased ADL status, decreased strength, decreased endurance, decreased balance, decreased coordination, increased pain  Prognosis: good  Clinical Assessment: Patient presents below baseline function s/p femoral tibial bypass graft and will benefit from continued OT services to address the above deficits.  Prior to surgery and achilles injury, patient was independent with ADLs and mobility with 3WW. Patient progressing to min-CGA for transfers and functional mobility. Pt continues to require cues for posture during standing activities and is limited by back pain. Pt engaged in cognitive tasks and demo'd significant difficulty in area of visual spatial and problem solving this date. Patient con't to benefit from OT services to maximize patients safety and independence with ADLs, transfers and functional mobility. Con't per POC. Safety Interventions: patient left in chair, chair alarm in place, call light within reach, patient at risk for falls, nurse notified, and family/caregiver present    Plan  Frequency: 5 x/week, 90 min/day  Current Treatment Recommendations: strengthening, balance training, functional mobility training, transfer training, endurance training, wheelchair mobility training, patient/caregiver education, and ADL/self-care training    Goals  Patient Goals: not stated    Short Term Goals: To be completed in: 2 weeks  Patient will complete upper body ADL at supervision   Patient will complete lower body ADL at minimal assistance   Patient will complete toileting at minimal assistance   Patient will complete grooming at supervision   Patient will complete functional transfers at minimal assistance -goal met 8/26     Long Term Goals:   To be completed in: 3 weeks   Patient will complete lower body ADL at stand by assistance   Patient will complete toileting at stand by assistance   Patient will complete functional transfers at stand by assistance   Patient will complete functional mobility at stand by assistance   Patient to gather and transport IADL items at stand by assistance     Goals progressing 8/26       Therapy Session Time  First Session Therapy Time:    Individual Group Co-treatment   Time In 730     Time Out 815     Minutes 45        Second Session Therapy Time:   Individual Concurrent Group Co-treatment   Time In 1100 Time Out 1145        Minutes 45            Timed Code Treatment Minutes:  45 + 45  Total Treatment Minutes:  [address], OTR/L #[id number]

## 2022-08-28 LAB
GLUCOSE BLD-MCNC: 134 MG/DL (ref 70–99)
GLUCOSE BLD-MCNC: 137 MG/DL (ref 70–99)
GLUCOSE BLD-MCNC: 139 MG/DL (ref 70–99)
GLUCOSE BLD-MCNC: 139 MG/DL (ref 70–99)
PERFORMED ON: ABNORMAL

## 2022-08-28 PROCEDURE — 1280000000 HC REHAB R&B

## 2022-08-28 PROCEDURE — 6370000000 HC RX 637 (ALT 250 FOR IP): Performed by: PHYSICAL MEDICINE & REHABILITATION

## 2022-08-28 PROCEDURE — 6370000000 HC RX 637 (ALT 250 FOR IP): Performed by: SURGERY

## 2022-08-28 RX ORDER — OXYCODONE HCL 10 MG/1
10 TABLET, FILM COATED, EXTENDED RELEASE ORAL EVERY 12 HOURS SCHEDULED
Status: DISCONTINUED | OUTPATIENT
Start: 2022-08-28 | End: 2022-08-29

## 2022-08-28 RX ADMIN — PANTOPRAZOLE SODIUM 40 MG: 40 TABLET, DELAYED RELEASE ORAL at 05:25

## 2022-08-28 RX ADMIN — OXYCODONE AND ACETAMINOPHEN 2 TABLET: 5; 325 TABLET ORAL at 16:44

## 2022-08-28 RX ADMIN — TAMSULOSIN HYDROCHLORIDE 0.4 MG: 0.4 CAPSULE ORAL at 21:32

## 2022-08-28 RX ADMIN — OXYCODONE HYDROCHLORIDE 10 MG: 10 TABLET, FILM COATED, EXTENDED RELEASE ORAL at 10:04

## 2022-08-28 RX ADMIN — OXYCODONE HYDROCHLORIDE 10 MG: 10 TABLET, FILM COATED, EXTENDED RELEASE ORAL at 21:31

## 2022-08-28 RX ADMIN — MIRTAZAPINE 30 MG: 15 TABLET, FILM COATED ORAL at 21:32

## 2022-08-28 RX ADMIN — ROSUVASTATIN 10 MG: 10 TABLET, FILM COATED ORAL at 07:57

## 2022-08-28 RX ADMIN — OXYCODONE AND ACETAMINOPHEN 2 TABLET: 5; 325 TABLET ORAL at 05:43

## 2022-08-28 RX ADMIN — OXYCODONE AND ACETAMINOPHEN 2 TABLET: 5; 325 TABLET ORAL at 12:39

## 2022-08-28 RX ADMIN — POLYETHYLENE GLYCOL 3350 17 G: 17 POWDER, FOR SOLUTION ORAL at 07:55

## 2022-08-28 RX ADMIN — METFORMIN HYDROCHLORIDE 500 MG: 500 TABLET ORAL at 07:56

## 2022-08-28 RX ADMIN — Medication 400 MG: at 16:44

## 2022-08-28 RX ADMIN — METFORMIN HYDROCHLORIDE 500 MG: 500 TABLET ORAL at 16:43

## 2022-08-28 RX ADMIN — ASPIRIN 81 MG: 81 TABLET, COATED ORAL at 07:58

## 2022-08-28 RX ADMIN — CIPROFLOXACIN 500 MG: 500 TABLET, FILM COATED ORAL at 07:59

## 2022-08-28 RX ADMIN — LISINOPRIL 40 MG: 20 TABLET ORAL at 07:58

## 2022-08-28 RX ADMIN — SENNOSIDES AND DOCUSATE SODIUM 2 TABLET: 50; 8.6 TABLET ORAL at 07:59

## 2022-08-28 RX ADMIN — FINASTERIDE 5 MG: 5 TABLET, FILM COATED ORAL at 07:56

## 2022-08-28 RX ADMIN — CIPROFLOXACIN 500 MG: 500 TABLET, FILM COATED ORAL at 21:31

## 2022-08-28 ASSESSMENT — PAIN DESCRIPTION - DESCRIPTORS
DESCRIPTORS: ACHING;DISCOMFORT;SORE
DESCRIPTORS: ACHING;DISCOMFORT;SORE
DESCRIPTORS: ACHING;DISCOMFORT
DESCRIPTORS: ACHING;DISCOMFORT

## 2022-08-28 ASSESSMENT — PAIN DESCRIPTION - LOCATION
LOCATION: BACK
LOCATION: BACK;LEG
LOCATION: BACK
LOCATION: LEG

## 2022-08-28 ASSESSMENT — PAIN DESCRIPTION - ORIENTATION
ORIENTATION: LOWER
ORIENTATION: LOWER;RIGHT
ORIENTATION: LOWER
ORIENTATION: LOWER
ORIENTATION: RIGHT

## 2022-08-28 ASSESSMENT — PAIN SCALES - GENERAL
PAINLEVEL_OUTOF10: 9
PAINLEVEL_OUTOF10: 6
PAINLEVEL_OUTOF10: 7
PAINLEVEL_OUTOF10: 9
PAINLEVEL_OUTOF10: 0
PAINLEVEL_OUTOF10: 8
PAINLEVEL_OUTOF10: 6
PAINLEVEL_OUTOF10: 8
PAINLEVEL_OUTOF10: 0
PAINLEVEL_OUTOF10: 8
PAINLEVEL_OUTOF10: 6

## 2022-08-28 ASSESSMENT — PAIN DESCRIPTION - ONSET: ONSET: ON-GOING

## 2022-08-28 ASSESSMENT — PAIN - FUNCTIONAL ASSESSMENT
PAIN_FUNCTIONAL_ASSESSMENT: ACTIVITIES ARE NOT PREVENTED

## 2022-08-28 ASSESSMENT — PAIN SCALES - WONG BAKER
WONGBAKER_NUMERICALRESPONSE: 0

## 2022-08-28 ASSESSMENT — PAIN DESCRIPTION - FREQUENCY: FREQUENCY: CONTINUOUS

## 2022-08-28 ASSESSMENT — PAIN DESCRIPTION - PAIN TYPE: TYPE: CHRONIC PAIN

## 2022-08-28 NOTE — PLAN OF CARE
Problem: Safety - Adult  Goal: Free from fall injury  8/27/2022 2242 by Roosevelt Tadeo RN  Outcome: Progressing     Problem: Pain  Goal: Verbalizes/displays adequate comfort level or baseline comfort level  8/27/2022 2242 by Roosevelt Tadeo RN  Outcome: Progressing

## 2022-08-28 NOTE — PLAN OF CARE
Problem: Discharge Planning  Goal: Discharge to home or other facility with appropriate resources  Outcome: Progressing     Problem: Skin/Tissue Integrity  Goal: Absence of new skin breakdown  Outcome: Progressing     Problem: ABCDS Injury Assessment  Goal: Absence of physical injury  Outcome: Progressing     Problem: Safety - Adult  Goal: Free from fall injury  8/28/2022 0728 by Coretta Spann RN  Outcome: Progressing  8/27/2022 2242 by Sravani Degroot RN  Outcome: Progressing     Problem: Chronic Conditions and Co-morbidities  Goal: Patient's chronic conditions and co-morbidity symptoms are monitored and maintained or improved  Outcome: Progressing     Problem: Nutrition Deficit:  Goal: Optimize nutritional status  Outcome: Progressing     Problem: Pain  Goal: Verbalizes/displays adequate comfort level or baseline comfort level  8/28/2022 0728 by Coretta Spann RN  Outcome: Progressing  8/27/2022 2242 by Sravani Degroot RN  Outcome: Progressing

## 2022-08-28 NOTE — PROGRESS NOTES
Deysi Fontaine [facility] AT [facility]  8/28/2022  [id number]    Chief Complaint: Peripheral artery disease ([address]    Subjective:   No overnight events. Resting in bed. Family present. No needs voiced. ROS: No CP, SOB, dyspnea    Objective:  Patient Vitals for the past 24 hrs:   BP Temp Temp src Pulse Resp SpO2   08/28/22 0741 (!) 162/90 98 °F (36.7 °C) Oral 87 16 96 %   08/28/22 0613 -- -- -- -- 16 --   08/27/22 2015 (!) 161/73 97.8 °F (36.6 °C) Oral 91 16 96 %   08/27/22 1632 -- -- -- -- 16 --   08/27/22 1244 -- -- -- -- 17 --       Gen: No distress, pleasant. HEENT: Normocephalic, atraumatic  CV: Regular rate and rhythm. No MRG   Resp: No respiratory distress. CTAB   Abd: Soft, nontender nondistended  Ext: No edema. RLE in knee high ACE dressing  Neuro: Alert, oriented, appropriately interactive    Laboratory data: Available via EMR. Therapy progress:    PT    Supine to Sit: Partial/moderate assistance  Sit to Supine: Partial/moderate assistance   Sit to Stand: Partial/moderate assistance  Chair/Bed to Chair Transfer: Partial/moderate assistance  Car Transfer: Substantial/maximal assistance  Ambulation 10 ft: Partial/moderate assistance  Ambulation 50 ft: Partial/moderate assistance  Ambulation 150 ft:    Stairs - 1 Step:    Stairs - 4 Step:    Stairs - 12 Step:      OT    Eating: Setup or clean-up assistance  Oral Hygiene: Supervision or touching assistance  Bathing: Supervision or touching assistance  Upper Body Dressing: Supervision or touching assistance  Lower Body Dressing: Substantial/maximal assistance  Toilet Transfer: Toilet Hygiene: Dependent    Speech Therapy         Body mass index is 22.18 kg/m².     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 Leo Matthew. ASA, crestor. 50% weightbearing. PT/OT. Cipro for cellulitis. A. Fib: AC held 2/2 prior bleed. HTN: lisinopril 40     DM: SSI (home regimen metformin 1000 BID). Started metformin 500     Hypokalemia: supplemented     HTN: Lisinopril 40     BPH: proscar, flomax. Parker in place 2/2 retention. VT in near future when more ambulatory (anticipating Monday)    Hypomagnesemia: - supplement     Bowels: Per protocol  Bladder: Per protocol   Sleep: remeron scheduled  Pain: tylenol, maylin PRN   DVT PPx: SCD  ROYCE: 9/9      Marco Ware MD  8/28/2022, 9:38 AM    * This document was created using dictation software. While all precautions were taken to ensure accuracy, errors may have occurred. Please disregard any typographical errors.

## 2022-08-29 LAB
ANION GAP SERPL CALCULATED.3IONS-SCNC: 11 MMOL/L (ref 3–16)
BUN BLDV-MCNC: 8 MG/DL (ref 7–20)
CALCIUM SERPL-MCNC: 8.5 MG/DL (ref 8.3–10.6)
CHLORIDE BLD-SCNC: 103 MMOL/L (ref 99–110)
CO2: 26 MMOL/L (ref 21–32)
CREAT SERPL-MCNC: 0.5 MG/DL (ref 0.8–1.3)
GFR AFRICAN AMERICAN: >60
GFR NON-AFRICAN AMERICAN: >60
GLUCOSE BLD-MCNC: 125 MG/DL (ref 70–99)
GLUCOSE BLD-MCNC: 144 MG/DL (ref 70–99)
HCT VFR BLD CALC: 35.2 % (ref 40.5–52.5)
HEMOGLOBIN: 11.4 G/DL (ref 13.5–17.5)
MAGNESIUM: 2.1 MG/DL (ref 1.8–2.4)
MCH RBC QN AUTO: 29.9 PG (ref 26–34)
MCHC RBC AUTO-ENTMCNC: 32.4 G/DL (ref 31–36)
MCV RBC AUTO: 92.3 FL (ref 80–100)
PDW BLD-RTO: 15.2 % (ref 12.4–15.4)
PERFORMED ON: ABNORMAL
PLATELET # BLD: 367 K/UL (ref 135–450)
PMV BLD AUTO: 7.3 FL (ref 5–10.5)
POTASSIUM SERPL-SCNC: 3.6 MMOL/L (ref 3.5–5.1)
RBC # BLD: 3.82 M/UL (ref 4.2–5.9)
SODIUM BLD-SCNC: 140 MMOL/L (ref 136–145)
WBC # BLD: 6.9 K/UL (ref 4–11)

## 2022-08-29 PROCEDURE — 1280000000 HC REHAB R&B

## 2022-08-29 PROCEDURE — 97530 THERAPEUTIC ACTIVITIES: CPT

## 2022-08-29 PROCEDURE — 6370000000 HC RX 637 (ALT 250 FOR IP): Performed by: SURGERY

## 2022-08-29 PROCEDURE — 83735 ASSAY OF MAGNESIUM: CPT

## 2022-08-29 PROCEDURE — 85027 COMPLETE CBC AUTOMATED: CPT

## 2022-08-29 PROCEDURE — 80048 BASIC METABOLIC PNL TOTAL CA: CPT

## 2022-08-29 PROCEDURE — 6370000000 HC RX 637 (ALT 250 FOR IP): Performed by: PHYSICAL MEDICINE & REHABILITATION

## 2022-08-29 PROCEDURE — 97535 SELF CARE MNGMENT TRAINING: CPT

## 2022-08-29 PROCEDURE — 51798 US URINE CAPACITY MEASURE: CPT

## 2022-08-29 PROCEDURE — 97110 THERAPEUTIC EXERCISES: CPT

## 2022-08-29 PROCEDURE — 36415 COLL VENOUS BLD VENIPUNCTURE: CPT

## 2022-08-29 PROCEDURE — 97116 GAIT TRAINING THERAPY: CPT

## 2022-08-29 PROCEDURE — 94760 N-INVAS EAR/PLS OXIMETRY 1: CPT

## 2022-08-29 PROCEDURE — 99024 POSTOP FOLLOW-UP VISIT: CPT | Performed by: SURGERY

## 2022-08-29 RX ORDER — LIDOCAINE 4 G/G
1 PATCH TOPICAL DAILY
Status: DISCONTINUED | OUTPATIENT
Start: 2022-08-29 | End: 2022-09-06 | Stop reason: HOSPADM

## 2022-08-29 RX ORDER — POLYETHYLENE GLYCOL 3350 17 G/17G
17 POWDER, FOR SOLUTION ORAL DAILY PRN
Status: DISCONTINUED | OUTPATIENT
Start: 2022-08-29 | End: 2022-09-06 | Stop reason: HOSPADM

## 2022-08-29 RX ORDER — SENNA AND DOCUSATE SODIUM 50; 8.6 MG/1; MG/1
2 TABLET, FILM COATED ORAL 2 TIMES DAILY
Status: DISCONTINUED | OUTPATIENT
Start: 2022-08-29 | End: 2022-09-06 | Stop reason: HOSPADM

## 2022-08-29 RX ORDER — BISACODYL 10 MG
10 SUPPOSITORY, RECTAL RECTAL DAILY PRN
Status: DISCONTINUED | OUTPATIENT
Start: 2022-08-29 | End: 2022-09-06 | Stop reason: HOSPADM

## 2022-08-29 RX ADMIN — NYSTATIN 500000 UNITS: 100000 SUSPENSION ORAL at 16:11

## 2022-08-29 RX ADMIN — POLYETHYLENE GLYCOL 3350 17 G: 17 POWDER, FOR SOLUTION ORAL at 10:28

## 2022-08-29 RX ADMIN — NYSTATIN 500000 UNITS: 100000 SUSPENSION ORAL at 08:37

## 2022-08-29 RX ADMIN — LISINOPRIL 40 MG: 20 TABLET ORAL at 10:24

## 2022-08-29 RX ADMIN — OXYCODONE AND ACETAMINOPHEN 2 TABLET: 5; 325 TABLET ORAL at 10:33

## 2022-08-29 RX ADMIN — NYSTATIN 500000 UNITS: 100000 SUSPENSION ORAL at 22:16

## 2022-08-29 RX ADMIN — NYSTATIN 500000 UNITS: 100000 SUSPENSION ORAL at 12:03

## 2022-08-29 RX ADMIN — METFORMIN HYDROCHLORIDE 500 MG: 500 TABLET ORAL at 08:37

## 2022-08-29 RX ADMIN — CIPROFLOXACIN 500 MG: 500 TABLET, FILM COATED ORAL at 22:15

## 2022-08-29 RX ADMIN — CIPROFLOXACIN 500 MG: 500 TABLET, FILM COATED ORAL at 10:24

## 2022-08-29 RX ADMIN — MIRTAZAPINE 30 MG: 15 TABLET, FILM COATED ORAL at 22:15

## 2022-08-29 RX ADMIN — OXYCODONE AND ACETAMINOPHEN 1 TABLET: 5; 325 TABLET ORAL at 15:12

## 2022-08-29 RX ADMIN — ROSUVASTATIN 10 MG: 10 TABLET, FILM COATED ORAL at 10:24

## 2022-08-29 RX ADMIN — TAMSULOSIN HYDROCHLORIDE 0.4 MG: 0.4 CAPSULE ORAL at 22:16

## 2022-08-29 RX ADMIN — PANTOPRAZOLE SODIUM 40 MG: 40 TABLET, DELAYED RELEASE ORAL at 06:28

## 2022-08-29 RX ADMIN — METFORMIN HYDROCHLORIDE 500 MG: 500 TABLET ORAL at 15:13

## 2022-08-29 RX ADMIN — ASPIRIN 81 MG: 81 TABLET, COATED ORAL at 10:24

## 2022-08-29 RX ADMIN — OXYCODONE AND ACETAMINOPHEN 2 TABLET: 5; 325 TABLET ORAL at 06:27

## 2022-08-29 RX ADMIN — FINASTERIDE 5 MG: 5 TABLET, FILM COATED ORAL at 10:24

## 2022-08-29 RX ADMIN — BISACODYL 10 MG: 10 SUPPOSITORY RECTAL at 16:11

## 2022-08-29 ASSESSMENT — PAIN SCALES - GENERAL
PAINLEVEL_OUTOF10: 4
PAINLEVEL_OUTOF10: 0
PAINLEVEL_OUTOF10: 8
PAINLEVEL_OUTOF10: 7

## 2022-08-29 ASSESSMENT — PAIN DESCRIPTION - DESCRIPTORS
DESCRIPTORS: ACHING
DESCRIPTORS: ACHING

## 2022-08-29 ASSESSMENT — PAIN SCALES - WONG BAKER
WONGBAKER_NUMERICALRESPONSE: 0

## 2022-08-29 ASSESSMENT — PAIN DESCRIPTION - LOCATION
LOCATION: BACK
LOCATION: BACK

## 2022-08-29 ASSESSMENT — PAIN DESCRIPTION - ORIENTATION
ORIENTATION: LOWER
ORIENTATION: LOWER

## 2022-08-29 NOTE — PROGRESS NOTES
[facility] Department   Phone: [phone]    Occupational Therapy    [] Initial Evaluation            [x] Daily Treatment Note         [] Discharge Summary      Patient: Livan Morataya   : 1930   MRN: [id number]   Date of Service:  2022    Admitting Diagnosis:  Peripheral artery disease [facility])  Current Admission Summary:   77-year-old male with a history of A. fib previously on Xarelto but stopped secondary to a head bleed, cerebral amyloid angiopathy, diabetes who was admitted on  with right leg pain. He was transferred from [facility] to this facility on  for surgery with Dr. Dionne Lane. He underwent a right femoral to posterior tibial bypass on . His postoperative course has been overall unremarkable. He was evaluated by therapy and suggested to continue in an inpatient setting prior to returning home. Past Medical History:  has a past medical history of Afib ([facility] [address] Cerebral amyloid angiopathy ([facility] [address] Diabetes mellitus ([facility] [address] Heart aneurysm, and Vertigo. Past Surgical History:  has a past surgical history that includes Appendectomy; hernia repair; Lung surgery; Testicle surgery; and Femoral-tibial Bypass Graft (Right, 2022). Discharge Recommendations: Home with 24 hour supervision and assist and HHOT    DME Required For Discharge: DME to be determined pending patient progress     Precautions/Restrictions: high fall risk  Weight Bearing Restrictions: weight bearing as tolerated  [] Right Upper Extremity  [] Left Upper Extremity [x] Right Lower Extremity  [x] Left Lower Extremity  Required Braces/Orthotics: no braces required  Positional Restrictions:no positional restrictions    Pre-Admission Information   Lives With: alone, .   Comment: daughters take turns weekly  to stay with him since achilles injury                       Type of Home: house  Home Layout: one level  Home Access: level entry with ramp   Bathroom Layout: walker accessible, walk in shower  (not wc accessible)  Bathroom Equipment: grab bars in shower, tub transfer bench, hand held shower head, toilet raiser, BSC  Toilet Height: standard height  Home Equipment: rolling walker, transport chair, bed railing . Comment: typically uses 3 wheel walker   Transfer Assistance: Independent without use of device  Ambulation Assistance:Independent with 3WW, since achilles injury not walking but only transferring via stand pivots with CGA; was unable to propel transport chair himself  ADL Assistance: Typically independent with ADLs prior to achilles injury;  requires assistance with sponge bathing, requires assistance with dressing, requires assistance with grooming, requires assistance with toileting  IADL Assistance: requires assistance with all homemaking tasks  Active :        [] Yes                 [x] No  Hand Dominance: [] Left                 [x] Right  Current Employment: retired. Occupation:  at [employer]: [address]: 1 fall leading to ruptured achilles in June and was NWB and then in a boot      Subjective  General: Pt supine in bed w/ HOB elevated, w/ daughter and Dr. Blas Banda present - pt stating he wishes he wasn't always interrupted by therapy for exercise but agreeable to therapy.  Pt c/o of pain related to surgical operations and onset of thrush this date/.   Pain: Pt did not rate pain at this time but stated pain early this morning was ~9.5/10  Pain Interventions: pain medication in place prior to arrival and repositioned      Activities of Daily Living  Basic Activities of Daily Living  Grooming: setup assistance supervision Increased time to complete task  Grooming Comments: oral care and face washing seated at sink in w/c  Upper Extremity Dressing: setup assistance supervision  Lower Extremity Dressing: maximum assistance  Dressing Equipment: none  Dressing Comments: UB dressing seated in w/c; LB dressing completed seated EOB for breifs/pants over ankles - briefs and pants over hips in stance x2 maxA secondary to pt denial of assisting w/ dressing this date - pt balance via RW for dressing   Toileting Comments: Parker cath in place - will be removed this date per / Jeimy  General Comments: Increased time for ADL completion; VCs throughout for sequencing and initiation    Instrumental Activities of Daily Living  No IADL completed on this date. Functional Mobility  Bed Mobility  Supine to Sit: stand by assistance  Scooting: stand by assistance  Comments: HOB slightly elevated supine>sit, use of HR on L, increased time for all bed mobility this date  Transfers  Sit to stand transfer:contact guard assistance  Stand to sit transfer: contact guard assistance  Comments: EOB >< RW x3 this date; w/c>recliner; increased time and VC's for sequencing for all sit><stand transfers this date    Functional Mobility:  Sitting Balance: stand by assistance. Standing Balance: contact guard assistance. Functional Mobility: .  minimal assistance  Functional Mobility Activity: to/from bathroom, ~10 ft bathroom >recliner  Functional Mobility Device Use: rolling walker  Functional Mobility Comment: Darian for balance at hips and RW management; VC's throughout for RW management especially during turns; pt progressing Darian>CGA during functional mobility during session      Comments:     Other Therapeutic Interventions  Pt introduced to reacher this date in preparation for reacher use to increased IND in LB dressing. Pt seated in w/c for practice w/ reacher to obtain bean bags scattered around multiple planes and around BLEs to simulate reacher locations frequently used for LB dressing - pt completed task SBA w/ VC's to attend to task but pt demo'd good understanding and use of reacher. Second Session:  Pt seated in recliner upon entry - daughter present - pleasant and agreeable to therapy.  Pt stated pain in back is 9/10 this date, daughter noted pt is up to date on pain medications - pt repositioned throughout and at end of session. Pt demo'd increased confusion and impulsivity at start of session. Pt amb to ther dept and around OT kitchen Darian w/ RW. Pt completed IADL meal preparation activity in OT kitchen obtaining 5 kitchen objects from multiple planes w/ use of RW and RW basket - modA for balance; pt required max VC's throughout after visual demo'd of RW management - 2 seated rest breaks required during task - visual and verb demo of safe RW management for task showed to client after task completion - pt verb understanding. Pt setaed in w/c at table top for 48 piece puzzle activity to challenge cognition (attention, problem solving), visual discrimination, FM control, and seated posture - pt required extensive time and max VCs for task completion - task graded down throughout - pt experienced greatest success when pieces were blocked and pt was given the correct piece and asked \"this vs that\" questions for piece location. Pt stated he enjoyed activity despite length and challenge task posed. All sit><stands this date CGA, supine>sit and scooting w/ HOB flat SUP this date. Pt left supine in bed w/ HOB slightly elevated w/ daughter present and bed alarm, call light, and all other needs within reach.      Cognition  Overall Cognitive Status: Impaired  Arousal/Alterness: appropriate responses to stimuli  Following Commands: follows one step commands consistently  Attention Span: appears intact  Memory: decreased recall of recent events  Safety Judgement: decreased awareness of need for assistance, decreased awareness of need for safety  Problem Solving: assistance required to generate solutions, assistance required to implement solutions  Insights: decreased awareness of deficits  Initiation: requires cues for some  Sequencing: requires cues for some  Orientation:    alert and oriented x 4  Command Following:   accurately follows one step commands     Education  Barriers To Learning: cognition and hearing  Patient Education: patient educated on goals, OT role and benefits, plan of care, ADL adaptive strategies, proper use of assistive device/equipment, family education, transfer training  Learning Assessment:  patient verbalizes understanding, would benefit from continued reinforcement    Assessment  Activity Tolerance: pt demo'd good tolernace this date /74 during PM session; pt required intermittent rest breask during PM session and demo'd decreased attention during puzzle activity/.   Impairments Requiring Therapeutic Intervention: decreased functional mobility, decreased ADL status, decreased strength, decreased endurance, decreased balance, decreased coordination, increased pain  Prognosis: good  Clinical Assessment: Pt presents below baseline function s/p femoral tibial bypass graft and will benefit from continued OT services to address the above deficits. Prior to surgery and achilles injury, patient was independent with ADLs and mobility with 3WW. Pt con't to progress min-CGA for transfers and functional mobility - pt required modA for balance during IADL tasks at RW. Pt pain con't to limited participation but pt reports decreased pain this date. Pt demo'd increased cognitive deficits this date having difficully attending to and problem solving during puzzle activity. Pt con't to benefit from OT services to maximize patients safety and independence with ADLs, transfers and functional mobility. Con't per POC.   Safety Interventions: patient left in chair, chair alarm in place, call light within reach, patient at risk for falls, nurse notified, and family/caregiver present    Plan  Frequency: 5 x/week, 90 min/day  Current Treatment Recommendations: strengthening, balance training, functional mobility training, transfer training, endurance training, wheelchair mobility training, patient/caregiver education, and ADL/self-care training    Goals  Patient Goals: not stated    Short Term Goals: To be completed in: 2 weeks  Patient will complete upper body ADL at supervision - goal met 8/29  Patient will complete lower body ADL at minimal assistance   Patient will complete toileting at minimal assistance   Patient will complete grooming at supervision - goal met 8/29   Patient will complete functional transfers at minimal assistance -goal met 8/26     Long Term Goals:   To be completed in: 3 weeks   Patient will complete lower body ADL at stand by assistance   Patient will complete toileting at stand by assistance   Patient will complete functional transfers at stand by assistance   Patient will complete functional mobility at stand by assistance   Patient to gather and transport IADL items at stand by assistance     Goals progressing 8/29      Therapy Session Time  First Session Therapy Time:    Individual Group Co-treatment   Time In 0730     Time Out 0816     Minutes 46        Second Session Therapy Time:   Individual Concurrent Group Co-treatment   Time In 1335        Time Out 1440        Minutes 65            Timed Code Treatment Minutes:  46+65  Total Treatment Minutes:  [address] [facility], S/OT   Directed and supervised by JORGE Devries/NESTOR #[id number]

## 2022-08-29 NOTE — PROGRESS NOTES
VASCULAR    Doing well. Good graft pulse. Erythema at ankle stable - no drainage. CPM with full weight bearing and po antibx. Will follow.     Deborra Camera

## 2022-08-29 NOTE — PROGRESS NOTES
Urology Progress Note  [facility]    Provider: MANDEEP Gonzalez CNP  Patient ID:  Admission Date: 2022 Name: Mireya Peters  OR Date: 2022 MRN: [id number]   Patient Location: [facility]-[id number] : 1930  Attending: Forrest Cervantes MD Date of Service: 2022  PCP: Graeme Armando     Diagnoses:  BPH  Retention    Assessment/Plan:  81 yo male with hx of BPH and very remote history of TUR of prostate, on Flomax daily. He is POD 9 right fem repair and right fem bybass. He failed a voiding trial x1. Recommendations:  Continue Flomax  Start Finasteride  OOB as tolerated  VT today, monitor for retention, obtain PVR PRN, transition to str cath for PVR >500cc's or if symptomatic    The patient had a chance to ask questions which were answered. he understands the above plan. Subjective:   Mireya Peters is a 80 y.o. male. He was seen and examined this morning. Today we discussed Further rehabilitation and finasteride prior to VT. Objective:   Vitals:  Vitals:    22 1024   BP: 121/72   Pulse:    Resp:    Temp:    SpO2:        Intake/Output Summary (Last 24 hours) at 2022 1141  Last data filed at 2022 0636  Gross per 24 hour   Intake 240 ml   Output 1500 ml   Net -1260 ml       Physical Exam:  Gen: Alert and oriented x3, no acute distress  CV: Regular rate   Resp: unlabored respirations  Abd: Soft, non-distended, non-tender, no masses  Ext: no peripheral edema noted, moves upper and lower extremities spontaneously  Skin: warmand well perfused, no rashes noted on the face, or arms.      Labs:  Lab Results   Component Value Date    WBC 6.9 2022    HGB 11.4 (L) 2022    HCT 35.2 (L) 2022    MCV 92.3 2022     2022     Lab Results   Component Value Date    CREATININE 0.5 (L) 2022    BUN 8 2022     2022    K 3.6 2022     2022    CO2 26 2022       MANDEEP Gonzalez - CNP   2022

## 2022-08-29 NOTE — PROGRESS NOTES
Nutrition Note    RECOMMENDATIONS  PO Diet: CCC  ONS: Ensure High Protein @ lunch daily     NUTRITION ASSESSMENT   Po intake consistently greater than 50% of most meals per family report. Pt consumed 100% bkf this am.  Typical po intake prior to admission is a good bkf & dinner, with pt often skipping lunch. Pt also drank Ensure @ home. Family requests an Ensure with lunch daily. Will monitor for continued adequate po intake. Nutrition Related Findings: Gluc 125; +1-+2 BLE edema; LBM 8/28  Wounds: Surgical Incision  Nutrition Education:  Education not indicated   Nutrition Goals: PO intake 50% or greater     MALNUTRITION ASSESSMENT   Acute Illness  Malnutrition Status: No malnutrition    NUTRITION DIAGNOSIS   Increased nutrient needs related to increase demand for energy/nutrients as evidenced by  (increased protein to promote healing)      CURRENT NUTRITION THERAPIES  ADULT DIET; Regular; 5 carb choices (75 gm/meal)  ADULT ORAL NUTRITION SUPPLEMENT; Lunch; Low Calorie/High Protein Oral Supplement     PO Intake: 51-75%, %   PO Supplement Intake:None Ordered    ANTHROPOMETRICS  Current Height: 6' (182.9 cm)  Current Weight: 163 lb 9 oz (74.2 kg)    Ideal Body Weight (IBW): 178 lbs  (81 kg)    Usual Bodyweight 168 lb (76.2 kg)       BMI: 22.1      COMPARATIVE STANDARDS  Energy (kcal):  1850- 2220     Protein (g):  89- 111g       Fluid (mL/day):  1 ml/kcal    The patient will be monitored per nutrition standards of care. Consult dietitian if additional nutrition interventions are needed prior to RD reassessment.      Karolyn Tompkins, DONOVAN, LD    Contact: [phone]

## 2022-08-29 NOTE — PROGRESS NOTES
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 Lani Betancur. ASA, crestor. 50% weightbearing. PT/OT. Cipro for cellulitis. A. Fib: AC held 2/2 prior bleed. HTN: lisinopril 40     DM: SSI (home regimen metformin 1000 BID). Started metformin 500.  - d/c SSI and POCT     Hypokalemia: supplemented     HTN: Lisinopril 40     BPH: proscar, flomax. Parker in place 2/2 retention. - VT today    Hypomagnesemia: supplement     Bowels: Per protocol  Bladder: Per protocol   Sleep: remeron scheduled  Pain: tylenol, maylin PRN   DVT PPx: SCD  ROYCE: 9/9      Isaiah Elkins MD  8/29/2022, 8:31 AM    * This document was created using dictation software. While all precautions were taken to ensure accuracy, errors may have occurred. Please disregard any typographical errors.

## 2022-08-29 NOTE — PROGRESS NOTES
Alert and oriented but falls a sleep quickly and weaker than last week. Up in chair. Daughter at bedside.

## 2022-08-29 NOTE — PLAN OF CARE
Problem: Discharge Planning  Goal: Discharge to home or other facility with appropriate resources  Outcome: Progressing     Problem: Skin/Tissue Integrity  Goal: Absence of new skin breakdown  Description: 1. Monitor for areas of redness and/or skin breakdown  2. Assess vascular access sites hourly  3. Every 4-6 hours minimum:  Change oxygen saturation probe site  4. Every 4-6 hours:  If on nasal continuous positive airway pressure, respiratory therapy assess nares and determine need for appliance change or resting period.   Outcome: Progressing     Problem: ABCDS Injury Assessment  Goal: Absence of physical injury  Outcome: Progressing     Problem: Safety - Adult  Goal: Free from fall injury  Outcome: Progressing     Problem: Chronic Conditions and Co-morbidities  Goal: Patient's chronic conditions and co-morbidity symptoms are monitored and maintained or improved  Outcome: Progressing     Problem: Nutrition Deficit:  Goal: Optimize nutritional status  Outcome: Progressing  Flowsheets (Taken 8/29/2022 1013 by Darrian Laird, RD, LD)  Nutrient intake appropriate for improving, restoring, or maintaining nutritional needs: Monitor oral intake, labs, and treatment plans     Problem: Pain  Goal: Verbalizes/displays adequate comfort level or baseline comfort level  Outcome: Progressing

## 2022-08-30 PROCEDURE — 97530 THERAPEUTIC ACTIVITIES: CPT

## 2022-08-30 PROCEDURE — 6370000000 HC RX 637 (ALT 250 FOR IP): Performed by: SURGERY

## 2022-08-30 PROCEDURE — 97110 THERAPEUTIC EXERCISES: CPT

## 2022-08-30 PROCEDURE — 1280000000 HC REHAB R&B

## 2022-08-30 PROCEDURE — 51701 INSERT BLADDER CATHETER: CPT

## 2022-08-30 PROCEDURE — 97535 SELF CARE MNGMENT TRAINING: CPT

## 2022-08-30 PROCEDURE — 6370000000 HC RX 637 (ALT 250 FOR IP): Performed by: PHYSICAL MEDICINE & REHABILITATION

## 2022-08-30 PROCEDURE — 97116 GAIT TRAINING THERAPY: CPT

## 2022-08-30 PROCEDURE — 51798 US URINE CAPACITY MEASURE: CPT

## 2022-08-30 RX ADMIN — PANTOPRAZOLE SODIUM 40 MG: 40 TABLET, DELAYED RELEASE ORAL at 06:45

## 2022-08-30 RX ADMIN — MIRTAZAPINE 30 MG: 15 TABLET, FILM COATED ORAL at 21:46

## 2022-08-30 RX ADMIN — OXYCODONE AND ACETAMINOPHEN 2 TABLET: 5; 325 TABLET ORAL at 14:15

## 2022-08-30 RX ADMIN — OXYCODONE AND ACETAMINOPHEN 2 TABLET: 5; 325 TABLET ORAL at 09:55

## 2022-08-30 RX ADMIN — TAMSULOSIN HYDROCHLORIDE 0.4 MG: 0.4 CAPSULE ORAL at 21:46

## 2022-08-30 RX ADMIN — NYSTATIN 500000 UNITS: 100000 SUSPENSION ORAL at 08:22

## 2022-08-30 RX ADMIN — ROSUVASTATIN 10 MG: 10 TABLET, FILM COATED ORAL at 08:23

## 2022-08-30 RX ADMIN — CIPROFLOXACIN 500 MG: 500 TABLET, FILM COATED ORAL at 21:46

## 2022-08-30 RX ADMIN — FINASTERIDE 5 MG: 5 TABLET, FILM COATED ORAL at 08:23

## 2022-08-30 RX ADMIN — ASPIRIN 81 MG: 81 TABLET, COATED ORAL at 08:22

## 2022-08-30 RX ADMIN — NYSTATIN 500000 UNITS: 100000 SUSPENSION ORAL at 21:45

## 2022-08-30 RX ADMIN — CIPROFLOXACIN 500 MG: 500 TABLET, FILM COATED ORAL at 08:22

## 2022-08-30 RX ADMIN — SENNOSIDES AND DOCUSATE SODIUM 2 TABLET: 50; 8.6 TABLET ORAL at 08:22

## 2022-08-30 RX ADMIN — NYSTATIN 500000 UNITS: 100000 SUSPENSION ORAL at 14:14

## 2022-08-30 RX ADMIN — METFORMIN HYDROCHLORIDE 500 MG: 500 TABLET ORAL at 08:23

## 2022-08-30 RX ADMIN — OXYCODONE AND ACETAMINOPHEN 2 TABLET: 5; 325 TABLET ORAL at 21:46

## 2022-08-30 RX ADMIN — NYSTATIN 500000 UNITS: 100000 SUSPENSION ORAL at 17:06

## 2022-08-30 RX ADMIN — METFORMIN HYDROCHLORIDE 500 MG: 500 TABLET ORAL at 17:06

## 2022-08-30 RX ADMIN — LISINOPRIL 40 MG: 20 TABLET ORAL at 08:22

## 2022-08-30 ASSESSMENT — PAIN DESCRIPTION - DESCRIPTORS
DESCRIPTORS: ACHING
DESCRIPTORS: ACHING

## 2022-08-30 ASSESSMENT — PAIN DESCRIPTION - ORIENTATION
ORIENTATION: LOWER

## 2022-08-30 ASSESSMENT — PAIN SCALES - GENERAL
PAINLEVEL_OUTOF10: 8
PAINLEVEL_OUTOF10: 0
PAINLEVEL_OUTOF10: 9
PAINLEVEL_OUTOF10: 7

## 2022-08-30 ASSESSMENT — PAIN DESCRIPTION - LOCATION
LOCATION: BACK

## 2022-08-30 NOTE — PROGRESS NOTES
Patient noted to be out of bed on camera. Daughter had turned off bed alarm, then took patient to the bathroom without notifying staff and without waiting for staff assistance. Daughter and patient educated to call and wait for staff assistance with transfers. 2240  Assessment complete. Alert, oriented x4. Dressing to right lower extremity clean, dry, intact. Patient and daughter reported patient has been voiding small amounts since arellano catheter was removed during day shift today. Bladder scanned for 434 mL. Patient denies discomfort when bladder palpated. The care plan and education has been reviewed and mutually agreed upon with the patient and daughter. In bed, alarm on, bed in lowest position, call light and table within reach, camera at bedside. No further needs expressed at this time. 6696  Patient noted to be out of bed on camera. Daughter had turned off bed alarm, then took patient to the bathroom without notifying staff and without waiting for staff assistance. Daughter and patient re-educated to call and wait for staff assistance with transfers. 0100  Patient only voided a small amount while in bathroom. Bladder scanned for 596 mL. Intermittent straight catheterized for 600 mL. Patient reports relief after bladder drained. 0400  Daughter called appropriately for staff assistance to transfer patient to the bathroom. Incontinent of stool. Voided and passed stool in the toilet. Patient required cues to safely transfer when pivoting to and from wheelchair. 0700  Daughter called appropriately for staff assistance to transfer patient to the bathroom. Bladder scanned post-void for 413 mL.    0829  Some blanchable redness noted to buttocks/sacrum/coccyx overnight. Mepilex applied over site. Specialty mattress ordered to promote skin integrity. Low bed ordered to promote patient safety.

## 2022-08-30 NOTE — PROGRESS NOTES
Urology Progress Note  [facility]    Provider: Jenkins Bamberger, APRN - CNP  Patient ID:  Admission Date: 2022 Name: Edward Abbasi  OR Date: 2022 MRN: [id number]   Patient Location: [facility] – [place]-[id number] : 1930  Attending: Lorena Roberson MD Date of Service: 2022  PCP: Misty Rios     Diagnoses:  BPH  Retention    Assessment/Plan:  79 yo male with hx of BPH and very remote history of TUR of prostate, on Flomax daily. He is POD 9 right fem repair and right fem bybass. He failed a voiding trial x1. Recommendations:  Continue Flomax  Continue Finasteride  OOB as tolerated  VT today, monitor for retention, obtain PVR PRN, transition to str cath for PVR >500cc's or if symptomatic  Should regain volitional voiding soon, will continue to follow    The patient had a chance to ask questions which were answered. he understands the above plan. Subjective:   Edward Abbasi is a 80 y.o. male. He was seen and examined this morning. Today we discussed Further rehabilitation and finasteride prior to VT. Objective:   Vitals:  Vitals:    22 0815   BP: 113/61   Pulse: (!) 101   Resp: 16   Temp: 98.1 °F (36.7 °C)   SpO2: 94%       Intake/Output Summary (Last 24 hours) at 2022 0851  Last data filed at 2022 0100  Gross per 24 hour   Intake 240 ml   Output 600 ml   Net -360 ml       Physical Exam:  Gen: Alert and oriented x3, no acute distress  CV: Regular rate   Resp: unlabored respirations  Abd: Soft, non-distended, non-tender, no masses  Ext: no peripheral edema noted, moves upper and lower extremities spontaneously  Skin: warmand well perfused, no rashes noted on the face, or arms.      Labs:  Lab Results   Component Value Date    WBC 6.9 2022    HGB 11.4 (L) 2022    HCT 35.2 (L) 2022    MCV 92.3 2022     2022     Lab Results   Component Value Date    CREATININE 0.5 (L) 2022    BUN 8 2022     2022    K 3.6 08/29/2022     08/29/2022    CO2 26 08/29/2022       Margaret Escalante, APRN - CNP   8/30/2022

## 2022-08-30 NOTE — PLAN OF CARE
Problem: Discharge Planning  Goal: Discharge to home or other facility with appropriate resources  8/30/2022 1051 by Mely Wild RN  Outcome: Progressing  Flowsheets (Taken 8/30/2022 0815)  Discharge to home or other facility with appropriate resources: Identify barriers to discharge with patient and caregiver     Problem: Skin/Tissue Integrity  Goal: Absence of new skin breakdown  Description: 1. Monitor for areas of redness and/or skin breakdown  2. Assess vascular access sites hourly  3. Every 4-6 hours minimum:  Change oxygen saturation probe site  4. Every 4-6 hours:  If on nasal continuous positive airway pressure, respiratory therapy assess nares and determine need for appliance change or resting period.   8/30/2022 1051 by Mely Wild RN  Outcome: Progressing     Problem: ABCDS Injury Assessment  Goal: Absence of physical injury  8/30/2022 1051 by Mely Wild RN  Outcome: Progressing     Problem: Safety - Adult  Goal: Free from fall injury  8/30/2022 1051 by Mely Wild RN  Outcome: Progressing     Problem: Chronic Conditions and Co-morbidities  Goal: Patient's chronic conditions and co-morbidity symptoms are monitored and maintained or improved  8/30/2022 1051 by Mely Wild RN  Outcome: Progressing  Flowsheets (Taken 8/30/2022 0815)  Care Plan - Patient's Chronic Conditions and Co-Morbidity Symptoms are Monitored and Maintained or Improved: Monitor and assess patient's chronic conditions and comorbid symptoms for stability, deterioration, or improvement     Problem: Nutrition Deficit:  Goal: Optimize nutritional status  8/30/2022 1051 by Mely Wild RN  Outcome: Progressing     Problem: Pain  Goal: Verbalizes/displays adequate comfort level or baseline comfort level  8/30/2022 1051 by Mely Wild RN  Outcome: Progressing

## 2022-08-30 NOTE — PLAN OF CARE
Problem: Discharge Planning  Goal: Discharge to home or other facility with appropriate resources  Outcome: Progressing  Flowsheets (Taken 8/29/2022 2239)  Discharge to home or other facility with appropriate resources:   Identify barriers to discharge with patient and caregiver   Identify discharge learning needs (meds, wound care, etc)     Problem: Skin/Tissue Integrity  Goal: Absence of new skin breakdown  Description: 1. Monitor for areas of redness and/or skin breakdown  2. Assess vascular access sites hourly  3. Every 4-6 hours minimum:  Change oxygen saturation probe site  4. Every 4-6 hours:  If on nasal continuous positive airway pressure, respiratory therapy assess nares and determine need for appliance change or resting period.   Outcome: Progressing     Problem: ABCDS Injury Assessment  Goal: Absence of physical injury  Outcome: Progressing  Flowsheets (Taken 8/30/2022 0322)  Absence of Physical Injury: Implement safety measures based on patient assessment     Problem: Safety - Adult  Goal: Free from fall injury  Outcome: Progressing  Flowsheets (Taken 8/30/2022 0322)  Free From Fall Injury: Instruct family/caregiver on patient safety     Problem: Chronic Conditions and Co-morbidities  Goal: Patient's chronic conditions and co-morbidity symptoms are monitored and maintained or improved  Outcome: Progressing  Flowsheets (Taken 8/29/2022 2239)  Care Plan - Patient's Chronic Conditions and Co-Morbidity Symptoms are Monitored and Maintained or Improved: Monitor and assess patient's chronic conditions and comorbid symptoms for stability, deterioration, or improvement     Problem: Nutrition Deficit:  Goal: Optimize nutritional status  Outcome: Progressing     Problem: Pain  Goal: Verbalizes/displays adequate comfort level or baseline comfort level  Outcome: Progressing

## 2022-08-30 NOTE — PROGRESS NOTES
Anjana Castellanos  8/30/2022  [id number]    Chief Complaint: Peripheral artery disease ([address]    Subjective:   Sedation reported with oxycontin. Improved this am. Pain controlled currently. Able to urinate but still required ICs 2/2 retention. ROS: No CP, SOB, dyspnea    Objective:  Patient Vitals for the past 24 hrs:   BP Temp Pulse Resp SpO2   08/29/22 2200 -- -- -- 15 --   08/29/22 1945 117/72 98.1 °F (36.7 °C) 92 18 93 %   08/29/22 1024 121/72 -- -- -- --   08/29/22 0922 113/63 -- (!) 105 -- 96 %   08/29/22 0830 (!) 92/55 99.1 °F (37.3 °C) (!) 107 18 95 %       Gen: No distress, pleasant. Resting in bed  HEENT: Normocephalic, atraumatic. Oral thrush  CV: Regular rate and rhythm. No MRG   Resp: No respiratory distress. CTAB   Abd: Soft, nontender nondistended  Ext: No edema. RLE in knee high ACE dressing  Neuro: Alert, oriented, appropriately interactive    Laboratory data: Available via EMR. Therapy progress:    PT    Supine to Sit: Partial/moderate assistance  Sit to Supine: Supervision or touching assistance   Sit to Stand: Supervision or touching assistance  Chair/Bed to Chair Transfer: Partial/moderate assistance  Car Transfer: Substantial/maximal assistance  Ambulation 10 ft: Partial/moderate assistance  Ambulation 50 ft: Partial/moderate assistance  Ambulation 150 ft:    Stairs - 1 Step: Partial/moderate assistance  Stairs - 4 Step:    Stairs - 12 Step:      OT    Eating: Setup or clean-up assistance  Oral Hygiene: Supervision or touching assistance  Bathing: Supervision or touching assistance  Upper Body Dressing: Setup or clean-up assistance  Lower Body Dressing: Substantial/maximal assistance  Toilet Transfer: Toilet Hygiene: Dependent    Speech Therapy         Body mass index is 22.18 kg/m².     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 Cleo Cazares. ASA, crestor. 50% weightbearing. PT/OT. Cipro for cellulitis. A. Fib: AC held 2/2 prior bleed. HTN: lisinopril 40     DM: Started metformin 500. (home regimen metformin 1000 BID). D/c'd SSI and POCT     Hypokalemia: supplemented     HTN: Lisinopril 40     BPH: proscar, flomax. Parker in place 2/2 retention. VT. - continue bladder protocol    Hypomagnesemia: supplement     Bowels: Per protocol  Bladder: Per protocol   Sleep: remeron scheduled  Pain: tylenol, maylin PRN   DVT PPx: SCD  ROYCE: 9/9      Lorena Roberson MD  8/30/2022, 8:13 AM    * This document was created using dictation software. While all precautions were taken to ensure accuracy, errors may have occurred. Please disregard any typographical errors.

## 2022-08-30 NOTE — PROGRESS NOTES
[facility] Department   Phone: [phone]    Physical Therapy    [] Initial Evaluation            [x] Daily Treatment Note         [] Discharge Summary      Patient: Julianna Smart   : 1930   MRN: [id number]   Date of Service:  2022  Admitting Diagnosis: Peripheral artery disease [facility])  Current Admission Summary: 22-year-old male with a history of A. fib previously on Xarelto but stopped secondary to a head bleed, cerebral amyloid angiopathy, diabetes who was admitted on  with right leg pain. [facility]  for [facility] with Dr. Philip Jim. He underwent a right femoral to posterior tibial bypass on . His postoperative course has been overall unremarkable. He was evaluated by therapy and suggested to continue in an inpatient setting prior to returning home. He reports his pain is controlled. He is questioning why he is nonweightbearing in the right lower extremity. Past Medical History:  has a past medical history of Afib ([facility] [address] Cerebral amyloid angiopathy ([facility] [address] Diabetes mellitus ([facility] [address] Heart aneurysm, and Vertigo. Past Surgical History:  has a past surgical history that includes Appendectomy; hernia repair; Lung surgery; Testicle surgery; and Femoral-tibial Bypass Graft (Right, 2022).   Discharge Recommendations: Home with HHPT   DME Required For Discharge: DME to be determined pending patient progress  Precautions/Restrictions: high fall risk  Weight Bearing Restrictions: weight bearing as tolerated - upgraded to WBAT on 2022  [] Right Upper Extremity  [] Left Upper Extremity [x] Right Lower Extremity  [] Left Lower Extremity     ** Per dtg report, pt is WBAT on (L) LE following conservative management of achilles injury but hasn't had any therapy since being out of boot **       Required Braces/Orthotics: no braces required   [] Right  [] Left  Positional Restrictions:no positional restrictions    Pre-Admission Information   Lives With: alone. Comment: daughters take turns weekly 24/7 to stay with him since achilles injury in June                      Type of Home: house  Home Layout: one level  Home Access: level entry with ramp   Bathroom Layout: walker accessible, walk in shower  (not wc accessible)  Bathroom Equipment: grab bars in shower, tub transfer bench, hand held shower head, toilet raiser, BSC  Toilet Height: standard height  Home Equipment: rolling walker, transport chair, bed railing . Comment: typically uses 3 wheel walker   Transfer Assistance: Independent without use of device; Since June performing pivot transfers with CGA using grab bars  Ambulation Assistance:Independent with 3WW, since achilles injury not walking but only transferring via stand pivots with CGA; was unable to propel transport chair himself  ADL Assistance: Typically independent with ADLs prior to achilles injury;  Since June, requires assistance with sponge bathing, requires assistance with dressing, requires assistance with grooming, requires assistance with toileting  IADL Assistance: requires assistance with all homemaking tasks   Active :        [] Yes                 [x] No  Hand Dominance: [] Left                 [x] Right  Current Employment: retired. Occupation:  at [employer]: [address]: 1 fall leading to ruptured achilles in June and was NWB and then in a boot. Pt had not started any PT for his Left achilles prior to current hospitalization. Subjective  General: Seated in recliner upon arrival. Agreeable to therapy session. Daughter present. Reporting moderate fatigue following shower with OT. Pain: 8/10. Location: low back and (R) LE  Pain Interventions: pain medication in place prior to arrival and repositioned , Pt reporting completing OT without pain medication per patient request but now with increased pain.       Functional Mobility  Bed Mobility  Sit to Supine: stand by assistance  Scooting: stand by assistance  Comments: HOB partially elevated with use of railings and increased time. Transfers  Sit to stand transfer: contact guard assistance  Stand to sit transfer: contact guard assistance  Stand step transfer: minimal assistance, . Comment With use of RW. Requires min (A) for occasional balance stabilization during turning. VC for safe walker management and positioning. Car transfer: minimal assistance, . Comment Requires balance stabilization during turning with RW. Able to complete sit <=> stand and LE positioning without physical assist.  Comments:   Ambulation  Surface:level surface  Assistive Device: rolling walker  Assistance: minimal assistance  Distance: 130 ft + 20 ft + short distances within session. .  Gait Mechanics: Mixed use of step to vs reciprocal gait pattern with reduced elisha and forward flexed posture + head down positioning. Pt has (R) LE drop foot with wide JW resulting in difficulty clearing (R) LE. Comments:  VC for postural facilitation and safe walker management. Stair Mobility  Number of Steps: 4  Step Height: 6 inch  Hand Rails: (B) handrail  Assistance: contact guard assistance  Comments: Step to mechanics with VC for proper sequence. Increased hip/knee flexion to clear (R) LE on step surface. Balance  Static Sitting Balance: fair (+): maintains balance at SBA/supervision without use of UE support  Dynamic Sitting Balance: fair (+): maintains balance at SBA/supervision without use of UE support  Static Standing Balance: fair (-): maintains balance at CGA with use of UE support  Dynamic Standing Balance: poor (+): requires min (A) to maintain balance  Comments:     Other Therapeutic Interventions    Standing therex completed in // bars to (B) LE x 10 ea: marching, heel raises.       2nd Session:     Pt seated in w/c upon arrival in restroom after completing toileting with RN, agreeable to PT session. Sit <=> stand transfers completed throughout session at [facility], ongoing extensive safety cueing upon return to chair surfaces. Pt ambulates 60 ft + 20 ft within session using RW at min (A) with same mechanics as first session. Seated stepper L1: 4 min + 4 min to improve strength and cardiovascular endurance. Standing therex completed in // bars to (B) LE x 10 ea with increased difficulty sequencing movements: hip abduction, hip extension. Upon return to room, pt remains up in recliner with chair alarm and call light within reach. No new complaints following session.       Cognition  Overall Cognitive Status: Impaired  Arousal/Alterness: appropriate responses to stimuli  Following Commands: follows one step commands consistently, follows multi step commands with repetition  Safety Judgement: decreased awareness of need for safety  Problem Solving: assistance required to generate solutions, assistance required to implement solutions  Sequencing: requires cues for some  Orientation:    alert and oriented x 4  Command Following:   accurately follows one step commands    Education  Barriers To Learning: hearing  Patient Education: patient educated on goals, PT role and benefits, plan of care, general safety, functional mobility training, proper use of assistive device/equipment, transfer training  Learning Assessment:  patient verbalizes understanding, would benefit from continued reinforcement    Assessment  Activity Tolerance: Limited by fatigue and pain resulting in increased rest breaks  Impairments Requiring Therapeutic Intervention: decreased functional mobility, decreased ADL status, decreased ROM, decreased strength, decreased safety awareness, decreased endurance, decreased balance, increased pain, decreased posture  Prognosis: good  Clinical Assessment: Pt continues to make daily improvements in functional mobility in response to therapy services, with significant increase in ambulation distance on this date, increased stair mobility, and continued improved efficiency with transfers. Pt continues to require consistent safety cueing throughout mobility tasks including safe use of walker management and requires consistent physical assistance for turning with RW. Pt continues to be limited by low back pain requiring increased rest breaks within sessions. Prior to recent surgery and (L) ankle injury in June, patient was independent and living home alone. Continued skilled PT to promote return towards PLOF. Safety Interventions: patient left in bed, bed alarm in place, and call light within reach    Plan  Frequency: 5 x/week, 90 min/day  Current Treatment Recommendations: strengthening, ROM, balance training, functional mobility training, transfer training, endurance training, and wheelchair mobility training    Goals  Patient Goals:  Return home with improved mobility   Short Term Goals:  Time Frame: 1.5 wks   Pt able to perform bed mobility modified independence. Pt able to perform sliding board transfers with minimal assistance - goal discontinued secondary to upgraded WB status  Pt able to perform stand pivot transfers using grab bar with moderate assistance - goal met 8/26/2022  Pt able to navigate a wc 150 ft mod I - goal discontinued secondary to upgraded WB status  Pt able to ambulate 10 ft with LRAD at moderate assistance - goal met 8/26/2022    Long Term Goals: Time Frame 3 wks  Pt able to perform sliding board transfers with SBA - goal discontinued secondary to upgraded WB status  Pt able to perform stand pivot transfers using grab bar with SBA  Pt able to ambulate 150 ft with LRAD at SBA  Pt able to perform a car transfer with SBA.        Therapy Session Time      Individual Group Co-treatment   Time In  1054       Time Out  1100       Minutes  39           Second Session Therapy Time:   Individual Concurrent Group Co-treatment   Time In  2694         Time Out  1330         Minutes  39 Timed Code Treatment Minutes:  45 + 45    Total Treatment Minutes: 90    Electronically Signed By: Elaine Spatz, PT

## 2022-08-30 NOTE — PROGRESS NOTES
[facility] Department   Phone: [phone]    Occupational Therapy    [] Initial Evaluation            [x] Daily Treatment Note         [] Discharge Summary      Patient: Gely Watt   : 1930   MRN: [id number]   Date of Service:  2022    Admitting Diagnosis:  Peripheral artery disease [facility])  Current Admission Summary:   25-year-old male with a history of A. fib previously on Xarelto but stopped secondary to a head bleed, cerebral amyloid angiopathy, diabetes who was admitted on  with right leg pain. He was transferred from [facility] to this facility on  for surgery with Dr. Lazara Jiménez. He underwent a right femoral to posterior tibial bypass on . His postoperative course has been overall unremarkable. He was evaluated by therapy and suggested to continue in an inpatient setting prior to returning home. Past Medical History:  has a past medical history of Afib ([facility] [address] Cerebral amyloid angiopathy ([facility] [address] Diabetes mellitus ([facility] [address] Heart aneurysm, and Vertigo. Past Surgical History:  has a past surgical history that includes Appendectomy; hernia repair; Lung surgery; Testicle surgery; and Femoral-tibial Bypass Graft (Right, 2022). Discharge Recommendations: Home with 24 hour supervision and assist and HHOT    DME Required For Discharge: DME to be determined pending patient progress     Precautions/Restrictions: high fall risk  Weight Bearing Restrictions: weight bearing as tolerated  [] Right Upper Extremity  [] Left Upper Extremity [x] Right Lower Extremity  [x] Left Lower Extremity  Required Braces/Orthotics: no braces required  Positional Restrictions:no positional restrictions    Pre-Admission Information   Lives With: alone, .   Comment: daughters take turns weekly / to stay with him since achilles injury                       Type of Home: house  Home Layout: one level  Home Access: level entry with ramp   Bathroom Layout: walker accessible, walk in shower  (not wc accessible)  Bathroom Equipment: grab bars in shower, tub transfer bench, hand held shower head, toilet raiser, BSC  Toilet Height: standard height  Home Equipment: rolling walker, transport chair, bed railing . Comment: typically uses 3 wheel walker   Transfer Assistance: Independent without use of device  Ambulation Assistance:Independent with 3WW, since achilles injury not walking but only transferring via stand pivots with CGA; was unable to propel transport chair himself  ADL Assistance: Typically independent with ADLs prior to achilles injury;  requires assistance with sponge bathing, requires assistance with dressing, requires assistance with grooming, requires assistance with toileting  IADL Assistance: requires assistance with all homemaking tasks  Active :        [] Yes                 [x] No  Hand Dominance: [] Left                 [x] Right  Current Employment: retired. Occupation:  at [employer]: [address]: 1 fall leading to ruptured achilles in June and was NWB and then in a boot      Subjective  General: Pt supine in bed w/ HOB elevated upon OT arrival - daughter and RN present upon arrival. Pt frustrated regarding inability to void urine - pt encouraged that RN would be notified and emotional support provided - agreeable to shower during this date. RN notified at end of session for need for lidocaine patch replacement and pain medications.    Pain: Pt 9-9.5/10 pain throughout session - pt initially stated pain meds were admin prior to session, at end of session pt and daughter noted meds were not in place  Pain Interventions: RN notified, RN notified of patient request for pain medication, and repositioned      Activities of Daily Living  Basic Activities of Daily Living  Grooming: setup assistance supervision Increased time to complete task  Grooming Comments: oral care seated in w/c at sink; pt deferred CGA for balance for all; pt required increased time for all transfers this date     Functional Mobility:  Sitting Balance: supervision. Standing Balance: contact guard assistance. Functional Mobility: .  minimal assistance  Functional Mobility Activity: to/from bathroom, ~10 ft bathroom >recliner  Functional Mobility Device Use: rolling walker  Functional Mobility Comment: Darian for balance at hips and RW management; VC's throughout for RW management especially during turns; pt demo'd increased fatigue and decreased balance during amb this date     Comments:     Other Therapeutic Interventions      Second Session:  Pt seated in recliner upon arrival w/ daughter present. Pt c/o of ongoing back pain (7/10) and R LE pain (9/10) and requesting pain meds - RN notified and administered meds at start of session. Pt completed sit><stand transfer at start of session CGA w/ increased time - pt demo'ing increased confusion as pt unable to follow cues to wipe nose w/ tissue vs hand. Pt in stance at elevated table top for matching game to challenge standing balance and activity tolerance as well as cognition - pt able to maintain stance 3:27 min then fatiguing and requiring break - remainder of activity completed seated. Pt unable to complete activity w/ memory component, activity graded down to 12 cards presented at a time w/ client able to flip over all cards before sorting into matching pair - pt completed activity matching 48 cards into 24 pairs w/ increased time - pt demo'd quicker completion of task after each rep and when seated d/t elimination of need for divided attention. Pt amb ~10 ft in room w/c>EOB Darian for amb w/ RW and completed sit>supine supervision w/ increased time. Pt left supine in bed w/ HOB slightly elevated, bed alarm, call light and all other needs in place and daughter present.      Cognition  Overall Cognitive Status: Impaired  Arousal/Alterness: appropriate responses to stimuli  Following Commands: follows one step commands consistently  Attention Span: appears intact  Memory: decreased recall of recent events  Safety Judgement: decreased awareness of need for assistance, decreased awareness of need for safety  Problem Solving: assistance required to generate solutions, assistance required to implement solutions  Insights: decreased awareness of deficits  Initiation: requires cues for some  Sequencing: requires cues for some  Orientation:    alert and oriented x 4  Command Following:   accurately follows one step commands     Education  Barriers To Learning: cognition and hearing  Patient Education: patient educated on goals, OT role and benefits, plan of care, ADL adaptive strategies, proper use of assistive device/equipment, family education, transfer training  Learning Assessment:  patient verbalizes understanding, would benefit from continued reinforcement    Assessment  Activity Tolerance: pt required intermittent rest breaks throughout sessions this date - back pain con't to limit pt engagement   Impairments Requiring Therapeutic Intervention: decreased functional mobility, decreased ADL status, decreased strength, decreased endurance, decreased balance, decreased coordination, increased pain  Prognosis: good  Clinical Assessment: Pt presents below baseline function s/p femoral tibial bypass graft and will benefit from continued OT services to address the above deficits. Pt con't to progress w/ transfers and functional mobility but con't to require Darian for amb and CGA for transfers this date. Pt pain con't to limit participation and pt agitated and experiencing increased pain during and after shower secondary to BSC seated surface. Pt con't to benefit from OT services to maximize patients safety and independence with ADLs, transfers and functional mobility. Con't per POC.   Safety Interventions: patient left in chair, chair alarm in place, call light within reach, patient at risk for falls, nurse notified, and family/caregiver present    Plan  Frequency: 5 x/week, 90 min/day  Current Treatment Recommendations: strengthening, balance training, functional mobility training, transfer training, endurance training, wheelchair mobility training, patient/caregiver education, and ADL/self-care training    Goals  Patient Goals: not stated    Short Term Goals: To be completed in: 2 weeks  Patient will complete upper body ADL at supervision - goal met 8/29  Patient will complete lower body ADL at minimal assistance   Patient will complete toileting at minimal assistance   Patient will complete grooming at supervision - goal met 8/29   Patient will complete functional transfers at minimal assistance -goal met 8/26     Long Term Goals:   To be completed in: 3 weeks   Patient will complete lower body ADL at stand by assistance   Patient will complete toileting at stand by assistance   Patient will complete functional transfers at stand by assistance   Patient will complete functional mobility at stand by assistance   Patient to gather and transport IADL items at stand by assistance     Goals progressing 8/29      Therapy Session Time  First Session Therapy Time:    Individual Group Co-treatment   Time In 0830     Time Out 0930     Minutes 60        Second Session Therapy Time:   Individual Concurrent Group Co-treatment   Time In 1408        Time Out 1438        Minutes 30            Timed Code Treatment Minutes:  60+30  Total Treatment Minutes:  [address] , S/OT   Directed and supervised by Collette Greene OTR/NESTOR #[id number]

## 2022-08-30 NOTE — PROGRESS NOTES
[facility]   Phone: [phone]    Physical Therapy     [] Initial Evaluation                          [x] Daily Treatment Note                      [] Discharge Summary        Patient: Gely Watt                               : 1930                                    MRN: [id number]                                    Date of Service:  2022      Admitting Diagnosis: Peripheral artery disease [facility])  Current Admission Summary: 24-year-old male with a history of A. fib previously on Xarelto but stopped secondary to a head bleed, cerebral amyloid angiopathy, diabetes who was admitted on  with right leg pain. [facility]  for surgery with Dr. Lazara Jiménez. He underwent a right femoral to posterior tibial bypass on . His postoperative course has been overall unremarkable. He was evaluated by therapy and suggested to continue in an inpatient setting prior to returning home. He reports his pain is controlled. He is questioning why he is nonweightbearing in the right lower extremity. Past Medical History:  has a past medical history of Afib ([facility] [address] Cerebral amyloid angiopathy ([facility] [address] Diabetes mellitus ([facility] [address] Heart aneurysm, and Vertigo. Past Surgical History:  has a past surgical history that includes Appendectomy; hernia repair; Lung surgery; Testicle surgery; and Femoral-tibial Bypass Graft (Right, 2022).   Discharge Recommendations: Home with HHPT   DME Required For Discharge: DME to be determined pending patient progress  Precautions/Restrictions: high fall risk  Weight Bearing Restrictions: weight bearing as tolerated - upgraded to WBAT on 2022  [] Right Upper Extremity        [] Left Upper Extremity         [x] Right Lower Extremity         [] Left Lower Extremity              ** Per dtg report, pt is WBAT on (L) LE following conservative management of achilles injury but hasn't had any therapy since being out of boot **        Required Braces/Orthotics: no braces required                   [] Right            [] Left  Positional Restrictions:no positional restrictions       Pre-Admission Information   Lives With: alone. Comment: daughters take turns weekly 24/7 to stay with him since achilles injury in June                      Type of Home: house  Home Layout: one level  Home Access: level entry with ramp   Bathroom Layout: walker accessible, walk in shower  (not wc accessible)  Bathroom Equipment: grab bars in shower, tub transfer bench, hand held shower head, toilet raiser, BSC  Toilet Height: standard height  Home Equipment: rolling walker, transport chair, bed railing . Comment: typically uses 3 wheel walker   Transfer Assistance: Independent without use of device; Since June performing pivot transfers with CGA using grab bars  Ambulation Assistance:Independent with 3WW, since achilles injury not walking but only transferring via stand pivots with CGA; was unable to propel transport chair himself  ADL Assistance: Typically independent with ADLs prior to achilles injury;  Since June, requires assistance with sponge bathing, requires assistance with dressing, requires assistance with grooming, requires assistance with toileting  IADL Assistance: requires assistance with all homemaking tasks   Active :        [] Yes                 [x] No  Hand Dominance: [] Left                 [x] Right  Current Employment: retired. Occupation:  at [employer]: [address]: 1 fall leading to ruptured achilles in June and was NWB and then in a boot. Pt had not started any PT for his Left achilles prior to current hospitalization. Subjective  General: Seated in recliner upon arrival. Agreeable to therapy session. Daughter present. RN reporting mild hypotension this morning. Pain: 9/10. Location: Low back and diffuse LE pain.   Pain Interventions: oral pain medication in place prior to arrival and repositioned throughout session with increased rest breaks. Pain patch applied by RN at start of session. Ice pack utilized throughout session. Vitals  Heart Rate: (!) 105  BP: 113/63 (s/p ambulation)  BP Location: Left upper arm  MAP (Calculated): 79.67  SpO2: 96 %       Functional Mobility  Bed Mobility  Sit to Supine: stand by assistance  Comments:HOB partially elevated with use of railings and increased time  Transfers  Sit to stand transfer: contact guard assistance, . Comment Consistent VC for proper hand placement. Increased time to rise. Stand to sit transfer: contact guard assistance, . Comment VC for hand placement and proximity to seated surface prior to transition. Stand step transfer: minimal assistance, . Comment RW utilized for support. VC for proper positioning of device with stabilization during turning. Comments: RW utilized for all transfers. Ambulation  Surface:level surface  Assistive Device: rolling walker  Assistance: minimal assistance  Distance: 70 ft  Gait Mechanics: Forward flexed posture with head down. Mixed use of step to vs reciprocal gait pattern with reduced elisha, wide JW, increased time in double limb support. Leaves walker JW during turning, impulsive upon approach to seated surfaces  Comments:    Stair Mobility  Number of Steps: 3  Step Height: 6 inch  Hand Rails: (B) handrail  Assistance: minimal assistance  Comments: Step to mechanics with VC for sequence and hand positioning. Wheelchair Mobility:  No w/c mobility completed on this date.   Comments:   Balance  Static Sitting Balance: fair (+): maintains balance at SBA/supervision without use of UE support  Dynamic Sitting Balance: fair (+): maintains balance at SBA/supervision without use of UE support  Static Standing Balance: fair (-): maintains balance at CGA with use of UE support  Dynamic Standing Balance: poor (+): requires min (A) to maintain balance  Comments:      Other Therapeutic Interventions              Session begins with sit to stand for LB clothing management. Sit to stand completed at [facility], requires min (A) for correction of posterior balance loss during self management of LB clothing. 2nd Session:              Pt supine in bed upon arrival, reporting ongoing constant low back and (B) LE pain, agreeable to PT session. Pt requires extended rest breaks between activities due to pain and fatigue. Supine => sit transfer completed with use of HR at SBA. Pt ambulates 70 ft x 2 completions within session using RW at CGA/min (A) with same mechanics as first session. Seated stepper L1: 4 min + 4 min to improve strength and cardiovascular endurance. Repetitive sit <=> stand training completed with focus on hand placement with CGA. Upon return to room, pt remains up in recliner with chair alarm and call light within reach. No new complaints following session. Cognition  Overall Cognitive Status: Impaired  Arousal/Alterness: appropriate responses to stimuli  Following Commands: follows one step commands consistently, follows multi step commands with repetition  Safety Judgement: decreased awareness of need for safety  Problem Solving: assistance required to generate solutions, assistance required to implement solutions  Sequencing: requires cues for some  Orientation:    alert and oriented x 4  Command Following:   accurately follows one step commands       Education  Barriers To Learning: hearing  Patient Education: patient educated on goals, PT role and benefits, plan of care, weight-bearing education, general safety, functional mobility training, proper use of assistive device/equipment, transfer training  Learning Assessment:  patient verbalizes understanding, would benefit from continued reinforcement       Assessment  Activity Tolerance: Limited by fatigue and pain resulting in increased rest breaks.     Impairments Requiring Therapeutic Intervention: decreased functional mobility, decreased ADL status, decreased ROM, decreased strength, decreased safety awareness, decreased endurance, decreased balance, increased pain, decreased posture  Prognosis: good  Clinical Assessment: Pt continues to make consistent progress in response to therapy services, including improved completion of sit <=> stand transfers and improved max ambulation distance. Patient continues to be significantly limited by low back pain (baseline prior to admission) and fatigue resulting in frequent rest breaks within sessions. Pt continues to consistently require min (A) for balance stabilization during dynamic standing task completion despite RW use. Prior to recent surgery and (L) ankle injury in June, patient was independent and living home alone. Continued skilled PT to promote return towards PLOF. Safety Interventions: patient left in bed, bed alarm in place, and call light within reach       Plan  Frequency: 5 x/week, 90 min/day  Current Treatment Recommendations: strengthening, ROM, balance training, functional mobility training, transfer training, endurance training, and wheelchair mobility training       Goals  Patient Goals:  Return home with improved mobility   Short Term Goals:  Time Frame: 1.5 wks   Pt able to perform bed mobility modified independence.    Pt able to perform sliding board transfers with minimal assistance - goal discontinued secondary to upgraded WB status  Pt able to perform stand pivot transfers using grab bar with moderate assistance - goal met 8/26/2022  Pt able to navigate a wc 150 ft mod I - goal discontinued secondary to upgraded WB status  Pt able to ambulate 10 ft with LRAD at moderate assistance - goal met 8/26/2022     Long Term Goals: Time Frame 3 wks  Pt able to perform sliding board transfers with SBA - goal discontinued secondary to upgraded WB status  Pt able to perform stand pivot transfers using grab bar with SBA  Pt able to ambulate 150 ft with LRAD at SBA  Pt able to perform a car transfer with SBA.          Therapy Session Time                  Individual Group Co-treatment   Time In  0920     Time Out  1020     Minutes  60        Second Session Therapy Time:    Individual Concurrent Group Co-treatment   Time In  1120       Time Out  1150       Minutes  30             Timed Code Treatment Minutes:  60 + 30     Total Treatment Minutes: 90 Minutes        Electronically Signed By: Corina Cunningham PT

## 2022-08-31 LAB
ANION GAP SERPL CALCULATED.3IONS-SCNC: 9 MMOL/L (ref 3–16)
BUN BLDV-MCNC: 8 MG/DL (ref 7–20)
CALCIUM SERPL-MCNC: 8.3 MG/DL (ref 8.3–10.6)
CHLORIDE BLD-SCNC: 100 MMOL/L (ref 99–110)
CO2: 29 MMOL/L (ref 21–32)
CREAT SERPL-MCNC: 0.8 MG/DL (ref 0.8–1.3)
GFR AFRICAN AMERICAN: >60
GFR NON-AFRICAN AMERICAN: >60
GLUCOSE BLD-MCNC: 140 MG/DL (ref 70–99)
HCT VFR BLD CALC: 33.8 % (ref 40.5–52.5)
HEMOGLOBIN: 11.4 G/DL (ref 13.5–17.5)
MAGNESIUM: 1.9 MG/DL (ref 1.8–2.4)
MCH RBC QN AUTO: 30.7 PG (ref 26–34)
MCHC RBC AUTO-ENTMCNC: 33.7 G/DL (ref 31–36)
MCV RBC AUTO: 91.2 FL (ref 80–100)
PDW BLD-RTO: 14.6 % (ref 12.4–15.4)
PLATELET # BLD: 394 K/UL (ref 135–450)
PMV BLD AUTO: 8.1 FL (ref 5–10.5)
POTASSIUM SERPL-SCNC: 3.8 MMOL/L (ref 3.5–5.1)
RBC # BLD: 3.71 M/UL (ref 4.2–5.9)
SODIUM BLD-SCNC: 138 MMOL/L (ref 136–145)
WBC # BLD: 6.8 K/UL (ref 4–11)

## 2022-08-31 PROCEDURE — 97535 SELF CARE MNGMENT TRAINING: CPT

## 2022-08-31 PROCEDURE — 97530 THERAPEUTIC ACTIVITIES: CPT

## 2022-08-31 PROCEDURE — 51798 US URINE CAPACITY MEASURE: CPT

## 2022-08-31 PROCEDURE — 36415 COLL VENOUS BLD VENIPUNCTURE: CPT

## 2022-08-31 PROCEDURE — 80048 BASIC METABOLIC PNL TOTAL CA: CPT

## 2022-08-31 PROCEDURE — 6370000000 HC RX 637 (ALT 250 FOR IP): Performed by: PHYSICAL MEDICINE & REHABILITATION

## 2022-08-31 PROCEDURE — 92523 SPEECH SOUND LANG COMPREHEN: CPT

## 2022-08-31 PROCEDURE — 99024 POSTOP FOLLOW-UP VISIT: CPT | Performed by: SURGERY

## 2022-08-31 PROCEDURE — 97110 THERAPEUTIC EXERCISES: CPT

## 2022-08-31 PROCEDURE — 83735 ASSAY OF MAGNESIUM: CPT

## 2022-08-31 PROCEDURE — 97116 GAIT TRAINING THERAPY: CPT

## 2022-08-31 PROCEDURE — 6370000000 HC RX 637 (ALT 250 FOR IP): Performed by: SURGERY

## 2022-08-31 PROCEDURE — 85027 COMPLETE CBC AUTOMATED: CPT

## 2022-08-31 PROCEDURE — 1280000000 HC REHAB R&B

## 2022-08-31 RX ADMIN — SENNOSIDES AND DOCUSATE SODIUM 2 TABLET: 50; 8.6 TABLET ORAL at 21:50

## 2022-08-31 RX ADMIN — TAMSULOSIN HYDROCHLORIDE 0.4 MG: 0.4 CAPSULE ORAL at 21:49

## 2022-08-31 RX ADMIN — ROSUVASTATIN 10 MG: 10 TABLET, FILM COATED ORAL at 08:53

## 2022-08-31 RX ADMIN — MIRTAZAPINE 30 MG: 15 TABLET, FILM COATED ORAL at 21:49

## 2022-08-31 RX ADMIN — CIPROFLOXACIN 500 MG: 500 TABLET, FILM COATED ORAL at 08:54

## 2022-08-31 RX ADMIN — OXYCODONE AND ACETAMINOPHEN 1 TABLET: 5; 325 TABLET ORAL at 21:49

## 2022-08-31 RX ADMIN — NYSTATIN 500000 UNITS: 100000 SUSPENSION ORAL at 13:48

## 2022-08-31 RX ADMIN — NYSTATIN 500000 UNITS: 100000 SUSPENSION ORAL at 08:54

## 2022-08-31 RX ADMIN — OXYCODONE AND ACETAMINOPHEN 2 TABLET: 5; 325 TABLET ORAL at 06:25

## 2022-08-31 RX ADMIN — OXYCODONE AND ACETAMINOPHEN 2 TABLET: 5; 325 TABLET ORAL at 10:37

## 2022-08-31 RX ADMIN — FINASTERIDE 5 MG: 5 TABLET, FILM COATED ORAL at 08:54

## 2022-08-31 RX ADMIN — METFORMIN HYDROCHLORIDE 500 MG: 500 TABLET ORAL at 17:30

## 2022-08-31 RX ADMIN — PANTOPRAZOLE SODIUM 40 MG: 40 TABLET, DELAYED RELEASE ORAL at 06:15

## 2022-08-31 RX ADMIN — SENNOSIDES AND DOCUSATE SODIUM 2 TABLET: 50; 8.6 TABLET ORAL at 08:54

## 2022-08-31 RX ADMIN — NYSTATIN 500000 UNITS: 100000 SUSPENSION ORAL at 17:30

## 2022-08-31 RX ADMIN — ASPIRIN 81 MG: 81 TABLET, COATED ORAL at 08:54

## 2022-08-31 RX ADMIN — LISINOPRIL 40 MG: 20 TABLET ORAL at 13:48

## 2022-08-31 RX ADMIN — CIPROFLOXACIN 500 MG: 500 TABLET, FILM COATED ORAL at 21:49

## 2022-08-31 RX ADMIN — OXYCODONE AND ACETAMINOPHEN 2 TABLET: 5; 325 TABLET ORAL at 14:54

## 2022-08-31 RX ADMIN — NYSTATIN 500000 UNITS: 100000 SUSPENSION ORAL at 21:49

## 2022-08-31 RX ADMIN — METFORMIN HYDROCHLORIDE 500 MG: 500 TABLET ORAL at 08:54

## 2022-08-31 ASSESSMENT — PAIN - FUNCTIONAL ASSESSMENT: PAIN_FUNCTIONAL_ASSESSMENT: ACTIVITIES ARE NOT PREVENTED

## 2022-08-31 ASSESSMENT — PAIN SCALES - GENERAL
PAINLEVEL_OUTOF10: 0
PAINLEVEL_OUTOF10: 8
PAINLEVEL_OUTOF10: 0
PAINLEVEL_OUTOF10: 9
PAINLEVEL_OUTOF10: 4
PAINLEVEL_OUTOF10: 8

## 2022-08-31 ASSESSMENT — PAIN DESCRIPTION - LOCATION
LOCATION: BACK

## 2022-08-31 ASSESSMENT — PAIN DESCRIPTION - FREQUENCY: FREQUENCY: CONTINUOUS

## 2022-08-31 ASSESSMENT — PAIN DESCRIPTION - PAIN TYPE: TYPE: CHRONIC PAIN

## 2022-08-31 ASSESSMENT — PAIN DESCRIPTION - ORIENTATION: ORIENTATION: RIGHT;LOWER

## 2022-08-31 ASSESSMENT — PAIN DESCRIPTION - ONSET: ONSET: ON-GOING

## 2022-08-31 ASSESSMENT — PAIN DESCRIPTION - DESCRIPTORS: DESCRIPTORS: ACHING

## 2022-08-31 NOTE — PROGRESS NOTES
Jaspal Azevedo  8/31/2022  [id number]    Chief Complaint: Peripheral artery disease ([address]    Subjective:   No overnight events. No current complaints. Urinating well with standing at the toilet. Pain overall controlled. Labs reviewed. ROS: No CP, SOB, dyspnea    Objective:  Patient Vitals for the past 24 hrs:   BP Temp Temp src Pulse Resp SpO2   08/31/22 0842 (!) 96/57 98.1 °F (36.7 °C) Oral 94 16 96 %   08/30/22 2143 112/66 97.7 °F (36.5 °C) Oral 82 16 --   08/30/22 1445 -- -- -- -- 18 --   08/30/22 1025 -- -- -- -- 16 --       Gen: No distress, pleasant. Resting in WC  HEENT: Normocephalic, atraumatic. Oral thrush improving  CV: Regular rate and rhythm. No MRG   Resp: No respiratory distress. CTAB   Abd: Soft, nontender nondistended  Ext: No edema. RLE in knee high ACE dressing  Neuro: Alert, oriented, appropriately interactive    Laboratory data: Available via EMR. Therapy progress:    PT    Supine to Sit: Partial/moderate assistance  Sit to Supine: Supervision or touching assistance   Sit to Stand: Supervision or touching assistance  Chair/Bed to Chair Transfer: Partial/moderate assistance  Car Transfer: Partial/moderate assistance  Ambulation 10 ft: Partial/moderate assistance  Ambulation 50 ft: Partial/moderate assistance  Ambulation 150 ft:    Stairs - 1 Step: Supervision or touching assistance  Stairs - 4 Step: Supervision or touching assistance  Stairs - 12 Step:      OT    Eating: Setup or clean-up assistance  Oral Hygiene: Supervision or touching assistance  Bathing: Partial/moderate assistance  Upper Body Dressing: Setup or clean-up assistance  Lower Body Dressing: Substantial/maximal assistance  Toilet Transfer: Toilet Hygiene: Dependent    Speech Therapy         Body mass index is 22.18 kg/m².     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 Amy López. ASA, crestor. 50% weightbearing. PT/OT. Cipro for cellulitis. A. Fib: AC held 2/2 prior bleed. HTN: lisinopril 40     DM: Started metformin 500. (home regimen metformin 1000 BID). D/c'd SSI and POCT     Hypokalemia: supplemented     HTN: Lisinopril 40     BPH: proscar, flomax. Parker in place 2/2 retention. VT. Continue bladder protocol    Hypomagnesemia: supplement     Bowels: Per protocol  Bladder: Per protocol   Sleep: remeron scheduled  Pain: tylenol, maylin PRN   DVT PPx: SCD  ROYCE: 9/9      Nima Salcedo MD  8/31/2022, 8:58 AM    * This document was created using dictation software. While all precautions were taken to ensure accuracy, errors may have occurred. Please disregard any typographical errors.

## 2022-08-31 NOTE — PROGRESS NOTES
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                                      ARU H&P:   80-year-old male with a history of A. fib previously on Xarelto but stopped secondary to a head bleed, cerebral amyloid angiopathy, diabetes who was admitted on  with right leg pain. He was transferred from [facility] to this facility on  for surgery with Dr. Prieto Anaya. He underwent a right femoral to posterior tibial bypass on . His postoperative course has been overall unremarkable. He was evaluated by therapy and suggested to continue in an inpatient setting prior to returning home. He reports his pain is controlled. He is questioning why he is nonweightbearing in the right lower extremity. History/Prior Level of Function:   Living Status: Pt lives alone but has two adult daughters that switch off weeks and provide him 24/7 assistance   Occupation/School: Retired from [employer]   Medication Management: :  []Primary   []Secondary [x]No - daughters manage   Finance Management: []Primary   []Secondary [x]No - daughter manage   Active :   []Yes         [x]No - stopped driving because of health   Hearing:    WFL, hard of hearing  Vision:    Vision Corrective Device: wears glasses for distance and reading     PAIN:  Pain Scale: 9/10.   Location: Right hip   Pain Intervention:  RN notified of patient request for pain medication      SPEECH LANGUAGE COGNITIVE ASSESSMENT:     Patient complaint: No complaints in regard to speech, language and cognition. Pt's daughter arrived and mentioned noticing decreased cognition due to medical diagnoses and physical limitations. Oral Mechanism Exam:  Within functional limits        COMPREHENSION  Auditory Comprehension: Within functional limits  and Mild   Impaired Multi-step commands  Comment:  Errors appeared related to suspected hearing impairment     Reading Comprehension: To be assessed     EXPRESSION  Primary Mode of Expression: Verbal  Verbal Expression: Within functional limits        Written Expression: To be assessed      Pragmatics/Social Functioning: Within functional limits          MOTOR SPEECH  Motor Speech: Within functional limits        VOICE  Voice: Within functional limits   WNL     COGNITION  []Unable to be assessed secondary to Aphasia     Overall Orientation : Within functional limits   Oriented x4    Attention: Mild  and Moderate   Impaired Sustained Attention  Impaired Alternating Attention  Impaired Divided Attention    Memory: Moderate   Impaired Short-term Memory  Impaired Recall of New Learning   Impaired Immediate/working Memory  Impaired Long-term Memory    Problem Solving: Mild  and Moderate   Impaired Complex Tasks     Safety/Judgement: Within functional limits        ADDITIONAL ASSESSMENT:    The [employer] Mental Status (SLUMS) Examination administered to pt with pt scoring 12/30 falling within the Dementia rage* (1-20). * Of note, Pt does not have a formal dementia diagnosis. The above score and cognitive stage is not indicative of diagnosis rather used to direct treatment tasks and establish goals.       Orientation 3/3    Working Memory 0/5 x2    Basic Calculations 1/3 Required repetition of the word problem x3    Divergent Naming 2/3 Pt named 10 animals in 1 minute   Short term memory 0/5 Limited response to category/choice cues    Mental Manipulation 1/2    Executive function task (clock drawing) 0/4 Backwards rotation of numbers, repetition of several digits, unable to retain directive for hand placements. Auditory comprehension of 1 step directions 2/2    Auditory comprehension of short detailed paragraph 3/8        Impressions / Treatment Diagnosis:  Pt presents with Moderate  Cognitive-Linguistic Deficits  in the domains of attention, short-term and long-term memory, functional problem solving, and executive function skills. Pt was appropriately oriented and expressive language appeared intact without evidence of word finding. Pt processes of multi-step commands was delayed, required repetition and modification (loud speech, lip reading) to ensure adequate comprehension. Pt reported increased difficulty when provided with semantic cues for short-term memory questions. Pt's daughter began noticing slow progressive cognitive decline with multiple medical situations, therefore family was providing 24/7 assistance. Pain and pain management are new factors that are now impacting his cognition during his rehab stay. Pt is agreeable to participate in SLP intervention for return to prior level of function. Prognosis/Rehab Potential:      []Excellent   [x]Good    [x]Fair   []Poor    Tolerance of evaluation/treatment:    []Excellent   [x]Good    []Fair   []Poor    PLAN:    SLP treatment warranted:    [x] Yes  [] No      Frequency/Duration:   30 minutes/day; 5 days per week, as tolerated, until goals met, or discharged from ARU. Barriers to/and or personal factors that will affect rehab potential:  Pain and pain medication management. Discharge Recommendations:   Home with assistance and 24 hour supervision    EDUCATION:   Provided education regarding role of SLP, results of assessment, recommendations and general speech pathology plan of care.    [x] Pt verbalized understanding and agreement   [x] Pt requires ongoing learning   [] No evidence of comprehension     GOALS:  Patient stated goal: Return to home. Short Term Speech/Language/Cognitive Goals:   Pt will recall functional information (daily tasks, personal hx, etc.) with 80% accuracy. Patient will complete functional problem-solving tasks for daily situations with 80% accuracy or given min cues. Pt will attend to multi-step direction tasks with min cues to achieve 80% accuracy. Time frame for short term goals: 10 days    Long Term Goals:   Pt will demonstrate adequate cognitive linguistic abilities for safe discharge to home with min supervision utilizing strategies to maximize safety and independence. If patient discharges prior to next visit, this note will serve as discharge. Therapy Time:   Individual   Time In 1020   Time Out 1100   Minutes 40     Timed Code Treatment Minutes:  50    Total Treatment Minutes: 40    Electronically signed by:    Melinda Diaz M.A. CCC-SLP YUKI [id number]  Speech-Language Pathologist   8/31/2022 10:26 AM

## 2022-08-31 NOTE — PROGRESS NOTES
[facility] Department   Phone: [phone]    Occupational Therapy    [] Initial Evaluation            [x] Daily Treatment Note         [] Discharge Summary      Patient: Krysten Yanes   : 1930   MRN: [id number]   Date of Service:  2022    Admitting Diagnosis:  Peripheral artery disease [facility])  Current Admission Summary:   80-year-old male with a history of A. fib previously on Xarelto but stopped secondary to a head bleed, cerebral amyloid angiopathy, diabetes who was admitted on  with right leg pain. He was transferred from [facility] to this facility on  for surgery with Dr. Sarah Seth. He underwent a right femoral to posterior tibial bypass on . His postoperative course has been overall unremarkable. He was evaluated by therapy and suggested to continue in an inpatient setting prior to returning home. Past Medical History:  has a past medical history of Afib ([facility] [address] Cerebral amyloid angiopathy ([facility] [address] Diabetes mellitus ([facility] [address] Heart aneurysm, and Vertigo. Past Surgical History:  has a past surgical history that includes Appendectomy; hernia repair; Lung surgery; Testicle surgery; and Femoral-tibial Bypass Graft (Right, 2022). Discharge Recommendations: Home with 24 hour supervision and assist and HHOT    DME Required For Discharge: DME to be determined pending patient progress     Precautions/Restrictions: high fall risk  Weight Bearing Restrictions: weight bearing as tolerated  [] Right Upper Extremity  [] Left Upper Extremity [x] Right Lower Extremity  [x] Left Lower Extremity  Required Braces/Orthotics: no braces required  Positional Restrictions:no positional restrictions    Pre-Admission Information   Lives With: alone, .   Comment: daughters take turns weekly / to stay with him since achilles injury                       Type of Home: house  Home Layout: one level  Home Access: level entry with ramp   Bathroom Layout: walker accessible, walk in shower  (not wc accessible)  Bathroom Equipment: grab bars in shower, tub transfer bench, hand held shower head, toilet raiser, BSC  Toilet Height: standard height  Home Equipment: rolling walker, transport chair, bed railing . Comment: typically uses 3 wheel walker   Transfer Assistance: Independent without use of device  Ambulation Assistance:Independent with 3WW, since achilles injury not walking but only transferring via stand pivots with CGA; was unable to propel transport chair himself  ADL Assistance: Typically independent with ADLs prior to achilles injury;  requires assistance with sponge bathing, requires assistance with dressing, requires assistance with grooming, requires assistance with toileting  IADL Assistance: requires assistance with all homemaking tasks  Active :        [] Yes                 [x] No  Hand Dominance: [] Left                 [x] Right  Current Employment: retired. Occupation:  at [employer]: [address]: 1 fall leading to ruptured achilles in June and was NWB and then in a boot      Subjective  General: Pt supine in bed upon OT arrival - daughter present - pt agreeable to therapy and requesting to use restroom   Pain: Pt did not rate pain this AM - pt stated it has been a consistent 7/10 over the past 12 hours   Pain Interventions: pain medication in place prior to arrival and repositioned  - daughter stated pain meds admin at 0600     Activities of Daily Living  Basic Activities of Daily Living  Grooming: setup assistance supervision Increased time to complete task  Grooming Comments: oral care and facial grooming (shaving) at sink - handheld mirror provided to pt to increased accuracy and safety; increased time for facial grooming this date w/ Darian for thoroughness  Upper Extremity Dressing: setup assistance supervision requires verbal cueing .   Comment: VCs for sequencing  Lower Extremity Dressing: moderate assistance requires verbal cueing Increased time to complete task . Comment: significant VC/TCs for sequencing and use of reacher to marisa briefs/pants and sock aid to marisa socks - pt required increased assistance to marisa sock aids over L LE - pt assisted w/ pants/briefs over hips in stance performing 50-75% of task but therapist assistance required secondary to pt losing balance and needing to take seated rest break    Dressing Equipment: reacher  Dressing Comments: increased time and VCs for sequencing - briefs/pants over hips in stance w/ use of grab bar anteriorly w/ modA for balance - Darian for positional correction   Toileting: moderate assistance requires verbal cueing. Toileting Equipment: none  Toileting Comments: pt voided urine in stance after failed attempt seated on standard toilet; BUE support on anterior horizontal grab bar posterior to toilet w/ min/modA for balance throughout - pt completed clothing management Darian; increased time to void urine this date secondary to pt adjusting to no catheter   General Comments: Increased time for ADL completion; VCs throughout for sequencing and initiation    Instrumental Activities of Daily Living  No IADL completed on this date. Functional Mobility  Bed Mobility  Supine to Sit: supervision  Sit to Supine: supervision  Rolling Right: supervision  Comments: HOB slightly elevated supine>sit, use of HR on L, increased time for all bed mobility this date, HOB flat sit>supine  Transfers  Sit to stand transfer:contact guard assistance, moderate assistance, maximum assistance, . Comment Variable assist throughout session: CGA progressing to mod-MaxA at EOS secondary to posterior lean and fatigue - pt max VC's during increased assist level for hips forward and postural correction  Stand to sit transfer: contact guard assistance, minimal assistance, . Comment CGA progressing to Darian at EOS  Stand pivot transfer: contact guard assistance, .   Comment stand pivot RW error. Pt in stance at elevated table top to clean shaving cream spread across table to facilitate standing balance, functional reach, and coordination during IADL housekeeping task - pt stood ~3:30 min to clean shaving cream followed by rest break and additional ~1:30 min stand to dry table - demo'd good balance progressing modA > CGA w/ UE support on table top and VC's for feet shoulder width apart and hips forward in stance. Pt left supine in bed w/ HOB elevated, daughter present, and call light, bed alarm, and all other needs within reach.      Cognition  Overall Cognitive Status: Impaired  Arousal/Alterness: appropriate responses to stimuli  Following Commands: follows one step commands consistently  Attention Span: appears intact  Memory: decreased recall of recent events  Safety Judgement: decreased awareness of need for assistance, decreased awareness of need for safety  Problem Solving: assistance required to generate solutions, assistance required to implement solutions  Insights: decreased awareness of deficits  Initiation: requires cues for some  Sequencing: requires cues for some  Orientation:    alert and oriented x 4  Command Following:   accurately follows one step commands     Education  Barriers To Learning: cognition and hearing  Patient Education: patient educated on goals, OT role and benefits, plan of care, ADL adaptive strategies, proper use of assistive device/equipment, family education, transfer training  Learning Assessment:  patient verbalizes understanding, would benefit from continued reinforcement    Assessment  Activity Tolerance: pt required intermittent rest breaks throughout sessions this date - back pain con't to limit pt engagement   Impairments Requiring Therapeutic Intervention: decreased functional mobility, decreased ADL status, decreased strength, decreased endurance, decreased balance, decreased coordination, increased pain  Prognosis: good  Clinical Assessment: Pt presents below baseline function s/p femoral tibial bypass graft and will benefit from continued OT services to address the above deficits. Pt con't to progress w/ transfers and functional mobility but con't to require Darian for amb and CGA for transfers this date. Pt demo'd good attention during cognition task this date completing task w/ few VC's and no redirections or cues for sequencing as needed during prior cognition activities. Pt demo'd decreased pain and agitation this date but con't to require increased time, encouragement and re-direction during task completion. Pt con't to benefit from OT services to maximize patients safety and independence with ADLs, transfers and functional mobility. Con't per POC. Safety Interventions: patient left in chair, chair alarm in place, call light within reach, patient at risk for falls, nurse notified, and family/caregiver present    Plan  Frequency: 5 x/week, 90 min/day  Current Treatment Recommendations: strengthening, balance training, functional mobility training, transfer training, endurance training, wheelchair mobility training, patient/caregiver education, and ADL/self-care training    Goals  Patient Goals: not stated    Short Term Goals: To be completed in: 2 weeks  Patient will complete upper body ADL at supervision - goal met 8/29  Patient will complete lower body ADL at minimal assistance   Patient will complete toileting at minimal assistance   Patient will complete grooming at supervision - goal met 8/29   Patient will complete functional transfers at minimal assistance -goal met 8/26     Long Term Goals:   To be completed in: 3 weeks   Patient will complete lower body ADL at stand by assistance   Patient will complete toileting at stand by assistance   Patient will complete functional transfers at stand by assistance   Patient will complete functional mobility at stand by assistance   Patient to gather and transport IADL items at stand by assistance     Goals progressing 8/29      Therapy Session Time  First Session Therapy Time:    Individual Group Co-treatment   Time In 0730     Time Out 9206     Minutes 46        Second Session Therapy Time:   Individual Concurrent Group Co-treatment   Time In 1405        Time Out 1551        Minutes 46            Timed Code Treatment Minutes:  50+27  Total Treatment Minutes:  29 JOSIANE Oquendo/OT   Directed and supervised by JORGE Moore/NESTOR #[id number]

## 2022-08-31 NOTE — PATIENT CARE CONFERENCE
4  Discharge Goal: Independent  Sit to Lying  Assistance Needed: Supervision or touching assistance  CARE Score: 4  Discharge Goal: Independent  Lying to Sitting on Side of Bed  Assistance Needed: Supervision or touching assistance  CARE Score: 4  Discharge Goal: Independent  Sit to Stand  Assistance Needed: Supervision or touching assistance  CARE Score: 4  Discharge Goal: Supervision or touching assistance  Chair/Bed-to-Chair Transfer  Assistance Needed: Partial/moderate assistance  CARE Score: 3  Discharge Goal: Supervision or touching assistance  Car Transfer  Assistance Needed: Partial/moderate assistance  Reason if not Attempted: Not attempted due to medical condition or safety concerns  CARE Score: 3  Discharge Goal: Supervision or touching assistance  Walk 10 Feet  Assistance Needed: Partial/moderate assistance  Reason if not Attempted: Not attempted due to medical condition or safety concerns  CARE Score: 3  Discharge Goal: Supervision or touching assistance  Walk 50 Feet with Two Turns  Assistance Needed: Partial/moderate assistance  Reason if not Attempted: Not attempted due to medical condition or safety concerns  CARE Score: 3  Discharge Goal: Supervision or touching assistance  Walk 150 Feet  Reason if not Attempted: Not attempted due to medical condition or safety concerns  CARE Score: 88  Discharge Goal: Supervision or touching assistance  Walking 10 Feet on Uneven Surfaces  Reason if not Attempted: Not attempted due to medical condition or safety concerns  CARE Score: 88  Discharge Goal: Supervision or touching assistance  1 Step (Curb)  Assistance Needed: Supervision or touching assistance  Reason if not Attempted: Not attempted due to medical condition or safety concerns  CARE Score: 4  Discharge Goal: Partial/moderate assistance  4 Steps  Assistance Needed: Supervision or touching assistance  Reason if not Attempted: Not attempted due to medical condition or safety concerns  CARE Score: Pt was appropriately oriented and expressive language appeared intact without evidence of word finding. Pt processes of multi-step commands was delayed, required repetition and modification (loud speech, lip reading) to ensure adequate comprehension. Pt reported increased difficulty when provided with semantic cues for short-term memory questions. Pt's daughter began noticing slow progressive cognitive decline with multiple medical situations, therefore family was providing 24/7 assistance. Pain and pain management are new factors that are now impacting his cognition during his rehab stay. Pt is agreeable to participate in SLP intervention for return to prior level of function. Goals:               Short-term Goals  Timeframe for Short-term Goals: 10 days  Goal 1: Pt will recall functional information (daily tasks, personal hx, etc.) with 80% accuracy. Goal 2: Patient will complete functional problem-solving tasks for daily situations with 80% accuracy or given min cues. Goal 3: Pt will attend to multi-step direction tasks with min cues to achieve 80% accuracy. Timeframe for Short-term Goals: 10 days    Plan of Care:  Pt to be seen 5 out of 7 days per week per ARU protocol ( 30 minutes with SLP)    OCCUPATIONAL THERAPY:    ADL:   Grooming: setup assistance supervision Increased time to complete task  Upper Extremity Dressing: setup assistance supervision requires verbal cueing . Comment: VCs for sequencing  Lower Extremity Dressing: moderate assistance requires verbal cueing Increased time to complete task   Toileting: moderate assistance requires verbal cueing. Increased time for ADL completion; VCs throughout for sequencing and initiation    Toilet Transfers: Toilet transfer: contact guard assistance - w/ RW and grab bars    Tub/ShowerTransfers:   Toilet transfer: contact guard assistance - to [facility] w/ RW and grab bars    QM:  Eating  Assistance Needed: Setup or clean-up assistance  CARE Score: 5  Discharge Goal: Independent  Oral Hygiene  Assistance Needed: Supervision or touching assistance  CARE Score: 4  Discharge Goal: Set-up or clean-up assistance  [address] needed: Supervision or touching assistance  CARE Score: 4  Discharge Goal: Supervision or touching assistance  Toilet Transfer  Assistance needed: Supervision or touching assistance  Reason if not Attempted: Not attempted due to medical condition or safety concerns  CARE Score: 4  Discharge Goal: Supervision or touching assistance  Shower/Bathe Self  Assistance Needed: Partial/moderate assistance  Comment: sponge bath  CARE Score: 3  Discharge Goal: Supervision or touching assistance  Upper Body Dressing  Assistance Needed: Setup or clean-up assistance  CARE Score: 5  Discharge Goal: Set-up or clean-up assistance  Lower Body Dressing  Assistance Needed: Partial/moderate assistance  CARE Score: 3  Discharge Goal: Supervision or touching assistance  Putting On/Taking Off Footwear  Assistance Needed: Partial/moderate assistance  CARE Score: 3  Discharge Goal: Set-up or clean-up assistance    Goals:             Short Term Goals: To be completed in: 2 weeks  Patient will complete upper body ADL at supervision - goal met 8/29  Patient will complete lower body ADL at minimal assistance   Patient will complete toileting at minimal assistance   Patient will complete grooming at supervision - goal met 8/29   Patient will complete functional transfers at minimal assistance -goal met 8/26      Long Term Goals:   To be completed in: 3 weeks   Patient will complete lower body ADL at stand by assistance   Patient will complete toileting at stand by assistance   Patient will complete functional transfers at stand by assistance   Patient will complete functional mobility at stand by assistance   Patient to gather and transport IADL items at stand by assistance   These goals were reviewed with this patient at the time of assessment and Prem Menjivar Kvng Kim is in agreement    Plan of Care:  Pt to be seen 5 out of 7 days per week per ARU protocol ( 90 minutes with OT)     NUTRITION:  Weight: 163 lb 9 oz (74.2 kg) / Body mass index is 22.18 kg/m². Diet Order:CCC    Supplements:Ensure High Protein @ lunch daily    Po intake consistently greater than 50% of most meals per family report. Pt consumed 100% bkf this am.  Typical po intake prior to admission is a good bkf & dinner, with pt often skipping lunch. Pt also drank Ensure @ home. Family requests an Ensure with lunch daily. Will monitor for continued adequate po intake. Please see nutrition note for details. NURSING:    Risk for Readmission: 17%    Johns Fall Risk Score: 80  Wounds/Incisions/Ulcers: Incisions to right leg. Redness to sacrum/buttocks/coccyx. Medication Review: Reviewed daily with patient. Pain: Managed with and without medications. Consultations/Labs/X-rays: Urology, Vascular (last note 8/31). BMP, Magnesium, & CBC every Monday, Wednesday, & Friday. Patient/Family Education provided by team:    Discharge Plan   Estimated Length of Stay:5 days  Destination: Home  Pass:No  Services at Discharge: [facility] PT / OT S3  Equipment at Discharge: RW  Factors facilitating achievement of predicted outcomes: Family support, Cooperative, and Pleasant  Barriers to the achievement of predicted outcomes: Pain and Cognitive deficit    Patient Goals:  Return home with improved mobility. Interdisciplinary Individualized Plan of Care Review of Previous Week:    Medical and functional progress towards goals:  Medically doing well, mouth improved, throat improved, still on antibiotics for LE, transitioning off catheter.   Mobility improved, increased ambulation distance, stairs improving, pt with difficulty with memory and processing, ADLs progressing but pt still needs Min A.    Barriers towards progress:  Impaired balance, decreased memory and processing, decreased activity tolerance  Interventions to address Barriers:  Continue PT focus on balance training, daughters to get family training, pt to use walker at home, SLP to work on processing and basic cognition, PT/OT working on increasing pt's activity tolerance. Goals still appropriate:  Yes  Modifications to goals: None  Continue Current Plan of Care:  No  Modifications to Plan of Care: Change discharge date to 9/6/22    Rehab Team Members in attendance for Team Conference:  Vernell Solis, MSW, LSW    Marcos Torres, RD, LD    Michelle Foss OTR/NESTOR Ponce, PT, DPT    Flor Ramachandran M.A., Jose Rafael Garcia, ARMANDO Herrera PT, DPT,     I approve the established interdisciplinary plan of care as documented within the medical record of Edward Abbasi.     Lorena Roberson MD   Electronically signed by Lorena Roberson MD on 9/1/2022 at 11:18 AM

## 2022-08-31 NOTE — PROGRESS NOTES
Urology Progress Note  [facility]    Provider: Severa Chess, APRN - CNP  Patient ID:  Admission Date: 2022 Name: Salty Richards  OR Date: 2022 MRN: [id number]   Patient Location: [facility]-[id number] : 1930  Attending: Asa Blackwell MD Date of Service: 2022  PCP: Anita Castaneda     Diagnoses:  BPH  Retention    Assessment/Plan:  81 yo male with hx of BPH and very remote history of TUR of prostate, on Flomax daily. He is POD 9 right fem repair and right fem bybass. He failed a voiding trial x1.  ===  Reports improved urinary symptoms today  Cr 0.8     Recommendations:  Continue Flomax  Continue Finasteride  OOB as tolerated  VT today, monitor for retention, obtain PVR PRN, transition to str cath for PVR >500cc's or if symptomatic  Should regain volitional voiding soon, will continue to follow    The patient had a chance to ask questions which were answered. he understands the above plan. Subjective:   Salty Richards is a 80 y.o. male. He was seen and examined this morning. Today we discussed Further rehabilitation and finasteride prior to VT. Objective:   Vitals:  Vitals:    22 0842   BP: (!) 96/57   Pulse: 94   Resp: 16   Temp: 98.1 °F (36.7 °C)   SpO2: 96%     No intake or output data in the 24 hours ending 22 0912    Physical Exam:  Gen: Alert and oriented x3, no acute distress  CV: Regular rate   Resp: unlabored respirations  Abd: Soft, non-distended, non-tender, no masses  Ext: no peripheral edema noted, moves upper and lower extremities spontaneously  Skin: warmand well perfused, no rashes noted on the face, or arms.      Labs:  Lab Results   Component Value Date    WBC 6.8 2022    HGB 11.4 (L) 2022    HCT 33.8 (L) 2022    MCV 91.2 2022     2022     Lab Results   Component Value Date    CREATININE 0.8 2022    BUN 8 2022     2022    K 3.8 2022     2022    CO2 29 2022 Ramirez Eden, APRN - CNP   8/31/2022

## 2022-08-31 NOTE — PROGRESS NOTES
VASCULAR   POD#13     Doing well. Good graft pulse. Erythema at ankle improving/decreased - no drainage. Sutures intact. CPM with full weight bearing and po antibx. Will follow.      Deborra Camera

## 2022-08-31 NOTE — PROGRESS NOTES
[facility] Department   Phone: [phone]    Physical Therapy    [] Initial Evaluation            [x] Daily Treatment Note         [] Discharge Summary      Patient: Veronique Bolton   : 1930   MRN: [id number]   Date of Service:  2022  Admitting Diagnosis: Peripheral artery disease [facility])  Current Admission Summary: 80-year-old male with a history of A. fib previously on Xarelto but stopped secondary to a head bleed, cerebral amyloid angiopathy, diabetes who was admitted on  with right leg pain. He was transferred from [facility] to this facility on  for surgery with Dr. Wilian Gonzales. He underwent a right femoral to posterior tibial bypass on . His postoperative course has been overall unremarkable. He was evaluated by therapy and suggested to continue in an inpatient setting prior to returning home. He reports his pain is controlled. He is questioning why he is nonweightbearing in the right lower extremity. Past Medical History:  has a past medical history of Afib ([facility] [address] Cerebral amyloid angiopathy ([facility] [address] Diabetes mellitus ([facility] [address] Heart aneurysm, and Vertigo. Past Surgical History:  has a past surgical history that includes Appendectomy; hernia repair; Lung surgery; Testicle surgery; and Femoral-tibial Bypass Graft (Right, 2022).   Discharge Recommendations: Home with HHPT   DME Required For Discharge: DME to be determined pending patient progress  Precautions/Restrictions: high fall risk  Weight Bearing Restrictions: weight bearing as tolerated - upgraded to WBAT on 2022  [] Right Upper Extremity  [] Left Upper Extremity [x] Right Lower Extremity  [] Left Lower Extremity     ** Per dtg report, pt is WBAT on (L) LE following conservative management of achilles injury but hasn't had any therapy since being out of boot **       Required Braces/Orthotics: no braces required   [] Right  [] Left  Positional Restrictions:no positional restrictions    Pre-Admission Information   Lives With: alone. Comment: daughters take turns weekly 24/7 to stay with him since achilles injury in June                      Type of Home: house  Home Layout: one level  Home Access: level entry with ramp   Bathroom Layout: walker accessible, walk in shower  (not wc accessible)  Bathroom Equipment: grab bars in shower, tub transfer bench, hand held shower head, toilet raiser, BSC  Toilet Height: standard height  Home Equipment: rolling walker, transport chair, bed railing . Comment: typically uses 3 wheel walker   Transfer Assistance: Independent without use of device; Since June performing pivot transfers with CGA using grab bars  Ambulation Assistance:Independent with 3WW, since achilles injury not walking but only transferring via stand pivots with CGA; was unable to propel transport chair himself  ADL Assistance: Typically independent with ADLs prior to achilles injury;  Since June, requires assistance with sponge bathing, requires assistance with dressing, requires assistance with grooming, requires assistance with toileting  IADL Assistance: requires assistance with all homemaking tasks   Active :        [] Yes                 [x] No  Hand Dominance: [] Left                 [x] Right  Current Employment: retired. Occupation:  at [employer]: [address]: 1 fall leading to ruptured achilles in June and was NWB and then in a boot. Pt had not started any PT for his Left achilles prior to current hospitalization. Subjective  General: Seated in recliner upon arrival. Agreeable to therapy session. Daughter present. Reporting moderate fatigue following shower with OT. Pain: 8/10.   Location: low back and (R) LE  Pain Interventions: pain medication in place prior to arrival and repositioned       Functional Mobility  Bed Mobility  Supine to Sit: stand by assistance  Sit to Supine: stand by assistance  Scooting: stand by assistance  Comments: HOB partially elevated with use of railings and increased time. Transfers  Sit to stand transfer: contact guard assistance  Stand to sit transfer: contact guard assistance  Stand step transfer: minimal assistance, . Comment With use of RW. Requires min (A) for occasional balance stabilization during turning. VC for safe walker management and positioning. Comments:   Ambulation  Surface:level surface  Assistive Device: rolling walker  Assistance: minimal assistance  Distance: 135 ft + 20 ft + short distances within session. Gait Mechanics: Mixed use of step to vs reciprocal gait pattern with reduced elisha and forward flexed posture + head down positioning. Continued (R) LE drop foot but improved clearing (R) LE with increased hip/knee flexion. Comments:  VC for postural facilitation and safe walker management. Stair Mobility  Number of Steps: 4  Step Height: 6 inch  Hand Rails: (B) handrail  Assistance: contact guard assistance  Comments: Step to mechanics with VC for proper sequence. Increased hip/knee flexion to clear (R) LE on step surface. Balance  Static Sitting Balance: fair (+): maintains balance at SBA/supervision without use of UE support  Dynamic Sitting Balance: fair (+): maintains balance at SBA/supervision without use of UE support  Static Standing Balance: fair (-): maintains balance at CGA with use of UE support  Dynamic Standing Balance: poor (+): requires min (A) to maintain balance  Comments:     Other Therapeutic Interventions    Lateral stepping completed at ballet bar 10 ft x 2 ea direction, TC provided for hip/LE positioning. Alternating 6\" toe taps 1 x 10 ea with (B) UE support. 2nd Session:     Pt supine in bed upon arrival, reporting ongoing 8/10 low back and (R) LE pain. Medication in place prior to arrival, agreeable to PT session. Session begins with attempted toileting per patient request (unable to void). Intervention: decreased functional mobility, decreased ADL status, decreased ROM, decreased strength, decreased safety awareness, decreased endurance, decreased balance, increased pain, decreased posture  Prognosis: good  Clinical Assessment: Pt continues to make daily improvements in functional mobility in response to therapy services, with ongoing improvements in max ambulation distance and improved stability during turning and backing up to chair surfaces. Pt continues to require consistent safety cueing throughout mobility tasks including safe use of walker management and requires consistent physical assistance for turning with RW. Pt continues to be limited by low back pain requiring increased rest breaks within sessions. Prior to recent surgery and (L) ankle injury in June, patient was independent and living home alone. Continued skilled PT to promote return towards PLOF. Safety Interventions: patient left in bed, bed alarm in place, and call light within reach    Plan  Frequency: 5 x/week, 90 min/day  Current Treatment Recommendations: strengthening, ROM, balance training, functional mobility training, transfer training, endurance training, and wheelchair mobility training    Goals  Patient Goals:  Return home with improved mobility   Short Term Goals:  Time Frame: 1.5 wks   Pt able to perform bed mobility modified independence.    Pt able to perform sliding board transfers with minimal assistance - goal discontinued secondary to upgraded WB status  Pt able to perform stand pivot transfers using grab bar with moderate assistance - goal met 8/26/2022  Pt able to navigate a wc 150 ft mod I - goal discontinued secondary to upgraded WB status  Pt able to ambulate 10 ft with LRAD at moderate assistance - goal met 8/26/2022    Long Term Goals: Time Frame 3 wks  Pt able to perform sliding board transfers with SBA - goal discontinued secondary to upgraded WB status  Pt able to perform stand pivot transfers using grab bar with SBA  Pt able to ambulate 150 ft with LRAD at SBA  Pt able to perform a car transfer with SBA.        Therapy Session Time      Individual Group Co-treatment   Time In  0900       Time Out  0950       Minutes  48           Second Session Therapy Time:   Individual Concurrent Group Co-treatment   Time In  1250         Time Out  1338         Minutes  48           Timed Code Treatment Minutes:  50 + 48    Total Treatment Minutes: 98 minutes    Electronically Signed By: Judge Navarrete PT

## 2022-09-01 PROCEDURE — 6370000000 HC RX 637 (ALT 250 FOR IP): Performed by: PHYSICAL MEDICINE & REHABILITATION

## 2022-09-01 PROCEDURE — 97116 GAIT TRAINING THERAPY: CPT

## 2022-09-01 PROCEDURE — 1280000000 HC REHAB R&B

## 2022-09-01 PROCEDURE — 97530 THERAPEUTIC ACTIVITIES: CPT

## 2022-09-01 PROCEDURE — 97130 THER IVNTJ EA ADDL 15 MIN: CPT

## 2022-09-01 PROCEDURE — 51701 INSERT BLADDER CATHETER: CPT

## 2022-09-01 PROCEDURE — 51798 US URINE CAPACITY MEASURE: CPT

## 2022-09-01 PROCEDURE — 97129 THER IVNTJ 1ST 15 MIN: CPT

## 2022-09-01 PROCEDURE — 6370000000 HC RX 637 (ALT 250 FOR IP): Performed by: SURGERY

## 2022-09-01 RX ADMIN — CIPROFLOXACIN 500 MG: 500 TABLET, FILM COATED ORAL at 20:51

## 2022-09-01 RX ADMIN — TAMSULOSIN HYDROCHLORIDE 0.4 MG: 0.4 CAPSULE ORAL at 20:51

## 2022-09-01 RX ADMIN — ASPIRIN 81 MG: 81 TABLET, COATED ORAL at 08:01

## 2022-09-01 RX ADMIN — OXYCODONE AND ACETAMINOPHEN 2 TABLET: 5; 325 TABLET ORAL at 13:16

## 2022-09-01 RX ADMIN — NYSTATIN 500000 UNITS: 100000 SUSPENSION ORAL at 08:01

## 2022-09-01 RX ADMIN — METFORMIN HYDROCHLORIDE 500 MG: 500 TABLET ORAL at 16:28

## 2022-09-01 RX ADMIN — METFORMIN HYDROCHLORIDE 500 MG: 500 TABLET ORAL at 08:01

## 2022-09-01 RX ADMIN — NYSTATIN 500000 UNITS: 100000 SUSPENSION ORAL at 16:29

## 2022-09-01 RX ADMIN — NYSTATIN 500000 UNITS: 100000 SUSPENSION ORAL at 13:53

## 2022-09-01 RX ADMIN — CIPROFLOXACIN 500 MG: 500 TABLET, FILM COATED ORAL at 08:02

## 2022-09-01 RX ADMIN — SENNOSIDES AND DOCUSATE SODIUM 2 TABLET: 50; 8.6 TABLET ORAL at 20:51

## 2022-09-01 RX ADMIN — OXYCODONE AND ACETAMINOPHEN 1 TABLET: 5; 325 TABLET ORAL at 20:50

## 2022-09-01 RX ADMIN — LISINOPRIL 40 MG: 20 TABLET ORAL at 08:02

## 2022-09-01 RX ADMIN — ROSUVASTATIN 10 MG: 10 TABLET, FILM COATED ORAL at 08:02

## 2022-09-01 RX ADMIN — PANTOPRAZOLE SODIUM 40 MG: 40 TABLET, DELAYED RELEASE ORAL at 06:58

## 2022-09-01 RX ADMIN — NYSTATIN 500000 UNITS: 100000 SUSPENSION ORAL at 20:51

## 2022-09-01 RX ADMIN — OXYCODONE AND ACETAMINOPHEN 2 TABLET: 5; 325 TABLET ORAL at 08:16

## 2022-09-01 RX ADMIN — MIRTAZAPINE 30 MG: 15 TABLET, FILM COATED ORAL at 20:51

## 2022-09-01 RX ADMIN — FINASTERIDE 5 MG: 5 TABLET, FILM COATED ORAL at 08:01

## 2022-09-01 ASSESSMENT — PAIN DESCRIPTION - ONSET: ONSET: ON-GOING

## 2022-09-01 ASSESSMENT — PAIN - FUNCTIONAL ASSESSMENT: PAIN_FUNCTIONAL_ASSESSMENT: ACTIVITIES ARE NOT PREVENTED

## 2022-09-01 ASSESSMENT — PAIN DESCRIPTION - ORIENTATION
ORIENTATION: RIGHT

## 2022-09-01 ASSESSMENT — PAIN DESCRIPTION - LOCATION
LOCATION: LEG

## 2022-09-01 ASSESSMENT — PAIN SCALES - GENERAL
PAINLEVEL_OUTOF10: 8
PAINLEVEL_OUTOF10: 6
PAINLEVEL_OUTOF10: 8
PAINLEVEL_OUTOF10: 7

## 2022-09-01 ASSESSMENT — PAIN DESCRIPTION - DESCRIPTORS
DESCRIPTORS: ACHING

## 2022-09-01 ASSESSMENT — PAIN DESCRIPTION - FREQUENCY: FREQUENCY: CONTINUOUS

## 2022-09-01 ASSESSMENT — PAIN DESCRIPTION - PAIN TYPE: TYPE: CHRONIC PAIN

## 2022-09-01 NOTE — PROGRESS NOTES
Assessment complete. Alert. Oriented to person, place. Did not realize that it was nighttime. Could not remember that he had therapy today. Dressing to right leg clean, dry, intact. Assisted to bathroom. Only voided a very small amount (dribbles). Patient and daughter report that he voided a larger amount earlier today. Bladder scanned for 471 mL. Given PRN Percocet to relieve back pain. The care plan and education has been reviewed and mutually agreed upon with the patient. In bed, alarm on, bed in lowest position, call light and table within reach. No further needs expressed at this time. 2230  After being bladder scanned, patient wanted to try to void again. Voided a larger amount per Jose De Jesus DAO, PCA.    0300  Patient has voided a few more times tonight, each time with larger amounts that the first time he went to the bathroom this shift. Bladder scanned post-void for 551 mL. Straight catheterized for 425 mL. Patient reports relief after bladder drained. 0710  Incontinent of stool. Cleaned, changed, repositioned.

## 2022-09-01 NOTE — CARE COORDINATION
Team conference held today. Spoke with patient and his daughter to discuss progress with therapy, barriers to discharge, and plans to return home. Estimated discharge date set for 9/06/2022. Patient anticipates discharging to home with home health care and need supports. Will continue to follow for support and discharge planning.     Electronically signed by Jimmie Bloch, MSW on 9/1/2022 at 4:36 PM

## 2022-09-01 NOTE — PROGRESS NOTES
[facility] [address]   Phone: [phone]    Occupational Therapy    [] Initial Evaluation            [x] Daily Treatment Note         [] Discharge Summary      Patient: Lamar Bruner   : 1930   MRN: [id number]   Date of Service:  2022    Admitting Diagnosis:  Peripheral artery disease [facility])  Current Admission Summary:   70-year-old male with a history of A. fib previously on Xarelto but stopped secondary to a head bleed, cerebral amyloid angiopathy, diabetes who was admitted on  with right leg pain. He was transferred from [facility] to this facility on  for surgery with Dr. Katy Ahn. He underwent a right femoral to posterior tibial bypass on . His postoperative course has been overall unremarkable. He was evaluated by therapy and suggested to continue in an inpatient setting prior to returning home. Past Medical History:  has a past medical history of Afib ([facility] [address] Cerebral amyloid angiopathy ([facility] [address] Diabetes mellitus ([facility] [address] Heart aneurysm, and Vertigo. Past Surgical History:  has a past surgical history that includes Appendectomy; hernia repair; Lung surgery; Testicle surgery; and Femoral-tibial Bypass Graft (Right, 2022). Discharge Recommendations: Home with 24 hour supervision and assist and HHOT    DME Required For Discharge: patient has all required DME for discharge     Precautions/Restrictions: high fall risk  Weight Bearing Restrictions: weight bearing as tolerated  [] Right Upper Extremity  [] Left Upper Extremity [x] Right Lower Extremity  [x] Left Lower Extremity  Required Braces/Orthotics: no braces required  Positional Restrictions:no positional restrictions    Pre-Admission Information   Lives With: alone, .   Comment: daughters take turns weekly / to stay with him since achilles injury                       Type of Home: house  Home Layout: one level  Home Access: level entry with ramp   Bathroom Layout: walker accessible, and 10 reps of scapular protraction/retraction for BUE while seated in recliner. Patient required demonstration and verbal and tactile cueing for form. Second Session:      Cognition  Overall Cognitive Status: Impaired  Arousal/Alterness: appropriate responses to stimuli  Following Commands: follows one step commands consistently  Attention Span: appears intact  Memory: decreased recall of recent events  Safety Judgement: decreased awareness of need for assistance, decreased awareness of need for safety  Problem Solving: assistance required to generate solutions, assistance required to implement solutions  Insights: decreased awareness of deficits  Initiation: requires cues for some  Sequencing: requires cues for some  Orientation:    alert and oriented x 4  Command Following:   accurately follows one step commands     Education  Barriers To Learning: cognition and hearing  Patient Education: patient educated on goals, OT role and benefits, plan of care, ADL adaptive strategies, proper use of assistive device/equipment, family education, transfer training  Learning Assessment:  patient verbalizes understanding, would benefit from continued reinforcement    Assessment  Activity Tolerance: pt required intermittent rest breaks throughout sessions this date - back pain con't to limit pt engagement   Impairments Requiring Therapeutic Intervention: decreased functional mobility, decreased ADL status, decreased strength, decreased endurance, decreased balance, decreased coordination, increased pain  Prognosis: good  Clinical Assessment: Pt presents below baseline function s/p femoral tibial bypass graft and will benefit from continued OT services to address the above deficits. Pt con't to progress w/ transfers and functional mobility but con't to require CGA for amb and transfers this date.  Pt demo'd decreased pain and agitation this date but con't to require increased time, encouragement and re-direction during task completion. Pt con't to benefit from OT services to maximize patients safety and independence with ADLs, transfers and functional mobility. Con't per POC. Safety Interventions: patient left in bed, bed alarm in place, call light within reach, patient at risk for falls, and family/caregiver present    Plan  Frequency: 5 x/week, 90 min/day  Current Treatment Recommendations: strengthening, balance training, functional mobility training, transfer training, endurance training, wheelchair mobility training, patient/caregiver education, and ADL/self-care training    Goals  Patient Goals: not stated    Short Term Goals: To be completed in: 2 weeks  Patient will complete upper body ADL at supervision - goal met 8/29  Patient will complete lower body ADL at minimal assistance   Patient will complete toileting at minimal assistance   Patient will complete grooming at supervision - goal met 8/29   Patient will complete functional transfers at minimal assistance -goal met 8/26     Long Term Goals:   To be completed in: 3 weeks   Patient will complete lower body ADL at stand by assistance   Patient will complete toileting at stand by assistance   Patient will complete functional transfers at stand by assistance   Patient will complete functional mobility at stand by assistance   Patient to gather and transport IADL items at stand by assistance     Goals progressing 9/1       Therapy Session Time  First Session Therapy Time:   Individual Concurrent Group Co-treatment   Time In 1015        Time Out 1100        Minutes 45          Timed Code Treatment Minutes:  45 minutes     Total Treatment Minutes:  45 minutes     First Session: DINO Okeefe OTR/L [id number]

## 2022-09-01 NOTE — PROGRESS NOTES
[facility] [address] Department   Phone: [phone]    Physical Therapy    [] Initial Evaluation            [x] Daily Treatment Note         [] Discharge Summary      Patient: Laurent Amado   : 1930   MRN: [id number]   Date of Service:  2022  Admitting Diagnosis: Peripheral artery disease [facility])  Current Admission Summary: 69-year-old male with a history of A. fib previously on Xarelto but stopped secondary to a head bleed, cerebral amyloid angiopathy, diabetes who was admitted on  with right leg pain. He was transferred from [facility] to this facility on  for surgery with Dr. Cammy Schaffer. He underwent a right femoral to posterior tibial bypass on . His postoperative course has been overall unremarkable. He was evaluated by therapy and suggested to continue in an inpatient setting prior to returning home. He reports his pain is controlled. He is questioning why he is nonweightbearing in the right lower extremity. Past Medical History:  has a past medical history of Afib ([facility] [address] Cerebral amyloid angiopathy ([facility] [address] Diabetes mellitus ([facility] [address] Heart aneurysm, and Vertigo. Past Surgical History:  has a past surgical history that includes Appendectomy; hernia repair; Lung surgery; Testicle surgery; and Femoral-tibial Bypass Graft (Right, 2022).   Discharge Recommendations: Home with HHPT   DME Required For Discharge: DME to be determined pending patient progress  Precautions/Restrictions: high fall risk  Weight Bearing Restrictions: weight bearing as tolerated - upgraded to WBAT on 2022  [] Right Upper Extremity  [] Left Upper Extremity [x] Right Lower Extremity  [] Left Lower Extremity     ** Per dtg report, pt is WBAT on (L) LE following conservative management of achilles injury but hasn't had any therapy since being out of boot **       Required Braces/Orthotics: no braces required   [] Right  [] Left  Positional Restrictions:no positional restrictions    Pre-Admission Information   Lives With: alone. Comment: daughters take turns weekly 24/7 to stay with him since achilles injury in June                      Type of Home: house  Home Layout: one level  Home Access: level entry with ramp   Bathroom Layout: walker accessible, walk in shower  (not wc accessible)  Bathroom Equipment: grab bars in shower, tub transfer bench, hand held shower head, toilet raiser, BSC  Toilet Height: standard height  Home Equipment: rolling walker, transport chair, bed railing . Comment: typically uses 3 wheel walker   Transfer Assistance: Independent without use of device; Since June performing pivot transfers with CGA using grab bars  Ambulation Assistance:Independent with 3WW, since achilles injury not walking but only transferring via stand pivots with CGA; was unable to propel transport chair himself  ADL Assistance: Typically independent with ADLs prior to achilles injury;  Since June, requires assistance with sponge bathing, requires assistance with dressing, requires assistance with grooming, requires assistance with toileting  IADL Assistance: requires assistance with all homemaking tasks   Active :        [] Yes                 [x] No  Hand Dominance: [] Left                 [x] Right  Current Employment: retired. Occupation:  at [employer]: [address]: 1 fall leading to ruptured achilles in June and was NWB and then in a boot. Pt had not started any PT for his Left achilles prior to current hospitalization. Subjective  General: Supine in bed upon arrival. Agreeable to therapy session. Daughter present. Voices frustration of start to morning including need for straight cath. Pain: 8/10.   Location: low back and (R) LE  Pain Interventions: pain medication in place prior to arrival      Functional Mobility  Bed Mobility  Supine to Sit: supervision  Scooting: supervision  Comments: HOB partially elevated during (B) UE unsupported positoining. Static JW standing activity completed at ballet bar: alternating UE targeted reaching overhead with opposing unilateral support on ballet bar 1 x 10 ea, alternating UE targeted reaching overhead with opposing UE in unsupported position 1 x 5 ea. Pt maintains balance at CGA during targeted reaching activity but requires UE support to reset between ea repetition. 2nd Session:     Pt supine in bed upon arrival, reporting ongoing constant low back pain. Pain medication delivered within session, agreeable to PT session. Session begins with toileting per patient request.  Pt ambulates bed => toilet => sink => w/c with RW at CGA/min (A), ongoing VC for walker safety during turning and while approaching seated surfaces. Toilet transfer completed at [facility]. Maintains static standing balance at CGA with horizontal grab bar during LB clothing management. Standing hand hygiene completed at [facility], requiring CGA/min (A) for correction of posterior lean during (B) UE unsupported positoining. Caregiver education completed with patient and daughter. Caregiver educated on use of gait belt and demonstrates the ability to correctly don and doff device prior to completion of mobility tasks. Caregiver educated on proper body mechanics for transfer completion, ambulation, and bed mobility including proper positioning of self while assisting patient, importance of footwork/positioning, proper use of assistive device and use of LE when assisting patient in attempt to maximize patient and caregiver safety in home environment. Patient and caregiver demonstrate the ability to safely complete bed mobility tasks, surface to surface transfers, and ambulation. During caregiver education session, patient completes ambulation 60 ft x 2 completions with RW. Pt completes stand step transfer (x 4 completions with RW.   Sit => supine transfer completed with bed flat positioning + HR to simulate home environment. Pt in bed with bed alarm activated and call light within reach. Cognition  Overall Cognitive Status: Impaired  Arousal/Alterness: appropriate responses to stimuli  Following Commands: follows one step commands consistently, follows multi step commands with repetition  Safety Judgement: decreased awareness of need for safety  Problem Solving: assistance required to generate solutions, assistance required to implement solutions  Sequencing: requires cues for some  Orientation:    alert and oriented x 4  Command Following:   accurately follows one step commands    Education  Barriers To Learning: hearing  Patient Education: patient educated on goals, PT role and benefits, plan of care, general safety, functional mobility training, proper use of assistive device/equipment, transfer training  Learning Assessment:  patient verbalizes understanding, would benefit from continued reinforcement    Assessment  Activity Tolerance: Limited by fatigue and pain resulting in increased rest breaks  Impairments Requiring Therapeutic Intervention: decreased functional mobility, decreased ADL status, decreased ROM, decreased strength, decreased safety awareness, decreased endurance, decreased balance, increased pain, decreased posture  Prognosis: good  Clinical Assessment: Pt demonstrating further improvement in max ambulation distance and small improvements in safety carryover including hand placement during transfers. Despite improvements overall patient continues to require frequent safety cueing and ongoing physical assistance for balance stabilization during dynamic task completion with RW. Pt continues to be limited by low back pain requiring increased rest breaks within sessions. Prior to recent surgery and (L) ankle injury in June, patient was independent and living home alone. Continued skilled PT to promote return towards PLOF.     Safety Interventions: patient left in chair, chair alarm in place, call light within reach, and gait belt    Plan  Frequency: 5 x/week, 75 min/day  Current Treatment Recommendations: strengthening, ROM, balance training, functional mobility training, transfer training, endurance training, and wheelchair mobility training    Goals  Patient Goals:  Return home with improved mobility   Short Term Goals:  Time Frame: 1.5 wks   Pt able to perform bed mobility modified independence. Pt able to perform sliding board transfers with minimal assistance - goal discontinued secondary to upgraded WB status  Pt able to perform stand pivot transfers using grab bar with moderate assistance - goal met 8/26/2022  Pt able to navigate a wc 150 ft mod I - goal discontinued secondary to upgraded WB status  Pt able to ambulate 10 ft with LRAD at moderate assistance - goal met 8/26/2022    Long Term Goals: Time Frame 3 wks  Pt able to perform sliding board transfers with SBA - goal discontinued secondary to upgraded WB status  Pt able to perform stand pivot transfers using grab bar with SBA  Pt able to ambulate 150 ft with LRAD at SBA  Pt able to perform a car transfer with SBA.        Therapy Session Time      Individual Group Co-treatment   Time In  0845       Time Out  0930       Minutes  39           Second Session Therapy Time:   Individual Concurrent Group Co-treatment   Time In  6304         Time Out  1330         Minutes 43           Timed Code Treatment Minutes:  45 + 43    Total Treatment Minutes: 88 minutes    Electronically Signed By: Jakob Jorgensen PT

## 2022-09-01 NOTE — CARE COORDINATION
MARIBEL spoke with patient and patient's daughter regarding patient's discharge plan. Patient requesting to be referred to [facility] for follow-up after discharge from [facility]. MARIBEL referred patient to [facility] and [facility] accepted the patient. MARIBEL informed patient and his daughter.     Electronically signed by OPAL Sarkar on 9/1/2022 at 4:38 PM

## 2022-09-01 NOTE — PROGRESS NOTES
[facility] Department   Phone: [phone]    Occupational Therapy    [] Initial Evaluation            [x] Daily Treatment Note         [] Discharge Summary      Patient: Markell Holder   : 1930   MRN: [id number]   Date of Service:  2022    Admitting Diagnosis:  Peripheral artery disease [facility])  Current Admission Summary:   66-year-old male with a history of A. fib previously on Xarelto but stopped secondary to a head bleed, cerebral amyloid angiopathy, diabetes who was admitted on  with right leg pain. He was transferred from [facility] to this facility on  for surgery with Dr. Yasmine Nixon. He underwent a right femoral to posterior tibial bypass on . His postoperative course has been overall unremarkable. He was evaluated by therapy and suggested to continue in an inpatient setting prior to returning home. Past Medical History:  has a past medical history of Afib ([facility] [address] Cerebral amyloid angiopathy ([facility] [address] Diabetes mellitus ([facility] [address] Heart aneurysm, and Vertigo. Past Surgical History:  has a past surgical history that includes Appendectomy; hernia repair; Lung surgery; Testicle surgery; and Femoral-tibial Bypass Graft (Right, 2022). Discharge Recommendations: Home with 24 hour supervision and assist and HHOT    DME Required For Discharge: patient has all required DME for discharge     Precautions/Restrictions: high fall risk  Weight Bearing Restrictions: weight bearing as tolerated  [] Right Upper Extremity  [] Left Upper Extremity [x] Right Lower Extremity  [x] Left Lower Extremity  Required Braces/Orthotics: no braces required  Positional Restrictions:no positional restrictions    Pre-Admission Information   Lives With: alone, .   Comment: daughters take turns weekly / to stay with him since achilles injury                       Type of Home: house  Home Layout: one level  Home Access: level entry with ramp   Bathroom Layout: walker accessible, walk in shower  (not wc accessible)  Bathroom Equipment: grab bars in shower, tub transfer bench, hand held shower head, toilet raiser, BSC  Toilet Height: standard height  Home Equipment: rolling walker, transport chair, bed railing . Comment: typically uses 3 wheel walker   Transfer Assistance: Independent without use of device  Ambulation Assistance:Independent with 3WW, since achilles injury not walking but only transferring via stand pivots with CGA; was unable to propel transport chair himself  ADL Assistance: Typically independent with ADLs prior to achilles injury;  requires assistance with sponge bathing, requires assistance with dressing, requires assistance with grooming, requires assistance with toileting  IADL Assistance: requires assistance with all homemaking tasks  Active :        [] Yes                 [x] No  Hand Dominance: [] Left                 [x] Right  Current Employment: retired. Occupation:  at [employer]: [address]: 1 fall leading to ruptured achilles in June and was NWB and then in a boot      Subjective  General: Patient in recliner upon arrival, agreeable to OT session. Daughter present for part of session. Patient reported stomach has been bothering him today but declining to use toilet. Pain: Patient did not rate pain initially, reported some back pain after standing but did not rate. It has consistently been 7/10   Pain Interventions: patient denies pain interventions      Activities of Daily Living  Basic Activities of Daily Living  Upper Extremity Dressing: stand by assistance  Lower Extremity Dressing: minimal assistance requires verbal cueing Increased time to complete task . Comment: significant VC/TCs for sequencing and use of reacher to doff sock with reacher and marisa sock with sock aide. Sock put on sock aide for patient this date due to slow processing.     Dressing Equipment: reacher, sock aide  Dressing Comments: Patient able to doff/marisa robe while seated in recliner. Patient declining to change shirt or pants this date with OT due to having changed them this morning prior to session   General Comments: Declining toileting and oral hygiene     Instrumental Activities of Daily Living  No IADL completed on this date. Functional Mobility  Bed Mobility  Sit to Supine: supervision  Comments: bed flat for sit to supine  Transfers  Sit to stand transfer:contact guard assistance, . Comment patient continues to require cueing for hand placement   Stand to sit transfer: contact guard assistance, minimal assistance, . Comment CGA progressing to Mary at EOS due to fatigue   Comments: to/from recliner, to/from wc and to EOB. Patient with poor awareness for hand placement during stand to sit transfers especially when fatigued. Functional Mobility:  Sitting Balance: supervision. Standing Balance: contact guard assistance, . Comment with RW for stand tasks. Functional Mobility: .  contact guard assistance  Functional Mobility Activity: 15 ft from recliner to wc, 8 ft from wc to recliner at EOS  Functional Mobility Device Use: rolling walker  Functional Mobility Comment: patient required cueing for RW mgmt when turning to sit in recliner due to fatigue      Comments:     Other Therapeutic Interventions  Patient participated in card matching board activity standing at elevated table with RW and CGA. Task completed to improve functional reaching/balance while in standing and standing tolerance needed for ADLs, transfers and functional mobility. Patient tolerated 2:55, 2;15, and 1:40 of standing for task with extended rest breaks between stands. Patient able to follow directions to task well and correctly match cards. Patient able to visually scan board but required increased time for task. Patient instructed in neck and shoulder stretches to improve posture and positioning.  Patient completed 5 reps of neck flexion/extension and 10 reps of scapular protraction/retraction for BUE while seated in recliner. Patient required demonstration and verbal and tactile cueing for form. Second Session:  Pt seated supine in bed w/ HOB elevated upon arrival and daughter present - pt agreeable to therapy. Pt rates pain 6/10 but states pain meds in place prior to OT arrival. Pt amb in stance at OT kitchen for meal preparation activity following 6 step directions : 1 - grab white bowl, 2 - grab clear measuring cup, 3 - put 2 cups of water into measuring cup, 4 - pour water into white bowl, 5 - microwave water for 1 minute, 6 - clean up. Pt completed task w/ mod VC's for last 3 steps secondary to patient reading words in sentences out of order - pt with most success during session when completed steps were visually blocked from pt view; pt completed activity moving objects from multiple planes - task completed w/ varying min-modA secondary to fatigue w/ min VC's for postural adjustments - 2 rest breaks taken during task after 3:30 min + 2:05 min + 3:41 min stands. Pt seated at table top for cog table top activity to challenge visual scanning, visual perception, and problem solving to manipulate shape pieces to fit into matching shape slots - pt required min VC's for manipulating shapes to fit spaces - extensive time w/ increased success with extraneous options covered. Pt amb ~10' + ~10' in room w/ RW CGA - sit><stands completed CGA. Pt left supine in bed w/ HOB elevated, daughter present, call light, bed alarm, and all other needs within reach.      Cognition  Overall Cognitive Status: Impaired  Arousal/Alterness: appropriate responses to stimuli  Following Commands: follows one step commands consistently  Attention Span: appears intact  Memory: decreased recall of recent events  Safety Judgement: decreased awareness of need for assistance, decreased awareness of need for safety  Problem Solving: assistance required to generate solutions, assistance required to implement solutions  Insights: decreased awareness of deficits  Initiation: requires cues for some  Sequencing: requires cues for some  Orientation:    alert and oriented x 4  Command Following:   accurately follows one step commands     Education  Barriers To Learning: cognition and hearing  Patient Education: patient educated on goals, OT role and benefits, plan of care, ADL adaptive strategies, proper use of assistive device/equipment, family education, transfer training  Learning Assessment:  patient verbalizes understanding, would benefit from continued reinforcement    Assessment  Activity Tolerance: pt required intermittent rest breaks throughout sessions this date - back pain con't to limit pt engagement   Impairments Requiring Therapeutic Intervention: decreased functional mobility, decreased ADL status, decreased strength, decreased endurance, decreased balance, decreased coordination, increased pain  Prognosis: good  Clinical Assessment: Pt presents below baseline function s/p femoral tibial bypass graft and will benefit from continued OT services to address the above deficits. Pt con't to progress w/ transfers and functional mobility but con't to require CGA for amb and transfers this date. Pt demo'd decreased pain and agitation this date but con't to require increased time, encouragement and re-direction during task completion. Pt con't to benefit from OT services to maximize patients safety and independence with ADLs, transfers and functional mobility. Con't per POC.   Safety Interventions: patient left in bed, bed alarm in place, call light within reach, patient at risk for falls, and family/caregiver present    Plan  Frequency: 5 x/week, 75 min/day  Current Treatment Recommendations: strengthening, balance training, functional mobility training, transfer training, endurance training, wheelchair mobility training, patient/caregiver education, and ADL/self-care training    Goals  Patient Goals: not stated    Short Term Goals: To be completed in: 2 weeks  Patient will complete upper body ADL at supervision - goal met 8/29  Patient will complete lower body ADL at minimal assistance   Patient will complete toileting at minimal assistance   Patient will complete grooming at supervision - goal met 8/29   Patient will complete functional transfers at minimal assistance -goal met 8/26     Long Term Goals:   To be completed in: 3 weeks   Patient will complete lower body ADL at stand by assistance   Patient will complete toileting at stand by assistance   Patient will complete functional transfers at stand by assistance   Patient will complete functional mobility at stand by assistance   Patient to gather and transport IADL items at stand by assistance     Goals progressing 9/1       Therapy Session Time  First Session Therapy Time:   Individual Concurrent Group Co-treatment   Time In 1015        Time Out 1100        Minutes 45             Individual Concurrent Group Co-treatment   Time In 1400        Time Out 1450        Minutes 50          Timed Code Treatment Minutes:  45 + 50    Total Treatment Minutes:  95     First Session: Talita Lima [place]ulia OTR/L [id number]    Second Session: BORIS Watson   Directed and supervised by Georgina Henson OTR/L #[id number]

## 2022-09-01 NOTE — PROGRESS NOTES
[facility]   Phone: [phone]    Speech Therapy    [] Initial Evaluation            [x] Daily Treatment Note         [] Discharge Summary      Patient: Malika Villanueva   : 1930   MRN: [id number]          Admitting Diagnosis: Peripheral artery disease [facility])  Treatment Diagnosis:  Cognitive-Linguistic Deficits   Rehab Admission Date: 2022   Precautions/Restrictions: high fall risk, weight bearing  (WBAT, right low extremity)                 Pre-Admission Information   Living Status: Pt lives alone but has two adult daughters that switch off weeks and provide him 24/7 assistance   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 Daughter was present and supportive. Pt demonstrated delayed responses, required mod-max cues for personal history (long term) and convergent naming task. Pt noted to recite prayer when unable to elicit a response. Team conference held this date to discuss progress and plan of care. Recommend ongoing 24/7 supervision and assistance from family upon discharge. Objective:  Goals    Goal 1: Pt will recall functional information (daily tasks, personal hx, etc.) with 80% accuracy.     Long term personal history (assistance provided by pt's daughter to ensure accuracy of information)   - pt required mod-max cues for recall of 3 sibling names   - recalled 4/5 children's names Independently    - recalled 5/5 children's demographic information   - required mod cues for identifying number of grandchildren   - unable to recall his highschool name        Goal 2: Patient will complete functional problem-solving tasks for daily situations with 80% accuracy or given min cues. Goal not targeted this session. Goal 3: Pt will attend to multi-step direction tasks with min cues to achieve 80% accuracy. Attention for convergent naming task (concrete word grid)   - Pt provided names for 4/8 (50%) prompts; given mod-max cues to score 6/8 (75%)  - extended time for responses; mod-max cues for word generation            Other areas targeted:    Education:   Provided education regarding rationale for tasks and plan of care. Pt requires ongoing learning    Safety Devices: [x] Call light within reach  [x] Chair alarm activated  [] Bed alarm activated  [] Other:          Assessment/Progress/Discharge:    Weekly Update (9/1/2022): Diagnosis: SLP Evaluation requested per request of PT/OT, due to poor recall and carry over between sessions. Pt presents with Moderate  Cognitive-Linguistic Deficits  in the domains of attention, short-term and long-term memory, functional problem solving, and executive function skills. Pt was appropriately oriented and expressive language appeared intact without evidence of word finding. Pt processes of multi-step commands was delayed, required repetition and modification (loud speech, lip reading) to ensure adequate comprehension. Pt reported increased difficulty when provided with semantic cues for short-term memory questions. Pt's daughter began noticing slow progressive cognitive decline with multiple medical situations, therefore family was providing 24/7 assistance. Pain and pain management are new factors that are now impacting his cognition during his rehab stay.  Pt is agreeable to participate in SLP intervention for return to prior level of function. Current Diet Order:   ADULT DIET; Regular; 5 carb choices (75 gm/meal)  ADULT ORAL NUTRITION SUPPLEMENT; Lunch; Low Calorie/High Protein Oral Supplement            Plan:     Frequency:  5days/week   30 minutes/day    Discharge Recommendations:   Barriers: Cognitive deficit, Decreased endurance/ fatigue, and Medical complications  Discharge Recommendations:  24 hour supervision  Continued SLP Treatment:  TBD based upon progress   Estimated discharge date: 9/9/2022     Session 1 Times: Individual Concurrent Group Co-treatment   Time In 0930      Time Out 1000      Time Code Minutes  30      Minutes 30        Timed Code Treatment Minutes: 30  Total Treatment Minutes: 30    Electronically Signed by     Onur Hairston M.A.  CCC-SLP YUKI [id number]  Speech-Language Pathologist   9/1/2022 10:47 AM

## 2022-09-01 NOTE — PLAN OF CARE
Problem: Discharge Planning  Goal: Discharge to home or other facility with appropriate resources  9/1/2022 1009 by Delroy Gonzalez RN  Outcome: Progressing  Flowsheets (Taken 9/1/2022 0754)  Discharge to home or other facility with appropriate resources: Identify barriers to discharge with patient and caregiver     Problem: Skin/Tissue Integrity  Goal: Absence of new skin breakdown  Description: 1. Monitor for areas of redness and/or skin breakdown  2. Assess vascular access sites hourly  3. Every 4-6 hours minimum:  Change oxygen saturation probe site  4. Every 4-6 hours:  If on nasal continuous positive airway pressure, respiratory therapy assess nares and determine need for appliance change or resting period.   9/1/2022 1009 by Delroy Gonzalez RN  Outcome: Progressing     Problem: ABCDS Injury Assessment  Goal: Absence of physical injury  9/1/2022 1009 by Delroy Gonzalez RN  Outcome: Progressing     Problem: Safety - Adult  Goal: Free from fall injury  9/1/2022 1009 by Delroy Gonzlaez RN  Outcome: Progressing     Problem: Chronic Conditions and Co-morbidities  Goal: Patient's chronic conditions and co-morbidity symptoms are monitored and maintained or improved  9/1/2022 1009 by Delroy Gonzalez RN  Outcome: Progressing  Flowsheets (Taken 9/1/2022 0754)  Care Plan - Patient's Chronic Conditions and Co-Morbidity Symptoms are Monitored and Maintained or Improved: Monitor and assess patient's chronic conditions and comorbid symptoms for stability, deterioration, or improvement     Problem: Nutrition Deficit:  Goal: Optimize nutritional status  9/1/2022 1009 by Delroy Gonzalez RN  Outcome: Progressing       Problem: Pain  Goal: Verbalizes/displays adequate comfort level or baseline comfort level  9/1/2022 1009 by Delroy Gonzalez RN  Outcome: Progressing

## 2022-09-01 NOTE — PROGRESS NOTES
Renetta Villalpando [facility]  9/1/2022  [id number]    Chief Complaint: Peripheral artery disease ([address]    Subjective:   Required IC this am. Otherwise has been urinating well. Wanting to stop nystatin. Pain overall controlled. ROS: No CP, SOB, dyspnea    Objective:  Patient Vitals for the past 24 hrs:   BP Temp Temp src Pulse Resp SpO2   09/01/22 0817 (!) 149/76 98.2 °F (36.8 °C) Oral 95 18 97 %   08/31/22 2219 -- -- -- -- 18 --   08/31/22 2139 (!) 168/79 97.6 °F (36.4 °C) Oral 87 17 97 %   08/31/22 0842 (!) 96/57 98.1 °F (36.7 °C) Oral 94 16 96 %       Gen: No distress, pleasant. Resting in bed  HEENT: Normocephalic, atraumatic. Oral thrush improving but remains  CV: Regular rate and rhythm. No MRG   Resp: No respiratory distress. CTAB   Abd: Soft, nontender nondistended  Ext: No edema. RLE in knee high ACE dressing  Neuro: Alert, oriented, appropriately interactive    Laboratory data: Available via EMR. Therapy progress:    PT    Supine to Sit: Supervision or touching assistance  Sit to Supine: Supervision or touching assistance   Sit to Stand: Supervision or touching assistance  Chair/Bed to Chair Transfer: Partial/moderate assistance  Car Transfer: Partial/moderate assistance  Ambulation 10 ft: Partial/moderate assistance  Ambulation 50 ft: Partial/moderate assistance  Ambulation 150 ft:    Stairs - 1 Step: Supervision or touching assistance  Stairs - 4 Step: Supervision or touching assistance  Stairs - 12 Step:      OT    Eating: Setup or clean-up assistance  Oral Hygiene: Supervision or touching assistance  Bathing: Partial/moderate assistance  Upper Body Dressing: Setup or clean-up assistance  Lower Body Dressing: Partial/moderate assistance  Toilet Transfer: Partial/moderate assistance  Toilet Hygiene: Partial/moderate assistance    Speech Therapy    SLP Evaluation requested per request of PT/OT, due to poor recall and carry over between sessions.  Pt presents with Moderate  Cognitive-Linguistic Deficits  in the domains of attention, short-term and long-term memory, functional problem solving, and executive function skills. Pt was appropriately oriented and expressive language appeared intact without evidence of word finding. Pt processes of multi-step commands was delayed, required repetition and modification (loud speech, lip reading) to ensure adequate comprehension. Pt reported increased difficulty when provided with semantic cues for short-term memory questions. Pt's daughter began noticing slow progressive cognitive decline with multiple medical situations, therefore family was providing 24/7 assistance. Pain and pain management are new factors that are now impacting his cognition during his rehab stay. Pt is agreeable to participate in SLP intervention for return to prior level of function. Body mass index is 22.18 kg/m². 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 Erika Plasencia. ASA, crestor. 50% weightbearing. PT/OT. Cipro for cellulitis. A. Fib: AC held 2/2 prior bleed. HTN: lisinopril 40     DM: Started metformin 500. (home regimen metformin 1000 BID). D/c'd SSI and POCT     Hypokalemia: supplemented     HTN: Lisinopril 40     BPH: proscar, flomax. Parker placed 2/2 retention. VT.  Continue bladder protocol    Hypomagnesemia: supplement     Bowels: Per protocol  Bladder: Per protocol   Sleep: remeron scheduled  Pain: tylenol, maylin PRN   DVT PPx: SCD  ROYCE: 9/6    Team conference was held today on the patient and discussed directly with the patient utilizing their entire treatment team. Please see separate team note for details. Total treatment time for today's care >35 min. Adilai Rashid MD  9/1/2022, 8:34 AM    * This document was created using dictation software. While all precautions were taken to ensure accuracy, errors may have occurred. Please disregard any typographical errors.

## 2022-09-02 LAB
ANION GAP SERPL CALCULATED.3IONS-SCNC: 8 MMOL/L (ref 3–16)
BUN BLDV-MCNC: 6 MG/DL (ref 7–20)
CALCIUM SERPL-MCNC: 8.7 MG/DL (ref 8.3–10.6)
CHLORIDE BLD-SCNC: 102 MMOL/L (ref 99–110)
CO2: 29 MMOL/L (ref 21–32)
CREAT SERPL-MCNC: 0.7 MG/DL (ref 0.8–1.3)
GFR AFRICAN AMERICAN: >60
GFR NON-AFRICAN AMERICAN: >60
GLUCOSE BLD-MCNC: 160 MG/DL (ref 70–99)
HCT VFR BLD CALC: 34.4 % (ref 40.5–52.5)
HEMOGLOBIN: 11.4 G/DL (ref 13.5–17.5)
MAGNESIUM: 1.9 MG/DL (ref 1.8–2.4)
MCH RBC QN AUTO: 30.4 PG (ref 26–34)
MCHC RBC AUTO-ENTMCNC: 33.1 G/DL (ref 31–36)
MCV RBC AUTO: 92 FL (ref 80–100)
PDW BLD-RTO: 15 % (ref 12.4–15.4)
PLATELET # BLD: 445 K/UL (ref 135–450)
PMV BLD AUTO: 8.2 FL (ref 5–10.5)
POTASSIUM SERPL-SCNC: 3.6 MMOL/L (ref 3.5–5.1)
RBC # BLD: 3.74 M/UL (ref 4.2–5.9)
SODIUM BLD-SCNC: 139 MMOL/L (ref 136–145)
WBC # BLD: 6.9 K/UL (ref 4–11)

## 2022-09-02 PROCEDURE — 97530 THERAPEUTIC ACTIVITIES: CPT

## 2022-09-02 PROCEDURE — 51798 US URINE CAPACITY MEASURE: CPT

## 2022-09-02 PROCEDURE — 6370000000 HC RX 637 (ALT 250 FOR IP): Performed by: PHYSICAL MEDICINE & REHABILITATION

## 2022-09-02 PROCEDURE — 80048 BASIC METABOLIC PNL TOTAL CA: CPT

## 2022-09-02 PROCEDURE — 6370000000 HC RX 637 (ALT 250 FOR IP): Performed by: SURGERY

## 2022-09-02 PROCEDURE — 85027 COMPLETE CBC AUTOMATED: CPT

## 2022-09-02 PROCEDURE — 1280000000 HC REHAB R&B

## 2022-09-02 PROCEDURE — 97129 THER IVNTJ 1ST 15 MIN: CPT

## 2022-09-02 PROCEDURE — 36415 COLL VENOUS BLD VENIPUNCTURE: CPT

## 2022-09-02 PROCEDURE — 97116 GAIT TRAINING THERAPY: CPT

## 2022-09-02 PROCEDURE — 83735 ASSAY OF MAGNESIUM: CPT

## 2022-09-02 PROCEDURE — 97535 SELF CARE MNGMENT TRAINING: CPT

## 2022-09-02 PROCEDURE — 97130 THER IVNTJ EA ADDL 15 MIN: CPT

## 2022-09-02 RX ADMIN — METFORMIN HYDROCHLORIDE 500 MG: 500 TABLET ORAL at 18:26

## 2022-09-02 RX ADMIN — CIPROFLOXACIN 500 MG: 500 TABLET, FILM COATED ORAL at 19:44

## 2022-09-02 RX ADMIN — SENNOSIDES AND DOCUSATE SODIUM 2 TABLET: 50; 8.6 TABLET ORAL at 19:43

## 2022-09-02 RX ADMIN — MIRTAZAPINE 30 MG: 15 TABLET, FILM COATED ORAL at 19:44

## 2022-09-02 RX ADMIN — NYSTATIN 500000 UNITS: 100000 SUSPENSION ORAL at 08:02

## 2022-09-02 RX ADMIN — FINASTERIDE 5 MG: 5 TABLET, FILM COATED ORAL at 08:03

## 2022-09-02 RX ADMIN — SENNOSIDES AND DOCUSATE SODIUM 2 TABLET: 50; 8.6 TABLET ORAL at 08:02

## 2022-09-02 RX ADMIN — CIPROFLOXACIN 500 MG: 500 TABLET, FILM COATED ORAL at 08:02

## 2022-09-02 RX ADMIN — NYSTATIN 500000 UNITS: 100000 SUSPENSION ORAL at 18:26

## 2022-09-02 RX ADMIN — NYSTATIN 500000 UNITS: 100000 SUSPENSION ORAL at 12:38

## 2022-09-02 RX ADMIN — ROSUVASTATIN 10 MG: 10 TABLET, FILM COATED ORAL at 08:03

## 2022-09-02 RX ADMIN — PANTOPRAZOLE SODIUM 40 MG: 40 TABLET, DELAYED RELEASE ORAL at 05:16

## 2022-09-02 RX ADMIN — METFORMIN HYDROCHLORIDE 500 MG: 500 TABLET ORAL at 08:03

## 2022-09-02 RX ADMIN — TAMSULOSIN HYDROCHLORIDE 0.4 MG: 0.4 CAPSULE ORAL at 19:44

## 2022-09-02 RX ADMIN — NYSTATIN 500000 UNITS: 100000 SUSPENSION ORAL at 21:30

## 2022-09-02 RX ADMIN — ASPIRIN 81 MG: 81 TABLET, COATED ORAL at 08:02

## 2022-09-02 RX ADMIN — LISINOPRIL 40 MG: 20 TABLET ORAL at 08:03

## 2022-09-02 RX ADMIN — OXYCODONE AND ACETAMINOPHEN 2 TABLET: 5; 325 TABLET ORAL at 12:37

## 2022-09-02 ASSESSMENT — PAIN SCALES - GENERAL
PAINLEVEL_OUTOF10: 0
PAINLEVEL_OUTOF10: 0
PAINLEVEL_OUTOF10: 10

## 2022-09-02 NOTE — PROGRESS NOTES
[facility] Department   Phone: [phone]    Occupational Therapy    [] Initial Evaluation            [x] Daily Treatment Note         [] Discharge Summary      Patient: Edward Abbasi   : 1930   MRN: [id number]   Date of Service:  2022    Admitting Diagnosis:  Peripheral artery disease [facility])  Current Admission Summary:   80-year-old male with a history of A. fib previously on Xarelto but stopped secondary to a head bleed, cerebral amyloid angiopathy, diabetes who was admitted on  with right leg pain. He was transferred from [facility] to this facility on  for surgery with Dr. Cleo Cazares. He underwent a right femoral to posterior tibial bypass on . His postoperative course has been overall unremarkable. He was evaluated by therapy and suggested to continue in an inpatient setting prior to returning home. Past Medical History:  has a past medical history of Afib ([facility] [address] Cerebral amyloid angiopathy ([facility] [address] Diabetes mellitus ([facility] [address] Heart aneurysm, and Vertigo. Past Surgical History:  has a past surgical history that includes Appendectomy; hernia repair; Lung surgery; Testicle surgery; and Femoral-tibial Bypass Graft (Right, 2022). Discharge Recommendations: Home with 24 hour supervision and assist and HHOT    DME Required For Discharge: patient has all required DME for discharge     Precautions/Restrictions: high fall risk  Weight Bearing Restrictions: weight bearing as tolerated  [] Right Upper Extremity  [] Left Upper Extremity [x] Right Lower Extremity  [x] Left Lower Extremity  Required Braces/Orthotics: no braces required  Positional Restrictions:no positional restrictions    Pre-Admission Information   Lives With: alone, .   Comment: daughters take turns weekly / to stay with him since achilles injury                       Type of Home: house  Home Layout: one level  Home Access: level entry with ramp   Bathroom Layout: walker accessible, walk in shower  (not wc accessible)  Bathroom Equipment: grab bars in shower, tub transfer bench, hand held shower head, toilet raiser, BSC  Toilet Height: standard height  Home Equipment: rolling walker, transport chair, bed railing . Comment: typically uses 3 wheel walker   Transfer Assistance: Independent without use of device  Ambulation Assistance:Independent with 3WW, since achilles injury not walking but only transferring via stand pivots with CGA; was unable to propel transport chair himself  ADL Assistance: Typically independent with ADLs prior to achilles injury;  requires assistance with sponge bathing, requires assistance with dressing, requires assistance with grooming, requires assistance with toileting  IADL Assistance: requires assistance with all homemaking tasks  Active :        [] Yes                 [x] No  Hand Dominance: [] Left                 [x] Right  Current Employment: retired. Occupation:  at [employer]: [address]: 1 fall leading to ruptured achilles in June and was NWB and then in a boot      Subjective  General: Pt seated in recliner upon arrival w/ daughter present - pt c/o of headache and fatigue - pt agreeable to OT session w/ shower. Pain: 6/10 in RLE and back - pt did not rate pain in head  Pain Interventions: patient denies pain interventions and pain medication in place prior to arrival - pt denied heat pack intervention      Activities of Daily Living  Basic Activities of Daily Living  Grooming: modified independent  Grooming Comments: oral care seated in w/c at sink  Upper Extremity Bathing: setup assistance supervision requires verbal cueing Increased time to complete task .   Comment: VCs for thoroughness  Lower Extremity Bathing: setup assistance supervision   Bathing Equipment: none  Bathing Comments: pt c/o LB bathing seated on BSC w/ forward trunk flexion for hygiene knees>feet; pt denied use of long handled sponge this date  Upper Extremity Dressing: setup assistance  Lower Extremity Dressing: moderate assistance requires verbal cueing Increased time to complete task  Dressing Equipment: reacher, sock aide  Dressing Comments: Pt doffed LB clothing (w/ exception of socks) w/ CGA for balance in stance at RW and over ankles seated on BSC; pt donned socks, pants, and briefs over R LE w/ reacher and sock aid and SUP pt modA for donning LB clothing on L LE secondary to pain ; pt pulled briefs/pants ove rhips in stance at [facility] w/CGA for balance   General Comments: pt required increased time for ADL completion this date - c/o of increased pain seated on padded BSC this date     Instrumental Activities of Daily Living  No IADL completed on this date. Functional Mobility  Bed Mobility  Sit to Supine: supervision  Comments: bed flat and air mattress deflated; pt c/o of increased pain in L hip restricting IND w/ sit>supine  Transfers  Sit to stand transfer:contact guard assistance  Stand to sit transfer: contact guard assistance  Shower transfer: contact guard assistance  Shower transfer equipment: bedside commode, grab bars  Shower transfer comments: pt completed transfer amb to/from w/ RW and use of grab bars on R and anteriorly   Comments: minVCs for hand placement throughout    Functional Mobility:  Sitting Balance: supervision. Standing Balance: contact guard assistance, . Comment with RW for tasks in stance. Functional Mobility: .  contact guard assistance  Functional Mobility Activity: to/from bathroom  Functional Mobility Device Use: rolling walker  Functional Mobility Comment: intermittent VCs for hand placement       Comments:     Other Therapeutic Interventions  Pt seated in recliner for hand-eye coordination tasks by coordination UE/LEs for alternating target sequences - pt required increased time to accurately hit targets.  Task serves as benefit to client by activating R/L lobes of brain and working on coordination of UE/LE movements in prep for occupational tasks      Second Session:    Pt semi-fowlers in bed upon arrival, finishing with ST. Pt appeared to be less over-whelmed with another therapy session with this smooth over-lap of disciplines. Pt  declining sitting EOB for any functional tasks offered d/t increased fatigue following prior therapy sessions this date, however pt agreeable to Therapeutic Activity semi-fowlers in bed. Pt's daughter present throughout session. No c/o pain throughout session. Pt completed large 3D 10-piece puzzle with 4 VC for orientation to challenge visual discrimination skills. Pt demo'd fair problem-solving skills throughout activity with good engagement and spirits. Pt able to complete puzzle in 4 minutes. Pt completed stacking alternating wooden block pieces in celso-cross pattern x2 trials at raised height table with MAX increased time to complete to challenge visual figure ground skills and UE strength and endurance. Pt left in bed, bed alarm on, family member present, call light and all needs met/within reach at EOS.        Cognition  Overall Cognitive Status: Impaired  Arousal/Alterness: appropriate responses to stimuli  Following Commands: follows one step commands consistently  Attention Span: appears intact  Memory: decreased recall of recent events  Safety Judgement: decreased awareness of need for assistance, decreased awareness of need for safety  Problem Solving: assistance required to generate solutions, assistance required to implement solutions  Insights: decreased awareness of deficits  Initiation: requires cues for some  Sequencing: requires cues for some  Orientation:    alert and oriented x 4  Command Following:   accurately follows one step commands     Education  Barriers To Learning: cognition and hearing  Patient Education: patient educated on goals, OT role and benefits, plan of care, ADL adaptive strategies, proper use of assistive device/equipment, family education, transfer training  Learning Assessment:  patient verbalizes understanding, would benefit from continued reinforcement    Assessment  Activity Tolerance: pt required intermittent rest breaks throughout sessions this date - back pain con't to limit pt engagement   Impairments Requiring Therapeutic Intervention: decreased functional mobility, decreased ADL status, decreased strength, decreased endurance, decreased balance, decreased coordination, increased pain  Prognosis: good  Clinical Assessment: Pt presents below baseline function s/p femoral tibial bypass graft and will benefit from continued OT services to address the above deficits. Pt con't to progress w/ transfers and functional mobility but con't to require CGA for amb and transfers this date. Pt con't to require intermittent VCs for hand placement throughout activties and demo'd increased pain and agitation w/ shower this date. Pt con't to benefit from OT services to maximize patients safety and independence with ADLs, transfers and functional mobility. Con't per POC. Safety Interventions: patient left in bed, bed alarm in place, call light within reach, gait belt, patient at risk for falls, and family/caregiver present    Plan  Frequency: 5 x/week, 75 min/day  Current Treatment Recommendations: strengthening, balance training, functional mobility training, transfer training, endurance training, wheelchair mobility training, patient/caregiver education, and ADL/self-care training    Goals  Patient Goals: not stated    Short Term Goals: To be completed in: 2 weeks  Patient will complete upper body ADL at supervision - goal met 8/29  Patient will complete lower body ADL at minimal assistance   Patient will complete toileting at minimal assistance   Patient will complete grooming at supervision - goal met 8/29   Patient will complete functional transfers at minimal assistance -goal met 8/26     Long Term Goals:   To be completed in: 3 weeks Patient will complete lower body ADL at stand by assistance   Patient will complete toileting at stand by assistance   Patient will complete functional transfers at stand by assistance   Patient will complete functional mobility at stand by assistance   Patient to gather and transport IADL items at stand by assistance     Goals progressing 9/02      Therapy Session Time  First Session Therapy Time:   Individual Concurrent Group Co-treatment   Time In 0932        Time Out 1038        Minutes 64             Individual Concurrent Group Co-treatment   Time In 1300        Time Out 1338        Minutes 38          Timed Code Treatment Minutes:  64+38 Minutes    Total Treatment Minutes:  102 Minutes    AM: JOSIANE Barriga/OT   Directed and supervised by JORGE White/L #[id number]     PM: NAT [id number]

## 2022-09-02 NOTE — PROGRESS NOTES
Warren Mater  9/2/2022  [id number]    Chief Complaint: Peripheral artery disease ([address]    Subjective:   No overnight events. No current complaints. Feels improved today. Pain overall controlled. Labs reviewed. ROS: No CP, SOB, dyspnea    Objective:  Patient Vitals for the past 24 hrs:   BP Temp Temp src Pulse Resp SpO2   09/01/22 2120 -- -- -- -- 16 --   09/01/22 2045 (!) 152/77 97.6 °F (36.4 °C) Oral 100 16 94 %   09/01/22 1346 -- -- -- -- 16 --   09/01/22 0846 -- -- -- -- 18 --   09/01/22 0817 (!) 149/76 98.2 °F (36.8 °C) Oral 95 18 97 %       Gen: No distress, pleasant. Resting in bed  HEENT: Normocephalic, atraumatic. Oral thrush improving but remains  CV: Regular rate and rhythm. No MRG   Resp: No respiratory distress. CTAB   Abd: Soft, nontender nondistended  Ext: No edema. RLE in knee high ACE dressing  Neuro: Alert, oriented, appropriately interactive    Laboratory data: Available via EMR. Therapy progress:    PT    Supine to Sit: Supervision or touching assistance  Sit to Supine: Supervision or touching assistance   Sit to Stand: Supervision or touching assistance  Chair/Bed to Chair Transfer: Partial/moderate assistance  Car Transfer: Partial/moderate assistance  Ambulation 10 ft: Partial/moderate assistance  Ambulation 50 ft: Partial/moderate assistance  Ambulation 150 ft: Partial/moderate assistance  Stairs - 1 Step: Supervision or touching assistance  Stairs - 4 Step: Supervision or touching assistance  Stairs - 12 Step:      OT    Eating: Setup or clean-up assistance  Oral Hygiene: Supervision or touching assistance  Bathing: Partial/moderate assistance  Upper Body Dressing: Setup or clean-up assistance  Lower Body Dressing: Partial/moderate assistance  Toilet Transfer: Partial/moderate assistance  Toilet Hygiene: Partial/moderate assistance    Speech Therapy    SLP Evaluation requested per request of PT/OT, due to poor recall and carry over between sessions.  Pt presents with Moderate Cognitive-Linguistic Deficits  in the domains of attention, short-term and long-term memory, functional problem solving, and executive function skills. Pt was appropriately oriented and expressive language appeared intact without evidence of word finding. Pt processes of multi-step commands was delayed, required repetition and modification (loud speech, lip reading) to ensure adequate comprehension. Pt reported increased difficulty when provided with semantic cues for short-term memory questions. Pt's daughter began noticing slow progressive cognitive decline with multiple medical situations, therefore family was providing 24/7 assistance. Pain and pain management are new factors that are now impacting his cognition during his rehab stay. Pt is agreeable to participate in SLP intervention for return to prior level of function. Body mass index is 22.18 kg/m². 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 Wilian Gonzales. ASA, crestor. 50% weightbearing. PT/OT. Cipro for cellulitis. A. Fib: AC held 2/2 prior bleed. HTN: lisinopril 40     DM: Started metformin 500. (home regimen metformin 1000 BID). D/c'd SSI and POCT. Monitor on routine labs. Hypokalemia: supplemented     HTN: Lisinopril 40     BPH: proscar, flomax. Parker placed 2/2 retention. VT.  Continue bladder protocol    Hypomagnesemia: supplement     Bowels: Per protocol  Bladder: Per protocol   Sleep: remeron scheduled  Pain: tylenol, maylin PRN   DVT PPx: SCD  ROYCE: 9/6      Jeremías Johnson MD  9/2/2022, 8:05 AM    * This document was created using dictation software. While all precautions were taken to ensure accuracy, errors may have occurred. Please disregard any typographical errors.

## 2022-09-02 NOTE — PROGRESS NOTES
[facility] Department   Phone: [phone]    Speech Therapy    [] Initial Evaluation            [x] Daily Treatment Note         [] Discharge Summary      Patient: Lolly Jaramillo   : 1930   MRN: [id number]          Admitting Diagnosis: Peripheral artery disease [facility])  Treatment Diagnosis:  Cognitive-Linguistic Deficits   Rehab Admission Date: 2022   Precautions/Restrictions: high fall risk, weight bearing  (WBAT, right low extremity)                 Pre-Admission Information   Living Status: Pt lives alone but has two adult daughters that switch off weeks and provide him  assistance   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)   Medication Management: :  []Primary   []Secondary [x]No - daughters manage   Finance Management:          []Primary   []Secondary [x]No - daughter manage   Active :                        []Yes         [x]No - stopped driving because of health   Hearing:                                 WFL, hard of hearing  Vision:                                    Vision Corrective Device: wears glasses for distance and reading        Daily Treatment Info:   Date: 2022     Tx session 1   Pain Chronic lower back pain; Decline intervention or repositioning    Subjective     1st attempt: Pt in bed after working with therapy. Reported feeling too tired to participate. Requested SLP to return later. 2nd attempt: Pt required encouragement. Reported he is still tired but accepted to work with therapy while in bed. Objective:  Goals    Goal 1: Pt will recall functional information (daily tasks, personal hx, etc.) with 80% accuracy. Pt able to recall name of spouse and 5 children with mod cues. - Pt able to independently recall how he met his wife with orientation setting, age and summary.   - able to identify where each child lives with min cues.       Goal 2: Patient will complete functional problem-solving tasks for daily situations with 80% accuracy or given min cues. Goal not targeted this session. Goal 3: Pt will attend to multi-step direction tasks with min cues to achieve 80% accuracy. Following directions by marking on picture  - Pt completed task with 27% (3/11); errors relating to limited attention and limited short-term memory of spoken directions. - Pt demonstrated challenges with locating the prompted picture and then following the corresponding direction. Other areas targeted:    Education:   Provided education regarding rationale for tasks and plan of care. Pt requires ongoing learning    Safety Devices: [x] Call light within reach  [x] Chair alarm activated  [] Bed alarm activated  [] Other:          Assessment/Progress/Discharge:    Weekly Update (9/1/2022): Diagnosis: SLP Evaluation requested per request of PT/OT, due to poor recall and carry over between sessions. Pt presents with Moderate  Cognitive-Linguistic Deficits  in the domains of attention, short-term and long-term memory, functional problem solving, and executive function skills. Pt was appropriately oriented and expressive language appeared intact without evidence of word finding. Pt processes of multi-step commands was delayed, required repetition and modification (loud speech, lip reading) to ensure adequate comprehension. Pt reported increased difficulty when provided with semantic cues for short-term memory questions. Pt's daughter began noticing slow progressive cognitive decline with multiple medical situations, therefore family was providing 24/7 assistance. Pain and pain management are new factors that are now impacting his cognition during his rehab stay. Pt is agreeable to participate in SLP intervention for return to prior level of function. Current Diet Order:   ADULT DIET; Regular; 5 carb choices (75 gm/meal)  ADULT ORAL NUTRITION SUPPLEMENT; Lunch;  Low Calorie/High Protein Oral Supplement            Plan: Frequency:  5days/week   30 minutes/day    Discharge Recommendations:   Barriers: Cognitive deficit, Decreased endurance/ fatigue, and Medical complications  Discharge Recommendations:  24 hour supervision  Continued SLP Treatment:  TBD based upon progress   Estimated discharge date: 9/9/2022     Session 1 Times: Individual Concurrent Group Co-treatment   Time In 1330      Time Out 1400      Time Code Minutes  30      Minutes 30        Timed Code Treatment Minutes: 30  Total Treatment Minutes: 30    Electronically Signed by     Mami Rowe M.A. CCC-SLP SLeaPLea [id number]  Speech-Language Pathologist   9/2/2022 1:47 PM     The speech-language pathologist was present, directed the patient's care, made skilled judgment and was responsible for assessment and treatment.     Lauren Greene.,  Speech-Language Pathology

## 2022-09-02 NOTE — PLAN OF CARE
Problem: Discharge Planning  Goal: Discharge to home or other facility with appropriate resources  Outcome: Progressing     Problem: Skin/Tissue Integrity  Goal: Absence of new skin breakdown  Description: 1. Monitor for areas of redness and/or skin breakdown  2. Assess vascular access sites hourly  3. Every 4-6 hours minimum:  Change oxygen saturation probe site  4. Every 4-6 hours:  If on nasal continuous positive airway pressure, respiratory therapy assess nares and determine need for appliance change or resting period.   Outcome: Progressing     Problem: ABCDS Injury Assessment  Goal: Absence of physical injury  Outcome: Progressing     Problem: Safety - Adult  Goal: Free from fall injury  Outcome: Progressing     Problem: Chronic Conditions and Co-morbidities  Goal: Patient's chronic conditions and co-morbidity symptoms are monitored and maintained or improved  Outcome: Progressing     Problem: Chronic Conditions and Co-morbidities  Goal: Patient's chronic conditions and co-morbidity symptoms are monitored and maintained or improved  Outcome: Progressing     Problem: Pain  Goal: Verbalizes/displays adequate comfort level or baseline comfort level  Outcome: Progressing

## 2022-09-02 NOTE — PROGRESS NOTES
[facility]   Phone: [phone]    Physical Therapy    [] Initial Evaluation            [x] Daily Treatment Note         [] Discharge Summary      Patient: Livan Morataya   : 1930   MRN: [id number]   Date of Service:  2022  Admitting Diagnosis: Peripheral artery disease [facility])  Current Admission Summary: 77-year-old male with a history of A. fib previously on Xarelto but stopped secondary to a head bleed, cerebral amyloid angiopathy, diabetes who was admitted on  with right leg pain. He was transferred from [facility] to this facility on  for surgery with Dr. Dionne Lane. He underwent a right femoral to posterior tibial bypass on . His postoperative course has been overall unremarkable. He was evaluated by therapy and suggested to continue in an inpatient setting prior to returning home. He reports his pain is controlled. He is questioning why he is nonweightbearing in the right lower extremity. Past Medical History:  has a past medical history of Afib ([facility] [address] Cerebral amyloid angiopathy ([facility] [address] Diabetes mellitus ([facility] [address] Heart aneurysm, and Vertigo. Past Surgical History:  has a past surgical history that includes Appendectomy; hernia repair; Lung surgery; Testicle surgery; and Femoral-tibial Bypass Graft (Right, 2022).   Discharge Recommendations: Home with HHPT   DME Required For Discharge: DME to be determined pending patient progress  Precautions/Restrictions: high fall risk  Weight Bearing Restrictions: weight bearing as tolerated - upgraded to WBAT on 2022  [] Right Upper Extremity  [] Left Upper Extremity [x] Right Lower Extremity  [] Left Lower Extremity     ** Per dtg report, pt is WBAT on (L) LE following conservative management of achilles injury but hasn't had any therapy since being out of boot **       Required Braces/Orthotics: no braces required   [] Right  [] Left  Positional Restrictions:no positional restrictions    Pre-Admission Information   Lives With: alone. Comment: daughters take turns weekly 24/7 to stay with him since achilles injury in June                      Type of Home: house  Home Layout: one level  Home Access: level entry with ramp   Bathroom Layout: walker accessible, walk in shower  (not wc accessible)  Bathroom Equipment: grab bars in shower, tub transfer bench, hand held shower head, toilet raiser, BSC  Toilet Height: standard height  Home Equipment: rolling walker, transport chair, bed railing . Comment: typically uses 3 wheel walker   Transfer Assistance: Independent without use of device; Since June performing pivot transfers with CGA using grab bars  Ambulation Assistance:Independent with 3WW, since achilles injury not walking but only transferring via stand pivots with CGA; was unable to propel transport chair himself  ADL Assistance: Typically independent with ADLs prior to achilles injury;  Since June, requires assistance with sponge bathing, requires assistance with dressing, requires assistance with grooming, requires assistance with toileting  IADL Assistance: requires assistance with all homemaking tasks   Active :        [] Yes                 [x] No  Hand Dominance: [] Left                 [x] Right  Current Employment: retired. Occupation:  at [employer]: [address]: 1 fall leading to ruptured achilles in June and was NWB and then in a boot. Pt had not started any PT for his Left achilles prior to current hospitalization. Subjective  General: Patient is seated in recliner chair with daughter present upon therapist arrival, states that he has pain \"everywhere\" but does not formally rate this date. Patient declines need for pain medication. Patient states that he is feeling exhausted to do but will participate as he is able.    Pain: Patient does not rate upon questioning  Pain Interventions: patient denies pain interventions      Functional Mobility  Bed Mobility  Bed mobility not completed on this date. Comments:   Transfers  Sit to stand transfer: contact guard assistance  Stand to sit transfer: contact guard assistance  Stand step transfer: minimal assistance, . Comment with use of RW. Consistently requires VC for hand placement and safe turning sequence. Frequently attempts to sit prior to reaching seated surface. Comments:   Ambulation  Surface:level surface  Assistive Device: rolling walker  Assistance: minimal assistance, . Comment CGA  Distance: 60 ft + 20 ft   Gait Mechanics: Step to progressing to reciprocal gait pattern with reduced elisha and forward flexed posture + head down positioning. Continued (R) LE drop foot but improved clearing (R) LE with increased hip/knee flexion. Comments:  VC for postural facilitation and safe walker management especially while turning. Patient requires consistent assistance for obstacle avoidance as well as cuing to maintain body with boundaries of rolling walker. Stair Mobility  Number of Steps: 4  Step Height: 4 inch  Hand Rails: (B) handrail  Assistance: contact guard assistance, minimal assistance  Comments: Mixed use of step to and reciprocal pattern. Increased hip/knee flexion to clear (R) LE on step surface. Intermittent knee buckling when descending requiring increased reliance on upper extremity support. Min A to maintain balance during (L) knee buckling  Balance  Static Sitting Balance: fair (+): maintains balance at SBA/supervision without use of UE support  Dynamic Sitting Balance: fair (+): maintains balance at SBA/supervision without use of UE support  Static Standing Balance: fair (-): maintains balance at CGA with use of UE support  Dynamic Standing Balance: poor (+): requires min (A) to maintain balance  Comments:     Other Therapeutic Interventions    Functional mobility is completed as is listed above.  Seated stepper x 5 minutes to improve strength and cardiovascular endurance. Extended rest breaks required in between activities due to patient reports of increased fatigue this date. Patient states that during mobility his entire body feels weak. Upon completion of therapy session, pt requests to return to bed. Sit => supine transfer completed at SBA. Pt in bed with bed alarm activated and call light within reach. 2nd Session:     Patient is supine in bed upon therapist arrival, continues to rate elevated pain but does not formally rate. Patient notes that pain is worst in the back but declines interventions. Patient states that he will ambulate as able today, but notes that pain is too elevated to do prolonged ambulation at this time. Patient ambulates x 36' with use of RW and quality of gait same as is listed above. Patient responds well to cues to maintain body within boundaries of RW and obstacle avoidance. Dynamic standing balance activity completed single UE support on rolling walker including reaching outside JW/across midline, object grasp and toss 3 x 10. Balance maintained at CGA-min A during dynamic task completion. Patient presents with intermittent posterior LOB requiring min A to correct. Patient completes standing mini-squats at RW x 5 reps with CGA. Upon completion of therapy session, pt requests to return to bed. Sit => supine transfer completed at SBA. Pt in bed with bed alarm activated and call light within reach.           Cognition  Overall Cognitive Status: Impaired  Arousal/Alterness: appropriate responses to stimuli  Following Commands: follows one step commands consistently, follows multi step commands with repetition  Safety Judgement: decreased awareness of need for safety  Problem Solving: assistance required to generate solutions, assistance required to implement solutions  Sequencing: requires cues for some  Orientation:    alert and oriented x 4  Command Following:   accurately follows one step commands    Education  Barriers To Learning: hearing  Patient Education: patient educated on goals, PT role and benefits, plan of care, general safety, functional mobility training, proper use of assistive device/equipment, transfer training  Learning Assessment:  patient verbalizes understanding, would benefit from continued reinforcement    Assessment  Activity Tolerance: Limited by fatigue and pain resulting in increased rest breaks  Impairments Requiring Therapeutic Intervention: decreased functional mobility, decreased ADL status, decreased ROM, decreased strength, decreased safety awareness, decreased endurance, decreased balance, increased pain, decreased posture  Prognosis: good  Clinical Assessment: Pt presents with increased fatigue during mobility this date, requires extended time for rest breaks with all activities. Patient requires CGA-min A for all functional activities at this time and will benefit from continued family assistance and supervision when in the home setting. At this time patient presents with improving safety awareness, however still presents with insight deficits hindering independence in the home setting at this time. Safety Interventions: patient left in chair, chair alarm in place, call light within reach, and gait belt    Plan  Frequency: 5 x/week, 75 min/day  Current Treatment Recommendations: strengthening, ROM, balance training, functional mobility training, transfer training, endurance training, and wheelchair mobility training    Goals  Patient Goals:  Return home with improved mobility   Short Term Goals:  Time Frame: 1.5 wks   Pt able to perform bed mobility modified independence.    Pt able to perform sliding board transfers with minimal assistance - goal discontinued secondary to upgraded WB status  Pt able to perform stand pivot transfers using grab bar with moderate assistance - goal met 8/26/2022  Pt able to navigate a wc 150 ft mod I - goal discontinued secondary to upgraded WB status  Pt able to ambulate 10 ft with LRAD at moderate assistance - goal met 8/26/2022    Long Term Goals: Time Frame 3 wks  Pt able to perform sliding board transfers with SBA - goal discontinued secondary to upgraded WB status  Pt able to perform stand pivot transfers using grab bar with SBA  Pt able to ambulate 150 ft with LRAD at SBA  Pt able to perform a car transfer with SBA.        Therapy Session Time      Individual Group Co-treatment   Time In  0830       Time Out  0915       Minutes  39           Second Session Therapy Time:   Individual Concurrent Group Co-treatment   Time In  2762         Time Out  1330         Minutes 45           Timed Code Treatment Minutes:  85+82  Total Treatment Minutes: 90 minutes    Electronically Signed By: Lien Cortez, [facility], DPT [id number]

## 2022-09-03 PROCEDURE — 51798 US URINE CAPACITY MEASURE: CPT

## 2022-09-03 PROCEDURE — 1280000000 HC REHAB R&B

## 2022-09-03 PROCEDURE — 6370000000 HC RX 637 (ALT 250 FOR IP): Performed by: SURGERY

## 2022-09-03 PROCEDURE — 6370000000 HC RX 637 (ALT 250 FOR IP): Performed by: PHYSICAL MEDICINE & REHABILITATION

## 2022-09-03 RX ADMIN — ASPIRIN 81 MG: 81 TABLET, COATED ORAL at 10:31

## 2022-09-03 RX ADMIN — METFORMIN HYDROCHLORIDE 500 MG: 500 TABLET ORAL at 10:32

## 2022-09-03 RX ADMIN — PANTOPRAZOLE SODIUM 40 MG: 40 TABLET, DELAYED RELEASE ORAL at 05:33

## 2022-09-03 RX ADMIN — NYSTATIN 500000 UNITS: 100000 SUSPENSION ORAL at 20:21

## 2022-09-03 RX ADMIN — SENNOSIDES AND DOCUSATE SODIUM 2 TABLET: 50; 8.6 TABLET ORAL at 20:21

## 2022-09-03 RX ADMIN — METFORMIN HYDROCHLORIDE 500 MG: 500 TABLET ORAL at 16:45

## 2022-09-03 RX ADMIN — NYSTATIN 500000 UNITS: 100000 SUSPENSION ORAL at 16:45

## 2022-09-03 RX ADMIN — CIPROFLOXACIN 500 MG: 500 TABLET, FILM COATED ORAL at 20:21

## 2022-09-03 RX ADMIN — CIPROFLOXACIN 500 MG: 500 TABLET, FILM COATED ORAL at 10:31

## 2022-09-03 RX ADMIN — ROSUVASTATIN 10 MG: 10 TABLET, FILM COATED ORAL at 10:29

## 2022-09-03 RX ADMIN — MIRTAZAPINE 30 MG: 15 TABLET, FILM COATED ORAL at 20:21

## 2022-09-03 RX ADMIN — TAMSULOSIN HYDROCHLORIDE 0.4 MG: 0.4 CAPSULE ORAL at 20:21

## 2022-09-03 RX ADMIN — NYSTATIN 500000 UNITS: 100000 SUSPENSION ORAL at 10:36

## 2022-09-03 RX ADMIN — OXYCODONE AND ACETAMINOPHEN 2 TABLET: 5; 325 TABLET ORAL at 10:33

## 2022-09-03 RX ADMIN — SENNOSIDES AND DOCUSATE SODIUM 2 TABLET: 50; 8.6 TABLET ORAL at 10:30

## 2022-09-03 RX ADMIN — NYSTATIN 500000 UNITS: 100000 SUSPENSION ORAL at 13:30

## 2022-09-03 RX ADMIN — LISINOPRIL 40 MG: 20 TABLET ORAL at 10:31

## 2022-09-03 RX ADMIN — FINASTERIDE 5 MG: 5 TABLET, FILM COATED ORAL at 10:32

## 2022-09-03 ASSESSMENT — PAIN DESCRIPTION - DESCRIPTORS: DESCRIPTORS: ACHING;DISCOMFORT

## 2022-09-03 ASSESSMENT — PAIN DESCRIPTION - ORIENTATION: ORIENTATION: LOWER

## 2022-09-03 ASSESSMENT — PAIN SCALES - GENERAL
PAINLEVEL_OUTOF10: 5
PAINLEVEL_OUTOF10: 5
PAINLEVEL_OUTOF10: 6
PAINLEVEL_OUTOF10: 8
PAINLEVEL_OUTOF10: 7
PAINLEVEL_OUTOF10: 5
PAINLEVEL_OUTOF10: 5

## 2022-09-03 ASSESSMENT — PAIN - FUNCTIONAL ASSESSMENT: PAIN_FUNCTIONAL_ASSESSMENT: ACTIVITIES ARE NOT PREVENTED

## 2022-09-03 ASSESSMENT — PAIN DESCRIPTION - ONSET: ONSET: ON-GOING

## 2022-09-03 ASSESSMENT — PAIN DESCRIPTION - PAIN TYPE: TYPE: CHRONIC PAIN

## 2022-09-03 ASSESSMENT — PAIN DESCRIPTION - FREQUENCY: FREQUENCY: CONTINUOUS

## 2022-09-03 ASSESSMENT — PAIN DESCRIPTION - LOCATION: LOCATION: BACK

## 2022-09-03 NOTE — PLAN OF CARE
Problem: Skin/Tissue Integrity  Goal: Absence of new skin breakdown  Description: 1. Monitor for areas of redness and/or skin breakdown  2. Assess vascular access sites hourly  3. Every 4-6 hours minimum:  Change oxygen saturation probe site  4. Every 4-6 hours:  If on nasal continuous positive airway pressure, respiratory therapy assess nares and determine need for appliance change or resting period.   9/3/2022 0040 by Isaura Ferrer RN  Outcome: Progressing     Problem: Safety - Adult  Goal: Free from fall injury  9/3/2022 0040 by Isaura Ferrer RN  Outcome: Progressing

## 2022-09-04 PROCEDURE — 6370000000 HC RX 637 (ALT 250 FOR IP): Performed by: SURGERY

## 2022-09-04 PROCEDURE — 6370000000 HC RX 637 (ALT 250 FOR IP): Performed by: PHYSICAL MEDICINE & REHABILITATION

## 2022-09-04 PROCEDURE — 1280000000 HC REHAB R&B

## 2022-09-04 RX ADMIN — CIPROFLOXACIN 500 MG: 500 TABLET, FILM COATED ORAL at 22:55

## 2022-09-04 RX ADMIN — ROSUVASTATIN 10 MG: 10 TABLET, FILM COATED ORAL at 09:03

## 2022-09-04 RX ADMIN — PANTOPRAZOLE SODIUM 40 MG: 40 TABLET, DELAYED RELEASE ORAL at 05:32

## 2022-09-04 RX ADMIN — SENNOSIDES AND DOCUSATE SODIUM 2 TABLET: 50; 8.6 TABLET ORAL at 22:56

## 2022-09-04 RX ADMIN — NYSTATIN 500000 UNITS: 100000 SUSPENSION ORAL at 09:03

## 2022-09-04 RX ADMIN — OXYCODONE AND ACETAMINOPHEN 2 TABLET: 5; 325 TABLET ORAL at 18:50

## 2022-09-04 RX ADMIN — NYSTATIN 500000 UNITS: 100000 SUSPENSION ORAL at 16:59

## 2022-09-04 RX ADMIN — LISINOPRIL 40 MG: 20 TABLET ORAL at 09:03

## 2022-09-04 RX ADMIN — CIPROFLOXACIN 500 MG: 500 TABLET, FILM COATED ORAL at 09:03

## 2022-09-04 RX ADMIN — NYSTATIN 500000 UNITS: 100000 SUSPENSION ORAL at 23:02

## 2022-09-04 RX ADMIN — TAMSULOSIN HYDROCHLORIDE 0.4 MG: 0.4 CAPSULE ORAL at 22:56

## 2022-09-04 RX ADMIN — MIRTAZAPINE 30 MG: 15 TABLET, FILM COATED ORAL at 22:55

## 2022-09-04 RX ADMIN — FINASTERIDE 5 MG: 5 TABLET, FILM COATED ORAL at 09:03

## 2022-09-04 RX ADMIN — ASPIRIN 81 MG: 81 TABLET, COATED ORAL at 09:03

## 2022-09-04 RX ADMIN — OXYCODONE AND ACETAMINOPHEN 2 TABLET: 5; 325 TABLET ORAL at 10:19

## 2022-09-04 RX ADMIN — SENNOSIDES AND DOCUSATE SODIUM 2 TABLET: 50; 8.6 TABLET ORAL at 09:03

## 2022-09-04 RX ADMIN — OXYCODONE AND ACETAMINOPHEN 2 TABLET: 5; 325 TABLET ORAL at 22:55

## 2022-09-04 RX ADMIN — METFORMIN HYDROCHLORIDE 500 MG: 500 TABLET ORAL at 09:03

## 2022-09-04 RX ADMIN — METFORMIN HYDROCHLORIDE 500 MG: 500 TABLET ORAL at 16:59

## 2022-09-04 ASSESSMENT — PAIN SCALES - GENERAL
PAINLEVEL_OUTOF10: 7
PAINLEVEL_OUTOF10: 8
PAINLEVEL_OUTOF10: 8

## 2022-09-04 NOTE — PLAN OF CARE
Problem: Discharge Planning  Goal: Discharge to home or other facility with appropriate resources  Outcome: Progressing  Flowsheets  Taken 9/3/2022 2020 by Annabel Hoyt RN  Discharge to home or other facility with appropriate resources: Identify barriers to discharge with patient and caregiver  Taken 9/3/2022 0855 by Ivette Duran RN  Discharge to home or other facility with appropriate resources: Identify barriers to discharge with patient and caregiver     Problem: Skin/Tissue Integrity  Goal: Absence of new skin breakdown  Description: 1. Monitor for areas of redness and/or skin breakdown  2. Assess vascular access sites hourly  3. Every 4-6 hours minimum:  Change oxygen saturation probe site  4. Every 4-6 hours:  If on nasal continuous positive airway pressure, respiratory therapy assess nares and determine need for appliance change or resting period.   Outcome: Progressing     Problem: ABCDS Injury Assessment  Goal: Absence of physical injury  Outcome: Progressing  Flowsheets (Taken 9/3/2022 2020)  Absence of Physical Injury: Implement safety measures based on patient assessment

## 2022-09-04 NOTE — PROGRESS NOTES
Pt evening shift assessment complete. VSS and medication given as ordered. Pt assessed for comfort needs. Pt does not express any additional needs at this time, resting comfortably in bed.

## 2022-09-05 LAB
ANION GAP SERPL CALCULATED.3IONS-SCNC: 9 MMOL/L (ref 3–16)
BUN BLDV-MCNC: 6 MG/DL (ref 7–20)
CALCIUM SERPL-MCNC: 8.7 MG/DL (ref 8.3–10.6)
CHLORIDE BLD-SCNC: 101 MMOL/L (ref 99–110)
CO2: 29 MMOL/L (ref 21–32)
CREAT SERPL-MCNC: 0.7 MG/DL (ref 0.8–1.3)
GFR AFRICAN AMERICAN: >60
GFR NON-AFRICAN AMERICAN: >60
GLUCOSE BLD-MCNC: 137 MG/DL (ref 70–99)
HCT VFR BLD CALC: 35.6 % (ref 40.5–52.5)
HEMOGLOBIN: 12 G/DL (ref 13.5–17.5)
MAGNESIUM: 1.8 MG/DL (ref 1.8–2.4)
MCH RBC QN AUTO: 30.6 PG (ref 26–34)
MCHC RBC AUTO-ENTMCNC: 33.6 G/DL (ref 31–36)
MCV RBC AUTO: 91.1 FL (ref 80–100)
PDW BLD-RTO: 15.2 % (ref 12.4–15.4)
PLATELET # BLD: 487 K/UL (ref 135–450)
PMV BLD AUTO: 8.3 FL (ref 5–10.5)
POTASSIUM SERPL-SCNC: 3.7 MMOL/L (ref 3.5–5.1)
RBC # BLD: 3.91 M/UL (ref 4.2–5.9)
SODIUM BLD-SCNC: 139 MMOL/L (ref 136–145)
WBC # BLD: 6.6 K/UL (ref 4–11)

## 2022-09-05 PROCEDURE — 1280000000 HC REHAB R&B

## 2022-09-05 PROCEDURE — 97130 THER IVNTJ EA ADDL 15 MIN: CPT

## 2022-09-05 PROCEDURE — 97530 THERAPEUTIC ACTIVITIES: CPT

## 2022-09-05 PROCEDURE — 97110 THERAPEUTIC EXERCISES: CPT

## 2022-09-05 PROCEDURE — 97535 SELF CARE MNGMENT TRAINING: CPT

## 2022-09-05 PROCEDURE — 36415 COLL VENOUS BLD VENIPUNCTURE: CPT

## 2022-09-05 PROCEDURE — 80048 BASIC METABOLIC PNL TOTAL CA: CPT

## 2022-09-05 PROCEDURE — 51798 US URINE CAPACITY MEASURE: CPT

## 2022-09-05 PROCEDURE — 85027 COMPLETE CBC AUTOMATED: CPT

## 2022-09-05 PROCEDURE — 97129 THER IVNTJ 1ST 15 MIN: CPT

## 2022-09-05 PROCEDURE — 97116 GAIT TRAINING THERAPY: CPT

## 2022-09-05 PROCEDURE — 6370000000 HC RX 637 (ALT 250 FOR IP): Performed by: PHYSICAL MEDICINE & REHABILITATION

## 2022-09-05 PROCEDURE — 83735 ASSAY OF MAGNESIUM: CPT

## 2022-09-05 RX ORDER — ASPIRIN 81 MG/1
81 TABLET ORAL DAILY
Qty: 30 TABLET | Refills: 3 | Status: SHIPPED | OUTPATIENT
Start: 2022-09-06

## 2022-09-05 RX ORDER — LISINOPRIL 40 MG/1
40 TABLET ORAL DAILY
Qty: 30 TABLET | Refills: 3 | Status: SHIPPED | OUTPATIENT
Start: 2022-09-05

## 2022-09-05 RX ORDER — OXYCODONE HYDROCHLORIDE AND ACETAMINOPHEN 5; 325 MG/1; MG/1
1 TABLET ORAL EVERY 4 HOURS PRN
Qty: 20 TABLET | Refills: 0 | Status: SHIPPED | OUTPATIENT
Start: 2022-09-05 | End: 2022-09-10

## 2022-09-05 RX ORDER — FINASTERIDE 5 MG/1
5 TABLET, FILM COATED ORAL DAILY
Qty: 30 TABLET | Refills: 3 | Status: SHIPPED | OUTPATIENT
Start: 2022-09-06

## 2022-09-05 RX ADMIN — LISINOPRIL 40 MG: 20 TABLET ORAL at 13:23

## 2022-09-05 RX ADMIN — NYSTATIN 500000 UNITS: 100000 SUSPENSION ORAL at 17:33

## 2022-09-05 RX ADMIN — SENNOSIDES AND DOCUSATE SODIUM 2 TABLET: 50; 8.6 TABLET ORAL at 09:00

## 2022-09-05 RX ADMIN — SENNOSIDES AND DOCUSATE SODIUM 2 TABLET: 50; 8.6 TABLET ORAL at 21:07

## 2022-09-05 RX ADMIN — OXYCODONE AND ACETAMINOPHEN 2 TABLET: 5; 325 TABLET ORAL at 05:47

## 2022-09-05 RX ADMIN — TAMSULOSIN HYDROCHLORIDE 0.4 MG: 0.4 CAPSULE ORAL at 21:07

## 2022-09-05 RX ADMIN — OXYCODONE AND ACETAMINOPHEN 2 TABLET: 5; 325 TABLET ORAL at 18:29

## 2022-09-05 RX ADMIN — NYSTATIN 500000 UNITS: 100000 SUSPENSION ORAL at 09:00

## 2022-09-05 RX ADMIN — ROSUVASTATIN 10 MG: 10 TABLET, FILM COATED ORAL at 09:01

## 2022-09-05 RX ADMIN — OXYCODONE AND ACETAMINOPHEN 2 TABLET: 5; 325 TABLET ORAL at 23:19

## 2022-09-05 RX ADMIN — OXYCODONE AND ACETAMINOPHEN 1 TABLET: 5; 325 TABLET ORAL at 14:22

## 2022-09-05 RX ADMIN — FINASTERIDE 5 MG: 5 TABLET, FILM COATED ORAL at 09:00

## 2022-09-05 RX ADMIN — METFORMIN HYDROCHLORIDE 500 MG: 500 TABLET ORAL at 17:33

## 2022-09-05 RX ADMIN — NYSTATIN 500000 UNITS: 100000 SUSPENSION ORAL at 13:25

## 2022-09-05 RX ADMIN — PANTOPRAZOLE SODIUM 40 MG: 40 TABLET, DELAYED RELEASE ORAL at 05:47

## 2022-09-05 RX ADMIN — MIRTAZAPINE 30 MG: 15 TABLET, FILM COATED ORAL at 21:07

## 2022-09-05 RX ADMIN — METFORMIN HYDROCHLORIDE 500 MG: 500 TABLET ORAL at 09:01

## 2022-09-05 RX ADMIN — ASPIRIN 81 MG: 81 TABLET, COATED ORAL at 09:01

## 2022-09-05 RX ADMIN — NYSTATIN 500000 UNITS: 100000 SUSPENSION ORAL at 21:07

## 2022-09-05 ASSESSMENT — PAIN SCALES - WONG BAKER
WONGBAKER_NUMERICALRESPONSE: 4
WONGBAKER_NUMERICALRESPONSE: 0
WONGBAKER_NUMERICALRESPONSE: 4

## 2022-09-05 ASSESSMENT — PAIN SCALES - GENERAL
PAINLEVEL_OUTOF10: 8
PAINLEVEL_OUTOF10: 0
PAINLEVEL_OUTOF10: 7
PAINLEVEL_OUTOF10: 8

## 2022-09-05 ASSESSMENT — PAIN DESCRIPTION - LOCATION
LOCATION: BACK

## 2022-09-05 ASSESSMENT — PAIN DESCRIPTION - DESCRIPTORS
DESCRIPTORS: ACHING

## 2022-09-05 ASSESSMENT — PAIN - FUNCTIONAL ASSESSMENT: PAIN_FUNCTIONAL_ASSESSMENT: PREVENTS OR INTERFERES SOME ACTIVE ACTIVITIES AND ADLS

## 2022-09-05 ASSESSMENT — PAIN DESCRIPTION - ORIENTATION
ORIENTATION: LOWER
ORIENTATION: LOWER
ORIENTATION: LOWER;RIGHT

## 2022-09-05 NOTE — PROGRESS NOTES
Assessment complete. Alert, oriented x4. Reports pain improved with PRN Percocet. Patient enjoyed sitting on the couch today; felt that this aided in his pain control, too. Incisions and dressing to right leg clean, dry, intact. Assisted to bathroom. Voided. The care plan and education has been reviewed and mutually agreed upon with the patient. In bed, alarm on, bed in lowest position, call light and table within reach. No further needs expressed at this time. 0330  Patient has been voiding good amounts approximately every two hours. Bladder scanned for 386 mL post-void. [id number]  Bladder scanned for 445 mL post-void. 0547  Given PRN Percocet for back pain.

## 2022-09-05 NOTE — DISCHARGE INSTR - COC
Continuity of Care Form    Patient Name: Denae Templeton   :  1930  MRN:  [id number]    Admit date:  2022  Discharge date:  22    Code Status Order: Full Code   Advance Directives:     Admitting Physician:  Pia Connolly MD  PCP: Kristin Corporal    Discharging Nurse: PATI Murray, [facility] Unit/Room#: GKO-8086/0297-17  Discharging Unit Phone Number: [phone]    Emergency Contact:   Extended Emergency Contact Information  Primary Emergency Contact: Thang Pérez  Address: [address] [place] [address]  [address] Phone: [phone]  Work Phone: [phone]  Mobile Phone: [phone]  Relation: Child  Secondary Emergency Contact: eamon osuna  Home Phone: [phone]  Relation: Child    Past Surgical History:  Past Surgical History:   Procedure Laterality Date    APPENDECTOMY      FEMORAL-TIBIAL BYPASS GRAFT Right 2022    RIGHT FEMORAL TO POSTERIOR TIBIAL BYPASS GRAFT WITH VEIN GRAFT AND ARTERIOGRAM performed by Kendrick Lyons MD at [address]         Immunization History:   Immunization History   Administered Date(s) Administered    COVID-19, MODERNA BLUE border, Primary or Immunocompromised, (age 12y+), IM, 100 mcg/0.5mL 2021, 2021       Active Problems:  Patient Active Problem List   Diagnosis Code    Pleural plaque J92.9    Shortness of breath R06.02    Benign essential HTN I10    Hematoma T14. 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    Peripheral artery disease (HCC) I73.9       Isolation/Infection:   Isolation            No Isolation          Patient Infection Status       None to display            Nurse Assessment:  Last Vital Signs: BP (!) 91/55   Pulse 88   Temp 97.5 °F (36.4 °C) (Oral)   Resp 16   Ht 6' (1.829 m)   Wt 163 lb 9 oz (74.2 kg)   SpO2 95%   BMI 22.18 kg/m²     Last documented pain score (0-10 scale): Pain Level: 8  Last Weight:   Wt Readings from Last 1 Encounters:   08/22/22 163 lb 9 oz (74.2 kg)     Mental Status:  oriented, alert, and logical    IV Access:  - None    Nursing Mobility/ADLs: walker with 1 min or contact guard assist  Walking   Assisted  Transfer  Assisted  Bathing  Assisted  Dressing  Assisted  Toileting  Independent  Feeding  Independent  Med Admin  Assisted  Med Delivery   whole and prefers mixed with apple sauce (able to swallow better than water alone)    Wound Care Documentation and Therapy:  Incision 08/19/22 Generalized Proximal;Right (Active)   Dressing Status Clean;Dry; Intact 09/05/22 0845   Dressing Change Due 08/22/22 08/28/22 0749   Incision Cleansed Not Cleansed 09/03/22 0855   Dressing/Treatment Other (comment) 09/04/22 0800   Closure Steri-Strips 09/04/22 0800   Margins Approximated 09/05/22 0845   Incision Assessment Dry 09/05/22 0845   Drainage Amount None 09/05/22 0845   Odor None 09/05/22 0845   Cris-incision Assessment Intact 09/05/22 0845   Number of days: 16       Incision 08/19/22 Heel Distal;Right (Active)   Dressing Status Clean;Dry; Intact 09/05/22 0845   Dressing Change Due 09/05/22 09/03/22 2020   Incision Cleansed Not Cleansed 09/03/22 2020   Dressing/Treatment Ace wrap 09/05/22 0845   Closure Other (Comment) 09/04/22 0800   Incision Assessment Other (Comment) 09/05/22 0845   Drainage Amount None 09/05/22 0845   Odor None 09/05/22 0845   Cris-incision Assessment Other (Comment) 09/05/22 0845   Number of days: 16        Elimination:  Continence:    Bowel: Yes  Bladder: Yes  Urinary Catheter: None   Colostomy/Ileostomy/Ileal Conduit: No       Date of Last BM: 9/3/22    Intake/Output Summary (Last 24 hours) at 9/5/2022 1105  Last data filed at 9/5/2022 0617  Gross per 24 hour   Intake 960 ml   Output --   Net 960 ml     I/O last 3 completed shifts: In: 1080 [P.O.:1080]  Out: -     Safety Concerns: At Risk for Falls    Impairments/Disabilities:      None    Nutrition Therapy:  Current Nutrition Therapy:   - Oral Diet:  Carb Control 5 carbs/meal (2000kcals/day)    Routes of Feeding: Oral  Liquids: Thin Liquids  Daily Fluid Restriction: no  Last Modified Barium Swallow with Video (Video Swallowing Test): not done    Treatments at the Time of Hospital Discharge:   Respiratory Treatments: none  Oxygen Therapy:  is not on home oxygen therapy.   Ventilator:    - No ventilator support    Rehab Therapies: Physical Therapy and Occupational Therapy  Weight Bearing Status/Restrictions: No weight bearing restrictions  Other Medical Equipment (for information only, NOT a DME order):  walker  Other Treatments: none    Patient's personal belongings (please select all that are sent with patient):  Jodee    RN SIGNATURE:  Electronically signed by Bee Winston RN on 9/6/22 at 12:00 PM EDT    CASE MANAGEMENT/SOCIAL WORK SECTION    Inpatient Status Date: 8/22/2022    Readmission Risk Assessment Score:  Readmission Risk              Risk of Unplanned Readmission:  15           Discharging to Facility/ Agency   Name: BRITTANY Morillo  Phone: [phone]  Fax: [phone]      / signature: Electronically signed by OPAL Patel on 9/6/2022 at 9:04 AM     PHYSICIAN SECTION    Prognosis: Good    Condition at Discharge: Stable    Rehab Potential (if transferring to Rehab): Good    Recommended Labs or Other Treatments After Discharge: Home w/ home health and outpatient follow up     Physician Certification: I certify the above information and transfer of Brenda Hunter [place]  is necessary for the continuing treatment of the diagnosis listed and that he requires Home Care for less 30 days.      Update Admission H&P: No change in H&P    PHYSICIAN SIGNATURE:  Electronically signed by Abril Muñoz MD on 9/5/22 at 11:06 AM EDT

## 2022-09-05 NOTE — PROGRESS NOTES
[facility] Department   Phone: [phone]    Physical Therapy    [] Initial Evaluation            [x] Daily Treatment Note         [x] Discharge Summary      Patient: Julianna Smart   : 1930   MRN: [id number]   Date of Service:  2022  Admitting Diagnosis: Peripheral artery disease [facility])  Current Admission Summary: 70-year-old male with a history of A. fib previously on Xarelto but stopped secondary to a head bleed, cerebral amyloid angiopathy, diabetes who was admitted on  with right leg pain. He was transferred from [facility] to this facility on  for surgery with Dr. Philip Jim. He underwent a right femoral to posterior tibial bypass on . His postoperative course has been overall unremarkable. He was evaluated by therapy and suggested to continue in an inpatient setting prior to returning home. He reports his pain is controlled. He is questioning why he is nonweightbearing in the right lower extremity. Past Medical History:  has a past medical history of Afib ([facility] [address] Cerebral amyloid angiopathy ([facility] [address] Diabetes mellitus ([facility] [address] Heart aneurysm, and Vertigo. Past Surgical History:  has a past surgical history that includes Appendectomy; hernia repair; Lung surgery; Testicle surgery; and Femoral-tibial Bypass Graft (Right, 2022).   Discharge Recommendations: Home with HHPT   DME Required For Discharge: rolling walker  Precautions/Restrictions: high fall risk  Weight Bearing Restrictions: weight bearing as tolerated - upgraded to WBAT on 2022  [] Right Upper Extremity  [] Left Upper Extremity [x] Right Lower Extremity  [] Left Lower Extremity     ** Per dtg report, pt is WBAT on (L) LE following conservative management of achilles injury but hasn't had any therapy since being out of boot **       Required Braces/Orthotics: no braces required   [] Right  [] Left  Positional Restrictions:no positional restrictions    Pre-Admission Information Lives With: alone. Comment: daughters take turns weekly 24/7 to stay with him since achilles injury in June                      Type of Home: house  Home Layout: one level  Home Access: level entry with ramp   Bathroom Layout: walker accessible, walk in shower  (not wc accessible)  Bathroom Equipment: grab bars in shower, tub transfer bench, hand held shower head, toilet raiser, BSC  Toilet Height: standard height  Home Equipment: rolling walker, transport chair, bed railing . Comment: typically uses 3 wheel walker   Transfer Assistance: Independent without use of device; Since June performing pivot transfers with CGA using grab bars  Ambulation Assistance:Independent with 3WW, since achilles injury not walking but only transferring via stand pivots with CGA; was unable to propel transport chair himself  ADL Assistance: Typically independent with ADLs prior to achilles injury;  Since June, requires assistance with sponge bathing, requires assistance with dressing, requires assistance with grooming, requires assistance with toileting  IADL Assistance: requires assistance with all homemaking tasks   Active :        [] Yes                 [x] No  Hand Dominance: [] Left                 [x] Right  Current Employment: retired. Occupation:  at [employer]: [address]: 1 fall leading to ruptured achilles in June and was NWB and then in a boot. Pt had not started any PT for his Left achilles prior to current hospitalization. Subjective  General: Supine in bed upon arrival. Agreeable to therapy session. Daughter present. Pain: 8/10. Location: low back and (R) LE  Pain Interventions: pain medication in place prior to arrival      Functional Mobility  Bed Mobility  Supine to Sit: Independent  Sit to Supine: Independent  Rolling Left: Independent  Rolling Right: Independent  Scooting: Independent  Comments: Bed mobility completed on flat bed without HR.   In addition patient completes supine => sit with HOB partially elevated and HR to begin session at modified independent level. Transfers  Sit to stand transfer: stand by assistance  Stand to sit transfer: stand by assistance  Stand step transfer: contact guard assistance, . Comment all transfers completed with use of RW. Consistently requires VC for hand placement and safe turning sequence but demonstrates improved balance on this date. Ambulation  Surface:level surface  Assistive Device: rolling walker  Assistance: contact guard assistance  Distance: 235 ft + 20 ft + multiple short distances within session  Gait Mechanics: Reciprocal gait pattern with reduced elisha and forward flexed posture + head down positioning. Continued (R) LE drop foot but improved clearing (R) LE with increased hip/knee flexion. Comments:  VC for postural facilitation and safe walker management especially while turning. Ambulation Trial 2  Surface:carpet  Assistive Device: rolling walker  Assistance: contact guard assistance  Distance: 20 ft including turning  Gait Mechanics: Same mechanics as level surface, mild increased difficulty clearing (R) LE but completes with increased hip/knee flexion. Stair Mobility  Number of Steps: 12  Step Height: 6 inch  Hand Rails: (B) handrail  Assistance: stand by assistance  Comments: Step to pattern with VC for correct sequence. Increased hip/knee flexion to clear (R) LE on step surface. Balance  Static Sitting Balance: fair (+): maintains balance at SBA/supervision without use of UE support  Dynamic Sitting Balance: fair (+): maintains balance at SBA/supervision without use of UE support  Static Standing Balance: fair (-): maintains balance at CGA with use of UE support  Dynamic Standing Balance: fair (-): maintains balance at [id number] with use of UE support  Patient completes object retrieval from floor in standing position at contact guard assistance with use of RW and reacher.   Comments: 2nd Session:     Pt supine in bed upon arrival, agreeable to PT session. Supine => sit transfer completed at modified independent level with use of HR and HOB elevated. Sit <=> stand transfers completed throughout session at [facility] with occasional VC for hand placement. Pt ambulates 150 ft using RW at [facility] with same mechanics as first session. Car transfer completed with use of RW at [facility], VC for sequence and hand placement. Stand step transfer completed w/c <=> seated stepper and w/c => bed within session using RW at [facility]. Seated stepper L1 x 9 min to improve strength and cardiovascular endurance. Upon completion of therapy session, pt requests to return to bed. Sit => supine transfer completed at modified independent level. Pt in bed with bed alarm activated and call light within reach. Discharge recommendations reviewed. Cognition  Overall Cognitive Status: Impaired  Arousal/Alterness: appropriate responses to stimuli  Following Commands: follows one step commands consistently, follows multi step commands with repetition  Safety Judgement: decreased awareness of need for safety  Problem Solving: assistance required to generate solutions, assistance required to implement solutions  Sequencing: requires cues for some  Orientation:    alert and oriented x 4  Command Following:   accurately follows one step commands    Education  Barriers To Learning: hearing  Patient Education: patient educated on goals, PT role and benefits, plan of care, general safety, functional mobility training, proper use of assistive device/equipment, transfer training  Learning Assessment:  patient verbalizes understanding, reinforcement completed throughout stay secondary to [facility] impairments.     Assessment  Activity Tolerance: Limited by fatigue and pain resulting in increased rest breaks  Impairments Requiring Therapeutic Intervention: decreased functional mobility, decreased ADL status, decreased ROM, decreased strength, decreased safety awareness, decreased endurance, decreased balance, increased pain, decreased posture  Prognosis: good  Clinical Assessment: Pt made excellent progress in response to therapy services, progressing in all goal areas during therapy POC. Patient regained WBAT in (B) LE within therapy stay allowing progression from w/c based mobility to RW use for all household mobility tasks. Pt was able to progress to CGA/SBA for all standing based functional mobility however was unable to regain independent status due to ongoing consistent VC for safety within mobility tasks. Patient is set to return home with family who was prior already providing 24 hr care with patient at lower level of mobility status due to secondary injury. Patient to d/c with transition to home health therapy services. Safety Interventions: patient left in chair, chair alarm in place, call light within reach, and gait belt    Plan  Frequency: 5 x/week, 75 min/day  Current Treatment Recommendations: strengthening, ROM, balance training, functional mobility training, transfer training, endurance training, and wheelchair mobility training    Goals  Patient Goals:  Return home with improved mobility   Short Term Goals:  Time Frame: 1.5 wks   Pt able to perform bed mobility modified independence.    Pt able to perform sliding board transfers with minimal assistance - goal discontinued secondary to upgraded WB status  Pt able to perform stand pivot transfers using grab bar with moderate assistance - goal met 8/26/2022  Pt able to navigate a wc 150 ft mod I - goal discontinued secondary to upgraded WB status  Pt able to ambulate 10 ft with LRAD at moderate assistance - goal met 8/26/2022    Long Term Goals: Time Frame 3 wks  Pt able to perform sliding board transfers with SBA - goal discontinued secondary to upgraded WB status  Pt able to perform stand pivot transfers using grab bar with SBA - goal met 9/5/2022  Pt able to ambulate 150 ft with LRAD at [facility] - goal not met   Pt able to perform a car transfer with SBA.  - goal not met      Therapy Session Time      Individual Group Co-treatment   Time In  0930       Time Out  1010       Minutes  40           Second Session Therapy Time:   Individual Concurrent Group Co-treatment   Time In  1330         Time Out  1409         Minutes 39           Timed Code Treatment Minutes:  40 + 39    Total Treatment Minutes: 79 minutes    Electronically Signed By: Judge Navarrete, PT

## 2022-09-05 NOTE — PROGRESS NOTES
[facility] Department   Phone: [phone]    Speech Therapy    [] Initial Evaluation            [x] Daily Treatment Note         [x] Discharge Summary      Patient: Mynor Forte   : 1930   MRN: [id number]          Admitting Diagnosis: Peripheral artery disease [facility])  Treatment Diagnosis:  Cognitive-Linguistic Deficits   Rehab Admission Date: 2022   Precautions/Restrictions: high fall risk, weight bearing  (WBAT, right low extremity)                 Pre-Admission Information   Living Status: Pt lives alone but has two adult daughters that switch off weeks and provide him 24/7 assistance   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)   Medication Management: :  []Primary   []Secondary [x]No - daughters manage   Finance Management:          []Primary   []Secondary [x]No - daughter manage   Active :                        []Yes         [x]No - stopped driving because of health   Hearing:                                 WFL, hard of hearing  Vision:                                    Vision Corrective Device: wears glasses for distance and reading        Daily Treatment Info:   Date: 2022     Tx session 1   Pain Pt received pain intervention prior to session    Subjective     Pt in bed for session, demonstrated improved processing this session and improved ability to give his personal history. Reported poor sleep last night due to frequent urination. Pt's eldest daughter was present and attentive. Plan for d/c to home tomorrow with ongoing 24/7 assist from his children. Pt with limited progress towards goals due to lack of endurance and fatigue. Processing speed and functional memory were also limited. Recommend to continue with functional cognitive tasks (use of memory book, daily orientation via calendar updates, and mental activity) in his home environment with family support.       Objective:  Goals    Goal 1: Pt will recall functional information (daily tasks, personal hx, etc.) with 80% accuracy. GOAL NOT MET    Personal information   - fluently and Independently named 5/5 children and 5/5 of his siblings   - Independently named his home address     Provided education re benefit of memory book for improved ability to recall extended family. Pt's daughter verbalized understanding. Reported they also use a digital clock for recall of orientation information at home. Provided pt and daughter with a memory notebook for home environment (monthly calendar, weekly schedule, medication list, bill pay list). Pt and daughter agreed to the benefit. Goal 2: Patient will complete functional problem-solving tasks for daily situations with 80% accuracy or given min cues. GOAL NOT MET    Goal not targeted this session. Goal 3: Pt will attend to multi-step direction tasks with min cues to achieve 80% accuracy. GOAL NOT MET    Pt demonstrated improved processing speed for auditory comprehension of wh- questions  - no repetitions required   - appropriate responses (subjective)          Other areas targeted:    Education:   Provided education regarding rationale for tasks and plan of care. Pt requires ongoing learning    Safety Devices: [x] Call light within reach  [x] Chair alarm activated  [] Bed alarm activated  [] Other:          Assessment/Progress/Discharge:    Discharge Summary (9/5/2022): Diagnosis: Plan for d/c to home tomorrow with ongoing 24/7 assist from his children. Pt with limited progress towards goals due to lack of endurance and fatigue. Processing speed and functional memory were also limited. Recommend to continue with functional cognitive tasks (use of memory book, daily orientation via calendar updates, and mental activity) in his home environment with family support. Current Diet Order:   ADULT DIET; Regular; 5 carb choices (75 gm/meal)  ADULT ORAL NUTRITION SUPPLEMENT; Lunch;  Low Calorie/High Protein Oral Supplement

## 2022-09-05 NOTE — PROGRESS NOTES
[facility] [address] Department   Phone: [phone]    Occupational Therapy    [] Initial Evaluation            [] Daily Treatment Note         [x] Discharge Summary      Patient: Brando Dejesus   : 1930   MRN: [id number]   Date of Service:  2022    Admitting Diagnosis:  Peripheral artery disease [facility])  Current Admission Summary:   80-year-old male with a history of A. fib previously on Xarelto but stopped secondary to a head bleed, cerebral amyloid angiopathy, diabetes who was admitted on  with right leg pain. He was transferred from [facility] to this facility on  for surgery with  [facility]. He underwent a right femoral to posterior tibial bypass on . His postoperative course has been overall unremarkable. He was evaluated by therapy and suggested to continue in an inpatient setting prior to returning home. Past Medical History:  has a past medical history of Afib ([facility] [address] Cerebral amyloid angiopathy ([facility] [address] Diabetes mellitus ([facility] [address] Heart aneurysm, and Vertigo. Past Surgical History:  has a past surgical history that includes Appendectomy; hernia repair; Lung surgery; Testicle surgery; and Femoral-tibial Bypass Graft (Right, 2022). Discharge Recommendations: Home with 24 hour supervision and assist and HHOT S3    DME Required For Discharge: patient has all required DME for discharge including TTB, grab bars in shower/near commode, reacher    Precautions/Restrictions: high fall risk  Weight Bearing Restrictions: weight bearing as tolerated  [] Right Upper Extremity  [] Left Upper Extremity [x] Right Lower Extremity  [x] Left Lower Extremity  Required Braces/Orthotics: no braces required  Positional Restrictions:no positional restrictions    Pre-Admission Information   Lives With: alone, .   Comment: daughters take turns weekly / to stay with him since achilles injury                       Type of Home: house  Home Layout: one level  Home Access: level entry with ramp   Bathroom Layout: walker accessible, walk in shower  (not wc accessible)  Bathroom Equipment: grab bars in shower, tub transfer bench, hand held shower head, toilet raiser, BSC  Toilet Height: standard height  Home Equipment: rolling walker, transport chair, bed railing . Comment: typically uses 3 wheel walker   Transfer Assistance: Independent without use of device  Ambulation Assistance:Independent with 3WW, since achilles injury not walking but only transferring via stand pivots with CGA; was unable to propel transport chair himself  ADL Assistance: Typically independent with ADLs prior to achilles injury;  requires assistance with sponge bathing, requires assistance with dressing, requires assistance with grooming, requires assistance with toileting  IADL Assistance: requires assistance with all homemaking tasks  Active :        [] Yes                 [x] No  Hand Dominance: [] Left                 [x] Right  Current Employment: retired. Occupation:  at [employer]: [address]: 1 fall leading to ruptured achilles in June and was NWB and then in a boot      Subjective  General: Pt in bed at entry, agreeable to session and shower. Prt's Dtr present in room throughout session.   Pain: pt did not rate pain- stating \"the usual in my back\"  Pain Interventions: pain medication in place prior to arrival and repositioned       Activities of Daily Living  Basic Activities of Daily Living  Feeding: Independent  Grooming: modified independent  Grooming Comments: face washing seated  Upper Extremity Bathing: setup assistance supervision  Lower Extremity Bathing: SBA to CGA   Bathing Comments: completed seated on cushioned TTB and with use of grab bar in stance, pt able to complete all aspects of bathing seated w/ 1 stance for drying buttocks  Upper Extremity Dressing: modified independent  Lower Extremity Dressing: minimal assistance requires verbal cueing  Dressing Equipment: reacher  Dressing Comments: dressing completed seated on TTB w/ stances using grab bar, pt required assist with R sock d/t ace wrap in place, Mary for thread LEs into pants   Toileting: supervision. Toileting Comments: in stance at commode w/ use of grab bar to void urine  General Comments: pt assisted to/from bathroom via w/c to conserve energy; toileting completed prior to bathing/dressing; pt set-up with meal at EOS. Instrumental Activities of Daily Living  No IADL completed on this date. Functional Mobility  Bed Mobility  Supine to Sit: modified independent  Comments: HOB elevated, use of HR  Transfers  Sit to stand transfer:stand by assistance  Stand to sit transfer: stand by assistance  Squat pivot transfer: stand by assistance, . Comment w/c >couch w/o device, use of arm rest for support, no LOB  Shower transfer: stand by assistance  Shower transfer equipment: tub transfer bench, grab bars  Shower transfer comments: to/from w/c, use of grab bars  Comments: pt impulsive at times but demo'd no LOB and good use of external UE supports    Functional Mobility:  Sitting Balance: supervision. Standing Balance: stand by assistance, contact guard assistance. Functional Mobility Comment: pt assisted via w/c within room for all ADLs in AM session   Comments:     Other Therapeutic Interventions        Second Session:  Pt seated on couch at entry, agreeable to session, repots unchanged back pain (chronic at baseline), up to date on medications. Pt completed sit<>stands at SBA. Pt ambulated couch>bathroom sink with RW at SBA- upon entering bathroom pt reported dizziness- CGA to sit back into w/c- BP 91/44, post rest break pt reported improved symptoms, BP 91/55 with HR 93 (RN notified at 8 [facility]). Pt completed oral care and shaving w/c level at sink at Nat w/ increased time. Pt elft on couch at EOS, telesitter in room, call light and needs in reach, RN notified. Cognition  Overall Cognitive Status: Impaired  Arousal/Alterness: appropriate responses to stimuli  Following Commands: follows one step commands consistently, follows multi step commands with repetition, follows multi step commands with increased time  Attention Span: attends with cues to redirect  Memory: decreased recall of recent events  Safety Judgement: decreased awareness of need for safety  Problem Solving: assistance required to generate solutions, assistance required to implement solutions  Insights: decreased awareness of deficits  Initiation: requires cues for some  Sequencing: requires cues for some  Comments: pt repeating info multiple times throughout session but easily re-directed  Orientation:    alert and oriented x 4  Command Following:   accurately follows one step commands     Education  Barriers To Learning: cognition  Patient Education: patient educated on goals, OT role and benefits, plan of care, ADL adaptive strategies, proper use of assistive device/equipment, adaptive device training, energy conservation, family education, transfer training  Learning Assessment:  patient verbalizes understanding, would benefit from continued reinforcement    Assessment  Activity Tolerance: pt required intermittent rest breaks throughout sessions this date  Impairments Requiring Therapeutic Intervention: decreased functional mobility, decreased ADL status, decreased strength, decreased endurance, decreased balance, decreased coordination, increased pain  Prognosis: good  Clinical Assessment: Pt presents below baseline function s/p femoral tibial bypass graft. Pthroughout stay pt has progressed well in therapy, able to complete transfers with assist of 1 at [facility]. Pt continues to require Mary for LB ADL tasks but is open to AE use and will require ongoing education/assit for problem solving uses.  Pt's overall activity tolerance has improved, pt has been limited by pain throughout stay but participates within session w/ extended rest breaks. Pt's family is supportigve and is able to provide assistance upon d/c. Family educaton has been completed addressing ADL status and mobility. Pt con't to benefit from OT services to maximize patients safety and independence with ADLs, transfers and functional mobility. HHOT at d/c is recommended along with continued 24/7 SUP and assist from family. Safety Interventions: patient left in chair, call light within reach, gait belt, patient at risk for falls, nurse notified, and family/caregiver present    Plan  Frequency: 5 x/week, 75 min/day  Current Treatment Recommendations: strengthening, balance training, functional mobility training, transfer training, endurance training, wheelchair mobility training, patient/caregiver education, and ADL/self-care training    Goals  Patient Goals: not stated    Short Term Goals: To be completed in: 2 weeks  Patient will complete upper body ADL at supervision - goal met 8/29  Patient will complete lower body ADL at minimal assistance - goal met 9/5  Patient will complete toileting at minimal assistance - goal met 9/5  Patient will complete grooming at supervision - goal met 8/29   Patient will complete functional transfers at minimal assistance -goal met 8/26     Long Term Goals:   To be completed in: 3 weeks   Patient will complete lower body ADL at stand by assistance - goal not met   Patient will complete toileting at stand by assistance--  partially met, able to void in stance at SBA, variable assist required for olive care needs post BM  Patient will complete functional transfers at stand by assistance-- goal met   Patient will complete functional mobility at stand by assistance - goal met   Patient to gather and transport IADL items at stand by assistance- goal not met          Therapy Session Time  First Session Therapy Time:   Individual Concurrent Group Co-treatment   Time In 0730        Time Out 0830        Minutes 60 Individual Concurrent Group Co-treatment   Time In 1045        Time Out 1107        Minutes 22          Timed Code Treatment Minutes:  60 + 22    Total Treatment Minutes:  7 Eduin Patrick OTR/L #[id number]

## 2022-09-05 NOTE — PROGRESS NOTES
were also limited. Recommend to continue with functional cognitive tasks (use of memory book, daily orientation via calendar updates, and mental activity) in his home environment with family support. Body mass index is 22.18 kg/m². 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 [address]       Plan:   PAD: S//p right femoral to posterior tibial bypass on 8/19 with Dr. Love Mejia. ASA, crestor. 50% weightbearing. PT/OT. Cipro for cellulitis. A. Fib: AC held 2/2 prior bleed. HTN: lisinopril 40     DM: Started metformin 500. (home regimen metformin 1000 BID). D/c'd SSI and POCT. Monitor on routine labs. Hypokalemia: supplemented     HTN: Lisinopril 40     BPH: proscar, flomax. Parker placed 2/2 retention. VT. Continue bladder protocol    Hypomagnesemia: supplement     Bowels: Per protocol  Bladder: Per protocol   Sleep: remeron scheduled  Pain: tylenol, maylin PRN   DVT PPx: SCD  ROYCE: 9/6      Shaila Odonnell MD  9/5/2022, 11:02 AM    * This document was created using dictation software. While all precautions were taken to ensure accuracy, errors may have occurred. Please disregard any typographical errors.

## 2022-09-05 NOTE — PLAN OF CARE
Problem: Discharge Planning  Goal: Discharge to home or other facility with appropriate resources  9/5/2022 1104 by Antonio Gonzalez RN  Outcome: Progressing  9/5/2022 0815 by Yaritza Preston RN  Outcome: Progressing  Flowsheets (Taken 9/4/2022 2330)  Discharge to home or other facility with appropriate resources:   Identify barriers to discharge with patient and caregiver   Identify discharge learning needs (meds, wound care, etc)     Problem: Skin/Tissue Integrity  Goal: Absence of new skin breakdown  Description: 1. Monitor for areas of redness and/or skin breakdown  2. Assess vascular access sites hourly  3. Every 4-6 hours minimum:  Change oxygen saturation probe site  4. Every 4-6 hours:  If on nasal continuous positive airway pressure, respiratory therapy assess nares and determine need for appliance change or resting period.   9/5/2022 1104 by Antonio Gonzalez RN  Outcome: Progressing  9/5/2022 0815 by Yaritza Preston RN  Outcome: Progressing     Problem: ABCDS Injury Assessment  Goal: Absence of physical injury  9/5/2022 1104 by Antonio Gonzalez RN  Outcome: Progressing  9/5/2022 0815 by Yaritza Preston RN  Outcome: Progressing  Flowsheets (Taken 9/5/2022 0617)  Absence of Physical Injury: Implement safety measures based on patient assessment     Problem: Safety - Adult  Goal: Free from fall injury  9/5/2022 1104 by Antonio Gonzalez RN  Outcome: Progressing  9/5/2022 0815 by Yaritza Preston RN  Outcome: Progressing  Flowsheets (Taken 9/5/2022 0617)  Free From Fall Injury: Instruct family/caregiver on patient safety     Problem: Chronic Conditions and Co-morbidities  Goal: Patient's chronic conditions and co-morbidity symptoms are monitored and maintained or improved  9/5/2022 1104 by Antonio Gonzalez RN  Outcome: Progressing  9/5/2022 0815 by Yaritza Preston RN  Outcome: Progressing  Flowsheets (Taken 9/4/2022 2330)  Care Plan - Patient's Chronic Conditions and Co-Morbidity Symptoms are Monitored and Maintained or Improved: Monitor and assess patient's chronic conditions and comorbid symptoms for stability, deterioration, or improvement     Problem: Nutrition Deficit:  Goal: Optimize nutritional status  9/5/2022 1104 by Arnol Patterson RN  Outcome: Progressing  9/5/2022 0815 by Jose Rafael Jones RN  Outcome: Progressing     Problem: Pain  Goal: Verbalizes/displays adequate comfort level or baseline comfort level  9/5/2022 1104 by Arnol Patterson RN  Outcome: Progressing  9/5/2022 0815 by Jose Rafael Jones RN  Outcome: Progressing

## 2022-09-05 NOTE — PLAN OF CARE
Problem: Discharge Planning  Goal: Discharge to home or other facility with appropriate resources  Outcome: Progressing  Flowsheets (Taken 9/4/2022 2330)  Discharge to home or other facility with appropriate resources:   Identify barriers to discharge with patient and caregiver   Identify discharge learning needs (meds, wound care, etc)     Problem: Skin/Tissue Integrity  Goal: Absence of new skin breakdown  Description: 1. Monitor for areas of redness and/or skin breakdown  2. Assess vascular access sites hourly  3. Every 4-6 hours minimum:  Change oxygen saturation probe site  4. Every 4-6 hours:  If on nasal continuous positive airway pressure, respiratory therapy assess nares and determine need for appliance change or resting period.   Outcome: Progressing     Problem: ABCDS Injury Assessment  Goal: Absence of physical injury  Outcome: Progressing  Flowsheets (Taken 9/5/2022 0617)  Absence of Physical Injury: Implement safety measures based on patient assessment     Problem: Safety - Adult  Goal: Free from fall injury  Outcome: Progressing  Flowsheets (Taken 9/5/2022 0617)  Free From Fall Injury: Instruct family/caregiver on patient safety     Problem: Chronic Conditions and Co-morbidities  Goal: Patient's chronic conditions and co-morbidity symptoms are monitored and maintained or improved  Outcome: Progressing  Flowsheets (Taken 9/4/2022 2330)  Care Plan - Patient's Chronic Conditions and Co-Morbidity Symptoms are Monitored and Maintained or Improved: Monitor and assess patient's chronic conditions and comorbid symptoms for stability, deterioration, or improvement     Problem: Nutrition Deficit:  Goal: Optimize nutritional status  Outcome: Progressing     Problem: Pain  Goal: Verbalizes/displays adequate comfort level or baseline comfort level  Outcome: Progressing

## 2022-09-06 VITALS
RESPIRATION RATE: 18 BRPM | WEIGHT: 163.56 LBS | TEMPERATURE: 97.6 F | HEART RATE: 87 BPM | OXYGEN SATURATION: 98 % | HEIGHT: 72 IN | DIASTOLIC BLOOD PRESSURE: 60 MMHG | SYSTOLIC BLOOD PRESSURE: 103 MMHG | BODY MASS INDEX: 22.15 KG/M2

## 2022-09-06 PROCEDURE — APPSS30 APP SPLIT SHARED TIME 16-30 MINUTES: Performed by: NURSE PRACTITIONER

## 2022-09-06 PROCEDURE — APPNB30 APP NON BILLABLE TIME 0-30 MINS: Performed by: NURSE PRACTITIONER

## 2022-09-06 PROCEDURE — 6370000000 HC RX 637 (ALT 250 FOR IP): Performed by: PHYSICAL MEDICINE & REHABILITATION

## 2022-09-06 RX ADMIN — OXYCODONE AND ACETAMINOPHEN 2 TABLET: 5; 325 TABLET ORAL at 06:34

## 2022-09-06 RX ADMIN — OXYCODONE AND ACETAMINOPHEN 2 TABLET: 5; 325 TABLET ORAL at 11:54

## 2022-09-06 RX ADMIN — ROSUVASTATIN 10 MG: 10 TABLET, FILM COATED ORAL at 09:58

## 2022-09-06 RX ADMIN — PANTOPRAZOLE SODIUM 40 MG: 40 TABLET, DELAYED RELEASE ORAL at 06:34

## 2022-09-06 RX ADMIN — LISINOPRIL 40 MG: 20 TABLET ORAL at 09:58

## 2022-09-06 RX ADMIN — ASPIRIN 81 MG: 81 TABLET, COATED ORAL at 09:58

## 2022-09-06 RX ADMIN — SENNOSIDES AND DOCUSATE SODIUM 2 TABLET: 50; 8.6 TABLET ORAL at 09:58

## 2022-09-06 RX ADMIN — FINASTERIDE 5 MG: 5 TABLET, FILM COATED ORAL at 10:00

## 2022-09-06 RX ADMIN — METFORMIN HYDROCHLORIDE 500 MG: 500 TABLET ORAL at 10:02

## 2022-09-06 ASSESSMENT — PAIN DESCRIPTION - ORIENTATION: ORIENTATION: LOWER

## 2022-09-06 ASSESSMENT — PAIN DESCRIPTION - DESCRIPTORS
DESCRIPTORS: ACHING
DESCRIPTORS: ACHING

## 2022-09-06 ASSESSMENT — PAIN DESCRIPTION - LOCATION
LOCATION: BACK
LOCATION: BACK

## 2022-09-06 ASSESSMENT — PAIN SCALES - WONG BAKER
WONGBAKER_NUMERICALRESPONSE: 4

## 2022-09-06 ASSESSMENT — PAIN SCALES - GENERAL
PAINLEVEL_OUTOF10: 6
PAINLEVEL_OUTOF10: 5

## 2022-09-06 NOTE — PROGRESS NOTES
CLINICAL PHARMACY NOTE: MEDS TO BEDS    Total # of Prescriptions Filled: 3   The following medications were delivered to the patient:  Finasteride 5 mg  Percocet 5-325 mg  Lisinopril 40 mg    Additional Documentation:  Delivered to Patient daughter=Signed  Ok to be delivered per Keysha Snlel [facility]

## 2022-09-06 NOTE — PROGRESS NOTES
The pt up in chair excited to be discharged to home today. His daughter at bed side. Reviewed and given AVS and med rec. Answered all of the daughter's questions. Medications except 81mg aspirin were delivered to the daughter from out patient pharmacy. The pt discharged to home.

## 2022-09-06 NOTE — PROGRESS NOTES
Pt urinated multiple times tonight with a long period of delayed start to urine stream each time. Pt transferring well. Pt responding well to pain med dosage.

## 2022-09-06 NOTE — DISCHARGE SUMMARY
Physical Medicine & Rehabilitation  Discharge Summary     Patient Identification:  Marlyne Osgood  : 1930  Admit date: 2022  Discharge date: 2022   Attending provider: Isaiah Elkins MD        Primary care provider: Cathie Farley     Discharge Diagnoses:   Patient Active Problem List   Diagnosis    Pleural plaque    Shortness of breath    Benign essential HTN    Hematoma    Hyperlipidemia    DMII (diabetes mellitus, type 2) ([facility] [address]    Pain of right lower extremity due to ischemia    Edema of right lower leg    Cerebral amyloid angiopathy (CODE)    Atrial fibrillation (HCC)    Ischemia of right lower extremity    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)     Inpatient Rehabilitation Course:   Marlyne Osgood is a 80 y.o. male admitted to inpatient rehabilitation on 2022 s/p Peripheral artery disease ([facility] [address] The patient participated in an aggressive multidisciplinary inpatient rehabilitation program involving 3 hours of therapy per day, at least 5 days per week. PAD: S//p right femoral to posterior tibial bypass on  with Dr. Lani Betancur. ASA, crestor. 50% weightbearing. PT/OT. Cipro for cellulitis completed. A. Fib: AC held 2/2 prior bleed. HTN: lisinopril 40     DM: Started metformin 500. (home regimen metformin 1000 BID). D/c'd SSI and POCT. Monitor on routine labs. Hypokalemia: supplemented     HTN: Lisinopril 40     BPH: proscar, flomax. Parker placed 2/2 retention. VT.  Continue bladder protocol     Hypomagnesemia: supplement    Discharge Exam:  Constitutional: Pleasant, no distress  Head: Normocephalic  Eyes: Conjunctiva noninjected  Pulm: CTA bilat  CV: No murmurs noted  Abd: Soft, nontender  Ext: No edema  Neuro: Alert, fully oriented, appropriate   MSK: No joint abnormalities noted     Significant Diagnostics:   Lab Results   Component Value Date    CREATININE 0.7 (L) 09/05/2022    BUN 6 (L) 09/05/2022     09/05/2022    K 3.7 09/05/2022     09/05/2022    CO2 29 09/05/2022       Lab Results   Component Value Date    WBC 6.6 09/05/2022    HGB 12.0 (L) 09/05/2022    HCT 35.6 (L) 09/05/2022    MCV 91.1 09/05/2022     (H) 09/05/2022       Disposition:  Home in stable condition    Follow-up:  See after visit summary from hospitalization    Discharge Medications:     Medication List        START taking these medications      aspirin 81 MG EC tablet  Take 1 tablet by mouth daily  Notes to patient: Uses: moderate pain. Cardiovascular issues  Side effects; bleeding issues, nausea      finasteride 5 MG tablet  Commonly known as: PROSCAR  Take 1 tablet by mouth daily  Notes to patient: Uses: shrink enlarged prostate  Side Effects: postural hypotension, dizziness, ringing in ears            CHANGE how you take these medications      lisinopril 40 MG tablet  Commonly known as: PRINIVIL;ZESTRIL  Take 1 tablet by mouth daily  What changed:   medication strength  how much to take  Notes to patient: Uses: to treat high blood pressure (hypertention)  Side Effects: dizziness, sweating, nausea, vomiting, diarrhea     oxyCODONE-acetaminophen 5-325 MG per tablet  Commonly known as: PERCOCET  Take 1 tablet by mouth every 4 hours as needed for Pain for up to 5 days.   What changed: when to take this  Notes to patient: Uses; to help relieve moderate to severe pain   Side effects;nausea,vomitting,constipation,lightheadness             CONTINUE taking these medications      metFORMIN 500 MG tablet  Commonly known as: GLUCOPHAGE  Notes to patient: Uses: to treat high blood sugars  Side Effects: nausea, vomiting, diarrhea, stomachache metallic taste in mouth     mirtazapine 15 MG tablet  Commonly known as: REMERON  Notes to patient: Uses: to treat depression  Side Effects: drowsiness, weight gain, dizziness     omeprazole 40 MG delayed release capsule  Commonly known as: PRILOSEC  Notes to patient: Uses : to treat conditions too much acid production in your stomach   Side effects : Headache , Diarrhea, Nausea     rosuvastatin 10 MG tablet  Commonly known as: CRESTOR  Notes to patient: Uses: Lower cholesterol  Side EffectsHeadache, nausea, abdominal pain     tamsulosin 0.4 MG capsule  Commonly known as: FLOMAX  Notes to patient: Uses: by men to treat symptoms of enlarged prostate  Side effects; dizziness, lightheadedness,stuffy/running nose            STOP taking these medications      insulin glargine 100 UNIT/ML injection vial  Commonly known as: LANTUS               Where to Get Your Medications        These medications were sent to [facility], [address] [place] [address], [address]      Phone: [phone]   aspirin 81 MG EC tablet  finasteride 5 MG tablet  lisinopril 40 MG tablet  oxyCODONE-acetaminophen 5-325 MG per tablet         I spent over 35 minutes on this discharge encounter between counseling, coordination of care, and medication reconciliation.   To comply with [facility] by R.II.4.1: Discharge order placed in advance to facilitate discharge planning with rehab team.     Pineda Hall MD

## 2022-09-06 NOTE — PLAN OF CARE
Problem: Discharge Planning  Goal: Discharge to home or other facility with appropriate resources  Outcome: Completed     Problem: Skin/Tissue Integrity  Goal: Absence of new skin breakdown  Description: 1. Monitor for areas of redness and/or skin breakdown  2. Assess vascular access sites hourly  3. Every 4-6 hours minimum:  Change oxygen saturation probe site  4. Every 4-6 hours:  If on nasal continuous positive airway pressure, respiratory therapy assess nares and determine need for appliance change or resting period.   Outcome: Completed     Problem: ABCDS Injury Assessment  Goal: Absence of physical injury  Outcome: Completed     Problem: Safety - Adult  Goal: Free from fall injury  Outcome: Completed     Problem: Chronic Conditions and Co-morbidities  Goal: Patient's chronic conditions and co-morbidity symptoms are monitored and maintained or improved  Outcome: Completed     Problem: Nutrition Deficit:  Goal: Optimize nutritional status  Outcome: Completed     Problem: Pain  Goal: Verbalizes/displays adequate comfort level or baseline comfort level  Outcome: Completed

## 2022-09-06 NOTE — PROGRESS NOTES
VASCULAR    Continues to do well, planning to discharge today. Erythema at ankle improved with sutures intact. Finished 10 day course of oral Cipro. Continue with full weight bearing. Follow up with Dr. Dionne Lane at [facility] in 2 weeks. Call the office at [phone] for appointment.     Electronically signed by MANDEEP Tsai CNP on 9/6/2022 at 1:05 PM

## 2022-09-28 ENCOUNTER — OFFICE VISIT (OUTPATIENT)
Dept: VASCULAR SURGERY | Age: 87
End: 2022-09-28

## 2022-09-28 VITALS — HEIGHT: 72 IN | BODY MASS INDEX: 22.18 KG/M2

## 2022-09-28 DIAGNOSIS — I70.229 ATHEROSCLEROSIS OF ARTERY OF EXTREMITY WITH REST PAIN (HCC): Primary | ICD-10-CM

## 2022-09-28 PROCEDURE — 99024 POSTOP FOLLOW-UP VISIT: CPT | Performed by: SURGERY

## 2022-09-28 RX ORDER — MORPHINE SULFATE 15 MG/1
15 TABLET, FILM COATED, EXTENDED RELEASE ORAL 2 TIMES DAILY
COMMUNITY
Start: 2022-09-12

## 2022-09-28 NOTE — LETTER
[facility] ([facility]) - Vascular and Endovascular Surgeons, [address]  [address] [address]  Phone: [phone]  Fax: [phone]    Vidya Amador MD    September 28, 2022     [facility] 16. [address]  [address] [facility] [address]    Patient: Queen Mariana   MR Number: [id number]   YOB: 1930   Date of Visit: 9/28/2022       Dear Mj Levin:    I saw Mr. Noel Espinal in the office today approximately 6 weeks since he underwent a right leg bypass for limb salvage due to rest pain. He is doing quite well with a widely patent graft and we will enter him into our surveillance program for long-term follow-up. I will keep you appraised of his progress in the future. If you have any questions please feel free to contact me and please let me know if I can help you with any other patients in the future.     Sincerely,      Vidya Amador MD

## 2022-09-28 NOTE — PROGRESS NOTES
First OV S/P R fem-distal PT bypass for rest pain. Overall no issues. Daughters continue with R ace wraps to the knee. Weight bearing well. EXAM:  All incisions intact without erythema or drainage. Ankle sutures removed. Slight foot and calf swelling. Palpable graft pulse throughout without bruits. Good graft doppler and distal to ankle incision    A/P: S/P R fem-PT bypass. Patent. Continue weight bearing as tolerated. Elevate foot. Ace wrap. F/U 6 weeks for first GSS and OV.

## 2022-10-26 NOTE — PROGRESS NOTES
Pt rounded on this morning Q2h, whiteboard updated, pt given ice water and needs assessed. Pt sitting up in bed, c/o severe RLE pain, not being managed with the PRN analgesic. MD notified and Percocet order changed to 10mg q4h PRN, (see eMAR). Pt satisfied thus far, has no further needs or concerns at this time. Call light within reach, pt verbalized understanding to call prior to ambulating. Will continue to monitor and reassess.    Electronically signed by Ayan Connelly RN on 8/14/2022 at 10:21 AM (1) Female

## 2022-11-08 DIAGNOSIS — M79.604 PAIN OF RIGHT LOWER EXTREMITY DUE TO ISCHEMIA: ICD-10-CM

## 2022-11-08 DIAGNOSIS — I70.229 ATHEROSCLEROSIS OF ARTERY OF EXTREMITY WITH REST PAIN (HCC): Primary | ICD-10-CM

## 2022-11-08 DIAGNOSIS — I99.8 PAIN OF RIGHT LOWER EXTREMITY DUE TO ISCHEMIA: ICD-10-CM

## 2022-11-09 ENCOUNTER — TELEPHONE (OUTPATIENT)
Dept: VASCULAR SURGERY | Age: 87
End: 2022-11-09

## 2022-11-09 NOTE — TELEPHONE ENCOUNTER
Patient's daughter called to cancel and reschedule appt due to patient being ill. Patient was scheduled for a 5 week P/O graft scan and OV to follow. Please call patient's dtr back to advise when this can be rescheduled to.      Kathy Elliott [phone]

## 2022-11-16 ENCOUNTER — OFFICE VISIT (OUTPATIENT)
Dept: VASCULAR SURGERY | Age: 87
End: 2022-11-16

## 2022-11-16 ENCOUNTER — PROCEDURE VISIT (OUTPATIENT)
Dept: VASCULAR SURGERY | Age: 87
End: 2022-11-16

## 2022-11-16 VITALS
SYSTOLIC BLOOD PRESSURE: 128 MMHG | DIASTOLIC BLOOD PRESSURE: 60 MMHG | HEIGHT: 72 IN | WEIGHT: 158 LBS | BODY MASS INDEX: 21.4 KG/M2

## 2022-11-16 DIAGNOSIS — I70.229 ATHEROSCLEROSIS OF ARTERY OF EXTREMITY WITH REST PAIN (HCC): ICD-10-CM

## 2022-11-16 DIAGNOSIS — Z95.828 HISTORY OF ARTERIAL BYPASS OF LOWER LIMB: Primary | ICD-10-CM

## 2022-11-16 PROCEDURE — G8420 CALC BMI NORM PARAMETERS: HCPCS | Performed by: SURGERY

## 2022-11-16 PROCEDURE — 99024 POSTOP FOLLOW-UP VISIT: CPT | Performed by: SURGERY

## 2022-11-16 PROCEDURE — 4004F PT TOBACCO SCREEN RCVD TLK: CPT | Performed by: SURGERY

## 2022-11-16 PROCEDURE — 1123F ACP DISCUSS/DSCN MKR DOCD: CPT | Performed by: SURGERY

## 2022-11-16 PROCEDURE — G8427 DOCREV CUR MEDS BY ELIG CLIN: HCPCS | Performed by: SURGERY

## 2022-11-16 PROCEDURE — G8484 FLU IMMUNIZE NO ADMIN: HCPCS | Performed by: SURGERY

## 2022-11-16 NOTE — PROGRESS NOTES
Second OV S/P R fem-distal PT bypass for rest pain. Overall no issues. Pt continues with light stocking. Weight bearing well with outpt in house PT/OT - now discontinued per insurance    EXAM:  All incisions intact without erythema or drainage - well healed    Slight foot and calf swelling. Palpable graft pulse throughout without bruits. Good graft doppler and distal to ankle incision     GSS - widely patent with [facility] of 102 cm/sec    A/P: S/P R fem-PT bypass. Patent   Activity without restriction. Will attempt to get more PT/OT for continued recovery. Elevate foot. Stocking   F/U 3 months for GSS - explained plan for surveillance. All agreeable.

## 2022-11-18 ENCOUNTER — TELEPHONE (OUTPATIENT)
Dept: VASCULAR SURGERY | Age: 87
End: 2022-11-18

## 2022-11-18 NOTE — TELEPHONE ENCOUNTER
Patient's daughter called and stated the patient was see by Dr. Siva Garza yesterday and he recommended that she call and talk to Orion Chapa about getting her father set up for more Physical Therapy appointments. Please call to advise. Gael Tompkins [phone], She asked that you leave a message if she doesn't answer, she will be at a  today.

## 2022-11-18 NOTE — TELEPHONE ENCOUNTER
Spoke with daughter. Gordon was with [facility].    Will attempt to get more PT approved

## 2022-12-07 NOTE — TELEPHONE ENCOUNTER
Spoke with [facility] to follow up on more OT/PT for the patient. Patient was discharged as he met baseline requirements. Unless they are new developments ie pt fell, change in daily living activities, decrease in pts health, ins will not approve more visits for OT/PT. I did fax most recent office note from 11/16 and demographics to [phone] again. They will see what they can do.

## 2022-12-13 ENCOUNTER — TELEPHONE (OUTPATIENT)
Dept: VASCULAR SURGERY | Age: 87
End: 2022-12-13

## 2022-12-13 NOTE — TELEPHONE ENCOUNTER
Patient's daughter called and stated the patient woke up with his big toe contracted. She has questions regarding this matter for Dr. Lisette Phillips. Please call to advise.

## 2023-03-22 ENCOUNTER — PROCEDURE VISIT (OUTPATIENT)
Dept: VASCULAR SURGERY | Age: 88
End: 2023-03-22
Payer: MEDICARE

## 2023-03-22 DIAGNOSIS — I73.9 PVD (PERIPHERAL VASCULAR DISEASE) WITH CLAUDICATION (HCC): Primary | ICD-10-CM

## 2023-03-22 PROCEDURE — 93926 LOWER EXTREMITY STUDY: CPT | Performed by: SURGERY

## 2023-04-05 ENCOUNTER — OFFICE VISIT (OUTPATIENT)
Dept: VASCULAR SURGERY | Age: 88
End: 2023-04-05
Payer: MEDICARE

## 2023-04-05 VITALS — WEIGHT: 150 LBS | HEART RATE: 85 BPM | BODY MASS INDEX: 20.32 KG/M2 | HEIGHT: 72 IN

## 2023-04-05 DIAGNOSIS — Z95.828 HISTORY OF ARTERIAL BYPASS OF LOWER LIMB: ICD-10-CM

## 2023-04-05 DIAGNOSIS — Z48.812 ENCOUNTER FOR POSTOPERATIVE SURVEILLANCE OF VASCULAR BYPASS SURGERY: Primary | ICD-10-CM

## 2023-04-05 PROCEDURE — 1123F ACP DISCUSS/DSCN MKR DOCD: CPT | Performed by: SURGERY

## 2023-04-05 PROCEDURE — 4004F PT TOBACCO SCREEN RCVD TLK: CPT | Performed by: SURGERY

## 2023-04-05 PROCEDURE — G8420 CALC BMI NORM PARAMETERS: HCPCS | Performed by: SURGERY

## 2023-04-05 PROCEDURE — G8427 DOCREV CUR MEDS BY ELIG CLIN: HCPCS | Performed by: SURGERY

## 2023-04-05 PROCEDURE — 99212 OFFICE O/P EST SF 10 MIN: CPT | Performed by: SURGERY

## 2023-04-05 NOTE — PROGRESS NOTES
Third OV S/P R fem-distal PT bypass for rest pain 8/19/2023. Overall no issues. Pt continues with light stocking. Weight bearing well. Has damaged R great toe several times - seen by TRIP Campos DPM.    EXAM:  All incisions intact without erythema or drainage - well healed    Slight foot and calf swelling. Palpable graft pulse throughout without bruits. Good graft doppler and distal to ankle incision    R great toe with flexion deformity and small skin tear + subungal hematoma     GSS - widely patent with [facility] of 63 cm/sec; no graft abnormalities    A/P: S/P R fem-PT bypass. Patent   Continue F/U with DPM.   F/U with surveillance graft scan in 3 months - will call result.

## 2023-07-05 ENCOUNTER — PROCEDURE VISIT (OUTPATIENT)
Dept: VASCULAR SURGERY | Age: 88
End: 2023-07-05
Payer: MEDICARE

## 2023-07-05 ENCOUNTER — OFFICE VISIT (OUTPATIENT)
Dept: VASCULAR SURGERY | Age: 88
End: 2023-07-05
Payer: MEDICARE

## 2023-07-05 VITALS — BODY MASS INDEX: 21.4 KG/M2 | WEIGHT: 158 LBS | HEIGHT: 72 IN

## 2023-07-05 DIAGNOSIS — Z48.812 ENCOUNTER FOR POSTOPERATIVE SURVEILLANCE OF VASCULAR BYPASS SURGERY: ICD-10-CM

## 2023-07-05 DIAGNOSIS — T82.858A BYPASS GRAFT STENOSIS, INITIAL ENCOUNTER (HCC): Primary | ICD-10-CM

## 2023-07-05 PROCEDURE — 1123F ACP DISCUSS/DSCN MKR DOCD: CPT | Performed by: SURGERY

## 2023-07-05 PROCEDURE — 93926 LOWER EXTREMITY STUDY: CPT | Performed by: SURGERY

## 2023-07-05 PROCEDURE — G8420 CALC BMI NORM PARAMETERS: HCPCS | Performed by: SURGERY

## 2023-07-05 PROCEDURE — G8427 DOCREV CUR MEDS BY ELIG CLIN: HCPCS | Performed by: SURGERY

## 2023-07-05 PROCEDURE — 99213 OFFICE O/P EST LOW 20 MIN: CPT | Performed by: SURGERY

## 2023-07-05 PROCEDURE — 4004F PT TOBACCO SCREEN RCVD TLK: CPT | Performed by: SURGERY

## 2023-07-05 NOTE — PROGRESS NOTES
Seen for surveillance purposes S/P R fem-distal PT bypass 8/19/2022 for limb salvage. Overall feels good without foot pain or sores. C/O R knee instability. Surveillance graft scan today. Past Medical History:   Diagnosis Date    Afib ([address])     Cerebral amyloid angiopathy ([address])     Diabetes mellitus ([address])     Heart aneurysm     Vertigo      Past Surgical History:   Procedure Laterality Date    APPENDECTOMY      FEMORAL-TIBIAL BYPASS GRAFT Right 8/19/2022    RIGHT FEMORAL TO POSTERIOR TIBIAL BYPASS GRAFT WITH VEIN GRAFT AND ARTERIOGRAM performed by Aaron Barrera MD at [address] [facility]       Allergies   Allergen Reactions    Sulfa Antibiotics Hives    Pcn [Penicillins] Rash     \"Rash and itchy\"     Current Outpatient Medications   Medication Sig Dispense Refill    morphine (MS CONTIN) 15 MG extended release tablet Take 15 mg by mouth 2 times daily. aspirin 81 MG EC tablet Take 1 tablet by mouth daily 30 tablet 3    lisinopril (PRINIVIL;ZESTRIL) 40 MG tablet Take 1 tablet by mouth daily (Patient taking differently: Take 0.5 tablets by mouth daily) 30 tablet 3    finasteride (PROSCAR) 5 MG tablet Take 1 tablet by mouth daily 30 tablet 3    metFORMIN (GLUCOPHAGE) 500 MG tablet Take 1,000 mg by mouth in the morning and 1,000 mg in the evening. Take with meals. mirtazapine (REMERON) 15 MG tablet Take 15 mg by mouth nightly 2 tablets      rosuvastatin (CRESTOR) 10 MG tablet Take 10 mg by mouth in the morning. omeprazole (PRILOSEC) 40 MG delayed release capsule Take 40 mg by mouth in the morning. tamsulosin (FLOMAX) 0.4 MG capsule Take 0.4 mg by mouth nightly        No current facility-administered medications for this visit. Social History     Socioeconomic History    Marital status:       Spouse name: Not on file    Number of children: Not on file    Years of education: Not on file    Highest education level: Not on file   Occupational

## 2023-07-11 ENCOUNTER — HOSPITAL ENCOUNTER (OUTPATIENT)
Dept: CARDIAC CATH/INVASIVE PROCEDURES | Age: 88
Discharge: HOME | End: 2023-07-11

## 2023-07-12 ENCOUNTER — TELEPHONE (OUTPATIENT)
Dept: CARDIOTHORACIC SURGERY | Age: 88
End: 2023-07-12

## 2023-07-20 NOTE — TELEPHONE ENCOUNTER
Spoke with Daughter. Patient is scheduled for his angiogram on 7/25 at [facility] at 10:30 with arrival time 9:00 am.     NPO after midnight. Hold aspirin and metformin the day of.

## 2023-07-24 ENCOUNTER — PREP FOR PROCEDURE (OUTPATIENT)
Dept: VASCULAR SURGERY | Age: 88
End: 2023-07-24

## 2023-07-25 ENCOUNTER — HOSPITAL ENCOUNTER (OUTPATIENT)
Dept: CARDIAC CATH/INVASIVE PROCEDURES | Age: 88
Discharge: HOME | End: 2023-07-25
Attending: SURGERY | Admitting: SURGERY
Payer: MEDICARE

## 2023-07-25 VITALS
DIASTOLIC BLOOD PRESSURE: 90 MMHG | OXYGEN SATURATION: 98 % | SYSTOLIC BLOOD PRESSURE: 195 MMHG | BODY MASS INDEX: 21.4 KG/M2 | HEIGHT: 72 IN | RESPIRATION RATE: 12 BRPM | HEART RATE: 81 BPM | WEIGHT: 158 LBS

## 2023-07-25 PROBLEM — T82.858A BYPASS GRAFT STENOSIS (HCC): Status: ACTIVE | Noted: 2023-07-25

## 2023-07-25 LAB
ANION GAP SERPL CALCULATED.3IONS-SCNC: 11 MMOL/L (ref 3–16)
BUN SERPL-MCNC: 13 MG/DL (ref 7–20)
CALCIUM SERPL-MCNC: 9.5 MG/DL (ref 8.3–10.6)
CHLORIDE SERPL-SCNC: 101 MMOL/L (ref 99–110)
CO2 SERPL-SCNC: 28 MMOL/L (ref 21–32)
CREAT SERPL-MCNC: 0.7 MG/DL (ref 0.8–1.3)
DEPRECATED RDW RBC AUTO: 15.1 % (ref 12.4–15.4)
GFR SERPLBLD CREATININE-BSD FMLA CKD-EPI: >60 ML/MIN/{1.73_M2}
GLUCOSE SERPL-MCNC: 134 MG/DL (ref 70–99)
HCT VFR BLD AUTO: 45.9 % (ref 40.5–52.5)
HGB BLD-MCNC: 15.1 G/DL (ref 13.5–17.5)
MCH RBC QN AUTO: 30.4 PG (ref 26–34)
MCHC RBC AUTO-ENTMCNC: 33 G/DL (ref 31–36)
MCV RBC AUTO: 92 FL (ref 80–100)
PLATELET # BLD AUTO: 248 K/UL (ref 135–450)
PMV BLD AUTO: 8.5 FL (ref 5–10.5)
POTASSIUM SERPL-SCNC: 4.5 MMOL/L (ref 3.5–5.1)
RBC # BLD AUTO: 4.99 M/UL (ref 4.2–5.9)
SODIUM SERPL-SCNC: 140 MMOL/L (ref 136–145)
WBC # BLD AUTO: 10.4 K/UL (ref 4–11)

## 2023-07-25 PROCEDURE — C1887 CATHETER, GUIDING: HCPCS

## 2023-07-25 PROCEDURE — 2500000003 HC RX 250 WO HCPCS

## 2023-07-25 PROCEDURE — 85027 COMPLETE CBC AUTOMATED: CPT

## 2023-07-25 PROCEDURE — 99153 MOD SED SAME PHYS/QHP EA: CPT

## 2023-07-25 PROCEDURE — 75710 ARTERY X-RAYS ARM/LEG: CPT

## 2023-07-25 PROCEDURE — 99152 MOD SED SAME PHYS/QHP 5/>YRS: CPT

## 2023-07-25 PROCEDURE — 6360000002 HC RX W HCPCS

## 2023-07-25 PROCEDURE — 36415 COLL VENOUS BLD VENIPUNCTURE: CPT

## 2023-07-25 PROCEDURE — 80048 BASIC METABOLIC PNL TOTAL CA: CPT

## 2023-07-25 PROCEDURE — 6360000004 HC RX CONTRAST MEDICATION: Performed by: SURGERY

## 2023-07-25 PROCEDURE — C1769 GUIDE WIRE: HCPCS

## 2023-07-25 PROCEDURE — 36246 INS CATH ABD/L-EXT ART 2ND: CPT

## 2023-07-25 PROCEDURE — C1894 INTRO/SHEATH, NON-LASER: HCPCS

## 2023-07-25 PROCEDURE — 2709999900 HC NON-CHARGEABLE SUPPLY

## 2023-07-25 RX ORDER — SODIUM CHLORIDE 0.9 % (FLUSH) 0.9 %
5-40 SYRINGE (ML) INJECTION PRN
Status: DISCONTINUED | OUTPATIENT
Start: 2023-07-25 | End: 2023-07-25 | Stop reason: HOSPADM

## 2023-07-25 RX ORDER — SODIUM CHLORIDE 9 MG/ML
INJECTION, SOLUTION INTRAVENOUS CONTINUOUS
Status: DISCONTINUED | OUTPATIENT
Start: 2023-07-25 | End: 2023-07-25 | Stop reason: HOSPADM

## 2023-07-25 RX ORDER — SODIUM CHLORIDE 9 MG/ML
INJECTION, SOLUTION INTRAVENOUS PRN
Status: DISCONTINUED | OUTPATIENT
Start: 2023-07-25 | End: 2023-07-25 | Stop reason: HOSPADM

## 2023-07-25 RX ORDER — SODIUM CHLORIDE 0.9 % (FLUSH) 0.9 %
5-40 SYRINGE (ML) INJECTION PRN
Status: CANCELLED | OUTPATIENT
Start: 2023-07-25

## 2023-07-25 RX ORDER — SODIUM CHLORIDE 9 MG/ML
INJECTION, SOLUTION INTRAVENOUS PRN
Status: CANCELLED | OUTPATIENT
Start: 2023-07-25

## 2023-07-25 RX ORDER — SODIUM CHLORIDE 0.9 % (FLUSH) 0.9 %
5-40 SYRINGE (ML) INJECTION EVERY 12 HOURS SCHEDULED
Status: CANCELLED | OUTPATIENT
Start: 2023-07-25

## 2023-07-25 RX ORDER — SODIUM CHLORIDE 0.9 % (FLUSH) 0.9 %
5-40 SYRINGE (ML) INJECTION EVERY 12 HOURS SCHEDULED
Status: DISCONTINUED | OUTPATIENT
Start: 2023-07-25 | End: 2023-07-25 | Stop reason: HOSPADM

## 2023-07-25 RX ADMIN — IOPAMIDOL 25 ML: 755 INJECTION, SOLUTION INTRAVENOUS at 15:09

## 2023-07-25 NOTE — DISCHARGE INSTRUCTIONS
PERIPHERAL ANGIOGRAM    Care of your puncture site:  Remove bandage 24 hours after the procedure. May shower in 24 hours but do not sit in a bathtub/pool of water for 5 days or until the wound is healed. Inspect the site daily and gently clean using soap and water while standing in the shower. Dry thoroughly and apply a Band-Aid that covers the entire site. Do not apply powder or lotion. Normal Observations:  Soreness or tenderness which may last one week. Mild oozing from the incision site. Possible bruising that could last 2 weeks. Activity:  You may resume driving 24 hours following the procedure. You may resume normal activity in 5 days or after the wound heals. Avoid lifting more than 10 pounds for 5 days or until the wound heals. Avoid strenuous exercise or activity for 1 week. Nutrition:  Regular diet. Drink at least 8 to 10 glasses of decaffeinated, non-alcoholic fluid for the next 24 hours to flush the x-ray dye used for your angiogram out of your body. Call your doctor immediately if your condition worsens, for any other concerns, for a follow-up appointment or if you experience any of the following:  Significant bleeding that does not stop after 10 minutes of applying firm pressure on the puncture site. Increased swelling on the groin or leg. Unusual pain, numbness, or tingling of the groin or down the leg. Any signs of infection such as: redness, yellow drainage at the site, swelling or pain. Other Instructions:  Hold Metformin or Metformin containing drugs for 48 hours after procedure. Dr. Marion La office will call you to schedule surgery - [phone].

## 2023-07-25 NOTE — H&P
Update History & Physical    The patient's History and Physical of July 5, 2023 was reviewed with the patient and I examined the patient. There was no change. The surgical site was confirmed by the patient and me. Plan: The risks, benefits, expected outcome, and alternative to the recommended procedure have been discussed with the patient. Patient understands and wants to proceed with the procedure.      Electronically signed by Bobbi Buenrostro MD on 7/25/2023 at 1:41 PM

## 2023-07-25 NOTE — BRIEF OP NOTE
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   Plan surgical revision in near future      Electronically signed by Karyn Marroquin MD on 7/25/2023 at 3:22 PM

## 2023-07-25 NOTE — PRE SEDATION
Sedation Pre-Procedure Note    Patient Name: Eriberto Hooper   YOB: 1930  Room/Bed: Cath/NONE  Medical Record Number: [id number]  Date: 7/25/2023   Time: 1:41 PM       Indication:  Graft stenosis S/P R fem-PT bypass    Consent: I have discussed with the patient and/or the patient representative the indication, alternatives, and the possible risks and/or complications of the planned procedure and the anesthesia methods. The patient and/or patient representative appear to understand and agree to proceed. Vital Signs:   Vitals:    07/25/23 1141   BP: (!) 195/90   Pulse: 81   Resp: 12   SpO2: 98%       Past Medical History:   has a past medical history of Afib ([address]), Cerebral amyloid angiopathy ([address]), Diabetes mellitus ([address]), Heart aneurysm, and Vertigo. Past Surgical History:   has a past surgical history that includes Appendectomy; hernia repair; Lung surgery; Testicle surgery; and Femoral-tibial Bypass Graft (Right, 8/19/2022). Medications:   Scheduled Meds:    sodium chloride flush  5-40 mL IntraVENous 2 times per day     Continuous Infusions:    sodium chloride       PRN Meds: sodium chloride flush, sodium chloride  Home Meds:   Prior to Admission medications    Medication Sig Start Date End Date Taking? Authorizing Provider   morphine (MS CONTIN) 15 MG extended release tablet Take 1 tablet by mouth 2 times daily.  9/12/22   Historical Provider, MD   aspirin 81 MG EC tablet Take 1 tablet by mouth daily 9/6/22   Margoth Aguila MD   lisinopril (PRINIVIL;ZESTRIL) 40 MG tablet Take 1 tablet by mouth daily  Patient taking differently: Take 0.5 tablets by mouth daily 9/5/22   Margoth Aguila MD   finasteride (PROSCAR) 5 MG tablet Take 1 tablet by mouth daily 9/6/22   Margoth Aguila MD   metFORMIN (GLUCOPHAGE) 500 MG tablet Take 2 tablets by mouth 2 times daily (with meals)    Historical Provider, MD   mirtazapine (REMERON) 15 MG tablet Take 1 tablet by mouth nightly 2 tablets    Historical

## 2023-07-26 NOTE — OP NOTE
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 PROCEDURE:  1. Ultrasound-guided left femoral catheterization. 2.  Selective right leg angiogram via catheter positioned the proximal  right iliac artery followed by direct imaging and catheterization of the  right femoral posterior tibial bypass graft. SURGEON:  Kaveh Onofre MD    ANESTHESIA:  1% lidocaine with conscious sedation (Versed 2 mg IV push  and fentanyl 100 mcg IV push). Total sedation time 35 minutes, under  continuous oxygen saturation, EKG and blood pressure monitoring as well  as independent nurse observation and monitoring. ESTIMATED BLOOD LOSS:  50 mL. HISTORY:  The patient is a 60-year-old gentleman who a year ago  underwent a right [id number] posterior tibial bypass for limb  salvage. He has been doing well and a duplex scan demonstrated a new  severe stenosis in the distal graft suggestive of greater than 75%  stenosis. It was recommended that he undergo an angiography for  confirmation and decision making as well as possible treatment. He  agreed understanding risks, benefits, and other options. TECHNIQUE:  The patient was brought to the angiogram department and  placed on the table in supine position.   After adequate induction of IV  sedation and attachment to continuous oxygen saturation, EKG and blood  pressure

## 2023-07-30 ENCOUNTER — PREP FOR PROCEDURE (OUTPATIENT)
Dept: VASCULAR SURGERY | Age: 88
End: 2023-07-30

## 2023-07-31 ENCOUNTER — HOSPITAL ENCOUNTER (INPATIENT)
Age: 88
LOS: 1 days | Discharge: HOME | DRG: 254 | End: 2023-08-01
Attending: SURGERY | Admitting: SURGERY
Payer: MEDICARE

## 2023-07-31 ENCOUNTER — ANESTHESIA EVENT (OUTPATIENT)
Dept: OPERATING ROOM | Age: 88
DRG: 254 | End: 2023-07-31
Payer: MEDICARE

## 2023-07-31 ENCOUNTER — ANESTHESIA (OUTPATIENT)
Dept: OPERATING ROOM | Age: 88
DRG: 254 | End: 2023-07-31
Payer: MEDICARE

## 2023-07-31 PROBLEM — T82.858A: Status: ACTIVE | Noted: 2023-07-31

## 2023-07-31 LAB
ABO + RH BLD: NORMAL
APTT BLD: 26.8 SEC (ref 22.7–35.9)
BLD GP AB SCN SERPL QL: NORMAL
GLUCOSE BLD-MCNC: 133 MG/DL (ref 70–99)
INR PPP: 1 (ref 0.84–1.16)
PERFORMED ON: ABNORMAL
PROTHROMBIN TIME: 13.2 SEC (ref 11.5–14.8)

## 2023-07-31 PROCEDURE — 7100000000 HC PACU RECOVERY - FIRST 15 MIN: Performed by: SURGERY

## 2023-07-31 PROCEDURE — 6370000000 HC RX 637 (ALT 250 FOR IP): Performed by: SURGERY

## 2023-07-31 PROCEDURE — 36415 COLL VENOUS BLD VENIPUNCTURE: CPT

## 2023-07-31 PROCEDURE — 2580000003 HC RX 258: Performed by: SURGERY

## 2023-07-31 PROCEDURE — 6360000002 HC RX W HCPCS: Performed by: SURGERY

## 2023-07-31 PROCEDURE — 2580000003 HC RX 258: Performed by: NURSE ANESTHETIST, CERTIFIED REGISTERED

## 2023-07-31 PROCEDURE — C9399 UNCLASSIFIED DRUGS OR BIOLOG: HCPCS | Performed by: NURSE ANESTHETIST, CERTIFIED REGISTERED

## 2023-07-31 PROCEDURE — 2500000003 HC RX 250 WO HCPCS: Performed by: NURSE ANESTHETIST, CERTIFIED REGISTERED

## 2023-07-31 PROCEDURE — 3700000000 HC ANESTHESIA ATTENDED CARE: Performed by: SURGERY

## 2023-07-31 PROCEDURE — 3600000004 HC SURGERY LEVEL 4 BASE: Performed by: SURGERY

## 2023-07-31 PROCEDURE — 3700000001 HC ADD 15 MINUTES (ANESTHESIA): Performed by: SURGERY

## 2023-07-31 PROCEDURE — 6360000002 HC RX W HCPCS: Performed by: NURSE ANESTHETIST, CERTIFIED REGISTERED

## 2023-07-31 PROCEDURE — 2580000003 HC RX 258

## 2023-07-31 PROCEDURE — 85730 THROMBOPLASTIN TIME PARTIAL: CPT

## 2023-07-31 PROCEDURE — 86923 COMPATIBILITY TEST ELECTRIC: CPT

## 2023-07-31 PROCEDURE — 3600000014 HC SURGERY LEVEL 4 ADDTL 15MIN: Performed by: SURGERY

## 2023-07-31 PROCEDURE — 7100000001 HC PACU RECOVERY - ADDTL 15 MIN: Performed by: SURGERY

## 2023-07-31 PROCEDURE — 86900 BLOOD TYPING SEROLOGIC ABO: CPT

## 2023-07-31 PROCEDURE — 1200000000 HC SEMI PRIVATE

## 2023-07-31 PROCEDURE — 2709999900 HC NON-CHARGEABLE SUPPLY: Performed by: SURGERY

## 2023-07-31 PROCEDURE — 6370000000 HC RX 637 (ALT 250 FOR IP): Performed by: ANESTHESIOLOGY

## 2023-07-31 PROCEDURE — 86901 BLOOD TYPING SEROLOGIC RH(D): CPT

## 2023-07-31 PROCEDURE — 041K09N BYPASS RIGHT FEMORAL ARTERY TO POSTERIOR TIBIAL ARTERY WITH AUTOLOGOUS VENOUS TISSUE, OPEN APPROACH: ICD-10-PCS | Performed by: SURGERY

## 2023-07-31 PROCEDURE — 35881 REVISE GRAFT W/VEIN: CPT | Performed by: SURGERY

## 2023-07-31 PROCEDURE — 85610 PROTHROMBIN TIME: CPT

## 2023-07-31 PROCEDURE — A4217 STERILE WATER/SALINE, 500 ML: HCPCS | Performed by: SURGERY

## 2023-07-31 PROCEDURE — 86850 RBC ANTIBODY SCREEN: CPT

## 2023-07-31 PROCEDURE — 2580000003 HC RX 258: Performed by: ANESTHESIOLOGY

## 2023-07-31 RX ORDER — MEPERIDINE HYDROCHLORIDE 25 MG/ML
12.5 INJECTION INTRAMUSCULAR; INTRAVENOUS; SUBCUTANEOUS EVERY 5 MIN PRN
Status: DISCONTINUED | OUTPATIENT
Start: 2023-07-31 | End: 2023-07-31 | Stop reason: HOSPADM

## 2023-07-31 RX ORDER — MORPHINE SULFATE 2 MG/ML
2 INJECTION, SOLUTION INTRAMUSCULAR; INTRAVENOUS
Status: DISCONTINUED | OUTPATIENT
Start: 2023-07-31 | End: 2023-08-01 | Stop reason: HOSPADM

## 2023-07-31 RX ORDER — SODIUM CHLORIDE 0.9 % (FLUSH) 0.9 %
5-40 SYRINGE (ML) INJECTION PRN
Status: DISCONTINUED | OUTPATIENT
Start: 2023-07-31 | End: 2023-07-31 | Stop reason: HOSPADM

## 2023-07-31 RX ORDER — PANTOPRAZOLE SODIUM 40 MG/1
40 TABLET, DELAYED RELEASE ORAL
Status: DISCONTINUED | OUTPATIENT
Start: 2023-08-01 | End: 2023-08-01 | Stop reason: HOSPADM

## 2023-07-31 RX ORDER — MORPHINE SULFATE 2 MG/ML
4 INJECTION, SOLUTION INTRAMUSCULAR; INTRAVENOUS
Status: DISCONTINUED | OUTPATIENT
Start: 2023-07-31 | End: 2023-08-01 | Stop reason: HOSPADM

## 2023-07-31 RX ORDER — HYDRALAZINE HYDROCHLORIDE 20 MG/ML
10 INJECTION INTRAMUSCULAR; INTRAVENOUS EVERY 6 HOURS PRN
Status: DISCONTINUED | OUTPATIENT
Start: 2023-07-31 | End: 2023-08-01 | Stop reason: HOSPADM

## 2023-07-31 RX ORDER — SODIUM CHLORIDE 0.9 % (FLUSH) 0.9 %
5-40 SYRINGE (ML) INJECTION PRN
Status: DISCONTINUED | OUTPATIENT
Start: 2023-07-31 | End: 2023-08-01 | Stop reason: HOSPADM

## 2023-07-31 RX ORDER — FINASTERIDE 5 MG/1
5 TABLET, FILM COATED ORAL DAILY
Status: DISCONTINUED | OUTPATIENT
Start: 2023-07-31 | End: 2023-08-01 | Stop reason: HOSPADM

## 2023-07-31 RX ORDER — SODIUM CHLORIDE 9 MG/ML
INJECTION, SOLUTION INTRAVENOUS PRN
Status: DISCONTINUED | OUTPATIENT
Start: 2023-07-31 | End: 2023-07-31 | Stop reason: HOSPADM

## 2023-07-31 RX ORDER — LISINOPRIL 20 MG/1
40 TABLET ORAL DAILY
Status: DISCONTINUED | OUTPATIENT
Start: 2023-07-31 | End: 2023-08-01 | Stop reason: HOSPADM

## 2023-07-31 RX ORDER — OXYCODONE HYDROCHLORIDE 5 MG/1
10 TABLET ORAL EVERY 4 HOURS PRN
Status: DISCONTINUED | OUTPATIENT
Start: 2023-07-31 | End: 2023-08-01 | Stop reason: HOSPADM

## 2023-07-31 RX ORDER — SODIUM CHLORIDE 9 MG/ML
INJECTION, SOLUTION INTRAVENOUS
Status: COMPLETED
Start: 2023-07-31 | End: 2023-07-31

## 2023-07-31 RX ORDER — FENTANYL CITRATE 50 UG/ML
50 INJECTION, SOLUTION INTRAMUSCULAR; INTRAVENOUS EVERY 5 MIN PRN
Status: DISCONTINUED | OUTPATIENT
Start: 2023-07-31 | End: 2023-07-31 | Stop reason: HOSPADM

## 2023-07-31 RX ORDER — ACETAMINOPHEN 500 MG
1000 TABLET ORAL ONCE
Status: COMPLETED | OUTPATIENT
Start: 2023-07-31 | End: 2023-07-31

## 2023-07-31 RX ORDER — MORPHINE SULFATE 15 MG/1
15 TABLET, FILM COATED, EXTENDED RELEASE ORAL 2 TIMES DAILY
Status: DISCONTINUED | OUTPATIENT
Start: 2023-07-31 | End: 2023-08-01 | Stop reason: HOSPADM

## 2023-07-31 RX ORDER — SODIUM CHLORIDE 0.9 % (FLUSH) 0.9 %
5-40 SYRINGE (ML) INJECTION EVERY 12 HOURS SCHEDULED
Status: DISCONTINUED | OUTPATIENT
Start: 2023-07-31 | End: 2023-08-01 | Stop reason: HOSPADM

## 2023-07-31 RX ORDER — SODIUM CHLORIDE 9 MG/ML
INJECTION, SOLUTION INTRAVENOUS PRN
Status: CANCELLED | OUTPATIENT
Start: 2023-07-31

## 2023-07-31 RX ORDER — TAMSULOSIN HYDROCHLORIDE 0.4 MG/1
0.4 CAPSULE ORAL NIGHTLY
Status: DISCONTINUED | OUTPATIENT
Start: 2023-07-31 | End: 2023-08-01 | Stop reason: HOSPADM

## 2023-07-31 RX ORDER — SODIUM CHLORIDE 0.9 % (FLUSH) 0.9 %
5-40 SYRINGE (ML) INJECTION EVERY 12 HOURS SCHEDULED
Status: CANCELLED | OUTPATIENT
Start: 2023-07-31

## 2023-07-31 RX ORDER — ONDANSETRON 2 MG/ML
4 INJECTION INTRAMUSCULAR; INTRAVENOUS
Status: DISCONTINUED | OUTPATIENT
Start: 2023-07-31 | End: 2023-07-31 | Stop reason: HOSPADM

## 2023-07-31 RX ORDER — SODIUM CHLORIDE 0.9 % (FLUSH) 0.9 %
5-40 SYRINGE (ML) INJECTION EVERY 12 HOURS SCHEDULED
Status: DISCONTINUED | OUTPATIENT
Start: 2023-07-31 | End: 2023-07-31 | Stop reason: HOSPADM

## 2023-07-31 RX ORDER — PROTAMINE SULFATE 10 MG/ML
INJECTION, SOLUTION INTRAVENOUS PRN
Status: DISCONTINUED | OUTPATIENT
Start: 2023-07-31 | End: 2023-07-31 | Stop reason: SDUPTHER

## 2023-07-31 RX ORDER — ROSUVASTATIN CALCIUM 10 MG/1
10 TABLET, COATED ORAL DAILY
Status: DISCONTINUED | OUTPATIENT
Start: 2023-07-31 | End: 2023-08-01 | Stop reason: HOSPADM

## 2023-07-31 RX ORDER — VECURONIUM BROMIDE 1 MG/ML
INJECTION, POWDER, LYOPHILIZED, FOR SOLUTION INTRAVENOUS PRN
Status: DISCONTINUED | OUTPATIENT
Start: 2023-07-31 | End: 2023-07-31 | Stop reason: SDUPTHER

## 2023-07-31 RX ORDER — OXYCODONE HYDROCHLORIDE 5 MG/1
5 TABLET ORAL PRN
Status: DISCONTINUED | OUTPATIENT
Start: 2023-07-31 | End: 2023-07-31 | Stop reason: HOSPADM

## 2023-07-31 RX ORDER — SODIUM CHLORIDE 9 MG/ML
INJECTION, SOLUTION INTRAVENOUS CONTINUOUS
Status: DISCONTINUED | OUTPATIENT
Start: 2023-07-31 | End: 2023-08-01

## 2023-07-31 RX ORDER — CEFAZOLIN 2 G/1
INJECTION, POWDER, FOR SOLUTION INTRAMUSCULAR; INTRAVENOUS
Status: DISPENSED
Start: 2023-07-31 | End: 2023-07-31

## 2023-07-31 RX ORDER — DEXAMETHASONE SODIUM PHOSPHATE 4 MG/ML
INJECTION, SOLUTION INTRA-ARTICULAR; INTRALESIONAL; INTRAMUSCULAR; INTRAVENOUS; SOFT TISSUE PRN
Status: DISCONTINUED | OUTPATIENT
Start: 2023-07-31 | End: 2023-07-31 | Stop reason: SDUPTHER

## 2023-07-31 RX ORDER — SODIUM CHLORIDE 9 MG/ML
INJECTION, SOLUTION INTRAVENOUS PRN
Status: COMPLETED | OUTPATIENT
Start: 2023-07-31 | End: 2023-07-31

## 2023-07-31 RX ORDER — HYDROMORPHONE HYDROCHLORIDE 2 MG/ML
0.25 INJECTION, SOLUTION INTRAMUSCULAR; INTRAVENOUS; SUBCUTANEOUS EVERY 5 MIN PRN
Status: DISCONTINUED | OUTPATIENT
Start: 2023-07-31 | End: 2023-07-31 | Stop reason: HOSPADM

## 2023-07-31 RX ORDER — ONDANSETRON 2 MG/ML
4 INJECTION INTRAMUSCULAR; INTRAVENOUS EVERY 6 HOURS PRN
Status: DISCONTINUED | OUTPATIENT
Start: 2023-07-31 | End: 2023-08-01 | Stop reason: HOSPADM

## 2023-07-31 RX ORDER — SODIUM CHLORIDE 9 MG/ML
INJECTION, SOLUTION INTRAVENOUS PRN
Status: DISCONTINUED | OUTPATIENT
Start: 2023-07-31 | End: 2023-08-01 | Stop reason: HOSPADM

## 2023-07-31 RX ORDER — ONDANSETRON 4 MG/1
4 TABLET, ORALLY DISINTEGRATING ORAL EVERY 8 HOURS PRN
Status: DISCONTINUED | OUTPATIENT
Start: 2023-07-31 | End: 2023-08-01 | Stop reason: HOSPADM

## 2023-07-31 RX ORDER — HEPARIN SODIUM 1000 [USP'U]/ML
INJECTION, SOLUTION INTRAVENOUS; SUBCUTANEOUS PRN
Status: DISCONTINUED | OUTPATIENT
Start: 2023-07-31 | End: 2023-07-31 | Stop reason: SDUPTHER

## 2023-07-31 RX ORDER — MIRTAZAPINE 15 MG/1
15 TABLET, FILM COATED ORAL NIGHTLY
Status: DISCONTINUED | OUTPATIENT
Start: 2023-07-31 | End: 2023-08-01 | Stop reason: HOSPADM

## 2023-07-31 RX ORDER — OXYCODONE HYDROCHLORIDE 5 MG/1
10 TABLET ORAL PRN
Status: DISCONTINUED | OUTPATIENT
Start: 2023-07-31 | End: 2023-07-31 | Stop reason: HOSPADM

## 2023-07-31 RX ORDER — LIDOCAINE HYDROCHLORIDE 20 MG/ML
INJECTION, SOLUTION INFILTRATION; PERINEURAL PRN
Status: DISCONTINUED | OUTPATIENT
Start: 2023-07-31 | End: 2023-07-31 | Stop reason: SDUPTHER

## 2023-07-31 RX ORDER — OXYCODONE HYDROCHLORIDE 5 MG/1
5 TABLET ORAL EVERY 4 HOURS PRN
Status: DISCONTINUED | OUTPATIENT
Start: 2023-07-31 | End: 2023-08-01 | Stop reason: HOSPADM

## 2023-07-31 RX ORDER — GLYCOPYRROLATE 0.2 MG/ML
INJECTION INTRAMUSCULAR; INTRAVENOUS PRN
Status: DISCONTINUED | OUTPATIENT
Start: 2023-07-31 | End: 2023-07-31 | Stop reason: SDUPTHER

## 2023-07-31 RX ORDER — FENTANYL CITRATE 50 UG/ML
INJECTION, SOLUTION INTRAMUSCULAR; INTRAVENOUS PRN
Status: DISCONTINUED | OUTPATIENT
Start: 2023-07-31 | End: 2023-07-31 | Stop reason: SDUPTHER

## 2023-07-31 RX ORDER — PROPOFOL 10 MG/ML
INJECTION, EMULSION INTRAVENOUS PRN
Status: DISCONTINUED | OUTPATIENT
Start: 2023-07-31 | End: 2023-07-31 | Stop reason: SDUPTHER

## 2023-07-31 RX ORDER — FAMOTIDINE 10 MG/ML
INJECTION, SOLUTION INTRAVENOUS PRN
Status: DISCONTINUED | OUTPATIENT
Start: 2023-07-31 | End: 2023-07-31 | Stop reason: SDUPTHER

## 2023-07-31 RX ORDER — ONDANSETRON 2 MG/ML
INJECTION INTRAMUSCULAR; INTRAVENOUS PRN
Status: DISCONTINUED | OUTPATIENT
Start: 2023-07-31 | End: 2023-07-31 | Stop reason: SDUPTHER

## 2023-07-31 RX ORDER — ASPIRIN 81 MG/1
81 TABLET ORAL DAILY
Status: DISCONTINUED | OUTPATIENT
Start: 2023-07-31 | End: 2023-08-01 | Stop reason: HOSPADM

## 2023-07-31 RX ORDER — SODIUM CHLORIDE 0.9 % (FLUSH) 0.9 %
5-40 SYRINGE (ML) INJECTION PRN
Status: CANCELLED | OUTPATIENT
Start: 2023-07-31

## 2023-07-31 RX ADMIN — METFORMIN HYDROCHLORIDE 1000 MG: 500 TABLET ORAL at 16:57

## 2023-07-31 RX ADMIN — PHENYLEPHRINE HYDROCHLORIDE 50 MCG: 10 INJECTION INTRAVENOUS at 10:43

## 2023-07-31 RX ADMIN — PHENYLEPHRINE HYDROCHLORIDE 100 MCG: 10 INJECTION INTRAVENOUS at 11:40

## 2023-07-31 RX ADMIN — ONDANSETRON 4 MG: 2 INJECTION INTRAMUSCULAR; INTRAVENOUS at 10:38

## 2023-07-31 RX ADMIN — PHENYLEPHRINE HYDROCHLORIDE 75 MCG/MIN: 10 INJECTION INTRAVENOUS at 10:46

## 2023-07-31 RX ADMIN — PROTAMINE SULFATE 10 MG: 10 INJECTION, SOLUTION INTRAVENOUS at 11:55

## 2023-07-31 RX ADMIN — GLYCOPYRROLATE 0.1 MG: 0.2 INJECTION, SOLUTION INTRAMUSCULAR; INTRAVENOUS at 11:19

## 2023-07-31 RX ADMIN — VECURONIUM BROMIDE 3 MG: 1 INJECTION, POWDER, LYOPHILIZED, FOR SOLUTION INTRAVENOUS at 10:53

## 2023-07-31 RX ADMIN — ACETAMINOPHEN 1000 MG: 500 TABLET ORAL at 10:03

## 2023-07-31 RX ADMIN — SODIUM CHLORIDE: 9 INJECTION, SOLUTION INTRAVENOUS at 10:13

## 2023-07-31 RX ADMIN — PROPOFOL 60 MG: 10 INJECTION, EMULSION INTRAVENOUS at 10:23

## 2023-07-31 RX ADMIN — SODIUM CHLORIDE: 9 INJECTION, SOLUTION INTRAVENOUS at 10:10

## 2023-07-31 RX ADMIN — VECURONIUM BROMIDE 3 MG: 1 INJECTION, POWDER, LYOPHILIZED, FOR SOLUTION INTRAVENOUS at 10:23

## 2023-07-31 RX ADMIN — SODIUM CHLORIDE: 9 INJECTION, SOLUTION INTRAVENOUS at 19:10

## 2023-07-31 RX ADMIN — HEPARIN SODIUM 3000 UNITS: 1000 INJECTION INTRAVENOUS; SUBCUTANEOUS at 10:50

## 2023-07-31 RX ADMIN — SODIUM CHLORIDE: 900 INJECTION INTRAVENOUS at 10:13

## 2023-07-31 RX ADMIN — FENTANYL CITRATE 25 MCG: 50 INJECTION, SOLUTION INTRAMUSCULAR; INTRAVENOUS at 10:29

## 2023-07-31 RX ADMIN — ROSUVASTATIN 10 MG: 10 TABLET, FILM COATED ORAL at 16:57

## 2023-07-31 RX ADMIN — CEFAZOLIN 2000 MG: 2 INJECTION, POWDER, FOR SOLUTION INTRAMUSCULAR; INTRAVENOUS at 17:07

## 2023-07-31 RX ADMIN — FENTANYL CITRATE 25 MCG: 50 INJECTION, SOLUTION INTRAMUSCULAR; INTRAVENOUS at 10:33

## 2023-07-31 RX ADMIN — MIRTAZAPINE 15 MG: 15 TABLET, FILM COATED ORAL at 20:22

## 2023-07-31 RX ADMIN — MORPHINE SULFATE 15 MG: 15 TABLET, FILM COATED, EXTENDED RELEASE ORAL at 20:22

## 2023-07-31 RX ADMIN — HYDRALAZINE HYDROCHLORIDE 10 MG: 20 INJECTION INTRAMUSCULAR; INTRAVENOUS at 18:35

## 2023-07-31 RX ADMIN — DEXAMETHASONE SODIUM PHOSPHATE 4 MG: 4 INJECTION, SOLUTION INTRAMUSCULAR; INTRAVENOUS at 10:34

## 2023-07-31 RX ADMIN — LIDOCAINE HYDROCHLORIDE 100 MG: 20 INJECTION, SOLUTION INFILTRATION; PERINEURAL at 10:23

## 2023-07-31 RX ADMIN — PHENYLEPHRINE HYDROCHLORIDE 100 MCG: 10 INJECTION INTRAVENOUS at 11:18

## 2023-07-31 RX ADMIN — PHENYLEPHRINE HYDROCHLORIDE 100 MCG: 10 INJECTION INTRAVENOUS at 10:48

## 2023-07-31 RX ADMIN — FAMOTIDINE 20 MG: 10 INJECTION INTRAVENOUS at 10:13

## 2023-07-31 RX ADMIN — CEFAZOLIN 2000 MG: 2 INJECTION, POWDER, FOR SOLUTION INTRAMUSCULAR; INTRAVENOUS at 10:15

## 2023-07-31 RX ADMIN — FENTANYL CITRATE 25 MCG: 50 INJECTION, SOLUTION INTRAMUSCULAR; INTRAVENOUS at 10:23

## 2023-07-31 RX ADMIN — PHENYLEPHRINE HYDROCHLORIDE 200 MCG: 10 INJECTION INTRAVENOUS at 11:03

## 2023-07-31 RX ADMIN — FENTANYL CITRATE 25 MCG: 50 INJECTION, SOLUTION INTRAMUSCULAR; INTRAVENOUS at 10:40

## 2023-07-31 RX ADMIN — Medication 10 ML: at 20:22

## 2023-07-31 RX ADMIN — ASPIRIN 81 MG: 81 TABLET, COATED ORAL at 17:01

## 2023-07-31 RX ADMIN — PHENYLEPHRINE HYDROCHLORIDE 100 MCG: 10 INJECTION INTRAVENOUS at 10:53

## 2023-07-31 RX ADMIN — GLYCOPYRROLATE 0.1 MG: 0.2 INJECTION, SOLUTION INTRAMUSCULAR; INTRAVENOUS at 10:19

## 2023-07-31 RX ADMIN — TAMSULOSIN HYDROCHLORIDE 0.4 MG: 0.4 CAPSULE ORAL at 20:23

## 2023-07-31 RX ADMIN — PHENYLEPHRINE HYDROCHLORIDE 50 MCG: 10 INJECTION INTRAVENOUS at 10:34

## 2023-07-31 RX ADMIN — PHENYLEPHRINE HYDROCHLORIDE 200 MCG: 10 INJECTION INTRAVENOUS at 10:58

## 2023-07-31 RX ADMIN — SUGAMMADEX 200 MG: 100 INJECTION, SOLUTION INTRAVENOUS at 11:59

## 2023-07-31 RX ADMIN — FINASTERIDE 5 MG: 5 TABLET, FILM COATED ORAL at 16:57

## 2023-07-31 RX ADMIN — LISINOPRIL 40 MG: 20 TABLET ORAL at 16:57

## 2023-07-31 RX ADMIN — VECURONIUM BROMIDE 4 MG: 1 INJECTION, POWDER, LYOPHILIZED, FOR SOLUTION INTRAVENOUS at 11:14

## 2023-07-31 ASSESSMENT — ENCOUNTER SYMPTOMS: SHORTNESS OF BREATH: 1

## 2023-07-31 ASSESSMENT — PAIN SCALES - GENERAL: PAINLEVEL_OUTOF10: 0

## 2023-07-31 ASSESSMENT — PAIN - FUNCTIONAL ASSESSMENT: PAIN_FUNCTIONAL_ASSESSMENT: NONE - DENIES PAIN

## 2023-07-31 NOTE — BRIEF OP NOTE
Brief Postoperative Note      Patient: Mike Dejesus  YOB: 1930  MRN: [id number]    Date of Procedure: 7/31/2023    Pre-Op Diagnosis Codes:      * Bypass graft stenosis, initial encounter ([address] ) [T82.858A]    Post-Op Diagnosis: Same       Procedure(s):  REVISION OF RIGHT FEMORAL POPLITEAL BYPASS WITH INTERPOSITION VEIN    Surgeon(s):  Cordell Brizuela MD    Assistant:  First Assistant: Rivka Romero    Anesthesia: General    Estimated Blood Loss (mL): less than 50     Complications: None    Specimens:   * No specimens in log *    Implants:  * No implants in log *      Drains:   Urinary Catheter 07/31/23 [address] Way (Active)       Findings: as above      Electronically signed by Cordell Brizuela MD on 7/31/2023 at 12:36 PM

## 2023-07-31 NOTE — H&P
Update History & Physical    The patient's History and Physical of July 5, 2023 was reviewed with the patient and I examined the patient. There was no change. The surgical site was confirmed by the patient and me. Plan: The risks, benefits, expected outcome, and alternative to the recommended procedure have been discussed with the patient. Patient understands and wants to proceed with the procedure.      Electronically signed by Aaron Barrera MD on 7/31/2023 at 9:41 AM

## 2023-08-01 VITALS
WEIGHT: 148.15 LBS | SYSTOLIC BLOOD PRESSURE: 125 MMHG | HEART RATE: 82 BPM | DIASTOLIC BLOOD PRESSURE: 57 MMHG | TEMPERATURE: 98 F | RESPIRATION RATE: 16 BRPM | OXYGEN SATURATION: 94 % | HEIGHT: 73 IN | BODY MASS INDEX: 19.63 KG/M2

## 2023-08-01 PROCEDURE — 97530 THERAPEUTIC ACTIVITIES: CPT

## 2023-08-01 PROCEDURE — 97166 OT EVAL MOD COMPLEX 45 MIN: CPT

## 2023-08-01 PROCEDURE — 97116 GAIT TRAINING THERAPY: CPT

## 2023-08-01 PROCEDURE — 2580000003 HC RX 258: Performed by: SURGERY

## 2023-08-01 PROCEDURE — 6360000002 HC RX W HCPCS: Performed by: SURGERY

## 2023-08-01 PROCEDURE — 97162 PT EVAL MOD COMPLEX 30 MIN: CPT

## 2023-08-01 PROCEDURE — 97535 SELF CARE MNGMENT TRAINING: CPT

## 2023-08-01 PROCEDURE — APPNB30 APP NON BILLABLE TIME 0-30 MINS: Performed by: NURSE PRACTITIONER

## 2023-08-01 PROCEDURE — APPSS30 APP SPLIT SHARED TIME 16-30 MINUTES: Performed by: NURSE PRACTITIONER

## 2023-08-01 PROCEDURE — 6370000000 HC RX 637 (ALT 250 FOR IP): Performed by: SURGERY

## 2023-08-01 RX ADMIN — MORPHINE SULFATE 15 MG: 15 TABLET, FILM COATED, EXTENDED RELEASE ORAL at 08:19

## 2023-08-01 RX ADMIN — ROSUVASTATIN 10 MG: 10 TABLET, FILM COATED ORAL at 08:20

## 2023-08-01 RX ADMIN — CEFAZOLIN 2000 MG: 2 INJECTION, POWDER, FOR SOLUTION INTRAMUSCULAR; INTRAVENOUS at 00:18

## 2023-08-01 RX ADMIN — FINASTERIDE 5 MG: 5 TABLET, FILM COATED ORAL at 08:20

## 2023-08-01 RX ADMIN — ASPIRIN 81 MG: 81 TABLET, COATED ORAL at 08:20

## 2023-08-01 RX ADMIN — PANTOPRAZOLE SODIUM 40 MG: 40 TABLET, DELAYED RELEASE ORAL at 05:59

## 2023-08-01 RX ADMIN — METFORMIN HYDROCHLORIDE 1000 MG: 500 TABLET ORAL at 08:19

## 2023-08-01 RX ADMIN — LISINOPRIL 40 MG: 20 TABLET ORAL at 08:20

## 2023-08-01 ASSESSMENT — PAIN SCALES - GENERAL: PAINLEVEL_OUTOF10: 0

## 2023-08-01 NOTE — DISCHARGE INSTR - COC
Continuity of Care Form    Patient Name: Cris Ariza   :  1930  MRN:  [id number]    Admit date:  2023  Discharge date:  2023    Code Status Order: Full Code   Advance Directives:   [address] Oliver Documentation       Date/Time Healthcare Directive Type of Healthcare Directive Copy in [address] Agent's Name Healthcare Agent's Phone Number    [phone] Yes, patient has an advance directive for healthcare treatment Living will No, copy requested from family Adult [facility] [phone]            Admitting Physician:  Rubi Cheng MD  PCP: [address]    Discharging Nurse: [facility] Unit/Room#: CVU-2915/2915-01  Discharging Unit Phone Number: [phone]    Emergency Contact:   Extended Emergency Contact Information  Primary Emergency Contact: John Ambrosio  [address] Phone: [phone]  Work Phone: [phone]  Mobile Phone: [phone]  Relation: Child  Secondary Emergency Contact: eamon osuna  Home Phone: [phone]  Relation: Child    Past Surgical History:  Past Surgical History:   Procedure Laterality Date    APPENDECTOMY      FEMORAL BYPASS Right 2023    REVISION OF RIGHT FEMORAL POPLITEAL BYPASS WITH INTERPOSITION VEIN performed by Rubi Cheng MD at [address] [place] [address]    FEMORAL-TIBIAL BYPASS GRAFT Right 2022    RIGHT FEMORAL TO POSTERIOR TIBIAL BYPASS GRAFT WITH VEIN GRAFT AND ARTERIOGRAM performed by Rubi Cheng MD at [address] [place] [address]         Immunization History: There is no immunization history for the selected administration types on file for this patient. Active Problems:  Patient Active Problem List   Diagnosis Code    Pleural plaque J92.9    Shortness of breath R06.02    Benign essential HTN I10    Hematoma T14. 8XXA    Hyperlipidemia E78.5    DMII

## 2023-08-01 NOTE — DISCHARGE SUMMARY
tablet  Commonly known as: GLUCOPHAGE     mirtazapine 15 MG tablet  Commonly known as: REMERON     morphine 15 MG extended release tablet  Commonly known as: MS CONTIN     omeprazole 40 MG delayed release capsule  Commonly known as: PRILOSEC     rosuvastatin 10 MG tablet  Commonly known as: CRESTOR     tamsulosin 0.4 MG capsule  Commonly known as: FLOMAX            STOP taking these medications      insulin glargine 100 UNIT/ML injection vial  Commonly known as: LANTUS              Patient Instructions: Activity:  no heavy lifting, driving, or strenuous exercise for 2 weeks. Walk as tolerated, weight bearing as tolerated. Keep right leg elevated when in bed and chair. Diet:  regular diet  Wound Care:  keep incision clean and dry. Can use soap and water to clean, keep open to air. Okay to shower on Monday. No tub baths, swimming or hot tubs until seen in the office. ACE wrap right leg from ball of foot to knee. Follow up with Dr. Mami Aaron in 2 weeks. Please call the office at [phone] to make an appointment.       Signed:  Gabriella Peabody, APRN - CNP, 8/1/2023, 1:22 PM

## 2023-08-01 NOTE — PLAN OF CARE
Problem: Chronic Conditions and Co-morbidities  Goal: Patient's chronic conditions and co-morbidity symptoms are monitored and maintained or improved  Outcome: Progressing     Problem: Discharge Planning  Goal: Discharge to home or other facility with appropriate resources  Outcome: Progressing     Problem: Safety - Adult  Goal: Free from fall injury  Outcome: Progressing     Problem: Pain  Goal: Verbalizes/displays adequate comfort level or baseline comfort level  Outcome: Progressing     Problem: Skin/Tissue Integrity  Goal: Absence of new skin breakdown  Outcome: Progressing     Problem: ABCDS Injury Assessment  Goal: Absence of physical injury  Outcome: Progressing

## 2023-08-01 NOTE — OP NOTE
pulse  distally at the ankle dramatically improved as well. This wound was  then made hemostatic and closed using 3-0 Vicryls and the subcutaneous  tissue interrupted over the graft followed by a 4-0 Monocryl with  Dermabond. The patient was then extubated and transferred to the  recovery room in stable condition, having tolerated the procedure well.         Chela Tello MD    D: 07/31/2023 12:51:20       T: 07/31/2023 12:55:06     [email or web address]  Job#: [id number]     Doc#: [id number]    CC:

## 2023-08-02 ENCOUNTER — TELEPHONE (OUTPATIENT)
Dept: CARDIOTHORACIC SURGERY | Age: 88
End: 2023-08-02

## 2023-08-02 NOTE — TELEPHONE ENCOUNTER
Patient's daughter called wanting a 2 week post op appointment at [facility]. Please give her a call as I don't see availability until 9/6.

## 2023-08-04 LAB
BLOOD BANK DISPENSE STATUS: NORMAL
BLOOD BANK DISPENSE STATUS: NORMAL
BLOOD BANK PRODUCT CODE: NORMAL
BLOOD BANK PRODUCT CODE: NORMAL
BPU ID: NORMAL
BPU ID: NORMAL
DESCRIPTION BLOOD BANK: NORMAL
DESCRIPTION BLOOD BANK: NORMAL

## 2023-08-16 ENCOUNTER — OFFICE VISIT (OUTPATIENT)
Dept: VASCULAR SURGERY | Age: 88
End: 2023-08-16

## 2023-08-16 VITALS — HEIGHT: 73 IN | BODY MASS INDEX: 19.61 KG/M2 | WEIGHT: 148 LBS | HEART RATE: 80 BPM

## 2023-08-16 DIAGNOSIS — T82.858A BYPASS GRAFT STENOSIS, INITIAL ENCOUNTER (HCC): Primary | ICD-10-CM

## 2023-08-16 PROCEDURE — 99024 POSTOP FOLLOW-UP VISIT: CPT | Performed by: SURGERY

## 2023-08-16 NOTE — PROGRESS NOTES
First OV S/P revision R fem-PTA bypass with interposition vein graft 2 weeks ago. Feels well with no problems. EXAM:  Incisions intact without erythema or hematomas    [place]ble graft pulse distal to revision without bruit    A/P: S/P revision R fem-PTA bypass. Patent. Increase all activities. F/U 6 weeks. Will plan 3 month GSS for 3 months from now.

## 2023-10-04 ENCOUNTER — OFFICE VISIT (OUTPATIENT)
Dept: VASCULAR SURGERY | Age: 88
End: 2023-10-04

## 2023-10-04 VITALS — BODY MASS INDEX: 19.61 KG/M2 | WEIGHT: 148 LBS | HEIGHT: 73 IN | HEART RATE: 80 BPM

## 2023-10-04 DIAGNOSIS — Z48.812 ENCOUNTER FOR POSTOPERATIVE SURVEILLANCE OF VASCULAR BYPASS SURGERY: Primary | ICD-10-CM

## 2023-10-04 DIAGNOSIS — T82.858A BYPASS GRAFT STENOSIS, INITIAL ENCOUNTER (HCC): ICD-10-CM

## 2023-10-04 PROCEDURE — 99024 POSTOP FOLLOW-UP VISIT: CPT | Performed by: SURGERY

## 2023-10-04 NOTE — PROGRESS NOTES
Second OV S/P revision R fem-PTA bypass with interposition vein graft 7/31/2023  Feels well with no problems. EXAM:  Incisions intact without erythema or hematomas    Palapble graft pulse distal to revision without bruit - strong PT pulse    A/P: S/P revision R fem-PTA bypass. Patent. No restrictions. First GSS in early November - will call with results.

## 2023-11-02 ENCOUNTER — PROCEDURE VISIT (OUTPATIENT)
Dept: VASCULAR SURGERY | Age: 88
End: 2023-11-02
Payer: MEDICARE

## 2023-11-02 DIAGNOSIS — Z48.812 ENCOUNTER FOR POSTOPERATIVE SURVEILLANCE OF VASCULAR BYPASS SURGERY: ICD-10-CM

## 2023-11-02 DIAGNOSIS — I73.9 PVD (PERIPHERAL VASCULAR DISEASE) WITH CLAUDICATION (HCC): Primary | ICD-10-CM

## 2023-11-02 PROCEDURE — 93926 LOWER EXTREMITY STUDY: CPT | Performed by: SURGERY

## 2023-12-04 ENCOUNTER — TELEPHONE (OUTPATIENT)
Dept: VASCULAR SURGERY | Age: 88
End: 2023-12-04

## 2023-12-04 NOTE — TELEPHONE ENCOUNTER
Left message for Shonda Huff to call the office. Scan from early November looks good per Laney Amorer   Patient can reschedule appointment on 12/6 and reschedule for new scan and ov in February.

## (undated) DEVICE — APPLIER CLP L9.38IN M LIG TI DISP STR RNG HNDL LIGACLP

## (undated) DEVICE — COVER LT HNDL BLU PLAS

## (undated) DEVICE — MERCY FAIRFIELD TURNOVER KIT: Brand: MEDLINE INDUSTRIES, INC.

## (undated) DEVICE — SYRINGE, LUER LOCK, 10ML: Brand: MEDLINE

## (undated) DEVICE — SOLUTION IRRIG 1000ML 0.9% SOD CHL USP POUR PLAS BTL

## (undated) DEVICE — GAUZE,SPONGE,4"X4",8PLY,STRL,LF,10/TRAY: Brand: MEDLINE

## (undated) DEVICE — APPLICATOR PREP 26ML 0.7% IOD POVACRYLEX 74% ISO ALC ST

## (undated) DEVICE — HYPODERMIC SAFETY NEEDLE: Brand: MAGELLAN

## (undated) DEVICE — BLANKET WRM W29.9XL79.1IN UP BODY FORC AIR MISTRAL-AIR

## (undated) DEVICE — INTENDED TO BE USED TO OCCLUDE, RETRACT AND IDENTIFY ARTERIES, VEINS, TENDONS AND NERVES IN SURGICAL PROCEDURES: Brand: STERION®  VESSEL LOOP

## (undated) DEVICE — SUTURE PROL SZ 6-0 L18IN NONABSORBABLE BLU L13MM C-1 3/8 8718H

## (undated) DEVICE — STAPLER SKIN H3.9MM WIRE DIA0.58MM CRWN 6.9MM 35 STPL ROT

## (undated) DEVICE — SYRINGE MED 3ML CLR PLAS STD N CTRL LUERLOCK TIP DISP

## (undated) DEVICE — STRIP,CLOSURE,WOUND,MEDI-STRIP,1/2X4: Brand: MEDLINE

## (undated) DEVICE — SUTURE NONABSORBABLE MONOFILAMENT 5-0 C-1 1X24 IN PROLENE 8725H

## (undated) DEVICE — LOOP,VESSEL,MAXI,BLUE,2/PK,STERILE: Brand: MEDLINE

## (undated) DEVICE — SUTURE PROL SZ 7-0 L18IN NONABSORBABLE BLU L9.3MM BV-1 3/8 8701H

## (undated) DEVICE — APPLIER CLP L9.375IN APER 2.1MM CLS L3.8MM 20 SM TI CLP

## (undated) DEVICE — DECANTER FLD 9IN ST BG FOR ASEP TRNSF OF FLD

## (undated) DEVICE — SUTURE PROL SZ 6-0 L24IN NONABSORBABLE BLU L9.3MM BV-1 3/8 8805H

## (undated) DEVICE — SYRINGE MED 30ML STD CLR PLAS LUERLOCK TIP N CTRL DISP

## (undated) DEVICE — BANDAGE COMPR W6INXL12FT SMOOTH FOR LIMB EXSANG ESMARCH

## (undated) DEVICE — SURGICAL SUCTION CONNECTING TUBE WITH MALE CONNECTOR AND SUCTION CLAMP, 2 FT. LONG (.6 M), 5 MM I.D.: Brand: CONMED

## (undated) DEVICE — DRESSING PETRO W3XL3IN OIL EMUL N ADH GZ KNIT IMPREG CELOS

## (undated) DEVICE — ELECTRODE PT RET AD L9FT HI MOIST COND ADH HYDRGEL CORDED

## (undated) DEVICE — AGENT HEMSTAT W4XL8IN OXIDIZED REGENERATED CELOS ABSRB

## (undated) DEVICE — FOGARTY - HYDRAGRIP SURGICAL - CLAMP INSERTS: Brand: FOGARTY SOFTJAW

## (undated) DEVICE — VALVULOTOME ANGIOSCOPIC 91 CM CATH VEIN SL INTRO HD EZE-SIT

## (undated) DEVICE — MAJOR SET UP PK

## (undated) DEVICE — SUTURE VCRL + SZ 2-0 L36IN ABSRB UD L36MM CT-1 1/2 CIR VCP945H

## (undated) DEVICE — ZIMMER® STERILE DISPOSABLE TOURNIQUET CUFF WITH PLC, DUAL PORT, SINGLE BLADDER, 18 IN. (46 CM)

## (undated) DEVICE — SUTURE PERMAHAND SZ 3-0 L18IN NONABSORBABLE BLK L26MM SH C013D

## (undated) DEVICE — SHEET, T, LAPAROTOMY, STERILE: Brand: MEDLINE

## (undated) DEVICE — DRAPE,REIN 53X77,STERILE: Brand: MEDLINE

## (undated) DEVICE — LOTION PREP REMV 5OZ IODO CLR TINC OF BENZ DURAPREP

## (undated) DEVICE — GOWN SIRUS NONREIN XL W/TWL: Brand: MEDLINE INDUSTRIES, INC.

## (undated) DEVICE — GAUZE,SPONGE,4"X4",16PLY,XRAY,STRL,LF: Brand: MEDLINE

## (undated) DEVICE — SUTURE MCRYL + SZ 4-0 L27IN ABSRB UD L19MM PS-2 3/8 CIR MCP426H

## (undated) DEVICE — SUTURE NONABSORBABLE MONOFILAMENT 4-0 RB-1 36 IN BLU PROLENE 8557H

## (undated) DEVICE — TOWEL,OR,DSP,ST,WHITE,DLX,XR,4/PK,20PK/C: Brand: MEDLINE

## (undated) DEVICE — LIQUIBAND RAPID ADHESIVE 36/CS 0.8ML: Brand: MEDLINE

## (undated) DEVICE — INTENDED FOR TISSUE SEPARATION, AND OTHER PROCEDURES THAT REQUIRE A SHARP SURGICAL BLADE TO PUNCTURE OR CUT.: Brand: BARD-PARKER ® STAINLESS STEEL BLADES

## (undated) DEVICE — SPONGE LAP W18XL18IN WHT COT 4 PLY FLD STRUNG RADPQ DISP ST 2 PER PACK

## (undated) DEVICE — PAD THRM M SPL TORSO

## (undated) DEVICE — TOWEL,OR,DSP,ST,BLUE,DLX,10/PK,8PK/CS: Brand: MEDLINE

## (undated) DEVICE — SUTURE VCRL + SZ 3-0 L36IN ABSRB UD L36MM CT-1 1/2 CIR VCP944H

## (undated) DEVICE — VI-DRAPE ISOLATION BAG: Brand: CONVERTORS

## (undated) DEVICE — BLADE CLIPPER GEN PURP NS

## (undated) DEVICE — BANDAGE COBAN 4 IN COMPR W4INXL5YD FOAM COHESIVE QUIK STK SELF ADH SFT

## (undated) DEVICE — BANDAGE,GAUZE,BULKEE II,4.5"X4.1YD,STRL: Brand: MEDLINE

## (undated) DEVICE — SUTURE PERMAHAND SZ 4-0 L18IN NONABSORBABLE BLK SILK BRAID A183H

## (undated) DEVICE — APPLICATOR MEDICATED 26 CC SOLUTION HI LT ORNG CHLORAPREP

## (undated) DEVICE — SUTURE PERMA-HAND SZ 2-0 L30IN NONABSORBABLE BLK L26MM SH K833H

## (undated) DEVICE — SCANLAN® VASCU-STATT® SINGLE-USE BULLDOG CLAMP - MIDI ANGLED 45° (WHITE), CLAMPING PRESSURE 25-30 G (2/STERILE PKG): Brand: SCANLAN® VASCU-STATT® SINGLE-USE BULLDOG CLAMP

## (undated) DEVICE — GEL US 20GM NONIRRITATING OVERWRAPPED FILE PCH TRNSMIT

## (undated) DEVICE — SUTURE NONABSORBABLE MONOFILAMENT 7-0 BV-1 1X24 IN PROLENE 8702H

## (undated) DEVICE — INDICATED FOR SURGICAL CLAMPING DURING CARDIOVASCULAR PERIPHERAL VASCULAR, AND GENERAL SURGERY.: Brand: SOFT/FIBRA® SPRING CLIP

## (undated) DEVICE — SYRINGE, LUER LOCK, 30ML: Brand: MEDLINE

## (undated) DEVICE — SET EXTN PRIMING 4.9ML L30IN INCL SLDE CLMP SPIN M LUERLOCK

## (undated) DEVICE — STERILE POLYISOPRENE POWDER-FREE SURGICAL GLOVES: Brand: PROTEXIS

## (undated) DEVICE — 20 ML SYRINGE LUER-LOCK TIP: Brand: MONOJECT

## (undated) DEVICE — BANDAGE,ELASTIC,ESMARK,STERILE,6"X9',LF: Brand: MEDLINE

## (undated) DEVICE — TTL1LYR 16FR10ML 100%SIL TMPST TR: Brand: MEDLINE

## (undated) DEVICE — SUTURE BOOT: Brand: DEROYAL

## (undated) DEVICE — SUTURE PROL SZ 6-0 L24IN NONABSORBABLE BLU L13MM C-1 3/8 8726H

## (undated) DEVICE — SUTURE VCRL + SZ 3-0 L27IN ABSRB UD L26MM SH 1/2 CIR VCP416H

## (undated) DEVICE — SOLUTION IV IRRIG WATER 1000ML POUR BRL 2F7114

## (undated) DEVICE — SHEET,DRAPE,53X77,STERILE: Brand: MEDLINE

## (undated) DEVICE — SUTURE PERMAHAND SZ 2-0 L18IN NONABSORBABLE BLK L26MM SH C012D

## (undated) DEVICE — SUTURE PERMAHAND SZ 2-0 L12X18IN NONABSORBABLE BLK SILK A185H

## (undated) DEVICE — SUTURE VCRL + SZ 3-0 L18IN ABSRB UD SH 1/2 CIR TAPERCUT NDL VCP864D

## (undated) DEVICE — SUTURE PROL SZ 7-0 L30IN NONABSORBABLE BLU L13MM C-1 3/8 8708H

## (undated) DEVICE — SUTURE MCRYL + SZ 4-0 L18IN ABSRB UD L19MM PS-2 3/8 CIR MCP496G

## (undated) DEVICE — SYRINGE, LUER LOCK, 5ML: Brand: MEDLINE